# Patient Record
Sex: MALE | Race: WHITE | NOT HISPANIC OR LATINO | Employment: OTHER | URBAN - METROPOLITAN AREA
[De-identification: names, ages, dates, MRNs, and addresses within clinical notes are randomized per-mention and may not be internally consistent; named-entity substitution may affect disease eponyms.]

---

## 2017-01-27 ENCOUNTER — GENERIC CONVERSION - ENCOUNTER (OUTPATIENT)
Dept: OTHER | Facility: OTHER | Age: 57
End: 2017-01-27

## 2017-02-01 ENCOUNTER — GENERIC CONVERSION - ENCOUNTER (OUTPATIENT)
Dept: OTHER | Facility: OTHER | Age: 57
End: 2017-02-01

## 2017-03-03 ENCOUNTER — APPOINTMENT (OUTPATIENT)
Dept: LAB | Facility: CLINIC | Age: 57
End: 2017-03-03
Payer: MEDICARE

## 2017-03-03 DIAGNOSIS — B20 HUMAN IMMUNODEFICIENCY VIRUS (HIV) DISEASE (HCC): ICD-10-CM

## 2017-03-03 DIAGNOSIS — E78.1 PURE HYPERGLYCERIDEMIA: ICD-10-CM

## 2017-03-03 LAB
ALBUMIN SERPL BCP-MCNC: 4.4 G/DL (ref 3.5–5)
ALP SERPL-CCNC: 106 U/L (ref 46–116)
ALT SERPL W P-5'-P-CCNC: 34 U/L (ref 12–78)
ANION GAP SERPL CALCULATED.3IONS-SCNC: 11 MMOL/L (ref 4–13)
AST SERPL W P-5'-P-CCNC: 20 U/L (ref 5–45)
BASOPHILS # BLD AUTO: 0.04 THOUSANDS/ΜL (ref 0–0.1)
BASOPHILS NFR BLD AUTO: 1 % (ref 0–1)
BILIRUB SERPL-MCNC: 0.4 MG/DL (ref 0.2–1)
BUN SERPL-MCNC: 18 MG/DL (ref 5–25)
CALCIUM SERPL-MCNC: 9.6 MG/DL (ref 8.3–10.1)
CHLORIDE SERPL-SCNC: 101 MMOL/L (ref 100–108)
CHOLEST SERPL-MCNC: 185 MG/DL (ref 50–200)
CO2 SERPL-SCNC: 27 MMOL/L (ref 21–32)
CREAT SERPL-MCNC: 1.11 MG/DL (ref 0.6–1.3)
EOSINOPHIL # BLD AUTO: 0.12 THOUSAND/ΜL (ref 0–0.61)
EOSINOPHIL NFR BLD AUTO: 2 % (ref 0–6)
ERYTHROCYTE [DISTWIDTH] IN BLOOD BY AUTOMATED COUNT: 12.8 % (ref 11.6–15.1)
GFR SERPL CREATININE-BSD FRML MDRD: >60 ML/MIN/1.73SQ M
GLUCOSE SERPL-MCNC: 90 MG/DL (ref 65–140)
HCT VFR BLD AUTO: 45 % (ref 36.5–49.3)
HDLC SERPL-MCNC: 41 MG/DL (ref 40–60)
HGB BLD-MCNC: 15.8 G/DL (ref 12–17)
LDLC SERPL CALC-MCNC: 95 MG/DL (ref 0–100)
LYMPHOCYTES # BLD AUTO: 1.83 THOUSANDS/ΜL (ref 0.6–4.47)
LYMPHOCYTES NFR BLD AUTO: 27 % (ref 14–44)
MCH RBC QN AUTO: 30 PG (ref 26.8–34.3)
MCHC RBC AUTO-ENTMCNC: 35.1 G/DL (ref 31.4–37.4)
MCV RBC AUTO: 86 FL (ref 82–98)
MONOCYTES # BLD AUTO: 0.66 THOUSAND/ΜL (ref 0.17–1.22)
MONOCYTES NFR BLD AUTO: 10 % (ref 4–12)
NEUTROPHILS # BLD AUTO: 4.26 THOUSANDS/ΜL (ref 1.85–7.62)
NEUTS SEG NFR BLD AUTO: 60 % (ref 43–75)
PLATELET # BLD AUTO: 215 THOUSANDS/UL (ref 149–390)
PMV BLD AUTO: 11 FL (ref 8.9–12.7)
POTASSIUM SERPL-SCNC: 3.7 MMOL/L (ref 3.5–5.3)
PROT SERPL-MCNC: 8.8 G/DL (ref 6.4–8.2)
RBC # BLD AUTO: 5.26 MILLION/UL (ref 3.88–5.62)
SODIUM SERPL-SCNC: 139 MMOL/L (ref 136–145)
TRIGL SERPL-MCNC: 245 MG/DL
WBC # BLD AUTO: 6.91 THOUSAND/UL (ref 4.31–10.16)

## 2017-03-03 PROCEDURE — 36415 COLL VENOUS BLD VENIPUNCTURE: CPT

## 2017-03-03 PROCEDURE — 85025 COMPLETE CBC W/AUTO DIFF WBC: CPT

## 2017-03-03 PROCEDURE — 86706 HEP B SURFACE ANTIBODY: CPT

## 2017-03-03 PROCEDURE — 80061 LIPID PANEL: CPT

## 2017-03-03 PROCEDURE — 86361 T CELL ABSOLUTE COUNT: CPT

## 2017-03-03 PROCEDURE — 87536 HIV-1 QUANT&REVRSE TRNSCRPJ: CPT

## 2017-03-03 PROCEDURE — 80053 COMPREHEN METABOLIC PANEL: CPT

## 2017-03-03 PROCEDURE — 86803 HEPATITIS C AB TEST: CPT

## 2017-03-04 LAB
BASOPHILS # BLD AUTO: 0 X10E3/UL (ref 0–0.2)
BASOPHILS NFR BLD AUTO: 0 %
CD3+CD4+ CELLS # BLD: 594 /UL (ref 359–1519)
CD3+CD4+ CELLS NFR BLD: 33 % (ref 30.8–58.5)
EOSINOPHIL # BLD AUTO: 0.1 X10E3/UL (ref 0–0.4)
EOSINOPHIL NFR BLD AUTO: 2 %
ERYTHROCYTE [DISTWIDTH] IN BLOOD BY AUTOMATED COUNT: 14 % (ref 12.3–15.4)
HBV SURFACE AB SER-ACNC: <3.1 MIU/ML
HCT VFR BLD AUTO: 45.3 % (ref 37.5–51)
HCV AB SER QL: NORMAL
HGB BLD-MCNC: 15.6 G/DL (ref 12.6–17.7)
IMM GRANULOCYTES # BLD: 0 X10E3/UL (ref 0–0.1)
IMM GRANULOCYTES NFR BLD: 0 %
LYMPHOCYTES # BLD AUTO: 1.8 X10E3/UL (ref 0.7–3.1)
LYMPHOCYTES NFR BLD AUTO: 27 %
MCH RBC QN AUTO: 30.2 PG (ref 26.6–33)
MCHC RBC AUTO-ENTMCNC: 34.4 G/DL (ref 31.5–35.7)
MCV RBC AUTO: 88 FL (ref 79–97)
MONOCYTES # BLD AUTO: 0.6 X10E3/UL (ref 0.1–0.9)
MONOCYTES NFR BLD AUTO: 8 %
NEUTROPHILS # BLD AUTO: 4.1 X10E3/UL (ref 1.4–7)
NEUTROPHILS NFR BLD AUTO: 63 %
PLATELET # BLD AUTO: 218 X10E3/UL (ref 150–379)
RBC # BLD AUTO: 5.17 X10E6/UL (ref 4.14–5.8)
WBC # BLD AUTO: 6.6 X10E3/UL (ref 3.4–10.8)

## 2017-03-07 LAB
HIV1 RNA # SERPL NAA+PROBE: <20 COPIES/ML
HIV1 RNA SERPL NAA+PROBE-LOG#: NORMAL LOG10COPY/ML

## 2017-03-08 ENCOUNTER — GENERIC CONVERSION - ENCOUNTER (OUTPATIENT)
Dept: OTHER | Facility: OTHER | Age: 57
End: 2017-03-08

## 2017-03-28 ENCOUNTER — ALLSCRIPTS OFFICE VISIT (OUTPATIENT)
Dept: OTHER | Facility: CLINIC | Age: 57
End: 2017-03-28

## 2017-04-25 ENCOUNTER — ALLSCRIPTS OFFICE VISIT (OUTPATIENT)
Dept: OTHER | Facility: CLINIC | Age: 57
End: 2017-04-25

## 2017-04-25 ENCOUNTER — GENERIC CONVERSION - ENCOUNTER (OUTPATIENT)
Dept: OTHER | Facility: OTHER | Age: 57
End: 2017-04-25

## 2017-04-25 DIAGNOSIS — R19.7 DIARRHEA: ICD-10-CM

## 2017-04-25 DIAGNOSIS — E78.1 PURE HYPERGLYCERIDEMIA: ICD-10-CM

## 2017-05-19 ENCOUNTER — GENERIC CONVERSION - ENCOUNTER (OUTPATIENT)
Dept: OTHER | Facility: OTHER | Age: 57
End: 2017-05-19

## 2017-07-11 ENCOUNTER — ALLSCRIPTS OFFICE VISIT (OUTPATIENT)
Dept: OTHER | Facility: CLINIC | Age: 57
End: 2017-07-11

## 2017-07-11 ENCOUNTER — GENERIC CONVERSION - ENCOUNTER (OUTPATIENT)
Dept: OTHER | Facility: OTHER | Age: 57
End: 2017-07-11

## 2017-08-01 DIAGNOSIS — Z11.3 ENCOUNTER FOR SCREENING FOR INFECTIONS WITH PREDOMINANTLY SEXUAL MODE OF TRANSMISSION: ICD-10-CM

## 2017-08-01 DIAGNOSIS — B20 HUMAN IMMUNODEFICIENCY VIRUS (HIV) DISEASE (HCC): ICD-10-CM

## 2017-09-01 DIAGNOSIS — Z79.899 OTHER LONG TERM (CURRENT) DRUG THERAPY: ICD-10-CM

## 2017-09-01 DIAGNOSIS — E78.1 PURE HYPERGLYCERIDEMIA: ICD-10-CM

## 2017-09-01 DIAGNOSIS — E11.9 TYPE 2 DIABETES MELLITUS WITHOUT COMPLICATIONS (HCC): ICD-10-CM

## 2017-09-01 DIAGNOSIS — F17.200 NICOTINE DEPENDENCE, UNCOMPLICATED: ICD-10-CM

## 2017-10-05 ENCOUNTER — TRANSCRIBE ORDERS (OUTPATIENT)
Dept: LAB | Facility: CLINIC | Age: 57
End: 2017-10-05

## 2017-10-05 ENCOUNTER — APPOINTMENT (OUTPATIENT)
Dept: LAB | Facility: CLINIC | Age: 57
End: 2017-10-05
Payer: MEDICARE

## 2017-10-05 DIAGNOSIS — B20 HUMAN IMMUNODEFICIENCY VIRUS (HIV) DISEASE (HCC): ICD-10-CM

## 2017-10-05 DIAGNOSIS — E78.1 PURE HYPERGLYCERIDEMIA: ICD-10-CM

## 2017-10-05 DIAGNOSIS — Z11.3 ENCOUNTER FOR SCREENING FOR INFECTIONS WITH PREDOMINANTLY SEXUAL MODE OF TRANSMISSION: ICD-10-CM

## 2017-10-05 DIAGNOSIS — Z79.899 OTHER LONG TERM (CURRENT) DRUG THERAPY: ICD-10-CM

## 2017-10-05 DIAGNOSIS — F17.200 NICOTINE DEPENDENCE, UNCOMPLICATED: ICD-10-CM

## 2017-10-05 DIAGNOSIS — E11.9 TYPE 2 DIABETES MELLITUS WITHOUT COMPLICATIONS (HCC): ICD-10-CM

## 2017-10-05 LAB
25(OH)D3 SERPL-MCNC: 29.7 NG/ML (ref 30–100)
ALBUMIN SERPL BCP-MCNC: 3.7 G/DL (ref 3.5–5)
ALP SERPL-CCNC: 100 U/L (ref 46–116)
ALT SERPL W P-5'-P-CCNC: 35 U/L (ref 12–78)
ANION GAP SERPL CALCULATED.3IONS-SCNC: 4 MMOL/L (ref 4–13)
AST SERPL W P-5'-P-CCNC: 20 U/L (ref 5–45)
BASOPHILS # BLD AUTO: 0.03 THOUSANDS/ΜL (ref 0–0.1)
BASOPHILS NFR BLD AUTO: 1 % (ref 0–1)
BILIRUB SERPL-MCNC: 0.2 MG/DL (ref 0.2–1)
BILIRUB UR QL STRIP: NEGATIVE
BUN SERPL-MCNC: 17 MG/DL (ref 5–25)
CALCIUM SERPL-MCNC: 8.7 MG/DL (ref 8.3–10.1)
CHLORIDE SERPL-SCNC: 101 MMOL/L (ref 100–108)
CHOLEST SERPL-MCNC: 144 MG/DL (ref 50–200)
CLARITY UR: CLEAR
CO2 SERPL-SCNC: 29 MMOL/L (ref 21–32)
COLOR UR: YELLOW
CREAT SERPL-MCNC: 1.03 MG/DL (ref 0.6–1.3)
EOSINOPHIL # BLD AUTO: 0.33 THOUSAND/ΜL (ref 0–0.61)
EOSINOPHIL NFR BLD AUTO: 7 % (ref 0–6)
ERYTHROCYTE [DISTWIDTH] IN BLOOD BY AUTOMATED COUNT: 12.8 % (ref 11.6–15.1)
EST. AVERAGE GLUCOSE BLD GHB EST-MCNC: 120 MG/DL
GFR SERPL CREATININE-BSD FRML MDRD: 81 ML/MIN/1.73SQ M
GLUCOSE P FAST SERPL-MCNC: 111 MG/DL (ref 65–99)
GLUCOSE UR STRIP-MCNC: NEGATIVE MG/DL
HBA1C MFR BLD: 5.8 % (ref 4.2–6.3)
HCT VFR BLD AUTO: 40.9 % (ref 36.5–49.3)
HDLC SERPL-MCNC: 37 MG/DL (ref 40–60)
HGB BLD-MCNC: 14.1 G/DL (ref 12–17)
HGB UR QL STRIP.AUTO: NEGATIVE
KETONES UR STRIP-MCNC: NEGATIVE MG/DL
LDLC SERPL CALC-MCNC: 83 MG/DL (ref 0–100)
LEUKOCYTE ESTERASE UR QL STRIP: NEGATIVE
LYMPHOCYTES # BLD AUTO: 1.57 THOUSANDS/ΜL (ref 0.6–4.47)
LYMPHOCYTES NFR BLD AUTO: 34 % (ref 14–44)
MCH RBC QN AUTO: 30.1 PG (ref 26.8–34.3)
MCHC RBC AUTO-ENTMCNC: 34.5 G/DL (ref 31.4–37.4)
MCV RBC AUTO: 87 FL (ref 82–98)
MONOCYTES # BLD AUTO: 0.43 THOUSAND/ΜL (ref 0.17–1.22)
MONOCYTES NFR BLD AUTO: 9 % (ref 4–12)
NEUTROPHILS # BLD AUTO: 2.32 THOUSANDS/ΜL (ref 1.85–7.62)
NEUTS SEG NFR BLD AUTO: 49 % (ref 43–75)
NITRITE UR QL STRIP: NEGATIVE
PH UR STRIP.AUTO: 6 [PH] (ref 4.5–8)
PLATELET # BLD AUTO: 192 THOUSANDS/UL (ref 149–390)
PMV BLD AUTO: 11.2 FL (ref 8.9–12.7)
POTASSIUM SERPL-SCNC: 3.7 MMOL/L (ref 3.5–5.3)
PROT SERPL-MCNC: 7.8 G/DL (ref 6.4–8.2)
PROT UR STRIP-MCNC: NEGATIVE MG/DL
RBC # BLD AUTO: 4.69 MILLION/UL (ref 3.88–5.62)
SODIUM SERPL-SCNC: 134 MMOL/L (ref 136–145)
SP GR UR STRIP.AUTO: 1.01 (ref 1–1.03)
TRIGL SERPL-MCNC: 118 MG/DL
UROBILINOGEN UR QL STRIP.AUTO: 0.2 E.U./DL
WBC # BLD AUTO: 4.68 THOUSAND/UL (ref 4.31–10.16)

## 2017-10-05 PROCEDURE — 86592 SYPHILIS TEST NON-TREP QUAL: CPT

## 2017-10-05 PROCEDURE — 80061 LIPID PANEL: CPT

## 2017-10-05 PROCEDURE — 87536 HIV-1 QUANT&REVRSE TRNSCRPJ: CPT

## 2017-10-05 PROCEDURE — 86361 T CELL ABSOLUTE COUNT: CPT

## 2017-10-05 PROCEDURE — 80053 COMPREHEN METABOLIC PANEL: CPT

## 2017-10-05 PROCEDURE — 36415 COLL VENOUS BLD VENIPUNCTURE: CPT

## 2017-10-05 PROCEDURE — 81003 URINALYSIS AUTO W/O SCOPE: CPT

## 2017-10-05 PROCEDURE — 82306 VITAMIN D 25 HYDROXY: CPT

## 2017-10-05 PROCEDURE — 85025 COMPLETE CBC W/AUTO DIFF WBC: CPT

## 2017-10-05 PROCEDURE — 86480 TB TEST CELL IMMUN MEASURE: CPT

## 2017-10-05 PROCEDURE — 83036 HEMOGLOBIN GLYCOSYLATED A1C: CPT

## 2017-10-06 LAB
BASOPHILS # BLD AUTO: 0.1 X10E3/UL (ref 0–0.2)
BASOPHILS NFR BLD AUTO: 1 %
CD3+CD4+ CELLS # BLD: 465 /UL (ref 359–1519)
CD3+CD4+ CELLS NFR BLD: 31 % (ref 30.8–58.5)
EOSINOPHIL # BLD AUTO: 0.3 X10E3/UL (ref 0–0.4)
EOSINOPHIL NFR BLD AUTO: 7 %
ERYTHROCYTE [DISTWIDTH] IN BLOOD BY AUTOMATED COUNT: 14.4 % (ref 12.3–15.4)
HCT VFR BLD AUTO: 37.3 % (ref 37.5–51)
HGB BLD-MCNC: 12.9 G/DL (ref 12.6–17.7)
IMM GRANULOCYTES # BLD: 0 X10E3/UL (ref 0–0.1)
IMM GRANULOCYTES NFR BLD: 0 %
LYMPHOCYTES # BLD AUTO: 1.5 X10E3/UL (ref 0.7–3.1)
LYMPHOCYTES NFR BLD AUTO: 32 %
MCH RBC QN AUTO: 30.6 PG (ref 26.6–33)
MCHC RBC AUTO-ENTMCNC: 34.6 G/DL (ref 31.5–35.7)
MCV RBC AUTO: 88 FL (ref 79–97)
MONOCYTES # BLD AUTO: 0.5 X10E3/UL (ref 0.1–0.9)
MONOCYTES NFR BLD AUTO: 10 %
NEUTROPHILS # BLD AUTO: 2.4 X10E3/UL (ref 1.4–7)
NEUTROPHILS NFR BLD AUTO: 50 %
PLATELET # BLD AUTO: 216 X10E3/UL (ref 150–379)
RBC # BLD AUTO: 4.22 X10E6/UL (ref 4.14–5.8)
RPR SER QL: NORMAL
WBC # BLD AUTO: 4.8 X10E3/UL (ref 3.4–10.8)

## 2017-10-07 LAB
ANNOTATION COMMENT IMP: NORMAL
GAMMA INTERFERON BACKGROUND BLD IA-ACNC: 0.02 IU/ML
M TB IFN-G BLD-IMP: NEGATIVE
M TB IFN-G CD4+ BCKGRND COR BLD-ACNC: 0.12 IU/ML
M TB IFN-G CD4+ T-CELLS BLD-ACNC: 0.14 IU/ML
MITOGEN IGNF BLD-ACNC: 7.89 IU/ML
QUANTIFERON-TB GOLD IN TUBE: NORMAL
SERVICE CMNT-IMP: NORMAL

## 2017-10-09 ENCOUNTER — GENERIC CONVERSION - ENCOUNTER (OUTPATIENT)
Dept: OTHER | Facility: OTHER | Age: 57
End: 2017-10-09

## 2017-10-09 LAB
HIV1 RNA # SERPL NAA+PROBE: <20 COPIES/ML
HIV1 RNA SERPL NAA+PROBE-LOG#: NORMAL LOG10COPY/ML

## 2017-10-16 ENCOUNTER — ALLSCRIPTS OFFICE VISIT (OUTPATIENT)
Dept: OTHER | Facility: CLINIC | Age: 57
End: 2017-10-16

## 2017-10-17 ENCOUNTER — GENERIC CONVERSION - ENCOUNTER (OUTPATIENT)
Dept: OTHER | Facility: OTHER | Age: 57
End: 2017-10-17

## 2017-10-24 ENCOUNTER — ALLSCRIPTS OFFICE VISIT (OUTPATIENT)
Dept: OTHER | Facility: CLINIC | Age: 57
End: 2017-10-24

## 2017-12-06 ENCOUNTER — GENERIC CONVERSION - ENCOUNTER (OUTPATIENT)
Dept: OTHER | Facility: OTHER | Age: 57
End: 2017-12-06

## 2018-01-09 NOTE — PROGRESS NOTES
Assessment    1  Anxiety (300 00) (F41 9)   2  HIV disease (042) (B20)   3  Hypertriglyceridemia (272 1) (E78 1)   4  Testicular pain (608 9) (N50 8)    Plan    1  Stop: Hepatitis A   2  Stop: Prevnar 13 Intramuscular Suspension    3  (1) LIPID PANEL, FASTING; Status:Active; Requested for:40Leg5349;     Discussion/Summary    Mr Derek Rincon is a 50 yo man with HIV and documented HIV viral resistance to multiple classes of ARTs, now VL<20 on salvage regimen DRV/r bid, DTG bid, TDF qd  CD4 is 564  Progressive improvement in CD4 count since 2014  1  Continue current ART  2  Kidney function normal eGFR, liver enzymes also normal off Tricor  3  Improved TG with dietary modification  Will follow  AST 16  ALT 26  Checking lipids with next blood draw  4  Urology seen by Dr Jenise Aguayo, appreciate evaluation including ultrasound for mild varicocele  Recommended scrotal support and NSAIDS  5  Erectile dysfunction will consider low dose ED meds  Pt reports benefit, not recorded in med list     6  Tobacco cessation encouraged, would benefit  Possible side effects of new medications were reviewed with the patient/guardian today  The treatment plan was reviewed with the patient/guardian  The patient/guardian understands and agrees with the treatment plan      Chief Complaint  Pt offers no c/o at this time  Patient is here today for follow up of chronic conditions described in HPI  History of Present Illness    Pain Assessment   the patient states they do not have pain  Abuse And Domestic Violence Screen    Yes, the patient is safe at home  The patient states no one is hurting them  Depression And Suicide Screen  No, the patient has not had thoughts of hurting themself  No, the patient has not felt depressed in the past 7 days  The patient is being seen for a routine clinic follow-up of HIV infection  The last clinic visit was 3 month(s) ago   Management changes made at the last visit include ordering HIV VL and CD4 count  The patient is currently asymptomatic  No exacerbating factors are noted  No relieving factors are noted  No associated symptoms are reported  Current treatment includes antiretroviral regimen  By report, there is good compliance with treatment, good tolerance of treatment and good symptom control  The initial diagnosis of HIV was year(s) ago  The last CD4 count was 564 and performed on 4/22/16  The CD4 trend has been stable  The last viral load was <20 and performed on 4/22/16  The viral load has been stable  Since diagnosis the disease has been stable  Recently, the disease has been stable  There are no known disease complications  Pertinent medical history:  mental health problems, but no hepatitis B, no hepatitis C, no syphilis, no tuberculosis, no pneumonia, no herpes simplex, no alcohol dependency, no drug dependency, no IV drug use, no unsafe sexual habits and no pets in the home  The patient is currently able to do activities of daily living without limitations  He was previously evaluated in this clinic 3 month(s) ago  Past evaluation has included CD4 count, plasma viral load, complete blood count, urinalysis, metabolic profile, liver function panel and hepatitis panel  Past treatment has included antiretroviral regimen of salvage ART, DTG bid and DRV/r bid  The patient is being seen for follow-up of anxiety  The patient reports doing well  He has no comorbid illnesses  He has had no significant interval events  The patient is currently asymptomatic  Associated symptoms: no grandiosity, no racing thoughts, no periods of euphoria, no hallucinations and no suicidal ideation  The patient is not currently on medication for this problem  Disease management:  the patient is doing well with his goals  The patient is being seen for a routine clinic follow-up of hypertriglyceridemia  The last clinic visit was 3 month(s) ago   Management changes made at the last visit include ordering lipid profile  (off Tricor, due for lipid profile, dietary changes and activity increase) Recent measurements for this patient were taken on 1/25/16  Recent measurements: LDL 86 mg/dL  No associated symptoms are reported  Current treatment includes weight loss program and dietary modification  By report, there is good compliance with treatment, good tolerance of treatment and good symptom control  (hypertriglyceridemia off Tricor, had elevated liver enzymes on Tricor  )      Review of Systems    Constitutional: No fever or chills, feels well, no tiredness, no recent weight gain or weight loss  Eyes: No complaints of eye pain, no red eyes, no discharge from eyes, no itchy eyes  ENT: no complaints of earache, no hearing loss, no nosebleeds, no nasal discharge, no sore throat, no hoarseness  Cardiovascular: No complaints of slow heart rate, no fast heart rate, no chest pain, no palpitations, no leg claudication, no lower extremity  Respiratory: No complaints of shortness of breath, no wheezing, no cough, no SOB on exertion, no orthopnea or PND  Gastrointestinal: No complaints of abdominal pain, no constipation, no nausea or vomiting, no diarrhea or bloody stools  Genitourinary: No complaints of dysuria, no incontinence, no hesitancy, no nocturia, no genital lesion, no testicular pain  Musculoskeletal: No complaints of arthralgia, no myalgias, no joint swelling or stiffness, no limb pain or swelling  Integumentary: No complaints of skin rash or skin lesions, no itching, no skin wound, no dry skin  Neurological: No compliants of headache, no confusion, no convulsions, no numbness or tingling, no dizziness or fainting, no limb weakness, no difficulty walking  Psychiatric: Is not suicidal, no sleep disturbances, no anxiety or depression, no change in personality, no emotional problems     Endocrine: No complaints of proptosis, no hot flashes, no muscle weakness, no erectile dysfunction, no deepening of the voice, no feelings of weakness  Hematologic/Lymphatic: No complaints of swollen glands, no swollen glands in the neck, does not bleed easily, no easy bruising  Active Problems     1  Anxiety (300 00) (F41 9)   2  Bilateral back pain, unspecified location   3  Depression screening (V79 0) (Z13 89)   4  Essential hypertension (401 9) (I10)   5  Heart murmur (785 2) (R01 1)   6  HIV disease (042) (B20)   7  Hypertriglyceridemia (272 1) (E78 1)   8  Left groin pain (789 09) (R10 30)   9  Prostate cancer screening (V76 44) (Z12 5)   10  Snoring (786 09) (R06 83)   11  Testicular pain (608 9) (N50 8)   12  Varicocele (456 4) (I86 1)    Diabetes mellitus (250 00) (E11 9)          Past Medical History    1  History of DDD (degenerative disc disease), lumbar (722 52) (M51 36)   2  History of Facet joint disease (724 8) (M47 899)   3  History of hiatal hernia (V12 79) (Z87 19)   4  History of human immunodeficiency virus infection (V12 09) (Z86 19)   5  History of hypertension (V12 59) (Z86 79)   6  History of shoulder surgery (V45 89) (Z98 89)   7  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)    The active problems and past medical history were reviewed and updated today  Surgical History    1  History of Hernia Repair   2  History of Shoulder Surgery    The surgical history was reviewed and updated today  Family History  Mother    1  No pertinent family history  Father    2  No pertinent family history    The family history was reviewed and updated today  Social History    · Current every day smoker (305 1) (F17 200)   · History of Unknown if ever smoked  The social history was reviewed and updated today  The social history was reviewed and is unchanged  Current Meds   1  AmLODIPine Besylate 2 5 MG Oral Tablet; take 1 tablet by mouth daily; Therapy: 45Lej6638 to (Evaluate:50Ifd8152)  Requested for: 04LDJ3315; Last   Rx:30Mar2016 Ordered   2  Norvir 100 MG Oral Tablet; take 1 tablet by mouth twice a day; Therapy: 05Gkq2924 to (Evaluate:69Gju6215)  Requested for: 48XDM5299; Last   Rx:30Mar2016 Ordered   3  PARoxetine HCl - 20 MG Oral Tablet; take 1 tablet by mouth daily; Therapy: 62Vzm1499 to (Evaluate:65Kus9215)  Requested for: 45OUS3857; Last   Rx:32Jnp8286 Ordered   4  Prezista 600 MG Oral Tablet; take 1 tablet by mouth twice a day; Therapy: 34Vzv1257 to (Evaluate:93Pkn7301)  Requested for: 97XUP4251; Last   Rx:30Mar2016 Ordered   5  Tivicay 50 MG Oral Tablet; take 1 tablet by mouth twice a day; Therapy: 04Wxr7508 to (Evaluate:49Ijm2943)  Requested for: 86SXX5354; Last   Rx:30Mar2016 Ordered   6  Viread 300 MG Oral Tablet; take 1 tablet by mouth daily; Therapy: 91Xjz4111 to (Eloina Kaufman)  Requested for: 44VGD7776; Last   RQ:81ZMY9667 Ordered    The medication list was reviewed and updated today  Allergies    1  No Known Drug Allergies    Vitals  Signs [Data Includes: Current Encounter]   Recorded: 17CTF5792 09:22AM   Temperature: 97 5 F  Heart Rate: 76  Systolic: 847  Diastolic: 82  Weight: 429 lb   BMI Calculated: 28 62  BSA Calculated: 2 18    Physical Exam    Constitutional   General appearance: No acute distress, well appearing and well nourished  Eyes   Conjunctiva and lids: No swelling, erythema or discharge  Pupils and irises: Equal, round and reactive to light  Ears, Nose, Mouth, and Throat   External inspection of ears and nose: Normal     Otoscopic examination: Tympanic membranes translucent with normal light reflex  Canals patent without erythema  Nasal mucosa, septum, and turbinates: Normal without edema or erythema  Oropharynx: Normal with no erythema, edema, exudate or lesions  Pulmonary   Respiratory effort: No increased work of breathing or signs of respiratory distress  Auscultation of lungs: Clear to auscultation  Cardiovascular   Palpation of heart: Normal PMI, no thrills      Auscultation of heart: Normal rate and rhythm, normal S1 and S2, without murmurs  Examination of extremities for edema and/or varicosities: Normal     Carotid pulses: Normal     Abdomen   Abdomen: Non-tender, no masses  Liver and spleen: No hepatomegaly or splenomegaly  Lymphatic   Palpation of lymph nodes in neck: No lymphadenopathy  Musculoskeletal   Gait and station: Normal     Digits and nails: Normal without clubbing or cyanosis  Inspection/palpation of joints, bones, and muscles: Normal     Muscle strength: Normal strength throughout  Skin   Skin and subcutaneous tissue: Normal without rashes or lesions  Neurologic   Cranial nerves: Cranial nerves 2-12 intact  Reflexes: 2+ and symmetric  Sensation: No sensory loss      Psychiatric   Orientation to person, place, and time: Normal     Mood and affect: Normal        Results/Data  Results   (1) CBC/PLT/DIFF 22Apr2016 06:54AM Swapnil Garcia     Test Name Result Flag Reference   WBC COUNT 5 38 Thousand/uL  4 31-10 16   RBC COUNT 5 01 Million/uL  3 88-5 62   HEMOGLOBIN 14 9 g/dL  12 0-17 0   HEMATOCRIT 42 9 %  36 5-49 3   MCV 86 fL  82-98   MCH 29 7 pg  26 8-34 3   MCHC 34 7 g/dL  31 4-37 4   RDW 13 1 %  11 6-15 1   MPV 11 1 fL  8 9-12 7   PLATELET COUNT 351 Thousands/uL  149-390   NEUTROPHILS RELATIVE PERCENT 45 %  43-75   LYMPHOCYTES RELATIVE PERCENT 36 %  14-44   MONOCYTES RELATIVE PERCENT 11 %  4-12   EOSINOPHILS RELATIVE PERCENT 7 % H 0-6   BASOPHILS RELATIVE PERCENT 1 %  0-1   NEUTROPHILS ABSOLUTE COUNT 2 44 Thousands/µL  1 85-7 62   LYMPHOCYTES ABSOLUTE COUNT 1 93 Thousands/µL  0 60-4 47   MONOCYTES ABSOLUTE COUNT 0 60 Thousand/µL  0 17-1 22   EOSINOPHILS ABSOLUTE COUNT 0 36 Thousand/µL  0 00-0 61   BASOPHILS ABSOLUTE COUNT 0 05 Thousands/µL  0 00-0 10     (1) COMPREHENSIVE METABOLIC PANEL 13RCA5527 92:97WJ Swapnil Garcia   National Kidney Disease Education Program recommendations are as follows:  GFR calculation is accurate only with a steady state creatinine  Chronic Kidney disease less than 60 ml/min/1 73 sq  meters  Kidney failure less than 15 ml/min/1 73 sq  meters  Test Name Result Flag Reference   GLUCOSE,RANDM 99 mg/dL     If the patient is fasting, the ADA then defines impaired fasting glucose as > 100 mg/dL and diabetes as > or equal to 123 mg/dL  SODIUM 139 mmol/L  136-145   POTASSIUM 4 0 mmol/L  3 5-5 3   CHLORIDE 102 mmol/L  100-108   CARBON DIOXIDE 26 mmol/L  21-32   ANION GAP (CALC) 11 mmol/L  4-13   BLOOD UREA NITROGEN 17 mg/dL  5-25   CREATININE 1 00 mg/dL  0 60-1 30   Standardized to IDMS reference method   CALCIUM 9 2 mg/dL  8 3-10 1   BILI, TOTAL 0 20 mg/dL  0 20-1 00   ALK PHOSPHATAS 112 U/L     ALT (SGPT) 26 U/L  12-78   AST(SGOT) 16 U/L  5-45   ALBUMIN 3 8 g/dL  3 5-5 0   TOTAL PROTEIN 8 2 g/dL  6 4-8 2   eGFR Non-African American      >60 0 ml/min/1 73sq m     (1) T LYMPH SUBSET (CD4) 34Bsk3845 06:54AM Tom Moctezuma   Performed at:  [address] [facility] [address]  [id number]  : Christofer Calero MD, Phone:  [phone]     Test Name Result Flag Reference   CD4 T CELL ABSOLUTE 564 /uL  359 - 1519   CD4 % HELPER T CELL 29 7 % L 30 8 - 58 5   WBC (CD4/8) 5 3 x10E3/uL  3 4 - 10 8   RBC 5 13 x10E6/uL  4 14 - 5 80   HGB (CD4/8) 15 4 g/dL  12 6 - 17 7   HCT (CD4/8) 44 8 %  37 5 - 51 0   MCV (CD4/8) 87 fL  79 - 97   MCH (CD4/8) 30 0 pg  26 6 - 33 0   MCHC (CD4/8) 34 4 g/dL  31 5 - 35 7   RDW (CD4/8) 14 3 %  12 3 - 15 4   PLT (CD4/8) 217 x10E3/uL  150 - 379   NEUTS (CD4/8) 46 %     LYMPHS (CD4/8) 37 %     MONOS (CD4/8) 10 %     EOS (CD4/8) 6 %     BASOS (CD4/8) 1 %     NEUTS,ABS  (CD4/8) 2 4 x10E3/uL  1 4 - 7 0   LYMP,ABS (CD4/8) 1 9 x10E3/uL  0 7 - 3 1   MONOS,ABS (CD4/8) 0 5 x10E3/uL  0 1 - 0 9   EOS, ABS (CD4/8) 0 3 x10E3/uL  0 0 - 0 4   BASO, ABS (CD4/8) 0 1 x10E3/uL  0 0 - 0 2   IIMM  GRANS,ABS (CD4/8) 0 0 x10E3/uL  0 0 - 0 1   IMM  GRANULOCYTES (CD4/8) 0 %       (1) HIV-1 RNA QUANTITATIVE 22Apr2016 06: 54AM Eber Saint   Performed at:  [address] [facility] [address]  [id number]  : Azar Moore MD, Phone:  [phone]     Test Name Result Flag Reference   HIV-1 RNA QUANT <20 copies/mL     HIV-1 RNA not detectedThe reportable range for this assay is 20 to 10,000,000copies HIV-1 RNA/mL     HIV-1 RNA VIRAL LOAD LOG COMMNT yev21nqay/mL     Unable to calculate result since non-numeric result obtained forcomponent test      Future Appointments    Date/Time Provider Specialty Site   10/04/2016 04:15 PM Codie Otoole MD [facility]   03/22/2017 02:15 PM Cyndi Chen MD Urology [address] [place]     Signatures   Electronically signed by : Suzette Rock MD PhD; May 12 2016  2:41PM EST                       (Author)

## 2018-01-10 NOTE — PROGRESS NOTES
History of Present Illness    Assessment:  Oral Health Questionnaire  Nutrition Diagnosis No Nutrition Diagnosis Now   Pt indicates no current oral issues  Had 3 teeth pulled in February 2016 at [facility]  That was the last encounter with the dentist  Pt is cased managed by Sapphire Burns  Active Problems    1  Anxiety (300 00) (F41 9)   2  Bilateral back pain, unspecified location   3  Depression screening (V79 0) (Z13 89)   4  Dermoid cyst of face (216 3) (D23 30)   5  Diabetes mellitus (250 00) (E11 9)   6  Essential hypertension (401 9) (I10)   7  Heart murmur (785 2) (R01 1)   8  HIV disease (042) (B20)   9  Hypertriglyceridemia (272 1) (E78 1)   10  Left groin pain (789 09) (R10 30)   11  Prostate cancer screening (V76 44) (Z12 5)   12  Snoring (786 09) (R06 83)   13  Testicular pain (608 9) (N50 819)   14  Varicocele (456 4) (I86 1)    Past Medical History    1  History of DDD (degenerative disc disease), lumbar (722 52) (M51 36)   2  History of Facet joint disease (724 8) (M12 88)   3  History of hiatal hernia (V12 79) (Z87 19)   4  History of human immunodeficiency virus infection (V12 09) (Z86 19)   5  History of hypertension (V12 59) (Z86 79)   6  History of shoulder surgery (V45 89) (Z98 890)   7  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)    Surgical History    1  History of Hernia Repair   2  History of Shoulder Surgery    Family History  Mother    1  No pertinent family history  Father    2  No pertinent family history    Social History    · Current every day smoker (305 1) (F17 200)   · History of Unknown if ever smoked    Current Meds   1  AmLODIPine Besylate 2 5 MG Oral Tablet; take 1 tablet by mouth daily; Therapy: 63Euc8261 to (Evaluate:26Mar2017)  Requested for: 51TJN1133; Last Rx:46Pqg0323   Ordered   2  Norvir 100 MG Oral Tablet; take 1 tablet by mouth twice a day;    Therapy: 34Lpe2675 to (Evaluate:26Mar2017)  Requested for: 83UQQ5376; Last Rx:45Xwn5554   Ordered   3  PARoxetine HCl - 20 MG Oral Tablet; take 1 tablet by mouth daily; Therapy: 76Klq6417 to (Evaluate:26Mar2017)  Requested for: 69LIN6275; Last Rx:63Usl2413   Ordered   4  Prezista 600 MG Oral Tablet; take 1 tablet by mouth twice a day; Therapy: 38Lqa8764 to (Evaluate:26Mar2017)  Requested for: 97FGA2930; Last Rx:52Pwr0829   Ordered   5  Tivicay 50 MG Oral Tablet; take 1 tablet by mouth twice a day; Therapy: 35Qwk3741 to (Evaluate:26Mar2017)  Requested for: 04CPZ3383; Last Rx:46Peq9682   Ordered   6  Viread 300 MG Oral Tablet; take 1 tablet by mouth daily; Therapy: 50Aor3328 to (Yamilet Du)  Requested for: 86QJY7745; Last HL:56PAY1116   Ordered    Allergies    1  No Known Drug Allergies    Future Appointments    Date/Time Provider Specialty Site   03/28/2017 05:15 PM Linda Tobin MD Internal Medicine [facility] AT [facility]   11/09/2016 09:30 AM CATHIE Sanders [facility]     Signatures   Electronically signed by :  OLI Gamboa; Oct 12 2016 12:52PM EST                       (Author)

## 2018-01-11 NOTE — PROGRESS NOTES
Assessment    1  Diabetes mellitus (250 00) (E11 9)    Discussion/Summary    Intervention Diet Prescription   Energy 2000 kcal   25kcal /81kg   Protein 85g   1 1g /81kg   Fluid 2000ml   25ml /81kg Drink more water or home made sugar free tea, less juice  Estimated Intake: 2400kcal 80g Protein   His current intake is meeting estimated nutrition needs  Goal #1 - Improve/Maintain  comprehend education  Goal initiated  Time Frame For Accomplishment: By next follow-up  Intervention Nutrition Education Provided  Person Educated: patient   Topics Discussed: Lab results   Barriers To Learning: none  Readiness to Learn: Receptive  Teaching Method: verbal   Evaluation Of Learning: verbalized/demonstrated understanding       History of Present Illness  Assessment: Clinical Data/Client History HIV - Yes  His socio-economic status includes  cooks  He lives in [facility] house  Living Environment - He has access to refrigerator, stove and microwave  Psycho-Social factors are  depression  His functional status is  ambulatory, able to food shop and prepares own meals  He eats breakfast at  [employer], eggs, sometimes sausage orbacon, coffee, juice  He eats lunch at  [employer], juice  He eats dinner at  [employer] with grilled chicken, large glass juice  He snacks Loves juice, admits he drinks a lot  His appetite is  good  Nutrition Diagnosis   Problem related to comprehend education  As evidenced by  knowledge deficit  Signs/Symptoms:  Weight Gain and Abnormal Lab Results  Current A1c: 6 0  Active Problems    1  Anxiety (300 00) (F41 9)   2  Bilateral back pain, unspecified location   3  Depression screening (V79 0) (Z13 89)   4  Diabetes mellitus (250 00) (E11 9)   5  Essential hypertension (401 9) (I10)   6  Heart murmur (785 2) (R01 1)   7  HIV disease (042) (B20)   8  Hypertriglyceridemia (272 1) (E78 1)   9  Left groin pain (789 09) (R10 30)   10   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)   11  Prostate cancer screening (V76 44) (Z12 5)   12  Snoring (786 09) (R06 83)   13  Testicular pain (608 9) (N50 8)   14  Varicocele (456 4) (I86 1)    Past Medical History    1  History of DDD (degenerative disc disease), lumbar (722 52) (M51 36)   2  History of Facet joint disease (724 8) (M47 899)   3  History of hiatal hernia (V12 79) (Z87 19)   4  History of human immunodeficiency virus infection (V12 09) (Z86 19)   5  History of hypertension (V12 59) (Z86 79)   6  History of shoulder surgery (V45 89) (Z98 89)    Surgical History    1  History of Hernia Repair   2  History of Shoulder Surgery    Family History  Mother    1  No pertinent family history  Father    2  No pertinent family history    Social History    · Current every day smoker (305 1) (F17 200)   · History of Unknown if ever smoked    Current Meds   1  AmLODIPine Besylate 2 5 MG Oral Tablet; take 1 tablet by mouth daily; Therapy: 11Nkz5098 to (Evaluate:04Uis1190)  Requested for: 75FDV4978; Last Rx:30Mar2016   Ordered   2  Norvir 100 MG Oral Tablet; take 1 tablet by mouth twice a day; Therapy: 79Wlg5788 to (Evaluate:61Lcj6815)  Requested for: 14SAO3422; Last Rx:30Mar2016   Ordered   3  PARoxetine HCl - 20 MG Oral Tablet; take 1 tablet by mouth daily; Therapy: 52Xhn1768 to (Evaluate:17Efo4928)  Requested for: 95PBX4299; Last Rx:30Mar2016   Ordered   4  Prezista 600 MG Oral Tablet; take 1 tablet by mouth twice a day; Therapy: 08Zsm0603 to (Evaluate:58Ave6280)  Requested for: 37BXN4137; Last Rx:30Mar2016   Ordered   5  Tivicay 50 MG Oral Tablet; take 1 tablet by mouth twice a day; Therapy: 87Yya5944 to (Evaluate:14Sep8539)  Requested for: 41RWS7808; Last Rx:30Mar2016   Ordered   6  Viread 300 MG Oral Tablet; take 1 tablet by mouth daily; Therapy: 46Ggk0857 to (Carles Chant)  Requested for: 13MWG4123; Last LM:23CEO8307   Ordered    Allergies    1   No Known Drug Allergies    Vitals  Vitals [Data Includes: All]   Recorded: 38JHC2476 24:26CR   Systolic: 990  Diastolic: 82  Temperature: 97 5 F  Heart Rate: 76  Weight: 211 lb   BMI Calculated: 28 62  BSA Calculated: 2 18  Recorded: 08ENL1301 94:00XD   Systolic: 249  Diastolic: 80  Heart Rate: 72  Respiration: 16  Height: 6 ft   Weight: 204 lb   BMI Calculated: 27 67  BSA Calculated: 2 15  Recorded: 53IUY1095 58:64VS   Systolic: 577  Diastolic: 70  Temperature: 98 F  Heart Rate: 96  Weight: 207 lb 6 oz  BMI Calculated: 27 55  BSA Calculated: 2 18  Recorded: 93RAG0137 79:93PF   Systolic: 308  Diastolic: 78  Temperature: 97 7 F  Heart Rate: 76  Weight: 210 lb 4 oz  BMI Calculated: 27 93  BSA Calculated: 2 19  Recorded: 79RSO6877 56:45CV   Systolic: 556  Diastolic: 90  Temperature: 98 F  Heart Rate: 88  Weight: 207 lb 4 oz  BMI Calculated: 27 53  BSA Calculated: 2 18  Recorded: 68HTV6061 68:83LU   Systolic: 666  Diastolic: 90  Temperature: 97 8 F  Heart Rate: 84  Weight: 206 lb 4 oz  BMI Calculated: 27 4  BSA Calculated: 2 17  Recorded: 80UCV7455 90:17LO   Systolic: 622  Diastolic: 94  Temperature: 98 1 F  Heart Rate: 80  Weight: 207 lb 4 oz  BMI Calculated: 27 53  BSA Calculated: 2 18  Recorded: 49TLL9870 38:10LN   Systolic: 804  Diastolic: 82  Temperature: 97 9 F  Heart Rate: 76  Weight: 212 lb 2 oz  BMI Calculated: 28 18  BSA Calculated: 2 2  Recorded: 02ZTE0603 40:44NM   Systolic: 235  Diastolic: 84  Temperature: 98 1 F  Heart Rate: 92  Height: 6 ft 0 75 in  Weight: 210 lb 8 oz  BMI Calculated: 27 97  BSA Calculated: 2 19    Future Appointments    Date/Time Provider Specialty Site   03/22/2017 02:15 PM Candis Hines MD Urology [address]     Signatures   Electronically signed by : EMILY Jerome,SSI,[facility]; May 11 2016 11:00AM EST                       (Author)

## 2018-01-11 NOTE — RESULT NOTES
Verified Results  (1) LDL,DIRECT 61JOM6424 07:57AM Candy Morrow   LDL Cholesterol:        Optimal          <100 mg/dl         Near Optimal     100-129 mg/dl        Above Optimal          Borderline High   130-159 mg/dl          High              160-189 mg/dl          Very High        >189 mg/dl     Test Name Result Flag Reference   LDL, DIRECT 86 mg/dl  0-100     (1) HEMOGLOBIN A1C 79JZR8042 07:57AM Candy Morrow   5 7-6 4% impaired fasting glucose  >=6 5% diagnosis of diabetes    Falsely low levels are seen in conditions linked to short RBC life span-  hemolytic anemia, and splenomegaly  Falsely elevated levels are seen in situations where there is an increased production of RBC- receipt of erythropoietin or blood transfusions  Adopted from ADA-Clinical Practice Recommendations     Test Name Result Flag Reference   HEMOGLOBIN A1C 6 0 % H 4 0-5 6   EST  AVG   GLUCOSE 126 mg/dl
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
## 2018-01-12 NOTE — RESULT NOTES
Verified Results  (1) HEP C ANTIBODY [id number] 07:57AM Miranda Holley     Test Name Result Flag Reference   HEPATITIS C ANTIBODY Non-reactive  Non-reactive

## 2018-01-12 NOTE — PROGRESS NOTES
Assessment    1  Diarrhea (787 91) (R19 7)   2  HIV disease (042) (B20)   3  Nicotine dependence (305 1) (F17 200)   4  Essential hypertension (401 9) (I10)   5  Hypertriglyceridemia (272 1) (E78 1)    Plan  Diabetes mellitus, Nicotine dependence    · (1) HEMOGLOBIN A1C; Status:Active; Requested for:01Sep2017;   Hypertriglyceridemia, Nicotine dependence    · (1) LIPID PANEL, FASTING; Status:Active; Requested for:01Sep2017;   Nicotine dependence, Polypharmacy    · (1) VITAMIN D 25-HYDROXY; Status:Active; Requested for:01Sep2017;     Discussion/Summary    Sally Alicea is a 27-year-old  male who presents to clinic today for three-month primary care follow-up  HIV: CD4 594 VL <20 ART Norvir, Prezista, Tivicay, and Viread  Admits to missing the occasional night time dose  Advised to set alarm on cell phone to help him remember  Transient diarrhea: Describes 2-3 watery stools, occurring occasionally after breakfast  States most days BM are regular but does notice diarrhea occurring during times of increased stress  Advised to keep a food/stool diary and bring to next appointment  Encouraged a high fiber diet to bulk up stool  HTN: /76  Well controlled on Amlodipine  WIll continue  Hypertriglyceridemia: Doing well with eating a low fat diet  Recheck lipids with next labs  Refer to dietician for additional education  Nicotine dependence: Quit smoking 2 days ago  Is doing so "cold turkey" and declines nicotine replacement therapy  Refer to [address] for additional support  PCP f/u in 3 months  Possible side effects of new medications were reviewed with the patient/guardian today  The treatment plan was reviewed with the patient/guardian  The patient/guardian understands and agrees with the treatment plan   The patient was counseled regarding risks and benefits of treatment options, importance of compliance with treatment  Education   general HIV education  adherence  prevention care  Chief Complaint  Pt offers no c/o at this time  Pt quit smoking 2 days ago  Patient is here today for follow up of chronic conditions described in HPI  History of Present Illness  Amadorhuber Dennis Bill Garcia is a 56year old male who presents to the clinic today for 3 month PCP f/u  Past medical history significant for HIV, HTN, hypertriglyceridemia, nicotine dependence, and depression/anxiety  Seen in ID clinic 4/25/17 and reported new onset of diarrhea  Stool studies were ordered  Amador Shown states his diarrhea has significantly improved and he did not feel the need to complete stool studies  Pain Assessment   the patient states they do not have pain  Abuse And Domestic Violence Screen    Yes, the patient is safe at home  The patient states no one is hurting them  Depression And Suicide Screen  No, the patient has not had thoughts of hurting themself  No, the patient has not felt depressed in the past 7 days  no fever no lethargy no depression no weight loss no shortness of breath no nausea no vomiting no diarrhea no headache Current treatment includes antiretroviral regimen  By report, there is fair compliance with treatment and good tolerance of treatment  CD4: 594  VL: <20  Time spent: 20 minutes  Strength: Strong desire to be well  Weakness: Admit to missing 2 doses due to falling asleep early  Plan of Action:   Suggested he take medication earlier in the evening or use a phone alarm to help him remember doses  SUBSTANCE ABUSE: not using ETOH  not using drugs  SMOKING: He is not a current smoker, uses cigs, 10 cigs/day packs per day, for 39 years and has thought about quitting  He is a former smoker  SEXUALLY ACTIVE: He is not sexually active  HOUSING: He has stable housing  There are 2 people living in the household (including children)  The household income is $1445  00/month  HEALTH MAINTENANCE: His last dental exam was scheduled 7/2017  His last eye exam was needs     The patient is being seen for follow-up of acute diarrhea  Symptoms:  diarrhea and Describes occasion watery bowel movements that occur 2-3 times a day  he reports the symptoms are improving  Associated symptoms: no headache, no weight loss, no general malaise and no rash  Current Treatment: he is currently not being treated for this problem  The patient is being seen for a routine clinic follow-up of hypertriglyceridemia  Recent measurements: total cholesterol 185 mg/dL, HDL 41 mg/dL, LDL 95 mg/dL and triglycerides 245 mg/dL  His Due for lipid panel in September  Associated symptoms:  no abdominal pain, no chest pressure and no dyspnea at rest    The patient presents for follow-up of essential hypertension  The patient states he has been doing well with his blood pressure control since the last visit  He has no comorbid illnesses  He has no significant interval events  Symptoms: denies impaired vision, denies dyspnea, denies chest pain and denies lower extremity edema  Associated symptoms include no headache  Home monitoring: The patient is not checking blood pressure at home  Medications: the patient is adherent with his medication regimen  He denies medication side effects  Medication(s): a calcium channel blocker  Disease Management: the patient is doing well with his blood pressure goals  Review of Systems    Constitutional: No fever or chills, feels well, no tiredness, no recent weight gain or weight loss  Eyes: No complaints of eye pain, no red eyes, no discharge from eyes, no itchy eyes  ENT: no complaints of earache, no hearing loss, no nosebleeds, no nasal discharge, no sore throat, no hoarseness  Cardiovascular: No complaints of slow heart rate, no fast heart rate, no chest pain, no palpitations, no leg claudication, no lower extremity  Respiratory: No complaints of shortness of breath, no wheezing, no cough, no SOB on exertion, no orthopnea or PND     Gastrointestinal: bloody stools, but as noted in HPI, no abdominal pain, no nausea, no vomiting and no constipation  Musculoskeletal: No complaints of arthralgia, no myalgias, no joint swelling or stiffness, no limb pain or swelling  Integumentary: No complaints of skin rash or skin lesions, no itching, no skin wound, no dry skin  Psychiatric: not suicidal, no anxiety, no personality change, no sleep disturbances, no depression and no emotional problems  Endocrine: No complaints of proptosis, no hot flashes, no muscle weakness, no erectile dysfunction, no deepening of the voice, no feelings of weakness  Hematologic/Lymphatic: No complaints of swollen glands, no swollen glands in the neck, does not bleed easily, no easy bruising  Active Problems    1  Anxiety (300 00) (F41 9)   2  Bilateral back pain, unspecified location   3  Depression screening (V79 0) (Z13 89)   4  Dermoid cyst of face (216 3) (D23 30)   5  Diabetes mellitus (250 00) (E11 9)   6  Diarrhea (787 91) (R19 7)   7  Encounter for screening colonoscopy (V76 51) (Z12 11)   8  Encounter for screening examination for sexually transmitted disease (V74 5) (Z11 3)   9  Essential hypertension (401 9) (I10)   10  Heart murmur (785 2) (R01 1)   11  Hiatal hernia (553 3) (K44 9)   12  HIV disease (042) (B20)   15  Hypertriglyceridemia (272 1) (E78 1)   14  Inguinal hernia (550 90) (K40 90)   15  Left groin pain (789 09) (R10 32)   16  Need for prophylactic vaccination and inoculation against influenza (V04 81) (Z23)   17  Nicotine dependence (305 1) (F17 200)   18  Prostate cancer screening (V76 44) (Z12 5)   19  Snoring (786 09) (R06 83)   20  Testicular pain (608 9) (N50 819)   21  Varicocele (456 4) (I86 1)    Past Medical History    1  History of DDD (degenerative disc disease), lumbar (722 52) (M51 36)   2  History of Facet joint disease (724 8) (M12 88)   3  History of human immunodeficiency virus infection (V12 09) (Z86 19)   4   History of hypertension (V12 59) (Z86 79) 5  History of shoulder surgery (V45 89) (Z98 890)   6  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)    The active problems and past medical history were reviewed and updated today  Surgical History    1  History of Hernia Repair   2  History of Shoulder Surgery    The surgical history was reviewed and updated today  Family History  Mother    1  No pertinent family history  Father    2  No pertinent family history    The family history was reviewed and updated today  Social History    · Current every day smoker (305 1) (F17 200)   · History of Unknown if ever smoked  The social history was reviewed and updated today  The social history was reviewed and is unchanged  Current Meds   1  AmLODIPine Besylate 2 5 MG Oral Tablet; take 1 tablet by mouth daily; Therapy: 69Wep9688 to (Evaluate:78Oqn8406)  Requested for: 28Mar2017; Last   Rx:28Mar2017 Ordered   2  Norvir 100 MG Oral Tablet; take 1 tablet by mouth twice a day; Therapy: 33Pnd3831 to (Evaluate:26Khw3376)  Requested for: 28Mar2017; Last   Rx:28Mar2017 Ordered   3  PARoxetine HCl - 20 MG Oral Tablet; take 1 tablet by mouth daily; Therapy: 93Izh0808 to (Evaluate:99Gau0043)  Requested for: 28Mar2017; Last   Rx:28Mar2017 Ordered   4  Prezista 600 MG Oral Tablet; take 1 tablet by mouth twice a day; Therapy: 57Wcw3787 to (Evaluate:24Kir9253)  Requested for: 49WUY7619; Last   Rx:28Mar2017 Ordered   5  Tivicay 50 MG Oral Tablet; take 1 tablet by mouth twice a day; Therapy: 79Mnx0280 to (Evaluate:95Yim8374)  Requested for: 28Mar2017; Last   Rx:28Mar2017 Ordered   6  Viread 300 MG Oral Tablet; take 1 tablet by mouth daily; Therapy: 96Ujl0027 to (Evaluate:57Nth7156)  Requested for: 28Mar2017; Last   Rx:28Mar2017 Ordered    The medication list was reviewed and updated today  Allergies    1   No Known Drug Allergies    Vitals  Signs   Recorded: 55HKU6612 12:58PM   Temperature: 97 7 F  Heart Rate: 76  Systolic: 894  Diastolic: 76  Height: 6 ft   Weight: 183 lb 6 oz  BMI Calculated: 24 87  BSA Calculated: 2 05    Physical Exam    Constitutional   General appearance: No acute distress, well appearing and well nourished  Eyes   Conjunctiva and lids: No swelling, erythema or discharge  Wears corrective lenses  Pupils and irises: Equal, round and reactive to light  Ears, Nose, Mouth, and Throat   External inspection of ears and nose: Normal     Otoscopic examination: Tympanic membranes translucent with normal light reflex  Canals patent without erythema  Nasal mucosa, septum, and turbinates: Normal without edema or erythema  Oropharynx: Normal with no erythema, edema, exudate or lesions  Pulmonary   Respiratory effort: No increased work of breathing or signs of respiratory distress  Auscultation of lungs: Clear to auscultation  Cardiovascular   Auscultation of heart: Normal rate and rhythm, normal S1 and S2, without murmurs  Examination of extremities for edema and/or varicosities: Normal     Abdomen   Abdomen: Non-tender, no masses  Liver and spleen: No hepatomegaly or splenomegaly  Lymphatic   Palpation of lymph nodes in neck: No lymphadenopathy  Musculoskeletal   Gait and station: Normal     Digits and nails: Normal without clubbing or cyanosis  Inspection/palpation of joints, bones, and muscles: Normal     Muscle strength: Normal strength throughout  Skin   Skin and subcutaneous tissue: Normal without rashes or lesions  Psychiatric   Orientation to person, place, and time: Normal     Mood and affect: Normal     Lips, Teeth and Gums: The lips were normal with no lesions  Examination of the teeth revealed dental caries  Examination of the gingiva showed no abnormalities  Attending Note  Collaborating Physician: I disagree with the Advanced Practitioner note        Future Appointments    Date/Time Provider Specialty Site   10/24/2017 04:15 PM [facility]   10/16/2017 02:00 PM CATHIE Ricardo Epidemiology/Public Health [facility]   Electronically signed by : Trey Leiva; Jul 11 2017  6:38PM EST                       (Author)    Electronically signed by : Tabby Cadena DO; Jul 12 2017 11:10AM EST                       (Author)

## 2018-01-13 VITALS
HEIGHT: 72 IN | SYSTOLIC BLOOD PRESSURE: 122 MMHG | BODY MASS INDEX: 24.84 KG/M2 | TEMPERATURE: 97.7 F | WEIGHT: 183.38 LBS | DIASTOLIC BLOOD PRESSURE: 76 MMHG | HEART RATE: 76 BPM

## 2018-01-13 NOTE — PROGRESS NOTES
Assessment    1  Atypical mole (216 9) (D22 9)   2  HIV disease (042) (B20)   3  Essential hypertension (401 9) (I10)   4  Anxiety (300 00) (F41 9)   5  Hypertriglyceridemia (272 1) (E78 1)   6  Nicotine dependence (305 1) (F17 200)    Plan  Anxiety    · PARoxetine HCl - 20 MG Oral Tablet; take 1 tablet by mouth daily  Atypical mole    · *1 - [facility]  *  Status: Hold For - Scheduling   Requested for: [id number]  Care Summary provided  : Yes  Atypical mole, Essential hypertension, HIV disease    · Follow-up visit in 4 Months Evaluation and Treatment  Follow-up  Status: Hold For -  Scheduling  Requested for: 16Oct2017  HIV disease    · Norvir 100 MG Oral Tablet; take 1 tablet by mouth twice a day   · Prezista 600 MG Oral Tablet; take 1 tablet by mouth twice a day   · Tivicay 50 MG Oral Tablet; take 1 tablet by mouth twice a day   · Viread 300 MG Oral Tablet; take 1 tablet by mouth daily   · (1) CBC/PLT/DIFF; Status:Active; Requested FVD:[id number];    · (1) COMPREHENSIVE METABOLIC PANEL; Status:Active; Requested for:15Jan2018;   HIV disease, Need for prophylactic vaccination and inoculation against influenza    · Flulaval Quadrivalent Intramuscular Suspension  Hypertriglyceridemia    · 3 - Rosie PATTERSON, [facility] Co-Management  *  Status: Hold For - Scheduling   Requested for: 14IWX5804  are Referring to a non- Preferred Provider : Established Patient  Care Summary provided  : Yes  Nicotine dependence    · Nicotine Polacrilex 4 MG Mouth/Throat Gum; CHEW 1 PIECE SLOWLY AND  INTERMITTENTLY FOR 30 MINUTES  REPEAT EVERY 1-2 HOURS; MAXIMUM OF 24  PIECES/DAY   · *1 - [facility]  *  Status: Hold For -  Scheduling  Requested for: 82GCX8493  Care Summary provided  : Yes    Seth Navarro is a a 56year old male here today for PCP follow up of chronic conditions  HIV: CD4 697 VL 40 ART Norvir, Prezista, Tivicay and Viread   Admits to missing a few doses  Encouraged using cell phone alarm at last visit  Verónica Matthewsradhika states this has improved his compliance but will occasionally fall asleep prior to taking medications  Educated Verónica Mcneal on the importance of adherence  Explained the importance of viral suppression and the possibility of resistance developing if medication was not taken correctly  Reminded of scheduled ID clinic appointment next week  Transient diarrhea: Resolved  Verónica Mcneal states he is no longer having diarrhea and has one regular BM daily without any issues  HTN: /76  Well controlled on amlodipine  Hypertriglyceridemia: Total cholesterol 144 LDL 83 HDL 37 triglycerides 118  Greatly improved with diet  Continue to work closely with dietician  Recheck in 6 months  Anxiety/PTSD/depression: Well controlled on paroxetine  Atypical mole: 5cm atypical nevus on middle of scalp  Referred to dermatology for possible biopsy  Size has increased sine last PCP visit  Nicotine Dependence: Unsuccessful with smoking cessation  Willing to try nicotine replacement therapy at this time  Ordered Nicorette gum  Continue to work with smoking cessation program      PCP f/u scheduled for 4 months  Possible side effects of new medications were reviewed with the patient/guardian today  The treatment plan was reviewed with the patient/guardian  The patient/guardian understands and agrees with the treatment plan   The patient was counseled regarding instructions for management, risks and benefits of treatment options, importance of compliance with treatment  Education   general HIV education  adherence  prevention care  Chief Complaint  Pt would like to discuss smoking cessation  History of Present Illness  Mr Arthur Calix is a pleasant, talkative 49year old  male who presents to the clinic today for follow up of chronic conditions  PMHx significant fro HIV, HTN, depression/PTSD/anxiety, and hypertriglyceridemia   Verónica Mcneal would like to discuss smoking cessation today  Pain Assessment   the patient states they do not have pain  Abuse And Domestic Violence Screen    Yes, the patient is safe at home  The patient states no one is hurting them  Depression And Suicide Screen  No, the patient has not had thoughts of hurting themself  Yes, the patient has felt depressed in the past 7 days  The patient is being seen for a routine clinic follow-up of HIV infection  no fever no lethargy no depression no weight loss no decreased appetite no cough no shortness of breath no thrush no nausea no vomiting no diarrhea no headache Current treatment includes antiretroviral regimen  By report, there is good compliance with treatment and good tolerance of treatment  CD4: 465  VL: <20  Time spent: 20 minutes  Strength: Strong desire to be well and be involved in children's lives  Weakness: ocassionally misses doses  Plan of Action: Reminded to set alarm on phone  SUBSTANCE ABUSE: not using ETOH  not using drugs  SMOKING: He is a current smoker, uses cigs, 1 ppd packs per day, for 40 years and has thought about quitting  SEXUALLY ACTIVE: He is not sexually active  HOUSING: He has stable housing  There are 1 people living in the household (including children)  The household income is $1445  00/month  HEALTH MAINTENANCE: His last dental exam was 5/2017  His last eye exam was 8/2016  Stephanie Euceda presents with complaints of bilateral scalp skin lesions  Associated symptoms include multiple skin lesions, but no chills, no fever, no joint pain, no numbness and no shortness of breath  The patient is being seen for follow-up of generalized anxiety disorder  The patient reports doing well  Comorbid Illnesses: depression and PTSD  He has had no significant interval events  Associated symptoms: no suicidal ideation     Medication(s): selective serotonin reuptake inhibitors (The patient reports that the medication is working well )  Medications:  the patient is adherent to his medication regimen, but he denies medication side effects  The patient is being seen for clinic follow-up for tobacco cessation assistance  The patient has high interest in quitting  He has tried to quit several times  Patient perceived smoking benefits include stress management  Patient recognizes that smoking cessation benefits include saving money and improved health  Current treatment includes tobacco cessation program    The patient is being seen for a routine clinic follow-up of hypertriglyceridemia  Recent measurements: total cholesterol 144 mg/dL, HDL 37 mg/dL, LDL 83 mg/dL and triglycerides 118 mg/dL  His total cholesterol is decreasing, LDL is decreasing and triglycerides are decreasing  The patient presents for follow-up of essential hypertension  Symptoms: denies impaired vision, denies dyspnea, denies chest pain and denies lower extremity edema  Associated symptoms include no headache  Home monitoring: The patient is not checking blood pressure at home  Medications: Medication(s): a calcium channel blocker  Review of Systems    Constitutional: No fever or chills, feels well, no tiredness, no recent weight gain or weight loss  Eyes: No complaints of eye pain, no red eyes, no discharge from eyes, no itchy eyes  ENT: no complaints of earache, no hearing loss, no nosebleeds, no nasal discharge, no sore throat, no hoarseness  Cardiovascular: No complaints of slow heart rate, no fast heart rate, no chest pain, no palpitations, no leg claudication, no lower extremity  Respiratory: No complaints of shortness of breath, no wheezing, no cough, no SOB on exertion, no orthopnea or PND  Gastrointestinal: No complaints of abdominal pain, no constipation, no nausea or vomiting, no diarrhea or bloody stools  Genitourinary: No complaints of dysuria, no incontinence, no hesitancy, no nocturia, no genital lesion, no testicular pain  Musculoskeletal: No complaints of arthralgia, no myalgias, no joint swelling or stiffness, no limb pain or swelling  Integumentary: No complaints of skin rash or skin lesions, no itching, no skin wound, no dry skin  Neurological: No compliants of headache, no confusion, no convulsions, no numbness or tingling, no dizziness or fainting, no limb weakness, no difficulty walking  Psychiatric: Is not suicidal, no sleep disturbances, no anxiety or depression, no change in personality, no emotional problems  Endocrine: No complaints of proptosis, no hot flashes, no muscle weakness, no erectile dysfunction, no deepening of the voice, no feelings of weakness  Hematologic/Lymphatic: No complaints of swollen glands, no swollen glands in the neck, does not bleed easily, no easy bruising  Active Problems    1  Anxiety (300 00) (F41 9)   2  Bilateral back pain, unspecified location   3  Depression screening (V79 0) (Z13 89)   4  Dermoid cyst of face (216 3) (D23 30)   5  Diabetes mellitus (250 00) (E11 9)   6  Diarrhea (787 91) (R19 7)   7  Encounter for screening colonoscopy (V76 51) (Z12 11)   8  Encounter for screening examination for sexually transmitted disease (V74 5) (Z11 3)   9  Essential hypertension (401 9) (I10)   10  Heart murmur (785 2) (R01 1)   11  Hiatal hernia (553 3) (K44 9)   12  HIV disease (042) (B20)   15  Hypertriglyceridemia (272 1) (E78 1)   14  Inguinal hernia (550 90) (K40 90)   15  Left groin pain (789 09) (R10 32)   16  Need for prophylactic vaccination and inoculation against influenza (V04 81) (Z23)   17  Nicotine dependence (305 1) (F17 200)   18  Polypharmacy (V58 69) (Z79 899)   19  Prostate cancer screening (V76 44) (Z12 5)   20  Snoring (786 09) (R06 83)   21  Testicular pain (608 9) (N50 819)   22  Varicocele (456 4) (I86 1)    Past Medical History    1  History of DDD (degenerative disc disease), lumbar (722 52) (M51 36)   2   History of Facet joint disease (724 8) (M12 88) 3  History of human immunodeficiency virus infection (V12 09) (Z86 19)   4  History of hypertension (V12 59) (Z86 79)   5  History of shoulder surgery (V45 89) (Z98 890)   6  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)    The active problems and past medical history were reviewed and updated today  Surgical History    1  History of Hernia Repair   2  History of Shoulder Surgery    The surgical history was reviewed and updated today  Family History  Mother    1  No pertinent family history  Father    2  No pertinent family history    The family history was reviewed and updated today  Social History    · Current every day smoker (305 1) (F17 200)   · History of Unknown if ever smoked  The social history was reviewed and updated today  The social history was reviewed and is unchanged  Current Meds   1  AmLODIPine Besylate 2 5 MG Oral Tablet; take 1 tablet by mouth daily; Therapy: 17Ict5644 to (Evaluate:54Txl6432)  Requested for: 45TQC1909; Last   Rx:47Heh5091 Ordered   2  Norvir 100 MG Oral Tablet; take 1 tablet by mouth twice a day; Therapy: 66Cwu0170 to (Evaluate:63Qef5642)  Requested for: 28Mar2017; Last   Rx:28Mar2017 Ordered   3  PARoxetine HCl - 20 MG Oral Tablet; take 1 tablet by mouth daily; Therapy: 81Wgm6053 to (Evaluate:09Thw5191)  Requested for: 28Mar2017; Last   Rx:28Mar2017 Ordered   4  Prezista 600 MG Oral Tablet; take 1 tablet by mouth twice a day; Therapy: 33Ter7589 to (Evaluate:87Akl3973)  Requested for: 71WBC4872; Last   Rx:28Mar2017 Ordered   5  Tivicay 50 MG Oral Tablet; take 1 tablet by mouth twice a day; Therapy: 96Kwu6922 to (Evaluate:56Cdu6289)  Requested for: 28Mar2017; Last   Rx:28Mar2017 Ordered   6  Viread 300 MG Oral Tablet; take 1 tablet by mouth daily; Therapy: 04Rhv2081 to (Evaluate:79Ske5784)  Requested for: 28Mar2017; Last   Rx:28Mar2017 Ordered    The medication list was reviewed and updated today        Allergies 1  No Known Drug Allergies    Vitals  Signs   Recorded: 89RMY0091 01:58PM   Temperature: 97 5 F  Heart Rate: 71  Systolic: 419  Diastolic: 76  Height: 6 ft   Weight: 188 lb   BMI Calculated: 25 5  BSA Calculated: 2 08  O2 Saturation: 97    Physical Exam    Constitutional   General appearance: No acute distress, well appearing and well nourished  Eyes   Conjunctiva and lids: No swelling, erythema or discharge  wears corrective lens  Pupils and irises: Equal, round and reactive to light  Ears, Nose, Mouth, and Throat   External inspection of ears and nose: Normal     Otoscopic examination: Tympanic membranes translucent with normal light reflex  Canals patent without erythema  Nasal mucosa, septum, and turbinates: Normal without edema or erythema  Oropharynx: Normal with no erythema, edema, exudate or lesions  Pulmonary   Respiratory effort: No increased work of breathing or signs of respiratory distress  Auscultation of lungs: Clear to auscultation  Cardiovascular   Auscultation of heart: Normal rate and rhythm, normal S1 and S2, without murmurs  Examination of extremities for edema and/or varicosities: Normal     Abdomen   Abdomen: Non-tender, no masses  Liver and spleen: No hepatomegaly or splenomegaly  Lymphatic   Palpation of lymph nodes in neck: No lymphadenopathy  Musculoskeletal   Gait and station: Normal     Digits and nails: Normal without clubbing or cyanosis  Inspection/palpation of joints, bones, and muscles: Normal     Muscle strength: Normal strength throughout  Skin   Examination of the skin for lesions: Abnormal   Brown macule(s) with irregular borders  Psychiatric   Orientation to person, place, and time: Normal     Mood and affect: Normal      Lips, Teeth and Gums: The lips were normal with no lesions  Examination of the teeth revealed dental caries and teeth discoloration  Examination of the gingiva showed no abnormalities        Attending Note  Agree with Advanced Practitioner Note Except: Will verify size of nevus        Future Appointments    Date/Time Provider Specialty Site   10/24/2017 04:15 PM Altaf Cabello MD Infectious Disease [facility]   02/12/2018 02:00 PM CATHIE Leija Epidemiology/Public Health [address] [place] [address]   Electronically signed by : Alice Cordoba; Oct 16 2017  4:19PM EST                       (Author)    Electronically signed by : Manish Rivera DO; Oct 17 2017 10:45AM EST                       (Author)

## 2018-01-14 VITALS
SYSTOLIC BLOOD PRESSURE: 122 MMHG | TEMPERATURE: 98 F | WEIGHT: 181.25 LBS | HEART RATE: 80 BPM | BODY MASS INDEX: 24.58 KG/M2 | OXYGEN SATURATION: 98 % | DIASTOLIC BLOOD PRESSURE: 68 MMHG

## 2018-01-14 NOTE — PROGRESS NOTES
Assessment    1  Encounter for screening colonoscopy (V76 51) (Z12 11)   2  HIV disease (042) (B20)   3  Essential hypertension (401 9) (I10)   4  Hypertriglyceridemia (272 1) (E78 1)   5  Anxiety (300 00) (F41 9)    Plan  Encounter for screening colonoscopy    · *1 - [facility] Physician Referral  Consult Only: the expectation is that  the referring provider will communicate back to the patient on treatment options  Evaluation and Treatment: the expectation is that the referred to provider will  communicate back to the patient on treatment options  Status: Hold For - Scheduling   Requested for: 54CNX1490  Care Summary provided  : Yes  HIV disease    · Viread 300 MG Oral Tablet; take 1 tablet by mouth daily    Discussion/Summary    Delroy Vasquez is a 49year old male here today for 3 month PCP f/u  He is doing well and offers no acute complaints  HIV: CD4 416 VL <20 ART Norvir, Prezista, Tivicay, and Vired  Is able to correctly name and identify medications  Adherent with ART and denies missing any doses  Continues to be a monogamous relationship with girlfriend  Partner is aware of status  Uses condoms during sexual activity  HTN: BP stable 110/70  Continue Amlodipine  May consider d/c at next PCP visit  Elevated lipids: Will have lipid panel drawn with next labs in February  Discussed the importance of eating a diet low in saturated fat  Depression/PTSD: General mood improved  Working steadily and putting in overtime during the holiday season  In a stable relationship and highly motivated to stay healthy  Renewed paroxetine today  Health Maintenance: Counseled for greater than 15 minutes in the importance of smoking cessation  Advised to quit  Has tapered down smoking consumption from 1 pack a day to 6-10 cigarettes a day  Goal is to continue to taper and quit completely during the new year  Will assess for progress on smoking cessation goal at follow-up primary care visit   Refer to GI today for colonoscopy  PCP f/u scheduled for 3 months  Appointment made on same day as ID clinic  Instructed to get labs completed one month prior to appointment  Possible side effects of new medications were reviewed with the patient/guardian today  The treatment plan was reviewed with the patient/guardian  The patient/guardian understands and agrees with the treatment plan   The patient was counseled regarding instructions for management, risk factor reductions, risks and benefits of treatment options, importance of compliance with treatment  Topics Covered: advised patient not to smoke while using NRT  Education   general HIV education  adherence  Chief Complaint  Pt offers no c/o at this time  Patient is here today for follow up of chronic conditions described in HPI  History of Present Illness  Melida Raphael is a 49year old male here today for 3 month PCP f/u  PMHx significant for HTN, depression, elevated lipids, PTSD, and HIV  Latonya Rock has lost a total of 29 lbs in the past year due to improved eating habits and increased physical activity  He is doing well and offers no acute complaints  Pain Assessment   the patient states they do not have pain  Abuse And Domestic Violence Screen    Yes, the patient is safe at home  The patient states no one is hurting them  Depression And Suicide Screen  No, the patient has not had thoughts of hurting themself  No, the patient has not felt depressed in the past 7 days  The patient is being seen for a routine clinic follow-up of HIV infection  no fever no lethargy no depression no night sweats   The patient presents with complaints of gradual onset of weight loss (Losing weight to improve health) no decreased appetite no cough no shortness of breath no mouth sores no thrush no nausea no vomiting no diarrhea no headache No associated symptoms are reported  VL: <20  Time spent: 20 minutes  Strength: strong desire to be well  Weakness: busy work schedule  Plan of Action: take medication prior to leaving for work  SUBSTANCE ABUSE: not using ETOH  not using drugs  SMOKING: He is a current smoker, uses cigs, 6-10 cigs/day packs per day, for 38 years and has thought about quitting  He has the following barriers: pt is cutting back on his own  HOUSING: He has stable housing  There are 2 people living in the household (including children)  The household income is $1445  00/month  HEALTH MAINTENANCE: His last dental exam was will schedule today  His last eye exam was 10/2016  The patient is being seen for follow-up of generalized anxiety disorder  The patient reports doing well  Comorbid Illnesses: depression and PTSD  He has had no significant interval events  Interval symptoms:  stable anxiety, stable sleep disruption and stable depression    stable restlessness  Associated symptoms: no suicidal ideation  Medication(s): selective serotonin reuptake inhibitors (The patient reports he is currently taking this medication, that the medication is working well and he is adherent with taking this medication )  Medications:  the patient is adherent to his medication regimen  Disease management:  the patient is doing well with his goals  The patient presents for follow-up of essential hypertension  The patient states he has been doing well with his blood pressure control since the last visit  He has no significant interval events  Symptoms: denies impaired vision, denies dyspnea, denies chest pain, denies intermittent leg claudication and denies lower extremity edema  Review of Systems    Constitutional: No fever or chills, feels well, no tiredness, no recent weight gain or weight loss  Eyes: No complaints of eye pain, no red eyes, no discharge from eyes, no itchy eyes  ENT: no complaints of earache, no hearing loss, no nosebleeds, no nasal discharge, no sore throat, no hoarseness     Cardiovascular: No complaints of slow heart rate, no fast heart rate, no chest pain, no palpitations, no leg claudication, no lower extremity  Respiratory: No complaints of shortness of breath, no wheezing, no cough, no SOB on exertion, no orthopnea or PND  Gastrointestinal: No complaints of abdominal pain, no constipation, no nausea or vomiting, no diarrhea or bloody stools  Genitourinary: No complaints of dysuria, no incontinence, no hesitancy, no nocturia, no genital lesion, no testicular pain  Musculoskeletal: No complaints of arthralgia, no myalgias, no joint swelling or stiffness, no limb pain or swelling  Integumentary: No complaints of skin rash or skin lesions, no itching, no skin wound, no dry skin  Neurological: No compliants of headache, no confusion, no convulsions, no numbness or tingling, no dizziness or fainting, no limb weakness, no difficulty walking  Psychiatric: Is not suicidal, no sleep disturbances, no anxiety or depression, no change in personality, no emotional problems  Endocrine: No complaints of proptosis, no hot flashes, no muscle weakness, no erectile dysfunction, no deepening of the voice, no feelings of weakness  Hematologic/Lymphatic: No complaints of swollen glands, no swollen glands in the neck, does not bleed easily, no easy bruising  Active Problems    1  Anxiety (300 00) (F41 9)   2  Bilateral back pain, unspecified location   3  Depression screening (V79 0) (Z13 89)   4  Dermoid cyst of face (216 3) (D23 30)   5  Diabetes mellitus (250 00) (E11 9)   6  Essential hypertension (401 9) (I10)   7  Heart murmur (785 2) (R01 1)   8  HIV disease (042) (B20)   9  Hypertriglyceridemia (272 1) (E78 1)   10  Left groin pain (789 09) (R10 30)   11  Prostate cancer screening (V76 44) (Z12 5)   12  Snoring (786 09) (R06 83)   13  Testicular pain (608 9) (N50 819)   14  Varicocele (456 4) (I86 1)    Past Medical History    1  History of DDD (degenerative disc disease), lumbar (722 52) (M51 36)   2  History of Facet joint disease (724 8) (M12 88)   3  History of hiatal hernia (V12 79) (Z87 19)   4  History of human immunodeficiency virus infection (V12 09) (Z86 19)   5  History of hypertension (V12 59) (Z86 79)   6  History of shoulder surgery (V45 89) (Z98 890)   7  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)    The active problems and past medical history were reviewed and updated today  Surgical History    1  History of Hernia Repair   2  History of Shoulder Surgery    The surgical history was reviewed and updated today  Family History  Mother    1  No pertinent family history  Father    2  No pertinent family history    The family history was reviewed and updated today  Social History    · Current every day smoker (305 1) (F17 200)   · History of Unknown if ever smoked  The social history was reviewed and updated today  The social history was reviewed and is unchanged  Current Meds   1  AmLODIPine Besylate 2 5 MG Oral Tablet; take 1 tablet by mouth daily; Therapy: 22Vnz9789 to (Evaluate:26Mar2017)  Requested for: 40XVV3285; Last   Rx:03Lvt8166 Ordered   2  Norvir 100 MG Oral Tablet; take 1 tablet by mouth twice a day; Therapy: 16Err8234 to (Evaluate:26Mar2017)  Requested for: 99NSI2225; Last   Rx:84Kmt2928 Ordered   3  PARoxetine HCl - 20 MG Oral Tablet; take 1 tablet by mouth daily; Therapy: 94Adu9900 to (Evaluate:26Mar2017)  Requested for: 48IZY0031; Last   Rx:00Pgy0031 Ordered   4  Prezista 600 MG Oral Tablet; take 1 tablet by mouth twice a day; Therapy: 29Goy8349 to (Evaluate:26Mar2017)  Requested for: 19QLA4917; Last   Rx:97Zeu4111 Ordered   5  Tivicay 50 MG Oral Tablet; take 1 tablet by mouth twice a day; Therapy: 16Caf0994 to (Evaluate:26Mar2017)  Requested for: 02OTW1162; Last   Rx:95Zmr2278 Ordered   6  Viread 300 MG Oral Tablet; take 1 tablet by mouth daily;    Therapy: 78Fjh4724 to (Rashid Acosta)  Requested for: 10SMZ7825; Last   KF:03JCA3679 Ordered    The medication list was reviewed and updated today  Allergies    1  No Known Drug Allergies    Vitals  Signs   Recorded: 63Wmx1342 01:55PM   Temperature: 98 2 F  Heart Rate: 80  Systolic: 786  Diastolic: 70  Weight: 392 lb 8 oz  BMI Calculated: 24 62  BSA Calculated: 2 04    Physical Exam    Constitutional   General appearance: No acute distress, well appearing and well nourished  Eyes   Conjunctiva and lids: No swelling, erythema or discharge  wears corrective lens  Pupils and irises: Equal, round and reactive to light  Ears, Nose, Mouth, and Throat   External inspection of ears and nose: Normal     Otoscopic examination: Tympanic membranes translucent with normal light reflex  Canals patent without erythema  Nasal mucosa, septum, and turbinates: Normal without edema or erythema  Oropharynx: Normal with no erythema, edema, exudate or lesions  Pulmonary   Respiratory effort: No increased work of breathing or signs of respiratory distress  Auscultation of lungs: Clear to auscultation  Cardiovascular   Auscultation of heart: Normal rate and rhythm, normal S1 and S2, without murmurs  Examination of extremities for edema and/or varicosities: Normal     Abdomen   Abdomen: Non-tender, no masses  Liver and spleen: No hepatomegaly or splenomegaly  Lymphatic   Palpation of lymph nodes in neck: No lymphadenopathy  Musculoskeletal   Gait and station: Normal     Digits and nails: Normal without clubbing or cyanosis  Inspection/palpation of joints, bones, and muscles: Normal     Muscle strength: Normal strength throughout  Skin   Skin and subcutaneous tissue: Normal without rashes or lesions  Psychiatric   Orientation to person, place, and time: Normal     Mood and affect: Normal     Lips, Teeth and Gums: The lips were normal with no lesions  Examination of the teeth revealed missing teeth  Examination of the gingiva showed no abnormalities  Attending Note  Collaborating Physician: I agree with the Advanced Practitioner note        Future Appointments    Date/Time Provider Specialty Site   03/28/2017 05:15 PM Alvina Coast, Rollin Cowden, MD Internal Medicine [facility]   03/28/2017 02:00 PM CATHIE Dewey Epidemiology/Public Health [facility]   Electronically signed by : Dinh Ku; Dec 13 2016  3:54PM EST                       (Author)    Electronically signed by : Presley Nelson DO; Dec 14 2016  5:44PM EST                       (Author)

## 2018-01-14 NOTE — PROGRESS NOTES
Assessment    1  HIV disease (042) (B20)   2  Nicotine dependence (305 1) (F17 200)   3  Diarrhea (787 91) (R19 7)   4  Hypertriglyceridemia (272 1) (E78 1)    Plan    1  (1) STOOL CULTURE; Source:Rectum; Status:Active; Requested for:25Apr2017;     2  (1) CHLAMYDIA/GC AMPLIFIED DNA, PCR; Source:Urine, Unspecified Source;   Status:Active; Requested for:01Sep2017;     3  (1) RPR; Status:Active; Requested for:01Sep2017;     4  (1) CBC/PLT/DIFF; Status:Active; Requested for:01Sep2017;    5  (1) COMPREHENSIVE METABOLIC PANEL; Status:Active; Requested for:01Sep2017;    6  (1) HIV-1 RNA QUANTITATIVE; [Do Not Release]; Status:Active; Requested   for:01Sep2017;    7  (1) QUANTIFERON - TB GOLD; Status:Active; Requested for:01Sep2017;    8  (1) T LYMPH SUBSET (CD4); Status:Active; Requested for:01Sep2017;    9  (1) URINALYSIS (will reflex a microscopy if leukocytes, occult blood, protein or nitrites are   not within normal limits); Status:Active; Requested for:01Sep2017;    10  Hepatitis A; inject 1  ml intramuscular; To Be Done: 05CAW7602   85  Hepatitis B; INJECT 2  ML Intramuscular; To Be Done: 44VDT5363   12  Pneumo (Pneumovax); INJECT 0 5  ML Intramuscular; To Be Done:    32ONH8646    13  Follow-up visit in 6 months Evaluation and Treatment  Follow-up  Status: Hold For -    Scheduling  Requested for: 25Apr2017   14  (1) OVA AND PARASITES EXAM; Status:Active; Requested for:25Apr2017;    15  (Q) CLOSTRIDIUM DIFFICILE TOXIN B, QL PCR; Status:Active; Requested    for:25Apr2017;    16  (Q) FECAL LEUKOCYTE STAIN; Status:Active; Requested for:25Apr2017;     Discussion/Summary    HIV-doing well on ART with an undetectable viral load and a CD4 count in the 500s  Continue ART, recheck labs in 5 months, follow-up in 6 months  Nicotine dependence-stressed abstinence with the patient   I offered him our smoking cessation program  The patient declines at this time and wants to continue to try to quit on his own     Hypertriglyceridemia-marketed improvement with diet control  We'll continue to monitor  Diarrhea-possible IBS  As this is a new finding, will check stool studies including fecal leukocytes, culture, C  difficile, O&P  Possible side effects of new medications were reviewed with the patient/guardian today  The treatment plan was reviewed with the patient/guardian  The patient/guardian understands and agrees with the treatment plan   The patient was counseled regarding diagnostic results, prognosis, risks and benefits of treatment options  Education   general HIV education  adherence  prevention care  Chief Complaint  Patient reports to clinic for routine f/u visit  History of Present Illness  Routine follow-up for HIV  Patient claims 100% adherence with Prezista/Norvir/Tivicay/Viread  He denies any notable side effects  Since he had his colonoscopy he has had diarrhea  He also had vomiting last week which is now resolved  He denies any abdominal pain or weight loss  He continues to smoke but has decreased the amounts  Pain Assessment   the patient states they have pain  The pain is located in the Left Hip  (on a scale of 0 to 10, the patient rates the pain at 4 )   Abuse And Domestic Violence Screen    Yes, the patient is safe at home  The patient states no one is hurting them  Depression And Suicide Screen  No, the patient has not had thoughts of hurting themself  Yes, the patient has felt depressed in the past 7 days  no fever no lethargy no depression no night sweats no weight loss no decreased appetite no cough no shortness of breath no skin lesions no mouth sores no nausea no vomiting diarrhea      Active Problems    1  Anxiety (300 00) (F41 9)   2  Bilateral back pain, unspecified location   3  Depression screening (V79 0) (Z13 89)   4  Dermoid cyst of face (216 3) (D23 30)   5  Diabetes mellitus (250 00) (E11 9)   6   Encounter for screening colonoscopy (V76 51) (Z12 11) 7  Encounter for screening examination for sexually transmitted disease (V74 5) (Z11 3)   8  Essential hypertension (401 9) (I10)   9  Heart murmur (785 2) (R01 1)   10  Hiatal hernia (553 3) (K44 9)   11  HIV disease (042) (B20)   12  Hypertriglyceridemia (272 1) (E78 1)   13  Inguinal hernia (550 90) (K40 90)   14  Left groin pain (789 09) (R10 30)   15  Need for prophylactic vaccination and inoculation against influenza (V04 81) (Z23)   16  Prostate cancer screening (V76 44) (Z12 5)   17  Snoring (786 09) (R06 83)   18  Testicular pain (608 9) (N50 819)   19  Varicocele (456 4) (I86 1)    Past Medical History    1  History of DDD (degenerative disc disease), lumbar (722 52) (M51 36)   2  History of Facet joint disease (724 8) (M12 88)   3  History of human immunodeficiency virus infection (V12 09) (Z86 19)   4  History of hypertension (V12 59) (Z86 79)   5  History of shoulder surgery (V45 89) (Z98 890)   6  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)    Surgical History    1  History of Hernia Repair   2  History of Shoulder Surgery    Family History  Mother    1  No pertinent family history  Father    2  No pertinent family history    Social History    · Current every day smoker (305 1) (F17 200)   · History of Unknown if ever smoked    Current Meds   1  AmLODIPine Besylate 2 5 MG Oral Tablet; take 1 tablet by mouth daily; Therapy: 46Oaj1348 to (Evaluate:56Gva7241)  Requested for: 28Mar2017; Last   Rx:28Mar2017 Ordered   2  Norvir 100 MG Oral Tablet; take 1 tablet by mouth twice a day; Therapy: 47Spb7470 to (Evaluate:83Vpd8076)  Requested for: 28Mar2017; Last   Rx:28Mar2017 Ordered   3  PARoxetine HCl - 20 MG Oral Tablet; take 1 tablet by mouth daily; Therapy: 14Lkr2073 to (Evaluate:21Kks5314)  Requested for: 28Mar2017; Last   Rx:28Mar2017 Ordered   4  Prezista 600 MG Oral Tablet; take 1 tablet by mouth twice a day;    Therapy: 94Jxp7293 to (Evaluate:40Nfh6570)  Requested for: 30KCT0794; Last   Rx:28Mar2017 Ordered   5  Tivicay 50 MG Oral Tablet; take 1 tablet by mouth twice a day; Therapy: 50Wlo8641 to (Evaluate:56Xum5816)  Requested for: 28Mar2017; Last   Rx:28Mar2017 Ordered   6  Viread 300 MG Oral Tablet; take 1 tablet by mouth daily; Therapy: 76Gxh8686 to (Evaluate:20Vzz9173)  Requested for: 28Mar2017; Last   Rx:28Mar2017 Ordered    Allergies    1  No Known Drug Allergies    Vitals  Signs   Recorded: 25Apr2017 04:23PM   Temperature: 98 F  Heart Rate: 80  Systolic: 975  Diastolic: 68  Weight: 547 lb 4 oz  BMI Calculated: 24 58  BSA Calculated: 2 04  O2 Saturation: 98  Pain Scale: 4    Physical Exam    Constitutional   General appearance: No acute distress, well appearing and well nourished  Ears, Nose, Mouth, and Throat   Oropharynx: Normal with no erythema, edema, exudate or lesions  Pulmonary   Respiratory effort: No increased work of breathing or signs of respiratory distress  Auscultation of lungs: Clear to auscultation  Cardiovascular   Auscultation of heart: Normal rate and rhythm, normal S1 and S2, without murmurs  Examination of extremities for edema and/or varicosities: Normal     Abdomen   Abdomen: Non-tender, no masses  Liver and spleen: No hepatomegaly or splenomegaly  Lymphatic   Palpation of lymph nodes in neck: No lymphadenopathy         Future Appointments    Date/Time Provider Specialty Site   06/26/2017 02:00 PM CATHIE Whitley [facility]     Signatures   Electronically signed by : Ed Valverde MD; Apr 25 2017  4:44PM EST                       (Author)

## 2018-01-15 VITALS
HEART RATE: 71 BPM | OXYGEN SATURATION: 97 % | HEIGHT: 72 IN | SYSTOLIC BLOOD PRESSURE: 128 MMHG | WEIGHT: 188 LBS | DIASTOLIC BLOOD PRESSURE: 76 MMHG | TEMPERATURE: 97.5 F | BODY MASS INDEX: 25.47 KG/M2

## 2018-01-15 DIAGNOSIS — B20 HUMAN IMMUNODEFICIENCY VIRUS (HIV) DISEASE (HCC): ICD-10-CM

## 2018-01-15 NOTE — PROGRESS NOTES
Assessment    1  HIV disease (042) (B20)    Discussion/Summary    Intervention Diet Prescription   Energy 2075 kcal   25kcal /83kg   Protein 83g   1 0g /83kg   Fluid 2075ml   25ml /83kg <2 0g Na  Estimated Intake: 2200kcal 85g Protein   His current intake is meeting estimated nutrition needs  Goal #1 - Improve/Maintain  weight  Goal initiated  Time Frame For Accomplishment: By next follow-up  Intervention Nutrition Education Provided  Person Educated: patient   Topics Discussed: Weight management   Barriers To Learning: none  Readiness to Learn: Receptive  Teaching Method: verbal   Evaluation Of Learning: needs reinforcement   Keira Franks continues at a weight of 83kg (BMI 24)  PT is doing well with prior weight loss  Indicates continuing with improved dietary habits including drinking water throughout the day, increased salad, fruit, vegetable intake  Will continue to discuss eating habits  No supplementation at this time  Recent dental cleaning in June of 2017, upcoming work for crown in July 2017  Will continue to follow as needed  History of Present Illness  Assessment: Clinical Data/Client History HIV - Yes  AIDS - No    His socio-economic status includes  cooks and eats out  [facility] in [facility]  Living Environment - He has access to refrigerator, stove and microwave  His functional status is  ambulatory, able to food shop and prepares own meals  His activity level is  normal    He eats breakfast at  Cereal or hash browns, eggs, wheat toast, oj, coffee, water  He eats lunch at  Maybe a salad, minimal lunch intake,water  He eats dinner at  Beef or chicken, potatoes, vegetables, water  His appetite is  good   He does not take supplements  He has problems with  Regular dental cleanings last performed June 2017/ Additional work in July for crown   Nutrition Diagnosis   Problem related to no nutrition diagnosis at this time  Active Problems    1  Anxiety (300 00) (F41 9)   2  Bilateral back pain, unspecified location   3  Depression screening (V79 0) (Z13 89)   4  Dermoid cyst of face (216 3) (D23 30)   5  Diabetes mellitus (250 00) (E11 9)   6  Diarrhea (787 91) (R19 7)   7  Encounter for screening colonoscopy (V76 51) (Z12 11)   8  Encounter for screening examination for sexually transmitted disease (V74 5) (Z11 3)   9  Essential hypertension (401 9) (I10)   10  Heart murmur (785 2) (R01 1)   11  Hiatal hernia (553 3) (K44 9)   12  HIV disease (042) (B20)   15  Hypertriglyceridemia (272 1) (E78 1)   14  Inguinal hernia (550 90) (K40 90)   15  Left groin pain (789 09) (R10 32)   16  Need for prophylactic vaccination and inoculation against influenza (V04 81) (Z23)   17  Nicotine dependence (305 1) (F17 200)   18  Polypharmacy (V58 69) (Z79 899)   19  Prostate cancer screening (V76 44) (Z12 5)   20  Snoring (786 09) (R06 83)   21  Testicular pain (608 9) (N50 819)   22  Varicocele (456 4) (I86 1)    Past Medical History    1  History of DDD (degenerative disc disease), lumbar (722 52) (M51 36)   2  History of Facet joint disease (724 8) (M12 88)   3  History of human immunodeficiency virus infection (V12 09) (Z86 19)   4  History of hypertension (V12 59) (Z86 79)   5  History of shoulder surgery (V45 89) (Z98 890)   6  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)    Surgical History    1  History of Hernia Repair   2  History of Shoulder Surgery    Family History  Mother    1  No pertinent family history  Father    2  No pertinent family history    Social History    · Current every day smoker (305 1) (F17 200)   · History of Unknown if ever smoked    Current Meds   1  AmLODIPine Besylate 2 5 MG Oral Tablet; take 1 tablet by mouth daily; Therapy: 00Ywv0607 to (Evaluate:96Qqw4001)  Requested for: 28Mar2017; Last Rx:28Mar2017   Ordered   2  Norvir 100 MG Oral Tablet; take 1 tablet by mouth twice a day;    Therapy: 61Pbm2690 to (Evaluate:80Yit5419) Requested for: 28Mar2017; Last Rx:28Mar2017   Ordered   3  PARoxetine HCl - 20 MG Oral Tablet; take 1 tablet by mouth daily; Therapy: 06Sej9982 to (Evaluate:42Yji4992)  Requested for: 28Mar2017; Last Rx:28Mar2017   Ordered   4  Prezista 600 MG Oral Tablet; take 1 tablet by mouth twice a day; Therapy: 86Ado5716 to (Evaluate:67Zln1492)  Requested for: 39JNA8829; Last Rx:28Mar2017   Ordered   5  Tivicay 50 MG Oral Tablet; take 1 tablet by mouth twice a day; Therapy: 46Ziq7072 to (Evaluate:79Ina8263)  Requested for: 28Mar2017; Last Rx:28Mar2017   Ordered   6  Viread 300 MG Oral Tablet; take 1 tablet by mouth daily; Therapy: 73Wki8669 to (Evaluate:02Ydj2144)  Requested for: 28Mar2017; Last Rx:28Mar2017   Ordered    Allergies    1   No Known Drug Allergies    Vitals  Vitals   Recorded: 48DVV8224 27:48KO   Systolic: 944  Diastolic: 76  Temperature: 97 7 F  Heart Rate: 76  Height: 6 ft   Weight: 183 lb 6 oz  BMI Calculated: 24 87  BSA Calculated: 2 05  Recorded: 20QWV8102 52:38GB   Systolic: 884  Diastolic: 68  Temperature: 98 F  Heart Rate: 80  Weight: 181 lb 4 oz  BMI Calculated: 24 58  BSA Calculated: 2 04  Pain Scale: 4  O2 Saturation: 98  Recorded: 32DCJ8738 11:31KE   Systolic: 993  Diastolic: 78  Temperature: 97 6 F  Heart Rate: 60  Height: 6 ft   Weight: 182 lb 8 oz  BMI Calculated: 24 75  BSA Calculated: 2 05  Recorded: 74CPA7849 20:65VD   Systolic: 747  Diastolic: 70  Temperature: 98 2 F  Heart Rate: 80  Weight: 181 lb 8 oz  BMI Calculated: 24 62  BSA Calculated: 2 04  Recorded: 82SPU9493 08:15TT   Systolic: 566  Diastolic: 78  Temperature: 97 5 F  Heart Rate: 76  Weight: 191 lb 2 oz  BMI Calculated: 25 92  BSA Calculated: 2 09  Recorded: 90LNP3459 47:27JZ   Systolic: 563  Diastolic: 78  Temperature: 97 6 F  Heart Rate: 72  Weight: 198 lb   BMI Calculated: 26 85  BSA Calculated: 2 12  Recorded: 67DOF0288 01:82KT   Systolic: 652  Diastolic: 82  Temperature: 97 5 F  Heart Rate: 76  Weight: 211 lb   BMI Calculated: 28 62  BSA Calculated: 2 18  Recorded: 50PTN1570 59:28SP   Systolic: 773  Diastolic: 80  Heart Rate: 72  Respiration: 16  Height: 6 ft   Weight: 204 lb   BMI Calculated: 27 67  BSA Calculated: 2 15  Recorded: 66ZSN8364 80:15MY   Systolic: 436  Diastolic: 70  Temperature: 98 F  Heart Rate: 96  Weight: 207 lb 6 oz  BMI Calculated: 27 55  BSA Calculated: 2 18  Recorded: 29HNS9200 53:28PR   Systolic: 382  Diastolic: 78  Temperature: 97 7 F  Heart Rate: 76  Weight: 210 lb 4 oz  BMI Calculated: 27 93  BSA Calculated: 2 19  Recorded: 93NHJ1178 55:84KJ   Systolic: 569  Diastolic: 90  Temperature: 98 F  Heart Rate: 88  Weight: 207 lb 4 oz  BMI Calculated: 27 53  BSA Calculated: 2 18  Recorded: 84UXV0130 14:61EJ   Systolic: 393  Diastolic: 90  Temperature: 97 8 F  Heart Rate: 84  Weight: 206 lb 4 oz  BMI Calculated: 27 4  BSA Calculated: 2 17  Recorded: 59PZM5632 47:27HK   Systolic: 491  Diastolic: 94  Temperature: 98 1 F  Heart Rate: 80  Weight: 207 lb 4 oz  BMI Calculated: 27 53  BSA Calculated: 2 18  Recorded: 02ATW8239 73:96EO   Systolic: 911  Diastolic: 82  Temperature: 97 9 F  Heart Rate: 76  Weight: 212 lb 2 oz  BMI Calculated: 28 18  BSA Calculated: 2 2  Recorded: 12QIW8630 57:66KC   Systolic: 497  Diastolic: 84  Temperature: 98 1 F  Heart Rate: 92  Height: 6 ft 0 75 in  Weight: 210 lb 8 oz  BMI Calculated: 27 97  BSA Calculated: 2 19    Future Appointments    Date/Time Provider Specialty Site   10/24/2017 04:15 PM Alber Rossi MD Infectious Disease [facility] AT [facility]   10/16/2017 02:00 PM CATHIE Gandhi Epidemiology/Public Health [facility]     Signatures   Electronically signed by :  OLI Terrell; Jul 12 2017  4:18PM EST                       (Author)

## 2018-01-15 NOTE — PROGRESS NOTES
Assessment    1  HIV disease (042) (B20)   2  Diarrhea (787 91) (R19 7)   3  Nicotine dependence (305 1) (F17 200)    Plan    1  (1) CHLAMYDIA/GC AMPLIFIED DNA, PCR; Source:Urine, Unspecified Source;   Status:Active; Requested AZA:37XHU4272;     2  (1) CBC/PLT/DIFF; Status:Active; Requested CRJ:86MSI8650;    3  (1) COMPREHENSIVE METABOLIC PANEL; Status:Active; Requested JJX:09FQN6542;    4  (1) HIV-1 RNA QUANTITATIVE; [Do Not Release]; Status:Active; Requested   for:01Mar2018;    5  (1) T LYMPH SUBSET (CD4); Status:Active; Requested for:01Mar2018;    6  (1) URINALYSIS (will reflex a microscopy if leukocytes, occult blood, protein or nitrites are   not within normal limits); Status:Active; Requested LQW:60LXA5378;    7  Hepatitis A; inject 1  ml intramuscular; To Be Done: 26KNY9909   8  Hepatitis B; INJECT 2  ML Intramuscular; To Be Done: 57GIA2596   4  Menactra Intramuscular Injectable; INJECT 0 5  ML Intramuscular; To Be   Done: 55AEO5438    30  Follow-up visit in 6 months Evaluation and Treatment  Follow-up  Status: Hold For -    Scheduling  Requested for: 30AUJ3860    Discussion/Summary    HIV-doing well on ART with an undetectable viral load and CD4 count in the mid 400s  Continue ART, recheck labs in 5 months, follow-up in 6 months  Stressed adherence  Diarrhea-seems to have resolved with change in the patient's diet  The patient did not get stool studies before resolution  No additional workup needed at this time  Nicotine dependence-the patient is in the process of quitting smoking  Stressed the importance of tobacco cessation  Possible side effects of new medications were reviewed with the patient/guardian today  The treatment plan was reviewed with the patient/guardian   The patient/guardian understands and agrees with the treatment plan   The patient was counseled regarding diagnostic results, instructions for management, prognosis, risks and benefits of treatment options, importance of compliance with treatment  Education   general HIV education  adherence  prevention care  Chief Complaint  Pt here for routine f/u  History of Present Illness  Routine follow-up for HIV  Patient claims 100% adherence with Prezista/Norvir/Viread/Tivicay  He denies any notable side effects  His diarrhea has resolved  He is in the process of quitting smoking  He is starting an exercise program      Pain Assessment   the patient states they do not have pain  Abuse And Domestic Violence Screen    Yes, the patient is safe at home  The patient states no one is hurting them  Depression And Suicide Screen  No, the patient has not had thoughts of hurting themself  Yes, the patient has felt depressed in the past 7 days  The patient is being seen for a routine clinic follow-up of HIV infection  The patient is currently asymptomatic  Active Problems    1  Anxiety (300 00) (F41 9)   2  Atypical mole (216 9) (D22 9)   3  Bilateral back pain, unspecified location   4  Contact with and (suspected) exposure to infections with a predominantly sexual mode   of transmission (V01 6) (Z20 2)   5  Depression screening (V79 0) (Z13 89)   6  Dermoid cyst of face (216 3) (D23 30)   7  Diabetes mellitus (250 00) (E11 9)   8  Diarrhea (787 91) (R19 7)   9  Encounter for screening colonoscopy (V76 51) (Z12 11)   10  Encounter for screening examination for sexually transmitted disease (V74 5) (Z11 3)   11  Essential hypertension (401 9) (I10)   12  Heart murmur (785 2) (R01 1)   13  Hiatal hernia (553 3) (K44 9)   14  HIV disease (042) (B20)   15  Hypertriglyceridemia (272 1) (E78 1)   16  Inguinal hernia (550 90) (K40 90)   17  Left groin pain (789 09) (R10 32)   18  Need for prophylactic vaccination and inoculation against influenza (V04 81) (Z23)   19  Nicotine dependence (305 1) (F17 200)   20  Polypharmacy (V58 69) (Z79 899)   21  Prostate cancer screening (V76 44) (Z12 5)   22  Snoring (786 09) (R06 83)   23  Testicular pain (608 9) (N50 819)   24  Varicocele (456 4) (I86 1)    Past Medical History    1  History of DDD (degenerative disc disease), lumbar (722 52) (M51 36)   2  History of Facet joint disease (724 8) (M12 88)   3  History of human immunodeficiency virus infection (V12 09) (Z86 19)   4  History of hypertension (V12 59) (Z86 79)   5  History of shoulder surgery (V45 89) (Z98 890)   6  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)    Surgical History    1  History of Hernia Repair   2  History of Shoulder Surgery    Family History  Mother    1  No pertinent family history  Father    2  No pertinent family history    Social History    · Current every day smoker (305 1) (F17 200)   · History of Unknown if ever smoked    Current Meds   1  AmLODIPine Besylate 2 5 MG Oral Tablet; take 1 tablet by mouth daily; Therapy: 94Bjc7706 to (Evaluate:66Xiu0784)  Requested for: 22IUN9829; Last   Rx:12Cww9290 Ordered   2  Nicotine Polacrilex 4 MG Mouth/Throat Gum; CHEW 1 PIECE SLOWLY AND   INTERMITTENTLY FOR 30 MINUTES  REPEAT EVERY 1-2 HOURS; MAXIMUM OF 24   PIECES/DAY; Therapy: 02GZE2590 to (Evaluate:14Jan2018)  Requested for: 87RWH4807; Last   Rx:29Nic0455 Ordered   3  Norvir 100 MG Oral Tablet; take 1 tablet by mouth twice a day; Therapy: 42Pxr0608 to (Evaluate:14Apr2018)  Requested for: 60UHE8555; Last   Rx:18Apx9089 Ordered   4  PARoxetine HCl - 20 MG Oral Tablet; take 1 tablet by mouth daily; Therapy: 97Aut6671 to (Evaluate:14Apr2018)  Requested for: 88MRS0534; Last   Rx:43Kmk0391 Ordered   5  Prezista 600 MG Oral Tablet; take 1 tablet by mouth twice a day; Therapy: 61Yrx3427 to (Evaluate:14Apr2018)  Requested for: 80ALZ8362; Last   Rx:16Oct2017 Ordered   6  Tivicay 50 MG Oral Tablet; take 1 tablet by mouth twice a day; Therapy: 75Caw8202 to (Evaluate:14Apr2018)  Requested for: 65ZCN5634; Last   Rx:65Jus3460 Ordered   7   Viread 300 MG Oral Tablet; take 1 tablet by mouth daily; Therapy: 32Olc7835 to (Evaluate:94Zye0341)  Requested for: 56ISV1217; Last   Rx:84Ufw6535 Ordered    Allergies    1  No Known Drug Allergies    Vitals  Signs   Recorded: 78SYQ9861 03:59PM   Temperature: 98 F  Heart Rate: 77  Systolic: 362  Diastolic: 82  Height: 6 ft   Weight: 193 lb 8 oz  BMI Calculated: 26 24  BSA Calculated: 2 1  O2 Saturation: 97    Physical Exam    Constitutional   General appearance: No acute distress, well appearing and well nourished  Ears, Nose, Mouth, and Throat   Oropharynx: Normal with no erythema, edema, exudate or lesions  Pulmonary   Respiratory effort: No increased work of breathing or signs of respiratory distress  Auscultation of lungs: Clear to auscultation  Cardiovascular   Auscultation of heart: Normal rate and rhythm, normal S1 and S2, without murmurs  Examination of extremities for edema and/or varicosities: Normal     Abdomen   Abdomen: Non-tender, no masses  Liver and spleen: No hepatomegaly or splenomegaly  Lymphatic   Palpation of lymph nodes in neck: No lymphadenopathy         Future Appointments    Date/Time Provider Specialty Site   02/12/2018 02:00 PM CATHIE Quiroz Epidemiology/Public Health [facility] AT [facility]     Signatures   Electronically signed by : Sara Rodriguez MD; Oct 24 2017  4:41PM EST                       (Author)

## 2018-01-15 NOTE — PROGRESS NOTES
History of Present Illness    Assessment:  Wt loss/ elevated lipid levels  Nutrition Diagnosis Continue Previous Nutrition Diagnosis   Intervention Nutrition Education   Monitor And Evaluation Goal #1 - Wt loss, Goal #1 is improved , Goal #2 - Reduced lipid levels, Goal #2 is extended  Pt currently at 181# (29#) wt loss within 1 year time  Pt is within 3# of IBW  Pt indicates working towards wt loss as he did not feel well when he was around 210#  He indicates eating increased amount of vegetables  Asked pt of additional questions or needs at this time r/t wt loss  Pt declined  No new lab values r/t triglyceride levels  Will continue to follow to assess change in previous lab values and interventions as needed  Active Problems    1  Anxiety (300 00) (F41 9)   2  Bilateral back pain, unspecified location   3  Depression screening (V79 0) (Z13 89)   4  Dermoid cyst of face (216 3) (D23 30)   5  Diabetes mellitus (250 00) (E11 9)   6  Encounter for screening colonoscopy (V76 51) (Z12 11)   7  Essential hypertension (401 9) (I10)   8  Heart murmur (785 2) (R01 1)   9  HIV disease (042) (B20)   10  Hypertriglyceridemia (272 1) (E78 1)   11  Left groin pain (789 09) (R10 30)   12  Need for prophylactic vaccination and inoculation against influenza (V04 81) (Z23)   13  Prostate cancer screening (V76 44) (Z12 5)   14  Snoring (786 09) (R06 83)   15  Testicular pain (608 9) (N50 819)   16  Varicocele (456 4) (I86 1)    Past Medical History    1  History of DDD (degenerative disc disease), lumbar (722 52) (M51 36)   2  History of Facet joint disease (724 8) (M12 88)   3  History of hiatal hernia (V12 79) (Z87 19)   4  History of human immunodeficiency virus infection (V12 09) (Z86 19)   5  History of hypertension (V12 59) (Z86 79)   6  History of shoulder surgery (V45 89) (Z98 890)   7  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)    Surgical History    1   History of Hernia Repair   2  History of Shoulder Surgery    Family History  Mother    1  No pertinent family history  Father    2  No pertinent family history    Social History    · Current every day smoker (305 1) (F17 200)   · History of Unknown if ever smoked    Current Meds   1  AmLODIPine Besylate 2 5 MG Oral Tablet; take 1 tablet by mouth daily; Therapy: 62Ndw0125 to (Evaluate:26Mar2017)  Requested for: 88KVM0556; Last Rx:19Vza0759   Ordered   2  Norvir 100 MG Oral Tablet; take 1 tablet by mouth twice a day; Therapy: 44Rnw6147 to (Evaluate:26Mar2017)  Requested for: 34TAO1059; Last Rx:33Dqj1776   Ordered   3  PARoxetine HCl - 20 MG Oral Tablet; take 1 tablet by mouth daily; Therapy: 24Jys3113 to (Evaluate:26Mar2017)  Requested for: 10OZO0994; Last Rx:58Bub4793   Ordered   4  Prezista 600 MG Oral Tablet; take 1 tablet by mouth twice a day; Therapy: 99Rhs8376 to (Evaluate:26Mar2017)  Requested for: 02YEY2089; Last Rx:52Uxu1549   Ordered   5  Tivicay 50 MG Oral Tablet; take 1 tablet by mouth twice a day; Therapy: 36Sos9863 to (Evaluate:26Mar2017)  Requested for: 50BTC9469; Last Rx:66Mpq2386   Ordered   6  Viread 300 MG Oral Tablet; take 1 tablet by mouth daily; Therapy: 47Omp1783 to (Evaluate:11Jun2017)  Requested for: 90Ocf6794; Last Rx:18Qta4441   Ordered    Allergies    1  No Known Drug Allergies    Future Appointments    Date/Time Provider Specialty Site   03/28/2017 05:15 PM Franchesca Tobin MD Internal Medicine [facility] AT [place]   03/28/2017 02:00 PM CATHIE Leon Epidemiology/Public Health [facility]     Signatures   Electronically signed by :  OLI Durbin; Dec 13 2016  4:07PM EST                       (Author)

## 2018-01-15 NOTE — PROGRESS NOTES
Assessment    1  Dermoid cyst of face (216 3) (D23 30)   2  HIV disease (042) (B20)   3  Hypertriglyceridemia (272 1) (E78 1)   4  Essential hypertension (401 9) (I10)    Plan  Anxiety    · *1 - [facility],[address] Physician Referral  Consult  Status: Hold  For - Scheduling  Requested for: 43TNG0628  Care Summary provided  : Yes  Dermoid cyst of face    · Dermatology Referral Other Physician Referral  Consult  Status: Hold For - Scheduling   Requested for: 00SIB4163  are Referring to a non- Preferred Provider : Established Patient  Care Summary provided  : Yes  HIV disease    · Follow-up visit in 3 months Evaluation and Treatment  Follow-up  Status: Hold For -  Scheduling  Requested for: 21Inx7720  Hypertriglyceridemia    · (1) LIPID PANEL, FASTING; Status:Active; Requested for:10Aug2016;     Discussion/Summary    Christiana Jessica is a 53-year-old  male here today for primary care 3 month follow-up  Acute complaint of approximately 1 cm dermoid cyst on the left cheek  Lesion is not painful and developed suddenly over the past month  Refer to dermatology for further evaluation and possible removal     HIV: CD4 564 VL<20  On salvage ART Viread, Norvir, Prezista, Tivicay  Stressed the importance of adherence  Currently engaged in a new relationship with a female partner  She is aware of HIV status  Condoms are used during sexual intercourse  Hypertension; Well-controlled  Blood pressure today 122/78  Continue amlodipine  Hypertriglyceridemia: Last evaluation of lipid levels October 2015  Has not had lab work completed as requested  Elevated triglycerides 356  Is currently using natural remedy of garlic and clover honey in the hopes of lowering triglyceride levels  Stressed the importance of eating a low fat diet and increasing activity level  Encouraged continued weight loss  Given lab slip to have lipid levels checked with next labs  Depression: Stable  Continue paroxetine   Occasionally has nightmares related to PTSD  Will refer to behavioral health for further management  Maintenance: Refused hepatitis A and Prevnar vaccine  Concerned that vaccine may contain live virus  Educated that infection could not occur with administration of vaccine due to the denatured state of virus  Highly encouraged Angeline Stringer to get recommended vaccines in order to protect himself from infection  Ulises Fairly states he will consider proceeding vaccine at next office visit  Counseled on smoking cessation  Encouraged to quit  Angeline Stringer is currently reducing the amount of his daily cigarette intake in an attempt to quit  Educated on the health risks that smoking poses in the improvement to his overall well-being if he quits  Will refer to GI for colonoscopy at next office visit  Primary care follow-up scheduled for 3 months  Possible side effects of new medications were reviewed with the patient/guardian today  The treatment plan was reviewed with the patient/guardian  The patient/guardian understands and agrees with the treatment plan   The patient was counseled regarding diagnostic results, instructions for management, impressions, risks and benefits of treatment options, importance of compliance with treatment  Education   general HIV education  adherence  prevention care  Chief Complaint  Pt would like to have growth on left side of his face looked at  History of Present Illness    Pain Assessment   the patient states they do not have pain  Abuse And Domestic Violence Screen    Yes, the patient is safe at home  The patient states no one is hurting them  Depression And Suicide Screen  No, the patient has not had thoughts of hurting themself  Yes, the patient has felt depressed in the past 7 days  CD4: 564  VL: <20  Time spent: 15 minutes  Strength: Good insight into disease process  Weakness: Occasional reluctant to follow medical care plan, looks for alternative therapies     Plan of Action: Education on the need of continued adherence to prevent viral resistance  SUBSTANCE ABUSE: not using ETOH  not using drugs  SMOKING: He is a current smoker, uses cigs, 4 cigs/day packs per day and has thought about quitting  He has the following barriers: pt is currently cutting down on his own  SEXUALLY ACTIVE: He is not sexually active  HOUSING: He has stable housing  There are 1 people living in the household (including children)  The household income is $1445  00/month  HEALTH MAINTENANCE: His last dental exam was needs  will self schedule  His last eye exam was 2/2016  The patient is being seen for a routine clinic follow-up of hypertriglyceridemia  Recent measurements for this patient were taken on 10/2015 and Have requested many times that patient have laboratory study repeated, patient refused  Recent measurements: total cholesterol 149 mg/dL, HDL 45 mg/dL, LDL 33 mg/dL and triglycerides 356 mg/dL  Associated symptoms:  no abdominal pain, no chest pain, no chest pressure, no chest discomfort, no dyspnea at rest, no dyspnea on exertion, no exertional calf cramps and no skin lesions  Current treatment includes aerobic exercise and dietary modification  (Using honey and garlic to lower triglycerides)   The patient presents for follow-up of essential hypertension  The patient states he has been stable with his blood pressure control since the last visit  He has no comorbid illnesses  He has no significant interval events  Symptoms: denies impaired vision, denies dyspnea, denies chest pain, denies intermittent leg claudication and denies lower extremity edema  Associated symptoms include no headache and no memory loss  Home monitoring: The patient is not checking blood pressure at home  Medications: the patient is adherent with his medication regimen   The patient is due for a lipid panel and an eye exam       Review of Systems    Constitutional: No fever or chills, feels well, no tiredness, no recent weight gain or weight loss  Eyes: No complaints of eye pain, no red eyes, no discharge from eyes, no itchy eyes  ENT: no complaints of earache, no hearing loss, no nosebleeds, no nasal discharge, no sore throat, no hoarseness  Cardiovascular: No complaints of slow heart rate, no fast heart rate, no chest pain, no palpitations, no leg claudication, no lower extremity  Respiratory: No complaints of shortness of breath, no wheezing, no cough, no SOB on exertion, no orthopnea or PND  Gastrointestinal: No complaints of abdominal pain, no constipation, no nausea or vomiting, no diarrhea or bloody stools  Genitourinary: No complaints of dysuria, no incontinence, no hesitancy, no nocturia, no genital lesion, no testicular pain  Musculoskeletal: No complaints of arthralgia, no myalgias, no joint swelling or stiffness, no limb pain or swelling  Integumentary: skin lesion and Growth on left side of the face, but no rashes, no itching, no dry skin and no skin wound  Neurological: No compliants of headache, no confusion, no convulsions, no numbness or tingling, no dizziness or fainting, no limb weakness, no difficulty walking  Psychiatric: Is not suicidal, no sleep disturbances, no anxiety or depression, no change in personality, no emotional problems  Endocrine: No complaints of proptosis, no hot flashes, no muscle weakness, no erectile dysfunction, no deepening of the voice, no feelings of weakness  Hematologic/Lymphatic: No complaints of swollen glands, no swollen glands in the neck, does not bleed easily, no easy bruising  Active Problems    1  Anxiety (300 00) (F41 9)   2  Bilateral back pain, unspecified location   3  Depression screening (V79 0) (Z13 89)   4  Diabetes mellitus (250 00) (E11 9)   5  Essential hypertension (401 9) (I10)   6  Heart murmur (785 2) (R01 1)   7  HIV disease (042) (B20)   8  Hypertriglyceridemia (272 1) (E78 1)   9  Left groin pain (789 09) (R10 30)   10  Prostate cancer screening (V76 44) (Z12 5)   11  Snoring (786 09) (R06 83)   12  Testicular pain (608 9) (N50 8)   13  Varicocele (456 4) (I86 1)    Past Medical History    1  History of DDD (degenerative disc disease), lumbar (722 52) (M51 36)   2  History of Facet joint disease (724 8) (M47 899)   3  History of hiatal hernia (V12 79) (Z87 19)   4  History of human immunodeficiency virus infection (V12 09) (Z86 19)   5  History of hypertension (V12 59) (Z86 79)   6  History of shoulder surgery (V45 89) (Z98 89)   7  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)    The active problems and past medical history were reviewed and updated today  Surgical History    1  History of Hernia Repair   2  History of Shoulder Surgery    The surgical history was reviewed and updated today  Family History  Mother    1  No pertinent family history  Father    2  No pertinent family history    The family history was reviewed and updated today  Social History    · Current every day smoker (305 1) (F17 200)   · History of Unknown if ever smoked  The social history was reviewed and updated today  The social history was reviewed and is unchanged  Current Meds   1  AmLODIPine Besylate 2 5 MG Oral Tablet; take 1 tablet by mouth daily; Therapy: 84Bpy5794 to (Evaluate:25Uha7713)  Requested for: 01LBA1213; Last   Rx:30Mar2016 Ordered   2  Norvir 100 MG Oral Tablet; take 1 tablet by mouth twice a day; Therapy: 77Tyv2255 to (Evaluate:23Hqq7361)  Requested for: 46IDX3600; Last   Rx:30Mar2016 Ordered   3  PARoxetine HCl - 20 MG Oral Tablet; take 1 tablet by mouth daily; Therapy: 28Tmr0709 to (Evaluate:09Yzh8115)  Requested for: 55SQF5008; Last   Rx:30Mar2016 Ordered   4  Prezista 600 MG Oral Tablet; take 1 tablet by mouth twice a day; Therapy: 04Bxc4871 to (Evaluate:34Ect7232)  Requested for: 83JUZ2090; Last   Rx:30Mar2016 Ordered   5   Tivicay 50 MG Oral Tablet; take 1 tablet by mouth twice a day; Therapy: 38Jbc4032 to (Evaluate:23Ulr9888)  Requested for: 64ZAZ3365; Last   Rx:30Mar2016 Ordered   6  Viread 300 MG Oral Tablet; take 1 tablet by mouth daily; Therapy: 23Vlk9028 to (Levyyobany Larsen)  Requested for: 32MAA6374; Last   LD:19XXV8488 Ordered    The medication list was reviewed and updated today  Allergies    1  No Known Drug Allergies    Vitals  Signs   Recorded: 81OSU0023 43:42LH   Systolic: 016  Diastolic: 78  Heart Rate: 72  Temperature: 97 6 F  Weight: 198 lb   BMI Calculated: 26 85  BSA Calculated: 2 12    Physical Exam    Constitutional   General appearance: No acute distress, well appearing and well nourished  Eyes Wears corrective lenses  Pupils and irises: Equal, round and reactive to light  Ears, Nose, Mouth, and Throat   External inspection of ears and nose: Normal     Otoscopic examination: Tympanic membranes translucent with normal light reflex  Canals patent without erythema  Nasal mucosa, septum, and turbinates: Normal without edema or erythema  Oropharynx: Normal with no erythema, edema, exudate or lesions  Pulmonary   Respiratory effort: No increased work of breathing or signs of respiratory distress  Auscultation of lungs: Clear to auscultation  Cardiovascular   Auscultation of heart: Normal rate and rhythm, normal S1 and S2, without murmurs  Examination of extremities for edema and/or varicosities: Normal     Carotid pulses: Normal     Abdomen   Abdomen: Non-tender, no masses  Liver and spleen: No hepatomegaly or splenomegaly  Lymphatic   Palpation of lymph nodes in neck: No lymphadenopathy  Musculoskeletal   Gait and station: Normal     Digits and nails: Normal without clubbing or cyanosis  Inspection/palpation of joints, bones, and muscles: Normal     Muscle strength: Normal strength throughout  Skin 1 cm cystic lesion on the left cheek  Neurologic   Cranial nerves: Cranial nerves 2-12 intact      Reflexes: 2+ and symmetric  Sensation: No sensory loss  Psychiatric   Orientation to person, place, and time: Normal     Mood and affect: Normal     Lips, Teeth and Gums: The lips were normal with no lesions  Examination of the teeth revealed normal dentition  Examination of the gingiva showed no abnormalities  Attending Note  Collaborating Physician: I agree with the Advanced Practitioner note  Future Appointments    Date/Time Provider Specialty Site   10/04/2016 04:15 PM [facility] [place]   11/09/2016 09:30 AM CATHIE Bustillos Epidemiology/Public Health [facility] [address] [facility] [place]   03/22/2017 02:15 PM Ken Girard MD Urology [facility] Stephanie Mooney   Electronically signed by : Rachna Stafford;  Aug 10 2016  3:16PM EST                       (Author)    Electronically signed by : Teresa Luu DO; Aug 12 2016  7:54AM EST                       (Author)

## 2018-01-15 NOTE — PROGRESS NOTES
Patient Health Assessment    Date:            05/19/2017  Blood Pressure:  118/73  Pulse:           65  Age:             56  Weight:          180 lbs  Height/Length:   6' 2"  Body Mass Index: 23 1  Provider:        [email or web address]  Clinic:          [facility]        Medical Alert: Artificial Joints    Tobacco User    High Blood Pressure    ASC  Medications: Pen  mg    Pen  mg    Amlodipine Besylate    Epzicom    Fenofibrate    Norvir    Reyataz  Allergies:  Since Last Visit: Medical Alert: No Change    Medications: No Change    Allergies:        No Change  Pain Scale Type: Numeric Pain ScalePain Level: 0  Description: 56 yrs old pt presented for a periodic exam  Took 4 bitewings,  completed adult prophy using hand  and cavitron  Polished teeth using  polishing paste/cup  Noticed heavy plaque and dark calculus subgingivally and  fractured distal margin of tooth #19   The left bitewing showed recurrent caries   for 19   n v restore #19  a z/dr Sergio Oliver    ----- Signed on Friday, May 19, 2017 at 1:44:28 PM  -----  ----- Provider: [email or web address] - Resident Three, Dentist -- Clinic: [facility]  -----

## 2018-01-16 NOTE — PROGRESS NOTES
History of Present Illness    Assessment:    Dietary intake  Nutrition Diagnosis Continue Previous Nutrition Diagnosis   Intervention Nutrition Education   Monitor And Evaluation Goal #1 - Reduce triglyceride levels, Goal #1 is extended  Michelle Cuello continues with sustained wt loss as previously noted r/t to dietary intake changes  Pt darrius reports no issues  Triglyceride level from 3/3/17 at 275mg/dL which is a significant improvement from prior lab levels  Will continue education as needed  Active Problems    1  Anxiety (300 00) (F41 9)   2  Bilateral back pain, unspecified location   3  Depression screening (V79 0) (Z13 89)   4  Dermoid cyst of face (216 3) (D23 30)   5  Diabetes mellitus (250 00) (E11 9)   6  Diarrhea (787 91) (R19 7)   7  Encounter for screening colonoscopy (V76 51) (Z12 11)   8  Encounter for screening examination for sexually transmitted disease (V74 5) (Z11 3)   9  Essential hypertension (401 9) (I10)   10  Heart murmur (785 2) (R01 1)   11  Hiatal hernia (553 3) (K44 9)   12  HIV disease (042) (B20)   15  Hypertriglyceridemia (272 1) (E78 1)   14  Inguinal hernia (550 90) (K40 90)   15  Left groin pain (789 09) (R10 30)   16  Need for prophylactic vaccination and inoculation against influenza (V04 81) (Z23)   17  Nicotine dependence (305 1) (F17 200)   18  Prostate cancer screening (V76 44) (Z12 5)   19  Snoring (786 09) (R06 83)   20  Testicular pain (608 9) (N50 819)   21  Varicocele (456 4) (I86 1)    Past Medical History    1  History of DDD (degenerative disc disease), lumbar (722 52) (M51 36)   2  History of Facet joint disease (724 8) (M12 88)   3  History of human immunodeficiency virus infection (V12 09) (Z86 19)   4  History of hypertension (V12 59) (Z86 79)   5  History of shoulder surgery (V45 89) (Z98 890)   6  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)    Surgical History    1  History of Hernia Repair   2   History of Shoulder Surgery    Family History  Mother    1  No pertinent family history  Father    2  No pertinent family history    Social History    · Current every day smoker (305 1) (F17 200)   · History of Unknown if ever smoked    Current Meds   1  AmLODIPine Besylate 2 5 MG Oral Tablet; take 1 tablet by mouth daily; Therapy: 64Kcw7844 to (Evaluate:58Ftw6861)  Requested for: 28Mar2017; Last Rx:28Mar2017   Ordered   2  Norvir 100 MG Oral Tablet; take 1 tablet by mouth twice a day; Therapy: 95Mod1274 to (Evaluate:25Cty3708)  Requested for: 28Mar2017; Last Rx:28Mar2017   Ordered   3  PARoxetine HCl - 20 MG Oral Tablet; take 1 tablet by mouth daily; Therapy: 60Hui4844 to (Evaluate:14Lsv3119)  Requested for: 28Mar2017; Last Rx:28Mar2017   Ordered   4  Prezista 600 MG Oral Tablet; take 1 tablet by mouth twice a day; Therapy: 44Yde8803 to (Evaluate:72Stn8063)  Requested for: 98MKE7236; Last Rx:28Mar2017   Ordered   5  Tivicay 50 MG Oral Tablet; take 1 tablet by mouth twice a day; Therapy: 83Ztk2803 to (Evaluate:24Mur4168)  Requested for: 28Mar2017; Last Rx:28Mar2017   Ordered   6  Viread 300 MG Oral Tablet; take 1 tablet by mouth daily; Therapy: 56Cez7383 to (Evaluate:71Kvp5664)  Requested for: 28Mar2017; Last Rx:28Mar2017   Ordered    Allergies    1  No Known Drug Allergies    Future Appointments    Date/Time Provider Specialty Site   10/24/2017 04:15 PM Seamus Alaniz MD Infectious Disease [facility]   06/26/2017 02:00 PM CATHIE Snyder Epidemiology/Public Health [facility]     Signatures   Electronically signed by :  OLI Cardenas; Apr 26 2017 10:45AM EST                       (Author)

## 2018-01-16 NOTE — PROGRESS NOTES
Assessment    1  HIV disease (042) (B20)    Plan    1  Follow-up visit in 6 months Evaluation and Treatment  Follow-up  Status: Hold For -   Scheduling  Requested for: 24LNH2200   2  (1) CBC/PLT/DIFF; Status:Active; Requested for:01Feb2017;    3  (1) COMPREHENSIVE METABOLIC PANEL; Status:Active; Requested for:01Feb2017;    4  (1) HEP B SURFACE ANTIBODY; Status:Active; Requested for:01Feb2017;    5  (1) HEP C ANTIBODY; Status:Active; Requested for:01Feb2017;    6  (1) HIV-1 RNA QUANTITATIVE; [Do Not Release]; Status:Active; Requested   for:01Feb2017;    7  (1) T LYMPH SUBSET (CD4); Status:Active; Requested for:01Feb2017;    8  Hepatitis A; inject 1  ml intramuscular; To Be Done: 03OZV2404   8  Prevnar 13 Intramuscular Suspension; INJECT 0 5  ML Intramuscular; To Be   Done: 12FYQ8881    10  (1) LIPID PANEL, FASTING; Status:Active; Requested for:01Feb2017;    11  (1) LIPID PANEL, FASTING; Status:Canceled;    12  (1) LIPID PANEL, FASTING; Status:Canceled; Discussion/Summary    HIV-doing well with his current salvage regimen with an undetectable viral load and CD4 count of greater than 400  Continue ART, recheck labs in 5 months and follow-up in 6 months  Tobacco abuse-patient continues to attempt abstaining but is struggling with this process  Encouraged smoking cessation  Education   general HIV education  adherence  prevention care  Chief Complaint  Pt here for routine f/u  History of Present Illness  Routine follow-up for HIV  Patient on salvage regimen with twice daily Prezista/Norvir, tenofovir, Tivicay  Patient occasionally misses the p m  dose but only a few times since his last visit  He denies any notable side effects from the medications  Pain Assessment   the patient states they do not have pain  Abuse And Domestic Violence Screen    Yes, the patient is safe at home  The patient states no one is hurting them  Depression And Suicide Screen   No, the patient has not had thoughts of hurting themself  No, the patient has not felt depressed in the past 7 days  no fever no night sweats no weight loss no cough no shortness of breath no thrush no nausea no vomiting no diarrhea      Active Problems    1  Anxiety (300 00) (F41 9)   2  Bilateral back pain, unspecified location   3  Depression screening (V79 0) (Z13 89)   4  Dermoid cyst of face (216 3) (D23 30)   5  Diabetes mellitus (250 00) (E11 9)   6  Essential hypertension (401 9) (I10)   7  Heart murmur (785 2) (R01 1)   8  HIV disease (042) (B20)   9  Hypertriglyceridemia (272 1) (E78 1)   10  Left groin pain (789 09) (R10 30)   11  Prostate cancer screening (V76 44) (Z12 5)   12  Snoring (786 09) (R06 83)   13  Testicular pain (608 9) (N50 8)   14  Varicocele (456 4) (I86 1)    Past Medical History    1  History of DDD (degenerative disc disease), lumbar (722 52) (M51 36)   2  History of Facet joint disease (724 8) (M12 88)   3  History of hiatal hernia (V12 79) (Z87 19)   4  History of human immunodeficiency virus infection (V12 09) (Z86 19)   5  History of hypertension (V12 59) (Z86 79)   6  History of shoulder surgery (V45 89) (Z98 89)   7  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)    Surgical History    1  History of Hernia Repair   2  History of Shoulder Surgery    Family History  Mother    1  No pertinent family history  Father    2  No pertinent family history    Social History    · Current every day smoker (305 1) (F17 200)   · History of Unknown if ever smoked    Current Meds   1  AmLODIPine Besylate 2 5 MG Oral Tablet; take 1 tablet by mouth daily; Therapy: 11Deg6979 to (Evaluate:26Mar2017)  Requested for: 11CHG8705; Last   Rx:62Qcr8184 Ordered   2  Norvir 100 MG Oral Tablet; take 1 tablet by mouth twice a day; Therapy: 62Hvv8686 to (Evaluate:26Mar2017)  Requested for: 94QLE1378; Last   Rx:47Exi5008 Ordered   3  PARoxetine HCl - 20 MG Oral Tablet; take 1 tablet by mouth daily;    Therapy: 58CVD8215 to (Evaluate:2017)  Requested for: 31YSC5170; Last   Rx:73Uvh0775 Ordered   4  Prezista 600 MG Oral Tablet; take 1 tablet by mouth twice a day; Therapy: 79Zyi9344 to (Evaluate:2017)  Requested for: 82NNL0076; Last   Rx:60Bgm1628 Ordered   5  Tivicay 50 MG Oral Tablet; take 1 tablet by mouth twice a day; Therapy: 48Aic5058 to (Evaluate:2017)  Requested for: 80PDG4400; Last   Rx:14Yus1551 Ordered   6  Viread 300 MG Oral Tablet; take 1 tablet by mouth daily; Therapy: 83Doq1813 to (Rachell Salter)  Requested for: 15EIC6028; Last   U03AHU6327 Ordered    Allergies    1  No Known Drug Allergies    Vitals  Signs   Recorded: 64UTC4315 41:97NX   Systolic: 956  Diastolic: 78  Heart Rate: 76  Temperature: 97 5 F  Weight: 191 lb 2 oz  BMI Calculated: 25 92  BSA Calculated: 2 09    Physical Exam    Constitutional   General appearance: No acute distress, well appearing and well nourished  Ears, Nose, Mouth, and Throat   Oropharynx: Normal with no erythema, edema, exudate or lesions  Pulmonary   Respiratory effort: No increased work of breathing or signs of respiratory distress  Auscultation of lungs: Clear to auscultation  Cardiovascular   Auscultation of heart: Normal rate and rhythm, normal S1 and S2, without murmurs  Examination of extremities for edema and/or varicosities: Normal     Abdomen   Abdomen: Non-tender, no masses  Liver and spleen: No hepatomegaly or splenomegaly  Lymphatic   Palpation of lymph nodes in neck: No lymphadenopathy         Future Appointments    Date/Time Provider Specialty Site   2016 09:30 AM CATHIE Grace Epidemiology/Public Health [facility]     Signatures   Electronically signed by : Bharti Castro MD; Sep 27 2016  6:19PM EST                       (Author)

## 2018-01-17 NOTE — PROGRESS NOTES
Assessment    1  Inguinal hernia (550 90) (K40 90)   2  Anxiety (300 00) (F41 9)   3  Essential hypertension (401 9) (I10)   4  HIV disease (042) (B20)   5  Hypertriglyceridemia (272 1) (E78 1)    Plan  Anxiety    · PARoxetine HCl - 20 MG Oral Tablet; take 1 tablet by mouth daily  Essential hypertension    · AmLODIPine Besylate 2 5 MG Oral Tablet; take 1 tablet by mouth daily  Hiatal hernia, Inguinal hernia    · *1 - [address] [facility] Physician Referral  Consult Only: the expectation is  that the referring provider will communicate back to the patient on treatment options  Evaluation and Treatment: the expectation is that the referred to provider will  communicate back to the patient on treatment options  Status: Hold For - Scheduling   Requested for: [id number]  Care Summary provided  : Yes  HIV disease    · Norvir 100 MG Oral Tablet; take 1 tablet by mouth twice a day   · Prezista 600 MG Oral Tablet; take 1 tablet by mouth twice a day   · Tivicay 50 MG Oral Tablet; take 1 tablet by mouth twice a day   · Viread 300 MG Oral Tablet; take 1 tablet by mouth daily   · Hepatitis A; inject 1  ml intramuscular; To Be Done: [id number]   · Hepatitis B; INJECT 2  ML Intramuscular; To Be Done: 42PAJ1049   · Pneumo (Pneumovax); INJECT 0 5  ML Intramuscular; To Be Done:  [id number]    Discussion/Summary    Elizabeth Ayala is a pleasant, talkative 53-year-old male who presents to clinic today for primary care follow-up  Complains of increased pain at site of right inguinal hernia  Will refer to surgery for further evaluation  Also requests consult for hiatal hernia to be evaluated  Educated on signs and symptoms of hernia strangulation and instructed to seek immediate medical attention if any of those occur  HIV: CD4 594 VL <20 Adherent with ART Norvir, Prezista, Tivicay, and Viread  Rescheduled ID clinic appointment because he has a job interview tonight  HTN: /78, well controlled on Amlodipine   Will continue  Hypertriglyceridemia: Triglycerides decreased to 245 from previous reading of 356  Kati Schultz has made many dietary changes and would like to continue to lower triglycerides with lifestyle management  Recheck lipids in 6 months  Refer to dietician for additional education  Depression/PTSD: Kati Schultz recently ended his long-term relationship  This has increased his symptoms of depression  He is coping by taking classes at a local [employer] to further his career  Refer to [address] for additional management  Continue paroxetine  Smoking cessation: slowly reducing cigarette intake on his own  Had successfully reduced consumption to 6 cigarettes a day, but with recent life changes is back to smoking half a pack per day  Offered nicotine replacement therapy but patient declines this intervention at this time  Educated on the risks of smoking including COPD and lung cancer  We'll continue to assess for readiness at f/u appointments  Reymundo Sampson PCP f/u scheduled for 3 months  Possible side effects of new medications were reviewed with the patient/guardian today  The treatment plan was reviewed with the patient/guardian  The patient/guardian understands and agrees with the treatment plan   The patient was counseled regarding instructions for management, risks and benefits of treatment options  Education   general HIV education  adherence  prevention care  Chief Complaint  Pt c/o hernia  History of Present Illness  Kati Schultz is a 24-year-old male who presents to the clinic today for primary care follow-up  Past medical history significant for HIV, HTN, depression/PTSD, and hypertriglyceridemia  Complains of worsening right inguinal hernia  Previous surgical repair but feels as if symptoms have returned and are now causing pain that interferes with ambulation  Kati Schultz recently had a colonoscopy completed by [facility]  Pathology revealed a high grade polyp which was successfully excised   Follow-up colonoscopy recommended in 1 year  Pain Assessment   the patient states they have pain  The pain is located in the hernia  The patient describes the pain as sharp, dull and aching  (on a scale of 0 to 10, the patient rates the pain at 3 )   Abuse And Domestic Violence Screen    Yes, the patient is safe at home  The patient states no one is hurting them  Depression And Suicide Screen  No, the patient has not had thoughts of hurting themself  Yes, the patient has felt depressed in the past 7 days  The patient is being seen for a routine clinic follow-up of HIV infection  no fever no lethargy   The patient presents with complaints of depression (at baseline) no night sweats no weight loss no decreased appetite no cough no shortness of breath no skin lesions no mouth sores no thrush no nausea no vomiting no diarrhea no headache no visual changes Current treatment includes antiretroviral regimen  By report, there is good compliance with treatment and good tolerance of treatment  CD4: 594  VL: <20  Strength: Strong desire to be well  Weakness: Current diagnosis of depression/PTSD  Plan of Action: Close primary care follow-up  SUBSTANCE ABUSE: not using ETOH  not using drugs  SMOKING: He is a current smoker, uses cigs, 1/2ppd packs per day, for 39 years years and has thought about quitting  He has the following barriers: "not ready yet"  SEXUALLY ACTIVE: He is not sexually active  HOUSING: He has stable housing  There are 2 people living in the household (including children)  The household income is $2174  00month  HEALTH MAINTENANCE: His last dental exam was scheduled 5/2017  His last eye exam was 3/2016  The patient is being seen for follow-up of anxiety  The patient reports doing well  Comorbid Illnesses: depression  Break up   Interval symptoms:  stable anxiety, stable sleep disruption and stable depression  Associated symptoms: no suicidal ideation     Medication(s): selective serotonin reuptake inhibitors  Medications:  the patient is adherent to his medication regimen, but he denies medication side effects  Disease management:  the patient is doing well with his goals  The patient is being seen for a routine clinic follow-up of hypertriglyceridemia  Recent measurements for this patient were taken on 3/3/17 and 245  His triglycerides are decreasing  Associated symptoms:  no abdominal pain  Current treatment includes dietary modification  By report, there is good compliance with treatment  The patient is being seen for an initial evaluation of inguinal hernia  Symptoms:  inguinal bulge and inguinal pain, but no abdominal pain  The patient is currently experiencing symptoms  Associated symptoms:  no nausea, no vomiting, no constipation and no fever  The patient presents for follow-up of essential hypertension  The patient states he has been doing well with his blood pressure control since the last visit  Symptoms: denies chest pain and denies lower extremity edema  Associated symptoms include no headache  Medications: Medication(s): a calcium channel blocker  Review of Systems    Constitutional: No fever or chills, feels well, no tiredness, no recent weight gain or weight loss  Eyes: No complaints of eye pain, no red eyes, no discharge from eyes, no itchy eyes  ENT: no complaints of earache, no hearing loss, no nosebleeds, no nasal discharge, no sore throat, no hoarseness  Cardiovascular: No complaints of slow heart rate, no fast heart rate, no chest pain, no palpitations, no leg claudication, no lower extremity  Respiratory: No complaints of shortness of breath, no wheezing, no cough, no SOB on exertion, no orthopnea or PND  Gastrointestinal: No complaints of abdominal pain, no constipation, no nausea or vomiting, no diarrhea or bloody stools     Genitourinary: No complaints of dysuria, no incontinence, no hesitancy, no nocturia, no genital lesion, no testicular pain  Musculoskeletal: No complaints of arthralgia, no myalgias, no joint swelling or stiffness, no limb pain or swelling  Integumentary: No complaints of skin rash or skin lesions, no itching, no skin wound, no dry skin  Neurological: No compliants of headache, no confusion, no convulsions, no numbness or tingling, no dizziness or fainting, no limb weakness, no difficulty walking  Psychiatric: Is not suicidal, no sleep disturbances, no anxiety or depression, no change in personality, no emotional problems  Endocrine: No complaints of proptosis, no hot flashes, no muscle weakness, no erectile dysfunction, no deepening of the voice, no feelings of weakness  Hematologic/Lymphatic: No complaints of swollen glands, no swollen glands in the neck, does not bleed easily, no easy bruising  Active Problems    1  Anxiety (300 00) (F41 9)   2  Bilateral back pain, unspecified location   3  Depression screening (V79 0) (Z13 89)   4  Dermoid cyst of face (216 3) (D23 30)   5  Diabetes mellitus (250 00) (E11 9)   6  Encounter for screening colonoscopy (V76 51) (Z12 11)   7  Encounter for screening examination for sexually transmitted disease (V74 5) (Z11 3)   8  Essential hypertension (401 9) (I10)   9  Heart murmur (785 2) (R01 1)   10  HIV disease (042) (B20)   11  Hypertriglyceridemia (272 1) (E78 1)   12  Left groin pain (789 09) (R10 30)   13  Need for prophylactic vaccination and inoculation against influenza (V04 81) (Z23)   14  Prostate cancer screening (V76 44) (Z12 5)   15  Snoring (786 09) (R06 83)   16  Testicular pain (608 9) (N50 819)   17  Varicocele (456 4) (I86 1)    Past Medical History    1  History of DDD (degenerative disc disease), lumbar (722 52) (M51 36)   2  History of Facet joint disease (724 8) (M12 88)   3  History of human immunodeficiency virus infection (V12 09) (Z86 19)   4  History of hypertension (V12 59) (Z86 79)   5   History of shoulder surgery (V45 89) (Z98 890) 6  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)    The active problems and past medical history were reviewed and updated today  Surgical History    1  History of Hernia Repair   2  History of Shoulder Surgery    The surgical history was reviewed and updated today  Family History  Mother    1  No pertinent family history  Father    2  No pertinent family history    The family history was reviewed and updated today  Social History    · Current every day smoker (305 1) (F17 200)   · History of Unknown if ever smoked  The social history was reviewed and updated today  The social history was reviewed and is unchanged  Current Meds   1  AmLODIPine Besylate 2 5 MG Oral Tablet; take 1 tablet by mouth daily; Therapy: 64Sai5726 to (Evaluate:26Mar2017)  Requested for: 55RLE5042; Last   Rx:59Vsr3263 Ordered   2  Norvir 100 MG Oral Tablet; take 1 tablet by mouth twice a day; Therapy: 31Moq6580 to (Evaluate:26Mar2017)  Requested for: 54KJU3781; Last   Rx:15Ljx2000 Ordered   3  PARoxetine HCl - 20 MG Oral Tablet; take 1 tablet by mouth daily; Therapy: 41Fld6121 to (Evaluate:26Mar2017)  Requested for: 79IEX6893; Last   Rx:93Jhj8699 Ordered   4  Prezista 600 MG Oral Tablet; take 1 tablet by mouth twice a day; Therapy: 04Klk6288 to (Evaluate:26Mar2017)  Requested for: 58CON8158; Last   Rx:24Wwr7645 Ordered   5  Tivicay 50 MG Oral Tablet; take 1 tablet by mouth twice a day; Therapy: 61Pbn1769 to (Evaluate:26Mar2017)  Requested for: 32RAO6386; Last   Rx:20Apu2432 Ordered   6  Viread 300 MG Oral Tablet; take 1 tablet by mouth daily; Therapy: 35Sqe8352 to (Evaluate:11Jun2017)  Requested for: 53Qiu3370; Last   Rx:01Zjw2826 Ordered    The medication list was reviewed and updated today  Allergies    1   No Known Drug Allergies    Vitals  Signs   Recorded: 58ZMU7303 01:53PM   Temperature: 97 6 F  Heart Rate: 60  Systolic: 433  Diastolic: 78  Height: 6 ft   Weight: 182 lb 8 oz  BMI Calculated: 24 75  BSA Calculated: 2 05    Physical Exam    Constitutional   General appearance: No acute distress, well appearing and well nourished  Eyes   Conjunctiva and lids: No swelling, erythema or discharge  Wears corrective lenses  Pupils and irises: Equal, round and reactive to light  Ears, Nose, Mouth, and Throat   External inspection of ears and nose: Normal     Otoscopic examination: Tympanic membranes translucent with normal light reflex  Canals patent without erythema  Nasal mucosa, septum, and turbinates: Normal without edema or erythema  Oropharynx: Normal with no erythema, edema, exudate or lesions  Pulmonary   Respiratory effort: No increased work of breathing or signs of respiratory distress  Auscultation of lungs: Clear to auscultation  Cardiovascular   Auscultation of heart: Normal rate and rhythm, normal S1 and S2, without murmurs  Examination of extremities for edema and/or varicosities: Normal     Abdomen   Abdomen: Non-tender, no masses  Liver and spleen: No hepatomegaly or splenomegaly  Lymphatic   Palpation of lymph nodes in neck: No lymphadenopathy  Musculoskeletal   Gait and station: Normal     Skin   Skin and subcutaneous tissue: Normal without rashes or lesions  Psychiatric   Orientation to person, place, and time: Normal     Mood and affect: Normal     Lips, Teeth and Gums: The lips were normal with no lesions  Examination of the teeth revealed poor dentition  Attending Note  Collaborating Physician: I agree with the Advanced Practitioner note        Future Appointments    Date/Time Provider Specialty Site   04/25/2017 04:15 PM Emily Rodriguez MD Infectious Disease ASC AT [facility]   06/26/2017 02:00 PM CATHIE Barrera Epidemiology/Public Health [address] [facility]   Electronically signed by : Lewis Walker; Mar 28 2017  6:38PM EST                       (Author)    Electronically signed by Aislinn Monterroso DO; Mar 29 2017  5:11PM EST                       (Author)

## 2018-01-17 NOTE — PROGRESS NOTES
History of Present Illness  [facility]:   He is being seen for an initial consultation  The patient is being seen regarding meeting new [facility] and reviewing [facility] services   Support/Coping: daughter, hobbies which include biking, fixing old cars, racing cars,flying planes, as well as 2 cats and 2 dogs  Physical Exam    Objective: Orientation: oriented to person, oriented to place and oriented to time  Appearance: well developed, well nourished and well groomed  Observed mood and affect: euthymic, but appropriate  Harm to self or others: none reported or observed  Substance abuse: none reported or observed  Assessment    1  Inguinal hernia (550 90) (K40 90)   2  Anxiety (300 00) (F41 9)   3  Essential hypertension (401 9) (I10)   4  HIV disease (042) (B20)   5  Hypertriglyceridemia (272 1) (E78 1)    Plan    1  PARoxetine HCl - 20 MG Oral Tablet; take 1 tablet by mouth daily    2  AmLODIPine Besylate 2 5 MG Oral Tablet; take 1 tablet by mouth daily    3  *1 - [facility] [place] [address] Physician Referral  Consult Only: the expectation is   that the referring provider will communicate back to the patient on treatment options  Evaluation and Treatment: the expectation is that the referred to provider will   communicate back to the patient on treatment options  Status: Hold For - Scheduling    Requested for: 90PMV2885  Care Summary provided  : Yes    4  Norvir 100 MG Oral Tablet; take 1 tablet by mouth twice a day   5  Prezista 600 MG Oral Tablet; take 1 tablet by mouth twice a day   6  Tivicay 50 MG Oral Tablet; take 1 tablet by mouth twice a day   7  Viread 300 MG Oral Tablet; take 1 tablet by mouth daily   8  Hepatitis A; inject 1  ml intramuscular; To Be Done: 75HCI8263   9  Hepatitis B; INJECT 2  ML Intramuscular; To Be Done: 78DGO5767   10  Pneumo (Pneumovax); INJECT 0 5  ML Intramuscular;  To Be Done:    62OQM6438    Discussion/Summary  [facility]: Today, patient presents with no major issues or concerns  Patient will likely benefit from continued maintenance of wellbeing  The stage of change is maintenance  Behavioral recommendations: 1  F/U with [facility] at future visits  Discussion Summary [facility]:   [facility] introduced self and services to pt  Pt stated that he is hesitant about [facility]'s and his ability to connect because "there are going to be differences between us, he can tell right now " [facility] continued attempt to build therapeutic rapport with pt and practicing active listening  Pt opened up and talked extensively about his hobbies which included biking, fixing cars, flying planes and racing  Pt also talked about his cats and dogs including showing pictures to [facility]  Pt is a  with over 22 years of service and 3 tours in [place]  PT stated that his [facility] background has helped him deal with life as he "says it as it is" and gets things done  PT referred to his children therefore [facility] followed up by inquiring about his supports  PT stated that he lives with his daughter and that 1 of his sons are also in the area  PT ended by stating that the [facility] smile and curiosity about what he was talking about made his day and that he will be more willing to talk with [facility] in the future should a MH need arise as a result of this conversation today  [facility] thanked pt and returned the compliment as it was a pleasure talking with pt also        Future Appointments    Date/Time Provider Specialty Site   04/25/2017 04:15 PM Koko Stack MD Infectious Disease ASC AT [facility]   06/26/2017 02:00 PM CATHIE Fisher Epidemiology/Public Health [address] [facility]   Electronically signed by : Isabela Loyd; Mar 31 2017 10:36AM EST                       (Author)

## 2018-01-17 NOTE — PROGRESS NOTES
History of Present Illness  [facility]:   He is being seen in follow-up  The patient is being seen regarding checking in on current MH and assessing adequacy of coping skills   Physical Exam    Objective: Orientation: oriented to person, oriented to place and oriented to time  Appearance: well developed, appears healthy, well groomed and no decreased eye contact  Observed mood and affect: euthymic, but appropriate  Harm to self or others: none reported or observed  Substance abuse: none reported or observed  Assessment    1  HIV disease (042) (B20)   2  Nicotine dependence (305 1) (F17 200)   3  Diarrhea (787 91) (R19 7)   4  Hypertriglyceridemia (272 1) (E78 1)    Plan    1  (1) STOOL CULTURE; Source:Rectum; Status:Active; Requested for:25Apr2017;     2  (1) CHLAMYDIA/GC AMPLIFIED DNA, PCR; Source:Urine, Unspecified Source;   Status:Active; Requested for:01Sep2017;     3  (1) RPR; Status:Active; Requested for:01Sep2017;     4  (1) CBC/PLT/DIFF; Status:Active; Requested for:01Sep2017;    5  (1) COMPREHENSIVE METABOLIC PANEL; Status:Active; Requested for:01Sep2017;    6  (1) HIV-1 RNA QUANTITATIVE; [Do Not Release]; Status:Active; Requested   for:01Sep2017;    7  (1) QUANTIFERON - TB GOLD; Status:Active; Requested for:01Sep2017;    8  (1) T LYMPH SUBSET (CD4); Status:Active; Requested for:01Sep2017;    9  (1) URINALYSIS (will reflex a microscopy if leukocytes, occult blood, protein or nitrites are   not within normal limits); Status:Active; Requested for:01Sep2017;    10  Hepatitis B; INJECT 2  ML Intramuscular; To Be Done: 87YXZ0706   30  Hepatitis A   12  Pneumo (Pneumovax)    13  (1) OVA AND PARASITES EXAM; Status:Active; Requested for:25Apr2017;    14  (Q) CLOSTRIDIUM DIFFICILE TOXIN B, QL PCR; Status:Active; Requested    for:25Apr2017;    15  (Q) FECAL LEUKOCYTE STAIN; Status:Active; Requested for:25Apr2017;    16   Follow-up visit in 6 months Evaluation and Treatment  Follow-up Status: Complete     Done: 12Ijl9452    Discussion/Summary  [facility]: Today, patient presents with emotional disturbance concerning current happenings in [address] and other war/political news  Patient will likely benefit from limiting his news consumption during this time  Behavioral recommendations: 1  Watch movies - non war related  Limit amount of news watched on regular basis  2  Use VA counseling resource as needed   Discussion Summary [facility]:   [facility] checked in with PT as follow up after seeing him for the first time a month ago  PT stated that he is not doing very well because of the ongoing news about [place] and the potential for war  PT is ex- and states that he did not realize that when he originally signed up he had signed up for life  [facility] asked for clarification on this comment  PT responded that 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 and that he can never be discharged of that  [facility] explored how PT is coping and suggested that he drastically reduce the amount of news he watches and watch movies instead  PT commented that there are several war movies on now that he sometimes feel he cannot avoid them  [facility] explored PT's connection with the [place]  PT is engaged with VA services and even showed [facility] his benefits card  [facility] encouraged PT to call the hotline whenever he needs to talk things over as the card says and to call 911 if he ever feels like harming himself  PT agreed to plan        Future Appointments    Date/Time Provider Specialty Site   10/24/2017 04:15 PM Carlos Shane MD Infectious Disease ASC AT [facility]   06/26/2017 02:00 PM CATHIE De Leon Epidemiology/Public Health [address] [facility]   Electronically signed by : Mirna Frausto 87; May  2 2017  2:00PM EST                       (Author)

## 2018-01-22 VITALS
BODY MASS INDEX: 24.72 KG/M2 | DIASTOLIC BLOOD PRESSURE: 78 MMHG | TEMPERATURE: 97.6 F | HEART RATE: 60 BPM | SYSTOLIC BLOOD PRESSURE: 124 MMHG | WEIGHT: 182.5 LBS | HEIGHT: 72 IN

## 2018-01-22 VITALS
BODY MASS INDEX: 26.21 KG/M2 | OXYGEN SATURATION: 97 % | DIASTOLIC BLOOD PRESSURE: 82 MMHG | HEART RATE: 77 BPM | SYSTOLIC BLOOD PRESSURE: 156 MMHG | TEMPERATURE: 98 F | WEIGHT: 193.5 LBS | HEIGHT: 72 IN

## 2018-01-23 NOTE — PROGRESS NOTES
Patient Health Assessment    Date:            12/06/2017  Blood Pressure:  147/83  Pulse:           75  Age:             56  Weight:          180 lbs  Height/Length:   6' 2"  Body Mass Index: 23 1  Provider:        LUIS FERNANDO  Clinic:          [facility]  Medical Alert: Artificial Joints    Tobacco User    High Blood Pressure    ASC  Medications: Pen  mg    Pen  mg    Amlodipine Besylate    Epzicom    Fenofibrate    Norvir    Reyataz  Allergies:  Since Last Visit: Medical Alert: No Change    Medications: No Change    Allergies:        No Change  Pain Scale Type: Numeric Pain ScalePain Level: 0  Description:    Pt presents for repair of minor break in 19   14 and 19 are broken down  from grinding  Periodontal condition is out of control  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

## 2018-01-30 ENCOUNTER — APPOINTMENT (OUTPATIENT)
Dept: LAB | Facility: CLINIC | Age: 58
End: 2018-01-30
Payer: MEDICARE

## 2018-01-30 DIAGNOSIS — B20 HUMAN IMMUNODEFICIENCY VIRUS (HIV) DISEASE (HCC): ICD-10-CM

## 2018-01-30 LAB
ALBUMIN SERPL BCP-MCNC: 3.7 G/DL (ref 3.5–5)
ALP SERPL-CCNC: 94 U/L (ref 46–116)
ALT SERPL W P-5'-P-CCNC: 32 U/L (ref 12–78)
ANION GAP SERPL CALCULATED.3IONS-SCNC: 8 MMOL/L (ref 4–13)
AST SERPL W P-5'-P-CCNC: 19 U/L (ref 5–45)
BASOPHILS # BLD AUTO: 0.04 THOUSANDS/ΜL (ref 0–0.1)
BASOPHILS NFR BLD AUTO: 1 % (ref 0–1)
BILIRUB SERPL-MCNC: 0.3 MG/DL (ref 0.2–1)
BUN SERPL-MCNC: 14 MG/DL (ref 5–25)
CALCIUM SERPL-MCNC: 9.4 MG/DL (ref 8.3–10.1)
CHLORIDE SERPL-SCNC: 103 MMOL/L (ref 100–108)
CO2 SERPL-SCNC: 29 MMOL/L (ref 21–32)
CREAT SERPL-MCNC: 1.09 MG/DL (ref 0.6–1.3)
EOSINOPHIL # BLD AUTO: 0.21 THOUSAND/ΜL (ref 0–0.61)
EOSINOPHIL NFR BLD AUTO: 4 % (ref 0–6)
ERYTHROCYTE [DISTWIDTH] IN BLOOD BY AUTOMATED COUNT: 12.5 % (ref 11.6–15.1)
GFR SERPL CREATININE-BSD FRML MDRD: 76 ML/MIN/1.73SQ M
GLUCOSE SERPL-MCNC: 136 MG/DL (ref 65–140)
HCT VFR BLD AUTO: 41.8 % (ref 36.5–49.3)
HGB BLD-MCNC: 14.4 G/DL (ref 12–17)
LYMPHOCYTES # BLD AUTO: 1.74 THOUSANDS/ΜL (ref 0.6–4.47)
LYMPHOCYTES NFR BLD AUTO: 34 % (ref 14–44)
MCH RBC QN AUTO: 29.6 PG (ref 26.8–34.3)
MCHC RBC AUTO-ENTMCNC: 34.4 G/DL (ref 31.4–37.4)
MCV RBC AUTO: 86 FL (ref 82–98)
MONOCYTES # BLD AUTO: 0.44 THOUSAND/ΜL (ref 0.17–1.22)
MONOCYTES NFR BLD AUTO: 9 % (ref 4–12)
NEUTROPHILS # BLD AUTO: 2.75 THOUSANDS/ΜL (ref 1.85–7.62)
NEUTS SEG NFR BLD AUTO: 52 % (ref 43–75)
PLATELET # BLD AUTO: 184 THOUSANDS/UL (ref 149–390)
PMV BLD AUTO: 10.7 FL (ref 8.9–12.7)
POTASSIUM SERPL-SCNC: 3.7 MMOL/L (ref 3.5–5.3)
PROT SERPL-MCNC: 7.9 G/DL (ref 6.4–8.2)
RBC # BLD AUTO: 4.87 MILLION/UL (ref 3.88–5.62)
SODIUM SERPL-SCNC: 140 MMOL/L (ref 136–145)
WBC # BLD AUTO: 5.18 THOUSAND/UL (ref 4.31–10.16)

## 2018-01-30 PROCEDURE — 80053 COMPREHEN METABOLIC PANEL: CPT

## 2018-01-30 PROCEDURE — 36415 COLL VENOUS BLD VENIPUNCTURE: CPT

## 2018-01-30 PROCEDURE — 85025 COMPLETE CBC W/AUTO DIFF WBC: CPT

## 2018-02-08 RX ORDER — AMLODIPINE BESYLATE 2.5 MG/1
1 TABLET ORAL DAILY
COMMUNITY
Start: 2015-09-17 | End: 2018-04-11 | Stop reason: SDUPTHER

## 2018-02-08 RX ORDER — PAROXETINE HYDROCHLORIDE 20 MG/1
1 TABLET, FILM COATED ORAL DAILY
COMMUNITY
Start: 2015-09-17 | End: 2018-08-15 | Stop reason: SDUPTHER

## 2018-02-08 RX ORDER — RITONAVIR 100 MG/1
1 TABLET ORAL 2 TIMES DAILY
COMMUNITY
Start: 2015-09-17 | End: 2018-08-15 | Stop reason: SDUPTHER

## 2018-02-08 RX ORDER — TENOFOVIR DISOPROXIL FUMARATE 300 MG/1
1 TABLET, FILM COATED ORAL DAILY
COMMUNITY
Start: 2015-09-17 | End: 2018-08-15 | Stop reason: SDUPTHER

## 2018-02-12 ENCOUNTER — OFFICE VISIT (OUTPATIENT)
Dept: SURGERY | Facility: CLINIC | Age: 58
End: 2018-02-12
Payer: MEDICARE

## 2018-02-12 VITALS
HEIGHT: 74 IN | WEIGHT: 190.6 LBS | OXYGEN SATURATION: 98 % | SYSTOLIC BLOOD PRESSURE: 120 MMHG | BODY MASS INDEX: 24.46 KG/M2 | HEART RATE: 70 BPM | TEMPERATURE: 97.7 F | DIASTOLIC BLOOD PRESSURE: 80 MMHG

## 2018-02-12 DIAGNOSIS — F17.210 CIGARETTE NICOTINE DEPENDENCE WITHOUT COMPLICATION: ICD-10-CM

## 2018-02-12 DIAGNOSIS — B20 HIV DISEASE (HCC): ICD-10-CM

## 2018-02-12 DIAGNOSIS — I10 ESSENTIAL HYPERTENSION: Primary | ICD-10-CM

## 2018-02-12 DIAGNOSIS — D22.9 ATYPICAL MOLE: ICD-10-CM

## 2018-02-12 DIAGNOSIS — F41.9 ANXIETY: ICD-10-CM

## 2018-02-12 DIAGNOSIS — E78.1 HYPERTRIGLYCERIDEMIA: ICD-10-CM

## 2018-02-12 PROBLEM — K40.90 INGUINAL HERNIA: Status: ACTIVE | Noted: 2017-03-28

## 2018-02-12 PROBLEM — F17.200 NICOTINE DEPENDENCE: Status: ACTIVE | Noted: 2017-04-25

## 2018-02-12 PROCEDURE — 99214 OFFICE O/P EST MOD 30 MIN: CPT | Performed by: NURSE PRACTITIONER

## 2018-02-12 NOTE — PROGRESS NOTES
Assessment/Plan:    Subjective:      Patient ID: Felisha Obando is a 56 y o  male  Problem List Items Addressed This Visit     Anxiety     Stable  Continue current medications  Atypical mole     Removed by dermatology  Biopsy negative for malignancy  Essential hypertension - Primary     Blood pressure:   BP Readings from Last 3 Encounters:   18 120/80   10/24/17 156/82   10/16/17 128/76       Continue current antihypertensive  Educated on the following lifestyle modifications to lower BP and decrease cardiovascular risk factors  limit alcohol intake, reduce salt in diet, maintain a healthy weight, engage in 30 minutes of cardiovascular exercise daily, and not smoke  Relevant Medications    amLODIPine (NORVASC) 2 5 mg tablet    [facility] ([facility] )       Cd4:  465  Viral load: <20   ART: Norvir, Tivicay, Prezista, and Viread  Admits to missing a few doses of medications  Suggested setting alarm on cell phone to help remember medications  Denies side effects  Stressed the importance of adherence  Continue 6 month follow up with ID clinic  Reviewed most recent labs, including Cd4 and viral load  Discussed the risks and benefits of treatment options, instructions for management, importance of treatment adherence, and reduction of risk factor  Educated on possible  medication side effects  Counseled on routes of HIV transmission, including the risk of  infection  Emphasized that viral suppression is the best method to prevent HIV transmission  At this time pt denies the need for HIV testing of anyone in their life  Total encounter time was 45 minutes  Greater then 20 minutes were spent on counseling and patient education  Pt voices understanding and agreement with treatment plan               Relevant Medications    ritonavir (NORVIR) 100 mg tablet    darunavir (PREZISTA) 600 mg tablet    dolutegravir (TIVICAY) 50 MG TABS    tenofovir (VIREAD) 300 mg tablet Hypertriglyceridemia         Lipid levels improved with diet  Continue to eat a low fat/low cholesterol diet  Follow up with dietician for additional education  Check lipid levels annually  Nicotine dependence     Using gum on occasion to reduce cigarette use  Counseled for greater than 15 minutes on the importance of smoking cessation  Advised to quit  Educated on the increased risk of heart and lung disease associated with smoking  Referred to Jayro Briseno [address] [place] [address] for enrollment in smoking cessation program             Relevant Medications    nicotine polacrilex (NICORETTE) 4 mg gum            Pt  Presents here today for PCP f/u of chronic conditions  C/O slight right foot pain  Does not wish additional work up at this time  Will let provider know when he would like additional work up  The following portions of the patient's history were reviewed and updated as appropriate: allergies, current medications, past family history, past medical history, past social history, past surgical history and problem list     Review of Systems   Constitutional: Negative for activity change, appetite change, chills, diaphoresis, fatigue, fever and unexpected weight change  HENT: Negative for congestion, dental problem, ear pain, hearing loss, mouth sores, rhinorrhea and sore throat  Eyes: Negative for pain, redness and visual disturbance  Respiratory: Negative for shortness of breath and wheezing  Cardiovascular: Negative for chest pain and leg swelling  Gastrointestinal: Negative for abdominal pain, constipation, diarrhea, nausea and vomiting  Endocrine: Negative for polydipsia, polyphagia and polyuria  Genitourinary: Negative for difficulty urinating and dysuria  Musculoskeletal: Negative for back pain, joint swelling and myalgias  Skin: Negative for rash  Neurological: Negative for syncope and headaches  Psychiatric/Behavioral: Negative for behavioral problems and suicidal ideas  Objective:    Vitals:    02/12/18 1338   BP: 120/80   Pulse: 70   Temp: 97 7 °F (36 5 °C)   SpO2: 98%        Physical Exam   Constitutional: He is oriented to person, place, and time  He appears well-developed and well-nourished  No distress  HENT:   Head: Normocephalic  Right Ear: External ear normal    Left Ear: External ear normal    Nose: Nose normal    Mouth/Throat: Oropharynx is clear and moist  No oropharyngeal exudate  Eyes: Conjunctivae are normal  Pupils are equal, round, and reactive to light  Right eye exhibits no discharge  Left eye exhibits no discharge  Neck: Normal range of motion  No thyromegaly present  Cardiovascular: Normal rate, regular rhythm, normal heart sounds and intact distal pulses  No murmur heard  Pulmonary/Chest: Effort normal and breath sounds normal  He has no wheezes  Abdominal: Soft  Bowel sounds are normal  He exhibits no mass  There is no tenderness  Musculoskeletal: Normal range of motion  He exhibits no edema or tenderness  Lymphadenopathy:     He has no cervical adenopathy  Neurological: He is alert and oriented to person, place, and time  Skin: Skin is warm and dry  No rash noted  Psychiatric: He has a normal mood and affect   His behavior is normal

## 2018-02-12 NOTE — PROGRESS NOTES
Assessment/Plan:      Diagnoses and all orders for this visit:    Essential hypertension    Atypical mole    Cigarette nicotine dependence without complication    Anxiety    HIV disease (HCC)    Hypertriglyceridemia    Other orders  -     amLODIPine (NORVASC) 2 5 mg tablet; Take 1 tablet by mouth daily  -     nicotine polacrilex (NICORETTE) 4 mg gum; Chew  -     ritonavir (NORVIR) 100 mg tablet; Take 1 tablet by mouth 2 (two) times a day  -     PARoxetine (PAXIL) 20 mg tablet; Take 1 tablet by mouth daily  -     darunavir (PREZISTA) 600 mg tablet; Take 1 tablet by mouth 2 (two) times a day  -     dolutegravir (TIVICAY) 50 MG TABS; Take 1 tablet by mouth 2 (two) times a day  -     tenofovir (VIREAD) 300 mg tablet; Take 1 tablet by mouth daily          [address] continue efforts to stop smoking    Discussion: Today patient presents with no mental health concerns  PT is doing well and has a new dog added to his existing 2  Son stays with him 1/week  Ex-wife contacted him and tried to get back in his life but he enforced his boundary as he realizes that that relationship is over and he is not in a place to re-enter one right now  [facility] praised  Subjective:     Patient ID: Se Shrestha is a 56 y o  male      HPI: The patient is being seen at the [facility] today for a routine behavioral health follow up     Objective:    Orientation    Person: Yes   Place: Yes   Time: Yes     Appearance     Well Developed: Yes   Healthy: Yes   Acutely Ill: No  Chronically Ill: No  Uncomfortable: No  Normal Body Odor: Yes   Smells of Feces: No  Disheveled: No  Well Nourished: Yes   Weight WNL of ideal: Yes   Grooming Unkempt: No  Poor Eye Contact: No  Hirsute: No  Looks Tired: No  Acutely Exhausted: No  Well Hydrated: Yes   Poorly Hydrated: No    Mood and Affect    Appropriate: Yes   Euthymic: Yes   Irritable: No  Angry: No  Anxious: No  Depresses: No  Blunted: No  Labile: No  Restricted: No    Harm to Self or Others: denied     Substance Abuse: none reported or observed

## 2018-02-12 NOTE — ASSESSMENT & PLAN NOTE
Blood pressure:   BP Readings from Last 3 Encounters:   02/12/18 120/80   10/24/17 156/82   10/16/17 128/76       Continue current antihypertensive  Educated on the following lifestyle modifications to lower BP and decrease cardiovascular risk factors  limit alcohol intake, reduce salt in diet, maintain a healthy weight, engage in 30 minutes of cardiovascular exercise daily, and not smoke

## 2018-02-12 NOTE — PATIENT INSTRUCTIONS
Problem List Items Addressed This Visit     Anxiety     Stable  Continue current medications  Atypical mole     Removed by dermatology  Biopsy negative for malignancy  Essential hypertension - Primary     Blood pressure:   BP Readings from Last 3 Encounters:   18 120/80   10/24/17 156/82   10/16/17 128/76       Continue current antihypertensive  Educated on the following lifestyle modifications to lower BP and decrease cardiovascular risk factors  limit alcohol intake, reduce salt in diet, maintain a healthy weight, engage in 30 minutes of cardiovascular exercise daily, and not smoke  Relevant Medications    amLODIPine (NORVASC) 2 5 mg tablet    [facility] disease ([facility] )       Cd4:  465  Viral load: <20   ART: Norvir, Tivicay, Prezista, and Viread  Admits to missing a few doses of medications  Suggested setting alarm on cell phone to help remember medications  Denies side effects  Stressed the importance of adherence  Continue 6 month follow up with ID clinic  Reviewed most recent labs, including Cd4 and viral load  Discussed the risks and benefits of treatment options, instructions for management, importance of treatment adherence, and reduction of risk factor  Educated on possible  medication side effects  Counseled on routes of HIV transmission, including the risk of  infection  Emphasized that viral suppression is the best method to prevent HIV transmission  At this time pt denies the need for HIV testing of anyone in their life  Total encounter time was 45 minutes  Greater then 20 minutes were spent on counseling and patient education  Pt voices understanding and agreement with treatment plan  Relevant Medications    ritonavir (NORVIR) 100 mg tablet    darunavir (PREZISTA) 600 mg tablet    dolutegravir (TIVICAY) 50 MG TABS    tenofovir (VIREAD) 300 mg tablet    Hypertriglyceridemia         Lipid levels improved with diet      Continue to eat a low fat/low cholesterol diet  Follow up with dietician for additional education  Check lipid levels annually  Nicotine dependence     Using gum on occasion to reduce cigarette use  Counseled for greater than 15 minutes on the importance of smoking cessation  Advised to quit  Educated on the increased risk of heart and lung disease associated with smoking  Referred to Jayro Briseno [address] [place] [address] for enrollment in smoking cessation program             Relevant Medications    nicotine polacrilex (NICORETTE) 4 mg gum        Cigarette Smoking and Your Health   AMBULATORY CARE:   Risks to your health if you smoke:  Nicotine and other chemicals found in tobacco damage every cell in your body  Even if you are a light smoker, you have an increased risk for cancer, heart disease, and lung disease  If you are pregnant or have diabetes, smoking increases your risk for complications  Benefits to your health if you stop smoking:   · You decrease respiratory symptoms such as coughing, wheezing, and shortness of breath  · You reduce your risk for cancers of the lung, mouth, throat, kidney, bladder, pancreas, stomach, and cervix  If you already have cancer, you increase the benefits of chemotherapy  You also reduce your risk for cancer returning or a second cancer from developing  · You reduce your risk for heart disease, blood clots, heart attack, and stroke  · You reduce your risk for lung infections, and diseases such as pneumonia, asthma, chronic bronchitis, and emphysema  · Your circulation improves  More oxygen can be delivered to your body  If you have diabetes, you lower your risk for complications, such as kidney, artery, and eye diseases  You also lower your risk for nerve damage  Nerve damage can lead to amputations, poor vision, and blindness  · You improve your body's ability to heal and to fight infections    Benefits to the health of others if you stop smoking:  Tobacco is harmful to nonsmokers who breathe in your secondhand smoke  The following are ways the health of others around you may improve when you stop smoking:  · You lower the risks for lung cancer and heart disease in nonsmoking adults  · If you are pregnant, you lower the risk for miscarriage, early delivery, low birth weight, and stillbirth  You also lower your baby's risk for SIDS, obesity, developmental delay, and neurobehavioral problems, such as ADHD  · If you have children, you lower their risk for ear infections, colds, pneumonia, bronchitis, and asthma  For more information and support to stop smoking:   · [employer]  Phone: 3- 166 - 454-5204  Web Address: www [employer]  Follow up with your healthcare provider as directed:  Write down your questions so you remember to ask them during your visits  © 2017 [address] Information is for End User's use only and may not be sold, redistributed or otherwise used for commercial purposes  All illustrations and images included in CareNotes® are the copyrighted property of [employer] , [employer]  or [employer]  The above information is an  only  It is not intended as medical advice for individual conditions or treatments  Talk to your doctor, nurse or pharmacist before following any medical regimen to see if it is safe and effective for you

## 2018-02-12 NOTE — ASSESSMENT & PLAN NOTE
Cd4:  465  Viral load: <20   ART: Norvir, Tivicay, Prezista, and Viread  Admits to missing a few doses of medications  Suggested setting alarm on cell phone to help remember medications  Denies side effects  Stressed the importance of adherence  Continue 6 month follow up with ID clinic  Reviewed most recent labs, including Cd4 and viral load  Discussed the risks and benefits of treatment options, instructions for management, importance of treatment adherence, and reduction of risk factor  Educated on possible  medication side effects  Counseled on routes of HIV transmission, including the risk of  infection  Emphasized that viral suppression is the best method to prevent HIV transmission  At this time pt denies the need for HIV testing of anyone in their life  Total encounter time was 45 minutes  Greater then 20 minutes were spent on counseling and patient education  Pt voices understanding and agreement with treatment plan

## 2018-02-12 NOTE — PROGRESS NOTES
Assessment/Plan:      Diagnoses and all orders for this visit:    Essential hypertension    Atypical mole    Cigarette nicotine dependence without complication    Anxiety    HIV disease (HCC)    Hypertriglyceridemia    Other orders  -     amLODIPine (NORVASC) 2 5 mg tablet; Take 1 tablet by mouth daily  -     nicotine polacrilex (NICORETTE) 4 mg gum; Chew  -     ritonavir (NORVIR) 100 mg tablet; Take 1 tablet by mouth 2 (two) times a day  -     PARoxetine (PAXIL) 20 mg tablet; Take 1 tablet by mouth daily  -     darunavir (PREZISTA) 600 mg tablet; Take 1 tablet by mouth 2 (two) times a day  -     dolutegravir (TIVICAY) 50 MG TABS; Take 1 tablet by mouth 2 (two) times a day  -     tenofovir (VIREAD) 300 mg tablet; Take 1 tablet by mouth daily          Subjective:     Patient ID: Felicity Rudd is a 56 y o  male  Reason for Visit: Nicotine Dependence    HPI    Smoking Cessation:         Current Status: trying to quit    Treatment Plan: nicotine gum     Additional Treatments: tobacco cessation program    Patient Discussion: patient    Discussion   PT still smokes  PEDRO SCHRADER [facility] encouraged complete cessation  PT uses gum to reduce urges  Smoking occurs while driving out of habit  Has reduced use from 12 cigs or up to a pack to about 8-10 per day      Review of Systems      Objective:     Physical Exam

## 2018-02-12 NOTE — ASSESSMENT & PLAN NOTE
Using gum on occasion to reduce cigarette use  Counseled for greater than 15 minutes on the importance of smoking cessation  Advised to quit  Educated on the increased risk of heart and lung disease associated with smoking    Referred to [facility] for enrollment in smoking cessation program

## 2018-02-12 NOTE — ASSESSMENT & PLAN NOTE
Lipid levels improved with diet  Continue to eat a low fat/low cholesterol diet  Follow up with dietician for additional education  Check lipid levels annually

## 2018-03-01 DIAGNOSIS — Z20.2 CONTACT WITH AND (SUSPECTED) EXPOSURE TO INFECTIONS WITH A PREDOMINANTLY SEXUAL MODE OF TRANSMISSION: ICD-10-CM

## 2018-03-01 DIAGNOSIS — Z11.3 ENCOUNTER FOR SCREENING FOR INFECTIONS WITH PREDOMINANTLY SEXUAL MODE OF TRANSMISSION: ICD-10-CM

## 2018-03-01 DIAGNOSIS — B20 HUMAN IMMUNODEFICIENCY VIRUS (HIV) DISEASE (HCC): ICD-10-CM

## 2018-04-05 ENCOUNTER — TRANSCRIBE ORDERS (OUTPATIENT)
Dept: LAB | Facility: CLINIC | Age: 58
End: 2018-04-05

## 2018-04-05 ENCOUNTER — APPOINTMENT (OUTPATIENT)
Dept: LAB | Facility: CLINIC | Age: 58
End: 2018-04-05
Payer: MEDICARE

## 2018-04-05 DIAGNOSIS — Z11.3 ENCOUNTER FOR SCREENING FOR INFECTIONS WITH PREDOMINANTLY SEXUAL MODE OF TRANSMISSION: ICD-10-CM

## 2018-04-05 DIAGNOSIS — Z20.2 CONTACT WITH AND (SUSPECTED) EXPOSURE TO INFECTIONS WITH A PREDOMINANTLY SEXUAL MODE OF TRANSMISSION: ICD-10-CM

## 2018-04-05 DIAGNOSIS — B20 HUMAN IMMUNODEFICIENCY VIRUS (HIV) DISEASE (HCC): ICD-10-CM

## 2018-04-05 LAB
ALBUMIN SERPL BCP-MCNC: 4.3 G/DL (ref 3.5–5)
ALP SERPL-CCNC: 101 U/L (ref 46–116)
ALT SERPL W P-5'-P-CCNC: 39 U/L (ref 12–78)
ANION GAP SERPL CALCULATED.3IONS-SCNC: 9 MMOL/L (ref 4–13)
AST SERPL W P-5'-P-CCNC: 23 U/L (ref 5–45)
BASOPHILS # BLD AUTO: 0.03 THOUSANDS/ΜL (ref 0–0.1)
BASOPHILS NFR BLD AUTO: 1 % (ref 0–1)
BILIRUB SERPL-MCNC: 0.4 MG/DL (ref 0.2–1)
BILIRUB UR QL STRIP: NEGATIVE
BUN SERPL-MCNC: 14 MG/DL (ref 5–25)
CALCIUM SERPL-MCNC: 9.3 MG/DL (ref 8.3–10.1)
CHLORIDE SERPL-SCNC: 99 MMOL/L (ref 100–108)
CLARITY UR: CLEAR
CO2 SERPL-SCNC: 31 MMOL/L (ref 21–32)
COLOR UR: YELLOW
CREAT SERPL-MCNC: 1.13 MG/DL (ref 0.6–1.3)
EOSINOPHIL # BLD AUTO: 0.21 THOUSAND/ΜL (ref 0–0.61)
EOSINOPHIL NFR BLD AUTO: 4 % (ref 0–6)
ERYTHROCYTE [DISTWIDTH] IN BLOOD BY AUTOMATED COUNT: 13 % (ref 11.6–15.1)
GFR SERPL CREATININE-BSD FRML MDRD: 72 ML/MIN/1.73SQ M
GLUCOSE SERPL-MCNC: 87 MG/DL (ref 65–140)
GLUCOSE UR STRIP-MCNC: ABNORMAL MG/DL
HCT VFR BLD AUTO: 43.6 % (ref 36.5–49.3)
HGB BLD-MCNC: 15.2 G/DL (ref 12–17)
HGB UR QL STRIP.AUTO: NEGATIVE
KETONES UR STRIP-MCNC: NEGATIVE MG/DL
LEUKOCYTE ESTERASE UR QL STRIP: NEGATIVE
LYMPHOCYTES # BLD AUTO: 1.96 THOUSANDS/ΜL (ref 0.6–4.47)
LYMPHOCYTES NFR BLD AUTO: 35 % (ref 14–44)
MCH RBC QN AUTO: 29.6 PG (ref 26.8–34.3)
MCHC RBC AUTO-ENTMCNC: 34.9 G/DL (ref 31.4–37.4)
MCV RBC AUTO: 85 FL (ref 82–98)
MONOCYTES # BLD AUTO: 0.58 THOUSAND/ΜL (ref 0.17–1.22)
MONOCYTES NFR BLD AUTO: 11 % (ref 4–12)
NEUTROPHILS # BLD AUTO: 2.77 THOUSANDS/ΜL (ref 1.85–7.62)
NEUTS SEG NFR BLD AUTO: 49 % (ref 43–75)
NITRITE UR QL STRIP: NEGATIVE
PH UR STRIP.AUTO: 6 [PH] (ref 4.5–8)
PLATELET # BLD AUTO: 215 THOUSANDS/UL (ref 149–390)
PMV BLD AUTO: 10.7 FL (ref 8.9–12.7)
POTASSIUM SERPL-SCNC: 4 MMOL/L (ref 3.5–5.3)
PROT SERPL-MCNC: 8.6 G/DL (ref 6.4–8.2)
PROT UR STRIP-MCNC: NEGATIVE MG/DL
RBC # BLD AUTO: 5.14 MILLION/UL (ref 3.88–5.62)
SODIUM SERPL-SCNC: 139 MMOL/L (ref 136–145)
SP GR UR STRIP.AUTO: 1.02 (ref 1–1.03)
UROBILINOGEN UR QL STRIP.AUTO: 0.2 E.U./DL
WBC # BLD AUTO: 5.55 THOUSAND/UL (ref 4.31–10.16)

## 2018-04-05 PROCEDURE — 80053 COMPREHEN METABOLIC PANEL: CPT

## 2018-04-05 PROCEDURE — 85025 COMPLETE CBC W/AUTO DIFF WBC: CPT

## 2018-04-05 PROCEDURE — 87591 N.GONORRHOEAE DNA AMP PROB: CPT

## 2018-04-05 PROCEDURE — 81003 URINALYSIS AUTO W/O SCOPE: CPT

## 2018-04-05 PROCEDURE — 87536 HIV-1 QUANT&REVRSE TRNSCRPJ: CPT

## 2018-04-05 PROCEDURE — 87491 CHLMYD TRACH DNA AMP PROBE: CPT

## 2018-04-05 PROCEDURE — 36415 COLL VENOUS BLD VENIPUNCTURE: CPT

## 2018-04-05 PROCEDURE — 86361 T CELL ABSOLUTE COUNT: CPT

## 2018-04-06 LAB
BASOPHILS # BLD AUTO: 0 X10E3/UL (ref 0–0.2)
BASOPHILS NFR BLD AUTO: 1 %
CD3+CD4+ CELLS # BLD: 794 /UL (ref 359–1519)
CD3+CD4+ CELLS NFR BLD: 36.1 % (ref 30.8–58.5)
EOSINOPHIL # BLD AUTO: 0.2 X10E3/UL (ref 0–0.4)
EOSINOPHIL NFR BLD AUTO: 4 %
ERYTHROCYTE [DISTWIDTH] IN BLOOD BY AUTOMATED COUNT: 14.3 % (ref 12.3–15.4)
HCT VFR BLD AUTO: 54.7 % (ref 37.5–51)
HGB BLD-MCNC: 19.1 G/DL (ref 13–17.7)
IMM GRANULOCYTES # BLD: 0 X10E3/UL (ref 0–0.1)
IMM GRANULOCYTES NFR BLD: 0 %
LYMPHOCYTES # BLD AUTO: 2.2 X10E3/UL (ref 0.7–3.1)
LYMPHOCYTES NFR BLD AUTO: 40 %
MCH RBC QN AUTO: 30.5 PG (ref 26.6–33)
MCHC RBC AUTO-ENTMCNC: 34.9 G/DL (ref 31.5–35.7)
MCV RBC AUTO: 87 FL (ref 79–97)
MONOCYTES # BLD AUTO: 0.4 X10E3/UL (ref 0.1–0.9)
MONOCYTES NFR BLD AUTO: 7 %
NEUTROPHILS # BLD AUTO: 2.7 X10E3/UL (ref 1.4–7)
NEUTROPHILS NFR BLD AUTO: 48 %
PLATELET # BLD AUTO: 167 X10E3/UL (ref 150–379)
RBC # BLD AUTO: 6.27 X10E6/UL (ref 4.14–5.8)
WBC # BLD AUTO: 5.6 X10E3/UL (ref 3.4–10.8)

## 2018-04-07 LAB
CHLAMYDIA DNA CVX QL NAA+PROBE: NORMAL
N GONORRHOEA DNA GENITAL QL NAA+PROBE: NORMAL

## 2018-04-09 LAB
HIV1 RNA # SERPL NAA+PROBE: <20 COPIES/ML
HIV1 RNA SERPL NAA+PROBE-LOG#: NORMAL LOG10COPY/ML

## 2018-04-11 DIAGNOSIS — I10 ESSENTIAL HYPERTENSION: Primary | ICD-10-CM

## 2018-04-11 RX ORDER — AMLODIPINE BESYLATE 2.5 MG/1
TABLET ORAL DAILY
Qty: 30 TABLET | Refills: 3 | Status: SHIPPED | OUTPATIENT
Start: 2018-04-11 | End: 2018-09-19 | Stop reason: SDUPTHER

## 2018-04-24 ENCOUNTER — OFFICE VISIT (OUTPATIENT)
Dept: SURGERY | Facility: CLINIC | Age: 58
End: 2018-04-24
Payer: MEDICARE

## 2018-04-24 VITALS
WEIGHT: 195.4 LBS | SYSTOLIC BLOOD PRESSURE: 118 MMHG | TEMPERATURE: 97.6 F | OXYGEN SATURATION: 96 % | HEART RATE: 90 BPM | BODY MASS INDEX: 25.08 KG/M2 | HEIGHT: 74 IN | DIASTOLIC BLOOD PRESSURE: 74 MMHG

## 2018-04-24 VITALS
HEIGHT: 74 IN | TEMPERATURE: 97.6 F | OXYGEN SATURATION: 96 % | SYSTOLIC BLOOD PRESSURE: 118 MMHG | DIASTOLIC BLOOD PRESSURE: 74 MMHG | BODY MASS INDEX: 25.08 KG/M2 | HEART RATE: 90 BPM | WEIGHT: 195.4 LBS

## 2018-04-24 DIAGNOSIS — B20 HIV DISEASE (HCC): ICD-10-CM

## 2018-04-24 DIAGNOSIS — F41.9 ANXIETY: ICD-10-CM

## 2018-04-24 DIAGNOSIS — I10 ESSENTIAL HYPERTENSION: Primary | ICD-10-CM

## 2018-04-24 DIAGNOSIS — E78.1 HYPERTRIGLYCERIDEMIA: ICD-10-CM

## 2018-04-24 DIAGNOSIS — Z23 NEED FOR MENINGOCOCCUS VACCINE: ICD-10-CM

## 2018-04-24 DIAGNOSIS — Z13.6 ENCOUNTER FOR SCREENING FOR CARDIOVASCULAR DISORDERS: ICD-10-CM

## 2018-04-24 DIAGNOSIS — K40.90 INGUINAL HERNIA WITHOUT OBSTRUCTION OR GANGRENE, RECURRENCE NOT SPECIFIED, UNSPECIFIED LATERALITY: ICD-10-CM

## 2018-04-24 DIAGNOSIS — K44.9 HIATAL HERNIA: ICD-10-CM

## 2018-04-24 DIAGNOSIS — B20 HIV (HUMAN IMMUNODEFICIENCY VIRUS INFECTION) (HCC): Primary | ICD-10-CM

## 2018-04-24 DIAGNOSIS — Z23 NEED FOR HEPATITIS B VACCINATION: ICD-10-CM

## 2018-04-24 DIAGNOSIS — Z23 NEED FOR HEPATITIS A IMMUNIZATION: ICD-10-CM

## 2018-04-24 DIAGNOSIS — F17.210 CIGARETTE NICOTINE DEPENDENCE WITHOUT COMPLICATION: ICD-10-CM

## 2018-04-24 DIAGNOSIS — D75.1 POLYCYTHEMIA: ICD-10-CM

## 2018-04-24 PROBLEM — D22.9 ATYPICAL MOLE: Status: RESOLVED | Noted: 2017-10-16 | Resolved: 2018-04-24

## 2018-04-24 PROCEDURE — 90471 IMMUNIZATION ADMIN: CPT

## 2018-04-24 PROCEDURE — 99214 OFFICE O/P EST MOD 30 MIN: CPT | Performed by: INTERNAL MEDICINE

## 2018-04-24 PROCEDURE — 90632 HEPA VACCINE ADULT IM: CPT

## 2018-04-24 PROCEDURE — 99214 OFFICE O/P EST MOD 30 MIN: CPT | Performed by: NURSE PRACTITIONER

## 2018-04-24 NOTE — PATIENT INSTRUCTIONS
Inguinal Hernia   AMBULATORY CARE:   An inguinal hernia  happens when organs or abdominal tissue push through a weak spot in the abdominal wall  The abdominal wall is made of fat and muscle  It holds the intestines in place  The hernia may contain fluid, tissue from the abdomen, or part of an organ (such as an intestine)  Common signs and symptoms:  A hernia may happen over time or it may happen suddenly  Some movements can make symptoms worse  Examples include when you cough, sneeze, strain to have a bowel movement, lift, or stand for a long time  You may have any of the following:  · A soft lump or bulge in your groin, lower abdomen, or scrotum     · Pain or burning in your abdomen  Seek care immediately if:   · You have severe abdominal pain with nausea and vomiting  · Your abdomen is larger than usual      · Your hernia gets bigger or is purple or blue  · You see blood in your bowel movements  · You feel weak, dizzy, or faint  Contact your healthcare provider if:   · You have a fever  · You have questions or concerns about your condition or care  Treatment for an inguinal hernia  usually involves surgery  Surgery can be done to place the hernia back inside the abdominal wall  Before you have surgery, you may be given medicine or have a manual reduction  Manual reduction means your healthcare provider will use his hands to put firm, steady pressure on your hernia  He will continue until the hernia disappears inside the abdominal wall  You may  need the following:  · NSAIDs , such as ibuprofen, help decrease swelling, pain, and fever  NSAIDs can cause stomach bleeding or kidney problems in certain people  If you take blood thinner medicine, always ask your healthcare provider if NSAIDs are safe for you  Always read the medicine label and follow directions  · Take your medicine as directed    Contact your healthcare provider if you think your medicine is not helping or if you have side effects  Tell him or her if you are allergic to any medicine  Keep a list of the medicines, vitamins, and herbs you take  Include the amounts, and when and why you take them  Bring the list or the pill bottles to follow-up visits  Carry your medicine list with you in case of an emergency  Follow up with your healthcare provider as directed:  Write down your questions so you remember to ask them during your visits  Manage your symptoms and prevent another hernia:   · Do not lift anything heavy  Heavy lifting can make your hernia worse or cause another hernia  Ask your healthcare provider how much is safe for you to lift  · Drink liquids as directed  Liquids may prevent constipation and straining during a bowel movement  Ask how much liquid to drink each day and which liquids are best for you  · Eat foods high in fiber  Fiber may prevent constipation and straining during a bowel movement  Foods that contain fiber include fruits, vegetables, beans, lentils, and whole grains  · Maintain a healthy weight  If you are overweight, weight loss may prevent your hernia from getting worse  It may also prevent another hernia  Talk to your healthcare provider about exercise and how to lose weight safely if you are overweight  · Do not smoke  Nicotine and other chemicals in cigarettes and cigars can weaken the abdominal wall  This may increase your risk for another hernia  Ask your healthcare provider for information if you currently smoke and need help to quit  E-cigarettes or smokeless tobacco still contain nicotine  Talk to your healthcare provider before you use these products  © 2017 [address] [employer] Information is for End User's use only and may not be sold, redistributed or otherwise used for commercial purposes  All illustrations and images included in CareNotes® are the copyrighted property of [employer] , [employer]  or [employer]  The above information is an  only  It is not intended as medical advice for individual conditions or treatments  Talk to your doctor, nurse or pharmacist before following any medical regimen to see if it is safe and effective for you

## 2018-04-24 NOTE — ASSESSMENT & PLAN NOTE
Lipid panel to be checked with next labs  Continue to eat a low fat/low cholesterol diet and exercise regularly  Refer to dietician for additional education

## 2018-04-24 NOTE — PROGRESS NOTES
Progress Note - Infectious Disease   Marycarmen Conway 66 y o  male MRN: [id number]  Unit/Bed#:  Encounter: [id number]      Impression/Plan:  1  HIV-doing well on ART with an undetectable viral load and CD4 count in the 700s  Continue ART, recheck labs in 5 months, follow-up in 6 months  Stressed adherence      2   Polycythemia-possibly related to the patient's ongoing smoking  The H&H has increased substantially which could suggest a lab error or volume depletion  Will recheck the CBC with diff and continue to monitor for now  Stressed the importance tobacco cessation  Refer to hematology oncology if the H&H continues to stay high     3  Nicotine dependence-the patient is in the process of quitting smoking  Stressed the importance of tobacco cessation  Patient was provided medication, adherence and prevention education    Subjective:  Routine follow-up for HIV  Patient claims 100% adherence with Prezista/Norvir/Tivicay/tenofovir  Patient denies any notable side effects  Overall the feeling well  The patient denies any fever chills or sweats, denies any nausea vomiting or diarrhea, denies any cough or shortness of breath  ROS: A complete 12 point ROS is negative other than that noted in the HPI    Objective:  Vitals:  Vitals:    04/24/18 1620   BP: 118/74   Pulse: 90   Temp: 97 6 °F (36 4 °C)   SpO2: 96%   Weight: 88 6 kg (195 lb 6 4 oz)   Height: 6' 1 5" (1 867 m)       Physical Exam:   General Appearance:  Alert, interactive, appearing well,  nontoxic, no acute distress  Neck:   Supple without lymphadenopathy, no thyromegaly or masses   Throat: Oropharynx moist without lesions  Lungs:   Clear to auscultation bilaterally; no wheezes, rhonchi or rales; respirations unlabored   Heart:  RRR; no murmur, rub or gallop   Abdomen:   Soft, non-tender, non-distended, positive bowel sounds  Extremities: No clubbing, cyanosis or edema   Skin: No new rashes or lesions  No draining wounds noted  Lab Results   Component Value Date     04/05/2018    K 4 0 04/05/2018    CL 99 (L) 04/05/2018    CO2 31 04/05/2018    ANIONGAP 9 04/05/2018    BUN 14 04/05/2018    CREATININE 1 13 04/05/2018    GLUCOSE 87 04/05/2018    GLUF 111 (H) 10/05/2017    CALCIUM 9 3 04/05/2018    AST 23 04/05/2018    ALT 39 04/05/2018    ALKPHOS 101 04/05/2018    PROT 8 6 (H) 04/05/2018    BILITOT 0 40 04/05/2018    EGFR 72 04/05/2018     Lab Results   Component Value Date    WBC 5 55 04/05/2018    HGB 15 2 04/05/2018    HGB 19 1 (H) 04/05/2018    HCT 43 6 04/05/2018    HCT 54 7 (H) 04/05/2018    MCV 85 04/05/2018    MCV 87 04/05/2018     04/05/2018     04/05/2018     Lab Results   Component Value Date    HEPCAB Non-reactive 03/03/2017     Lab Results   Component Value Date    HEPCAB Non-reactive 03/03/2017     Lab Results   Component Value Date    RPR Non-Reactive 10/05/2017     CD4 T CELL ABSOLUTE   Date/Time Value Ref Range Status   10/26/2015 07:58  359 - 1,519 /uL Final     CD4 T Cell Abs   Date/Time Value Ref Range Status   04/05/2018 12:48  359 - 1519 /uL Final     HIV-1 RNA Viral Load Log   Date/Time Value Ref Range Status   04/05/2018 12:48 PM COMMENT [id number]/mL Final     Comment:     Unable to calculate result since non-numeric result obtained for  component test      No results found for: SCANRESULT    Labs, Imaging, & Other studies:   All pertinent labs and imaging studies were personally reviewed      Current Outpatient Prescriptions:     amLODIPine (NORVASC) 2 5 mg tablet, TAKE 1 TABLET BY MOUTH DAILY, Disp: 30 tablet, Rfl: 3    darunavir (PREZISTA) 600 mg tablet, Take 1 tablet by mouth 2 (two) times a day, Disp: , Rfl:     dolutegravir (TIVICAY) 50 MG TABS, Take 1 tablet by mouth 2 (two) times a day, Disp: , Rfl:     PARoxetine (PAXIL) 20 mg tablet, Take 1 tablet by mouth daily, Disp: , Rfl:     ritonavir (NORVIR) 100 mg tablet, Take 1 tablet by mouth 2 (two) times a day, Disp: , Rfl:     tenofovir (VIREAD) 300 mg tablet, Take 1 tablet by mouth daily, Disp: , Rfl:

## 2018-04-24 NOTE — ASSESSMENT & PLAN NOTE
Cd4:    CD4 T CELL ABSOLUTE   Date/Time Value Ref Range Status   10/26/2015 07:58  359 - 1,519 /uL Final     CD4 T Cell Abs   Date/Time Value Ref Range Status   2018 12:48  359 - 1519 /uL Final      Viral load: <20   ART: Verlin Backer, Viread  ID clinic scheduled today  Denies side effects  Stressed the importance of adherence  Continue follow up with ID clinic  Reviewed most recent labs, including Cd4 and viral load  Discussed the risks and benefits of treatment options, instructions for management, importance of treatment adherence, and reduction of risk factor  Educated on possible  medication side effects  Counseled on routes of HIV transmission, including the risk of  infection  Emphasized that viral suppression is the best method to prevent HIV transmission  At this time pt denies the need for HIV testing of anyone in their life  Total encounter time was 45 minutes  Greater then 20 minutes were spent on counseling and patient education  Pt voices understanding and agreement with treatment plan

## 2018-04-24 NOTE — ASSESSMENT & PLAN NOTE
Blood pressure:   BP Readings from Last 3 Encounters:   04/24/18 118/74   02/12/18 120/80   10/24/17 156/82       Continue amlodipine  Educated on the following lifestyle modifications to lower BP and decrease cardiovascular risk factors  limit alcohol intake, reduce salt in diet, maintain a healthy weight, engage in 30 minutes of cardiovascular exercise daily, and not smoke

## 2018-04-24 NOTE — ASSESSMENT & PLAN NOTE
Well controlled with paroxetine  Referred to [facility] for additional support  Understands events that trigger anxiety  Uses deep breathing to combat symptoms

## 2018-04-24 NOTE — ASSESSMENT & PLAN NOTE
Nicorette gum prescribed as a cessation aide  Not effective  Dayan Brothers states he is not ready to quit at this time  Counseled for greater than 15 minutes on the importance of smoking cessation  Advised to quit  Educated on the increased risk of heart and lung disease associated with smoking    Referred to [facility] for enrollment in smoking cessation program

## 2018-04-24 NOTE — PROGRESS NOTES
Assessment/Plan:    HIV disease ([facility] [address] )    Cd4:    CD4 T CELL ABSOLUTE   Date/Time Value Ref Range Status   10/26/2015 07:58  359 - 1,519 /uL Final     CD4 T Cell Abs   Date/Time Value Ref Range Status   2018 12:48  359 - 1519 /uL Final      Viral load: <20   ART: Herman Kulkarni, Viread  ID clinic scheduled today  Denies side effects  Stressed the importance of adherence  Continue follow up with ID clinic  Reviewed most recent labs, including Cd4 and viral load  Discussed the risks and benefits of treatment options, instructions for management, importance of treatment adherence, and reduction of risk factor  Educated on possible  medication side effects  Counseled on routes of HIV transmission, including the risk of  infection  Emphasized that viral suppression is the best method to prevent HIV transmission  At this time pt denies the need for HIV testing of anyone in their life  Total encounter time was 45 minutes  Greater then 20 minutes were spent on counseling and patient education  Pt voices understanding and agreement with treatment plan  Nicotine dependence  Nicorette gum prescribed as a cessation aide  Not effective  Vernerfeliciano Preston states he is not ready to quit at this time  Counseled for greater than 15 minutes on the importance of smoking cessation  Advised to quit  Educated on the increased risk of heart and lung disease associated with smoking  Referred to [facility] for enrollment in smoking cessation program        Hypertriglyceridemia  Lipid panel to be checked with next labs  Continue to eat a low fat/low cholesterol diet and exercise regularly  Refer to dietician for additional education  Hiatal hernia  Stable  Declines surgical referral  Reviewed education on when to seek emergent management r/t hernia strangulation  Provided with info sheet on AVS      Anxiety  Well controlled with paroxetine  Referred to [facility] for additional support  Understands events that trigger anxiety  Uses deep breathing to combat symptoms  Essential hypertension  Blood pressure:   BP Readings from Last 3 Encounters:   04/24/18 118/74   02/12/18 120/80   10/24/17 156/82       Continue amlodipine  Educated on the following lifestyle modifications to lower BP and decrease cardiovascular risk factors  limit alcohol intake, reduce salt in diet, maintain a healthy weight, engage in 30 minutes of cardiovascular exercise daily, and not smoke  Diagnoses and all orders for this visit:    Essential hypertension    HIV disease ([facility] [address] )    Cigarette nicotine dependence without complication    Hypertriglyceridemia    Hiatal hernia    Anxiety    Inguinal hernia without obstruction or gangrene, recurrence not specified, unspecified laterality          Subjective:      Patient ID: Janice Colmenares is a 66 y o  male  Danielle Hams Vikash Ramesh) Austyn Steveion presents to the clinic today for 4 month PCP f/u of chronic conditions  C/O cramp in the left leg that started this morning  Using stretching exercises to alleviate pain  Refuses any additional intervention  The following portions of the patient's history were reviewed and updated as appropriate: allergies, current medications, past family history, past medical history, past social history, past surgical history and problem list     Review of Systems   Constitutional: Negative for activity change, appetite change, chills, diaphoresis, fatigue, fever and unexpected weight change  HENT: Negative for congestion, dental problem, ear pain, hearing loss, mouth sores, rhinorrhea and sore throat  Eyes: Negative for pain, redness and visual disturbance  Respiratory: Negative for shortness of breath and wheezing  Cardiovascular: Negative for chest pain and leg swelling  Gastrointestinal: Negative for abdominal pain, constipation, diarrhea, nausea and vomiting  Endocrine: Negative for polydipsia, polyphagia and polyuria  Genitourinary: Negative for difficulty urinating and dysuria  Musculoskeletal: Negative for back pain, joint swelling and myalgias  Leg pain/cramp   Skin: Negative for rash  Neurological: Negative for syncope and headaches  Psychiatric/Behavioral: Negative for behavioral problems and suicidal ideas  Lab Results   Component Value Date     04/05/2018    K 4 0 04/05/2018    CL 99 (L) 04/05/2018    CO2 31 04/05/2018    ANIONGAP 9 04/05/2018    BUN 14 04/05/2018    CREATININE 1 13 04/05/2018    GLUCOSE 87 04/05/2018    GLUF 111 (H) 10/05/2017    CALCIUM 9 3 04/05/2018    AST 23 04/05/2018    ALT 39 04/05/2018    ALKPHOS 101 04/05/2018    PROT 8 6 (H) 04/05/2018    BILITOT 0 40 04/05/2018    EGFR 72 04/05/2018     Lab Results   Component Value Date    WBC 5 55 04/05/2018    HGB 15 2 04/05/2018    HGB 19 1 (H) 04/05/2018    HCT 43 6 04/05/2018    HCT 54 7 (H) 04/05/2018    MCV 85 04/05/2018    MCV 87 04/05/2018     04/05/2018     04/05/2018         Objective:      /74   Pulse 90   Temp 97 6 °F (36 4 °C)   Ht 6' 1 5" (1 867 m)   Wt 88 6 kg (195 lb 6 4 oz)   SpO2 96%   BMI 25 43 kg/m²          Physical Exam   Constitutional: He is oriented to person, place, and time  He appears well-developed and well-nourished  No distress  HENT:   Head: Normocephalic  Right Ear: External ear normal    Left Ear: External ear normal    Nose: Nose normal    Mouth/Throat: Oropharynx is clear and moist  No oropharyngeal exudate  Eyes: Conjunctivae are normal  Pupils are equal, round, and reactive to light  Right eye exhibits no discharge  Left eye exhibits no discharge  Neck: Normal range of motion  No thyromegaly present  Cardiovascular: Normal rate, regular rhythm, normal heart sounds and intact distal pulses  No murmur heard  Pulmonary/Chest: Effort normal and breath sounds normal  He has no wheezes  Abdominal: Soft  Bowel sounds are normal  He exhibits no mass   There is no tenderness  Musculoskeletal: Normal range of motion  He exhibits no edema or tenderness  Lymphadenopathy:     He has no cervical adenopathy  Neurological: He is alert and oriented to person, place, and time  Skin: Skin is warm and dry  No rash noted  Psychiatric: He has a normal mood and affect   His behavior is normal

## 2018-04-24 NOTE — ASSESSMENT & PLAN NOTE
Stable  Declines surgical referral  Reviewed education on when to seek emergent management r/t hernia strangulation   Provided with info sheet on AVS

## 2018-04-24 NOTE — PROGRESS NOTES
Assessment/Plan:     [employer]     Today patient present with   Chief Complaint   Patient presents with    Follow-up     Pt offers no c/o at this time  Patient would likely benefit from having people to talk to on a regular basis and maintaining overall well-being  Stage of change: Maintenance       Diagnoses and all orders for this visit:    Essential hypertension    HIV disease ([facility] [address] )    Cigarette nicotine dependence without complication    Hypertriglyceridemia    Hiatal hernia    Anxiety    Inguinal hernia without obstruction or gangrene, recurrence not specified, unspecified laterality          Discussion:     Patient mood is stable  and Patient denies acute behavioral health issues  Subjective:     Patient ID: Yolanda Turner is a 66 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   He reported no MH issues this day  Pt talked a lot in a short period of time (typical of him) and reported enjoying making [employer] [facility] lahannah  [employer]  [facility] completed PHQ-9 with pt; pt's scores revealed only minimal symptoms  HPI    History of Present Illness: The patient is seeing the [facility]  [facility] today for a routine behavioral health follow up      Review of Systems      Objective:     Physical Exam      [facility] [email or web address] [facility]    Orientation     Person: yes    Place: yes    Time: yes    Appearance    Well Developed: yes healthy    Uncomfortable: no    Normal Body Odor: yes    Smells of Feces: no    Smells of Urine: no    Disheveled: no    Well Nourished: yes     Grooming Unkempt: no    Poor Eye Contact: no    Hirsute: no    Looks Tired: no    Acutely Exhausted: no    Mood and Affect:     Appropriate: yes    Euthymicyes    Irritable: no    Angry: no    Anxious: no    Depressed:no    Blunted:no    Labile: no    Restricted: no    Harm to Self or Others: denied    Substance Abuse: none reported or observed

## 2018-08-15 DIAGNOSIS — F43.10 PTSD (POST-TRAUMATIC STRESS DISORDER): ICD-10-CM

## 2018-08-15 DIAGNOSIS — B20 HIV (HUMAN IMMUNODEFICIENCY VIRUS INFECTION) (HCC): Primary | ICD-10-CM

## 2018-08-16 RX ORDER — DOLUTEGRAVIR SODIUM 50 MG/1
TABLET, FILM COATED ORAL
Qty: 60 TABLET | Refills: 5 | Status: SHIPPED | OUTPATIENT
Start: 2018-08-16 | End: 2018-10-08 | Stop reason: SDUPTHER

## 2018-08-16 RX ORDER — PAROXETINE HYDROCHLORIDE 20 MG/1
TABLET, FILM COATED ORAL DAILY
Qty: 30 TABLET | Refills: 5 | Status: SHIPPED | OUTPATIENT
Start: 2018-08-16 | End: 2018-10-08 | Stop reason: SDUPTHER

## 2018-08-16 RX ORDER — DARUNAVIR 600 MG
TABLET ORAL
Qty: 60 TABLET | Refills: 5 | Status: SHIPPED | OUTPATIENT
Start: 2018-08-16 | End: 2018-10-08 | Stop reason: SDUPTHER

## 2018-08-16 RX ORDER — TENOFOVIR DISOPROXIL FUMARATE 300 MG
TABLET ORAL DAILY
Qty: 30 TABLET | Refills: 5 | Status: SHIPPED | OUTPATIENT
Start: 2018-08-16 | End: 2018-10-08 | Stop reason: SDUPTHER

## 2018-08-16 RX ORDER — RITONAVIR 100 MG/1
TABLET, FILM COATED ORAL
Qty: 60 TABLET | Refills: 5 | Status: SHIPPED | OUTPATIENT
Start: 2018-08-16 | End: 2018-10-08 | Stop reason: SDUPTHER

## 2018-08-24 ENCOUNTER — TRANSCRIBE ORDERS (OUTPATIENT)
Dept: LAB | Facility: CLINIC | Age: 58
End: 2018-08-24

## 2018-08-24 ENCOUNTER — APPOINTMENT (OUTPATIENT)
Dept: LAB | Facility: CLINIC | Age: 58
End: 2018-08-24
Payer: MEDICARE

## 2018-08-24 DIAGNOSIS — B20 HIV (HUMAN IMMUNODEFICIENCY VIRUS INFECTION) (HCC): ICD-10-CM

## 2018-08-24 DIAGNOSIS — Z13.6 ENCOUNTER FOR SCREENING FOR CARDIOVASCULAR DISORDERS: ICD-10-CM

## 2018-08-24 LAB
ALBUMIN SERPL BCP-MCNC: 3.9 G/DL (ref 3.5–5)
ALP SERPL-CCNC: 102 U/L (ref 46–116)
ALT SERPL W P-5'-P-CCNC: 36 U/L (ref 12–78)
ANION GAP SERPL CALCULATED.3IONS-SCNC: 8 MMOL/L (ref 4–13)
AST SERPL W P-5'-P-CCNC: 23 U/L (ref 5–45)
BASOPHILS # BLD AUTO: 0.04 THOUSANDS/ΜL (ref 0–0.1)
BASOPHILS NFR BLD AUTO: 1 % (ref 0–1)
BILIRUB SERPL-MCNC: 0.6 MG/DL (ref 0.2–1)
BUN SERPL-MCNC: 15 MG/DL (ref 5–25)
CALCIUM SERPL-MCNC: 9.3 MG/DL (ref 8.3–10.1)
CHLORIDE SERPL-SCNC: 102 MMOL/L (ref 100–108)
CHOLEST SERPL-MCNC: 182 MG/DL (ref 50–200)
CO2 SERPL-SCNC: 28 MMOL/L (ref 21–32)
CREAT SERPL-MCNC: 1.11 MG/DL (ref 0.6–1.3)
EOSINOPHIL # BLD AUTO: 0.22 THOUSAND/ΜL (ref 0–0.61)
EOSINOPHIL NFR BLD AUTO: 4 % (ref 0–6)
ERYTHROCYTE [DISTWIDTH] IN BLOOD BY AUTOMATED COUNT: 12.3 % (ref 11.6–15.1)
GFR SERPL CREATININE-BSD FRML MDRD: 74 ML/MIN/1.73SQ M
GLUCOSE P FAST SERPL-MCNC: 120 MG/DL (ref 65–99)
HCT VFR BLD AUTO: 46 % (ref 36.5–49.3)
HCV AB SER QL: NORMAL
HDLC SERPL-MCNC: 36 MG/DL (ref 40–60)
HGB BLD-MCNC: 15.6 G/DL (ref 12–17)
IMM GRANULOCYTES # BLD AUTO: 0.01 THOUSAND/UL (ref 0–0.2)
IMM GRANULOCYTES NFR BLD AUTO: 0 % (ref 0–2)
LDLC SERPL CALC-MCNC: 113 MG/DL (ref 0–100)
LYMPHOCYTES # BLD AUTO: 1.44 THOUSANDS/ΜL (ref 0.6–4.47)
LYMPHOCYTES NFR BLD AUTO: 29 % (ref 14–44)
MCH RBC QN AUTO: 29.7 PG (ref 26.8–34.3)
MCHC RBC AUTO-ENTMCNC: 33.9 G/DL (ref 31.4–37.4)
MCV RBC AUTO: 88 FL (ref 82–98)
MONOCYTES # BLD AUTO: 0.47 THOUSAND/ΜL (ref 0.17–1.22)
MONOCYTES NFR BLD AUTO: 9 % (ref 4–12)
NEUTROPHILS # BLD AUTO: 2.87 THOUSANDS/ΜL (ref 1.85–7.62)
NEUTS SEG NFR BLD AUTO: 57 % (ref 43–75)
NRBC BLD AUTO-RTO: 0 /100 WBCS
PLATELET # BLD AUTO: 219 THOUSANDS/UL (ref 149–390)
PMV BLD AUTO: 11 FL (ref 8.9–12.7)
POTASSIUM SERPL-SCNC: 4 MMOL/L (ref 3.5–5.3)
PROT SERPL-MCNC: 8.3 G/DL (ref 6.4–8.2)
RBC # BLD AUTO: 5.25 MILLION/UL (ref 3.88–5.62)
RPR SER QL: NORMAL
SODIUM SERPL-SCNC: 138 MMOL/L (ref 136–145)
TRIGL SERPL-MCNC: 164 MG/DL
WBC # BLD AUTO: 5.05 THOUSAND/UL (ref 4.31–10.16)

## 2018-08-24 PROCEDURE — 80053 COMPREHEN METABOLIC PANEL: CPT

## 2018-08-24 PROCEDURE — 86360 T CELL ABSOLUTE COUNT/RATIO: CPT

## 2018-08-24 PROCEDURE — 80061 LIPID PANEL: CPT

## 2018-08-24 PROCEDURE — 86480 TB TEST CELL IMMUN MEASURE: CPT

## 2018-08-24 PROCEDURE — 86803 HEPATITIS C AB TEST: CPT

## 2018-08-24 PROCEDURE — 86592 SYPHILIS TEST NON-TREP QUAL: CPT

## 2018-08-24 PROCEDURE — 87536 HIV-1 QUANT&REVRSE TRNSCRPJ: CPT

## 2018-08-24 PROCEDURE — 85025 COMPLETE CBC W/AUTO DIFF WBC: CPT

## 2018-08-24 PROCEDURE — 36415 COLL VENOUS BLD VENIPUNCTURE: CPT

## 2018-08-25 LAB
BASOPHILS # BLD AUTO: 0.1 X10E3/UL (ref 0–0.2)
BASOPHILS NFR BLD AUTO: 1 %
CD3+CD4+ CELLS # BLD: 544 /UL (ref 359–1519)
CD3+CD4+ CELLS NFR BLD: 34 % (ref 30.8–58.5)
CD3+CD4+ CELLS/CD3+CD8+ CLL BLD: 1.02 % (ref 0.92–3.72)
CD3+CD8+ CELLS # BLD: 534 /UL (ref 109–897)
CD3+CD8+ CELLS NFR BLD: 33.4 % (ref 12–35.5)
EOSINOPHIL # BLD AUTO: 0.2 X10E3/UL (ref 0–0.4)
EOSINOPHIL NFR BLD AUTO: 4 %
ERYTHROCYTE [DISTWIDTH] IN BLOOD BY AUTOMATED COUNT: 14.1 % (ref 12.3–15.4)
HCT VFR BLD AUTO: 43.8 % (ref 37.5–51)
HGB BLD-MCNC: 14.7 G/DL (ref 13–17.7)
IMM GRANULOCYTES # BLD: 0 X10E3/UL (ref 0–0.1)
IMM GRANULOCYTES NFR BLD: 0 %
LYMPHOCYTES # BLD AUTO: 1.6 X10E3/UL (ref 0.7–3.1)
LYMPHOCYTES NFR BLD AUTO: 30 %
MCH RBC QN AUTO: 30.1 PG (ref 26.6–33)
MCHC RBC AUTO-ENTMCNC: 33.6 G/DL (ref 31.5–35.7)
MCV RBC AUTO: 90 FL (ref 79–97)
MONOCYTES # BLD AUTO: 0.3 X10E3/UL (ref 0.1–0.9)
MONOCYTES NFR BLD AUTO: 6 %
NEUTROPHILS # BLD AUTO: 3 X10E3/UL (ref 1.4–7)
NEUTROPHILS NFR BLD AUTO: 59 %
PLATELET # BLD AUTO: 239 X10E3/UL (ref 150–379)
RBC # BLD AUTO: 4.89 X10E6/UL (ref 4.14–5.8)
WBC # BLD AUTO: 5.1 X10E3/UL (ref 3.4–10.8)

## 2018-08-26 LAB
ANNOTATION COMMENT IMP: NORMAL
GAMMA INTERFERON BACKGROUND BLD IA-ACNC: 0.02 IU/ML
M TB IFN-G BLD-IMP: NEGATIVE
M TB IFN-G CD4+ BCKGRND COR BLD-ACNC: 0 IU/ML
M TB IFN-G CD4+ T-CELLS BLD-ACNC: 0.02 IU/ML
MITOGEN IGNF BLD-ACNC: 7.79 IU/ML
QUANTIFERON-TB GOLD IN TUBE: NORMAL
SERVICE CMNT-IMP: NORMAL

## 2018-08-28 LAB
HIV1 RNA # SERPL NAA+PROBE: <20 COPIES/ML
HIV1 RNA SERPL NAA+PROBE-LOG#: NORMAL LOG10COPY/ML

## 2018-09-19 DIAGNOSIS — I10 ESSENTIAL HYPERTENSION: ICD-10-CM

## 2018-09-19 RX ORDER — AMLODIPINE BESYLATE 2.5 MG/1
TABLET ORAL DAILY
Qty: 30 TABLET | Refills: 5 | Status: SHIPPED | OUTPATIENT
Start: 2018-09-19 | End: 2018-10-08 | Stop reason: SDUPTHER

## 2018-09-25 ENCOUNTER — DOCUMENTATION (OUTPATIENT)
Dept: SURGERY | Facility: CLINIC | Age: 58
End: 2018-09-25

## 2018-09-25 NOTE — PROGRESS NOTES
Pt was at [facility] to obtain copies of medical records and requested conversation with Chaplain Dixon provided reflective and attentive listening as Pt shared his excitement of a trip he is planning to [place]  This is significant for him for multiple reasons including his reconnecting with old friends from the time he served in the [employer] [facility] and that they were stationed in [employer] together  Pt has very limited social support and this is meaningful to his life  Pt was able to make connections to this all and his spiritual beliefs   offered to hear how his plans progress and to hear more about his time in the [employer] [facility] [employer] and these friends he is reconnecting with  Pt was glad to hear that the  was interested in continued conversation      Chaplain Tacho

## 2018-10-01 ENCOUNTER — TELEPHONE (OUTPATIENT)
Dept: SURGERY | Facility: CLINIC | Age: 58
End: 2018-10-01

## 2018-10-01 ENCOUNTER — PATIENT OUTREACH (OUTPATIENT)
Dept: SURGERY | Facility: CLINIC | Age: 58
End: 2018-10-01

## 2018-10-01 NOTE — PROGRESS NOTES
Ct dropped off MAWD statement with [facility] staff, CM sent check request for premium payment to supervisor

## 2018-10-08 ENCOUNTER — OFFICE VISIT (OUTPATIENT)
Dept: SURGERY | Facility: CLINIC | Age: 58
End: 2018-10-08
Payer: MEDICARE

## 2018-10-08 ENCOUNTER — PATIENT OUTREACH (OUTPATIENT)
Dept: SURGERY | Facility: CLINIC | Age: 58
End: 2018-10-08

## 2018-10-08 VITALS
OXYGEN SATURATION: 97 % | WEIGHT: 185.6 LBS | HEIGHT: 74 IN | TEMPERATURE: 97.9 F | BODY MASS INDEX: 23.82 KG/M2 | DIASTOLIC BLOOD PRESSURE: 72 MMHG | HEART RATE: 91 BPM | SYSTOLIC BLOOD PRESSURE: 110 MMHG

## 2018-10-08 DIAGNOSIS — R73.01 IMPAIRED FASTING GLUCOSE: ICD-10-CM

## 2018-10-08 DIAGNOSIS — E78.1 HYPERTRIGLYCERIDEMIA: ICD-10-CM

## 2018-10-08 DIAGNOSIS — I10 ESSENTIAL HYPERTENSION: ICD-10-CM

## 2018-10-08 DIAGNOSIS — B20 HIV DISEASE (HCC): Primary | ICD-10-CM

## 2018-10-08 DIAGNOSIS — F43.10 PTSD (POST-TRAUMATIC STRESS DISORDER): ICD-10-CM

## 2018-10-08 DIAGNOSIS — F41.9 ANXIETY: ICD-10-CM

## 2018-10-08 DIAGNOSIS — D75.1 POLYCYTHEMIA: ICD-10-CM

## 2018-10-08 DIAGNOSIS — Z23 NEED FOR HEPATITIS B BOOSTER VACCINATION: ICD-10-CM

## 2018-10-08 DIAGNOSIS — Z23 NEED FOR INFLUENZA VACCINATION: ICD-10-CM

## 2018-10-08 DIAGNOSIS — F17.210 CIGARETTE NICOTINE DEPENDENCE WITHOUT COMPLICATION: ICD-10-CM

## 2018-10-08 DIAGNOSIS — B20 HIV (HUMAN IMMUNODEFICIENCY VIRUS INFECTION) (HCC): ICD-10-CM

## 2018-10-08 DIAGNOSIS — Z23 NEED FOR MENACTRA VACCINATION: ICD-10-CM

## 2018-10-08 PROCEDURE — 99214 OFFICE O/P EST MOD 30 MIN: CPT | Performed by: NURSE PRACTITIONER

## 2018-10-08 PROCEDURE — 90682 RIV4 VACC RECOMBINANT DNA IM: CPT

## 2018-10-08 PROCEDURE — G0008 ADMIN INFLUENZA VIRUS VAC: HCPCS

## 2018-10-08 RX ORDER — AMLODIPINE BESYLATE 2.5 MG/1
2.5 TABLET ORAL DAILY
Qty: 30 TABLET | Refills: 5 | Status: SHIPPED | OUTPATIENT
Start: 2018-10-08 | End: 2019-04-29 | Stop reason: SDUPTHER

## 2018-10-08 RX ORDER — RITONAVIR 100 MG/1
100 TABLET ORAL 2 TIMES DAILY
Qty: 60 TABLET | Refills: 5 | Status: SHIPPED | OUTPATIENT
Start: 2018-10-08 | End: 2019-04-29 | Stop reason: SDUPTHER

## 2018-10-08 RX ORDER — PAROXETINE HYDROCHLORIDE 20 MG/1
20 TABLET, FILM COATED ORAL DAILY
Qty: 30 TABLET | Refills: 5 | Status: SHIPPED | OUTPATIENT
Start: 2018-10-08 | End: 2019-04-29 | Stop reason: SDUPTHER

## 2018-10-08 RX ORDER — TENOFOVIR DISOPROXIL FUMARATE 300 MG/1
300 TABLET, FILM COATED ORAL DAILY
Qty: 30 TABLET | Refills: 5 | Status: SHIPPED | OUTPATIENT
Start: 2018-10-08 | End: 2019-04-29 | Stop reason: SDUPTHER

## 2018-10-08 NOTE — ASSESSMENT & PLAN NOTE
Starting to progress towards cessation goals  Starting to cutback daily intake  Declines nicotine replacement therapy  Goal is to be smoke free in two years  Counseled for greater than 15 minutes on the importance of smoking cessation  Advised to quit  Educated on the increased risk of heart and lung disease associated with smoking    Referred to [facility] for enrollment in smoking cessation program

## 2018-10-08 NOTE — PATIENT INSTRUCTIONS

## 2018-10-08 NOTE — ASSESSMENT & PLAN NOTE
Blood pressure: Stable  BP Readings from Last 3 Encounters:   10/08/18 110/72   04/24/18 118/74   04/24/18 118/74       Continue amlodipine  Educated on the following lifestyle modifications to lower BP and decrease cardiovascular risk factors  limit alcohol intake, reduce salt in diet, maintain a healthy weight, engage in 30 minutes of cardiovascular exercise daily, and not smoke

## 2018-10-08 NOTE — LETTER
HIV Prevention Counseling Risk Reduction Plan    Client Name: Felisha Obando   Client #:    Client Signature:     BI #:    CM Signature:     CHIS Score: ***   Date: 10/08/18 RAT Score: ***     Current Risk Behaviors           Number of sex partners in the past three months: 0        Has sex for drugs or money: No  Has sex while using drugs and alcohol: No  Drug or Alcohol Abuse: No        Has not disclosed HIV/STD D to partner(s): No  Victim of sexual abuse/assault: No       Previous Successes             Safer Goal Behavior(s)  Safer Goal Behavior(s): Disclose HIV/STD DX to your partner(s)       Barrier(s) to Safer Goal Behavior          Benefit(s) to Safer Goal Behavior  Benefit(s) to safer goal behavior: Prevent the spread of HIV, STD to others       Action Steps  Action Steps: Continue adhering to medical care/medication adherence       Referral          Comments (relating to goals, benefits, barriers and action steps identified above)  Comments: (relating to goals, benefits, barriers and action steps identified above):  No current goals, ct not sexually active

## 2018-10-08 NOTE — LETTER
Jayro Briseno 29 at [address]  Dental Assessment Referral    Date: 10/08/18    Client Name: Negar Richardson  Client Address: [place] [address]  Client Phone: [phone] (home)   Primary Dentist: Giovany Cota you have dental insurance coverage? If yes, note it comments: Yes    1  When was your last dental visit?: June 2  Do you have trouble eating?: No     3  Do your gums bleed at any time?: No  4  Is your mouth sore?: No  5  Do you have any toothaches?: No  6  Do you have any loose teeth?: No  7   Have you noticed any white coating on your tongue or in your mouth?: No            ASC : Darren Scherer  Date: 10/08/18

## 2018-10-08 NOTE — ASSESSMENT & PLAN NOTE
CD4 T CELL ABSOLUTE   Date/Time Value Ref Range Status   10/26/2015 07:58  359 - 1,519 /uL Final     CD4 ABS   Date/Time Value Ref Range Status   2018 06:38  359 - 1519 /uL Final     HIV-1 RNA by PCR, Qn   Date/Time Value Ref Range Status   2018 06:38 AM <20 copies/mL Final     Comment:     HIV-1 RNA detected  The reportable range for this assay is 20 to 10,000,000  copies HIV-1 RNA/mL  HIV-1 RNA Viral Load Log   Date/Time Value Ref Range Status   2018 06:38 AM COMMENT [id number]/mL Final     Comment:     Unable to calculate result since non-numeric result obtained for  component test          ART: Tivicay, Viread, Prezista  Claims 100% adherence, denies missed doses  Denies side effects  Stressed the importance of adherence  Continue follow up with ID clinic  Reviewed most recent labs, including Cd4 and viral load  Discussed the risks and benefits of treatment options, instructions for management, importance of treatment adherence, and reduction of risk factor  Educated on possible  medication side effects  Counseled on routes of HIV transmission, including the risk of  infection  Emphasized that viral suppression is the best method to prevent HIV transmission  At this time pt denies the need for HIV testing of anyone in their life  Total encounter time was 45 minutes  Greater then 20 minutes were spent on counseling and patient education  Pt voices understanding and agreement with treatment plan

## 2018-10-08 NOTE — PROGRESS NOTES
Assessment/Plan:    Essential hypertension  Blood pressure: Stable  BP Readings from Last 3 Encounters:   10/08/18 110/72   18 118/74   18 118/74       Continue amlodipine  Educated on the following lifestyle modifications to lower BP and decrease cardiovascular risk factors  limit alcohol intake, reduce salt in diet, maintain a healthy weight, engage in 30 minutes of cardiovascular exercise daily, and not smoke  HIV disease ([facility] [address] )  CD4 T CELL ABSOLUTE   Date/Time Value Ref Range Status   10/26/2015 07:58  359 - 1,519 /uL Final     CD4 ABS   Date/Time Value Ref Range Status   2018 06:38  359 - 1519 /uL Final     HIV-1 RNA by PCR, Qn   Date/Time Value Ref Range Status   2018 06:38 AM <20 copies/mL Final     Comment:     HIV-1 RNA detected  The reportable range for this assay is 20 to 10,000,000  copies HIV-1 RNA/mL  HIV-1 RNA Viral Load Log   Date/Time Value Ref Range Status   2018 06:38 AM COMMENT [id number]/mL Final     Comment:     Unable to calculate result since non-numeric result obtained for  component test          ART: Tivicay, Viread, Prezista  Claims 100% adherence, denies missed doses  Denies side effects  Stressed the importance of adherence  Continue follow up with ID clinic  Reviewed most recent labs, including Cd4 and viral load  Discussed the risks and benefits of treatment options, instructions for management, importance of treatment adherence, and reduction of risk factor  Educated on possible  medication side effects  Counseled on routes of HIV transmission, including the risk of  infection  Emphasized that viral suppression is the best method to prevent HIV transmission  At this time pt denies the need for HIV testing of anyone in their life  Total encounter time was 45 minutes  Greater then 20 minutes were spent on counseling and patient education  Pt voices understanding and agreement with treatment plan  Polycythemia  Lab Results   Component Value Date    WBC 5 05 08/24/2018    WBC 5 1 08/24/2018    HGB 15 6 08/24/2018    HGB 14 7 08/24/2018    HCT 46 0 08/24/2018    HCT 43 8 08/24/2018    MCV 88 08/24/2018    MCV 90 08/24/2018     08/24/2018     08/24/2018     Stable  Hypertriglyceridemia  Stable  Total cholesterol:182  Triglycerides:164  LDL:113  HDL: 36    Eat a low fat/low cholesterol diet  Follow up with dietician for additional education  Anxiety  Well controlled with paroxetine  Talking with pastoral care about life goals  Nicotine dependence  Starting to progress towards cessation goals  Starting to cutback daily intake  Declines nicotine replacement therapy  Goal is to be smoke free in two years  Counseled for greater than 15 minutes on the importance of smoking cessation  Advised to quit  Educated on the increased risk of heart and lung disease associated with smoking  Referred to [facility] for enrollment in smoking cessation program           Diagnoses and all orders for this visit:    HIV disease ([place] [address] )    Need for influenza vaccination  -     influenza vaccine, 1224-5567, quadrivalent, recombinant, PF, 0 5 mL, for patients 18 yr+ (FLUBLOK)    Need for hepatitis B booster vaccination  -     Hepatitis B Vaccine Adult IM  -     Hepatitis B Vaccine Adult IM    Need for Menactra vaccination  -     Meningococcal Conjugate Vaccine MCV4P IM    Polycythemia    Hypertriglyceridemia  -     HEMOGLOBIN A1C W/ EAG ESTIMATION; Future    Anxiety    Essential hypertension  -     HEMOGLOBIN A1C W/ EAG ESTIMATION; Future  -     amLODIPine (NORVASC) 2 5 mg tablet; Take 1 tablet (2 5 mg total) by mouth daily    Cigarette nicotine dependence without complication    HIV (human immunodeficiency virus infection) ([facility] )  -     tenofovir (VIREAD) 300 mg tablet;  Take 1 tablet (300 mg total) by mouth daily  -     dolutegravir (TIVICAY) 50 MG TABS; Take 1 tablet (50 mg total) by mouth 2 (two) times a day  -     darunavir (PREZISTA) 600 mg tablet; Take 1 tablet (600 mg total) by mouth 2 (two) times a day  -     ritonavir (NORVIR) 100 mg tablet; Take 1 tablet (100 mg total) by mouth 2 (two) times a day    PTSD (post-traumatic stress disorder)  -     PARoxetine (PAXIL) 20 mg tablet; Take 1 tablet (20 mg total) by mouth daily    Impaired fasting glucose   -     HEMOGLOBIN A1C W/ EAG ESTIMATION; Future          Subjective:      Patient ID: Yanelis Diaz is a 66 y o  male  [facility] Code presents to the clinic for 6 month  PCP f/u of chronic conditions  PMHx significant for HIV, HTN, hyperlipidemia, polycythemia, and anxiety  Rj Romo is doing well and denies any acute complaints  He has long term goals to move to [facility] in 2-3 years  The following portions of the patient's history were reviewed and updated as appropriate: allergies, current medications, past family history, past medical history, past social history, past surgical history and problem list     Review of Systems   Constitutional: Negative for activity change, appetite change, chills, diaphoresis, fatigue, fever and unexpected weight change  HENT: Negative for congestion, dental problem, ear pain, hearing loss, mouth sores, rhinorrhea and sore throat  Eyes: Negative for pain, redness and visual disturbance  Respiratory: Negative for shortness of breath and wheezing  Cardiovascular: Negative for chest pain and leg swelling  Gastrointestinal: Negative for abdominal pain, constipation, diarrhea, nausea and vomiting  Endocrine: Negative for polydipsia, polyphagia and polyuria  Genitourinary: Negative for difficulty urinating and dysuria  Musculoskeletal: Negative for back pain, joint swelling and myalgias  Skin: Negative for rash  Neurological: Negative for syncope and headaches  Psychiatric/Behavioral: Negative for behavioral problems and suicidal ideas           Objective:      /72   Pulse 91   Temp 97 9 °F (36 6 °C)   Ht 6' 1 5" (1 867 m)   Wt 84 2 kg (185 lb 9 6 oz)   SpO2 97%   BMI 24 15 kg/m²       Lab Results   Component Value Date     08/24/2018    K 4 0 08/24/2018     08/24/2018    CO2 28 08/24/2018    ANIONGAP 9 10/26/2015    BUN 15 08/24/2018    CREATININE 1 11 08/24/2018    GLUCOSE 133 10/26/2015    GLUF 120 (H) 08/24/2018    CALCIUM 9 3 08/24/2018    AST 23 08/24/2018    ALT 36 08/24/2018    ALKPHOS 102 08/24/2018    PROT 8 2 10/26/2015    BILITOT 0 23 10/26/2015    EGFR 74 08/24/2018     Lab Results   Component Value Date    WBC 5 05 08/24/2018    WBC 5 1 08/24/2018    HGB 15 6 08/24/2018    HGB 14 7 08/24/2018    HCT 46 0 08/24/2018    HCT 43 8 08/24/2018    MCV 88 08/24/2018    MCV 90 08/24/2018     08/24/2018     08/24/2018     Lab Results   Component Value Date    HEPCAB Non-reactive 08/24/2018     Lab Results   Component Value Date    HEPCAB Non-reactive 08/24/2018     Lab Results   Component Value Date    RPR Non-Reactive 08/24/2018            Physical Exam   Constitutional: He is oriented to person, place, and time  He appears well-developed and well-nourished  No distress  HENT:   Head: Normocephalic  Right Ear: External ear normal    Left Ear: External ear normal    Nose: Nose normal    Mouth/Throat: Oropharynx is clear and moist  No oropharyngeal exudate  Eyes: Pupils are equal, round, and reactive to light  Conjunctivae are normal  Right eye exhibits no discharge  Left eye exhibits no discharge  Neck: Normal range of motion  No thyromegaly present  Cardiovascular: Normal rate, regular rhythm, normal heart sounds and intact distal pulses  No murmur heard  Pulmonary/Chest: Effort normal and breath sounds normal  He has no wheezes  Abdominal: Soft  Bowel sounds are normal  He exhibits no mass  There is no tenderness  Musculoskeletal: Normal range of motion  He exhibits no edema or tenderness  Lymphadenopathy:     He has no cervical adenopathy  Neurological: He is alert and oriented to person, place, and time  Skin: Skin is warm and dry  No rash noted  Psychiatric: He has a normal mood and affect   His behavior is normal

## 2018-10-08 NOTE — LETTER
Jayro Briseno [address] at [address] [place] [address]  HIV and Non-HIV Medication Assessment    Date: 10/08/18  Client Name: Cheryl Pickett  Client Address: [place] [id number]  Client Phone: [phone] (home)   : ***    Is the client currently receiving medical care for HIV?: Yes  When was the last time the client had a CD4 (T-cell) count? (Choose one): Within the last 3 months  What was the CD4 (or T-cell) count at the client's last measurement?: 544  When was the last time the client had a viral load count? (Choose one): Within the last 3 months  What was the viral load count at the client's last measurement? (Choose one): Undetectable  Has client ever been prescribed HIV antiretroviral (ARV) drug therapy by a doctor?: Yes       Client is currently being prescribed the following medications:  Medications: HIV ARV Meds  ARV: How many pills is the client supposed to take a day?: 2  ARV: How many pills did the client miss taking yesterday?: 0  ARV: How many pills did the client miss taking last week?: 0  ARV: Were there any pills missed within the last month?: 0                                                    Other Medication Questions:  Are there other medications you are supposed to be taking and are not taking?: No           Client's understanding of mediation?: Thorough  Has the client ever stopped taking ARV without the doctor's permission?: No        Is medical provider aware of adherence problems?: No     Is medical provider aware of complementary therapies or other medical problems?: No    Barriers to Drug Adherence:                      Summary:  Summary: Drug Adherence Assessment (Choose one): Client has an adequate understading and support to maintain medication adherence  No interventions needed  Staff Summary/Recommendation(s): No recomendations at this time

## 2018-10-08 NOTE — ASSESSMENT & PLAN NOTE
Lab Results   Component Value Date    WBC 5 05 08/24/2018    WBC 5 1 08/24/2018    HGB 15 6 08/24/2018    HGB 14 7 08/24/2018    HCT 46 0 08/24/2018    HCT 43 8 08/24/2018    MCV 88 08/24/2018    MCV 90 08/24/2018     08/24/2018     08/24/2018     Stable

## 2018-10-08 NOTE — ASSESSMENT & PLAN NOTE
Stable  Total cholesterol:182  Triglycerides:164  LDL:113  HDL: 36    Eat a low fat/low cholesterol diet  Follow up with dietician for additional education

## 2018-10-08 NOTE — PROGRESS NOTES
10/08/18 1500   Clinical Encounter Type   Visited With Patient   Routine Visit Follow-up   Continue Visiting Yes   Presybeterian Encounters   Presybeterian Needs (None)   Patient Spiritual Encounters   Spiritual Assessment 4      Visit     visited with Pt during his PCP appointment   used reflective and attentive listening as Pt discussed his dogs, his reconnecting with an old girlfriend, and his planned trip to his home town in [address] to visit his [place] for [employer]   facilitated Pt reshaping his difficulty forgiving himself for past choices and mistakes and connected that with his statements on the beliefs of the [employer] culture and religions wherein Pt stated they don't look back but live in the moment  Pt said he feels he will learn a lot about himself as he learns about the [employer] culture both as he prepares for his trip there next year and during that visit  He said he hopes to visit shrines and temples, and an elephant refuge operated by [employer], where he wants to practice living simply like them and prayer and meditation with them  Pt had been given the freezer packet and the 5 Wishes form but he refused to talk about it saying he will call when and if he needs to      Chaplain Hermila

## 2018-10-08 NOTE — PROGRESS NOTES
Ct in [facility] for RST/Recert  CT states no missed doses, no issues w/ meds and no current sexual partners  Ct owns his home, works and receives [employer]  Ct has MAWD for insurance  Ct states no DV, Legal or D&A issues  Ct has a car for transportation  Ct not in [facility] tx  Ct states he plans on moving to [facility] by next recert, ct states he knows they have medical facilities that can treat his HIV there and he plans on looking into it once he gets there  Ct independent in ADL's  Ct states he has a support system

## 2018-10-22 ENCOUNTER — TELEPHONE (OUTPATIENT)
Dept: SURGERY | Facility: CLINIC | Age: 58
End: 2018-10-22

## 2018-10-22 DIAGNOSIS — B20 HIV (HUMAN IMMUNODEFICIENCY VIRUS INFECTION) (HCC): Primary | ICD-10-CM

## 2018-10-22 NOTE — TELEPHONE ENCOUNTER
Pt's  gave me a message that the Pt was requesting a call from me   placed call as per Pt request and left message

## 2018-10-23 ENCOUNTER — PATIENT OUTREACH (OUTPATIENT)
Dept: SURGERY | Facility: CLINIC | Age: 58
End: 2018-10-23

## 2018-10-24 ENCOUNTER — PATIENT OUTREACH (OUTPATIENT)
Dept: SURGERY | Facility: CLINIC | Age: 58
End: 2018-10-24

## 2018-10-24 ENCOUNTER — DOCUMENTATION (OUTPATIENT)
Dept: SURGERY | Facility: CLINIC | Age: 58
End: 2018-10-24

## 2018-10-24 NOTE — PROGRESS NOTES
Pt called  on the morning of 10/23 to arrange a visit in the afternoon  On Pt's arrival,  learned that Pt needed assistance completing his 5 Wishes form and to discuss the process   provided reflective listening and explanation of each of the questions and areas to be completed and recorded the Pt's choices on the form  Form was presented to [facility] clerical staff for scan into medical chart on completion   provided the safe space for Pt to discuss his process of choosing what he did and how he feels it will affect his adult children   also informed Pt of my leaving [facility] soon and processed that with Pt      Chaplain Yamel

## 2018-10-31 NOTE — PROGRESS NOTES
Assessment    Annual nutrition assessmetn    Clinical Data/Client History    HIV: YES  AIDS: NO    : Lesvia Thompson    Socio- Economic Status: Eats Out and Cooks    Living Situation: House    Food Prep/Access: Refrigerator, Stove and Microwave    Psycho Social Factors: Psychiatric    Functional Status: Ambulatory, Able to [employer] and Prepared Own Meals    Activity Level: Normal    Oral Problems: None reported     Last Dental Exam: June 2018    Procedures Performed: cleaning    Typical Food/Beverage Intake:    · Breakfast Vega and eggs  · [employer] or deli sandwich, apple  · Dinner Beef or chicken, pasta, banana  · Snacks chips or crackers  · Fluids whole milk, water, orange juice, grape juice    Appetite: Good    Current Body Weight: 185#    Nutrition Diagnosis    Problem: Altered nutrition related laboratory values    Related to: Lack of nutrition related education and ability to apply knowledge into daily intake    As Evidenced By: Elevated lipid panel, elevated fasting BG    Intervention Diet Prescription    Nutritional needs based on: 84 kg     · 2100 kcal  · 68g protein  · 2100mL fluid  · <1 8g Na    Current intake: adequate    Nutrition Recommendations: Increase whole food intake, vegetables, fruits, lean protein, water    Nutrition Education Intervention: Provided     Person Educated: Patient    Teaching Method: Verbal    Readiness to Change: Pre-Contemplation    Visit Summary    Elmira Huitron was seen for his annual nutrition assessment  # (BMI 24) Slightly elevated labs include fasting , Triglycerides 164, , HDL below range at 36  Will continue to follow r/t lab values  Currently reports no nutrition concerns, diet is low in fresh fruits, vegetables, lean proteins  Higher intake of processed foods, high saturated fat meats  Some cognitive issues associated with nutrition education/application  Reports last dental cleaning June 2018, no issues at this time    Activity level revolves around caring for 3 dogs and working on cars      Krystal Velez, RD,LDN,CHC

## 2018-11-02 ENCOUNTER — TRANSCRIBE ORDERS (OUTPATIENT)
Dept: LAB | Facility: CLINIC | Age: 58
End: 2018-11-02

## 2018-11-02 ENCOUNTER — APPOINTMENT (OUTPATIENT)
Dept: LAB | Facility: CLINIC | Age: 58
End: 2018-11-02
Payer: MEDICARE

## 2018-11-02 DIAGNOSIS — B20 HIV (HUMAN IMMUNODEFICIENCY VIRUS INFECTION) (HCC): ICD-10-CM

## 2018-11-02 LAB
ALBUMIN SERPL BCP-MCNC: 3.7 G/DL (ref 3.5–5)
ALP SERPL-CCNC: 108 U/L (ref 46–116)
ALT SERPL W P-5'-P-CCNC: 38 U/L (ref 12–78)
ANION GAP SERPL CALCULATED.3IONS-SCNC: 9 MMOL/L (ref 4–13)
AST SERPL W P-5'-P-CCNC: 21 U/L (ref 5–45)
BASOPHILS # BLD AUTO: 0.05 THOUSANDS/ΜL (ref 0–0.1)
BASOPHILS NFR BLD AUTO: 1 % (ref 0–1)
BILIRUB SERPL-MCNC: 0.4 MG/DL (ref 0.2–1)
BUN SERPL-MCNC: 13 MG/DL (ref 5–25)
CALCIUM SERPL-MCNC: 9.7 MG/DL (ref 8.3–10.1)
CHLORIDE SERPL-SCNC: 101 MMOL/L (ref 100–108)
CO2 SERPL-SCNC: 28 MMOL/L (ref 21–32)
CREAT SERPL-MCNC: 1.21 MG/DL (ref 0.6–1.3)
EOSINOPHIL # BLD AUTO: 0.2 THOUSAND/ΜL (ref 0–0.61)
EOSINOPHIL NFR BLD AUTO: 4 % (ref 0–6)
ERYTHROCYTE [DISTWIDTH] IN BLOOD BY AUTOMATED COUNT: 13.1 % (ref 11.6–15.1)
GFR SERPL CREATININE-BSD FRML MDRD: 67 ML/MIN/1.73SQ M
GLUCOSE P FAST SERPL-MCNC: 193 MG/DL (ref 65–99)
HCT VFR BLD AUTO: 46.1 % (ref 36.5–49.3)
HGB BLD-MCNC: 15.5 G/DL (ref 12–17)
IMM GRANULOCYTES # BLD AUTO: 0.02 THOUSAND/UL (ref 0–0.2)
IMM GRANULOCYTES NFR BLD AUTO: 0 % (ref 0–2)
LYMPHOCYTES # BLD AUTO: 1.2 THOUSANDS/ΜL (ref 0.6–4.47)
LYMPHOCYTES NFR BLD AUTO: 24 % (ref 14–44)
MCH RBC QN AUTO: 29.6 PG (ref 26.8–34.3)
MCHC RBC AUTO-ENTMCNC: 33.6 G/DL (ref 31.4–37.4)
MCV RBC AUTO: 88 FL (ref 82–98)
MONOCYTES # BLD AUTO: 0.39 THOUSAND/ΜL (ref 0.17–1.22)
MONOCYTES NFR BLD AUTO: 8 % (ref 4–12)
NEUTROPHILS # BLD AUTO: 3.25 THOUSANDS/ΜL (ref 1.85–7.62)
NEUTS SEG NFR BLD AUTO: 63 % (ref 43–75)
NRBC BLD AUTO-RTO: 0 /100 WBCS
PLATELET # BLD AUTO: 208 THOUSANDS/UL (ref 149–390)
PMV BLD AUTO: 10.7 FL (ref 8.9–12.7)
POTASSIUM SERPL-SCNC: 4 MMOL/L (ref 3.5–5.3)
PROT SERPL-MCNC: 8.4 G/DL (ref 6.4–8.2)
RBC # BLD AUTO: 5.23 MILLION/UL (ref 3.88–5.62)
SODIUM SERPL-SCNC: 138 MMOL/L (ref 136–145)
WBC # BLD AUTO: 5.11 THOUSAND/UL (ref 4.31–10.16)

## 2018-11-02 PROCEDURE — 80053 COMPREHEN METABOLIC PANEL: CPT

## 2018-11-02 PROCEDURE — 86360 T CELL ABSOLUTE COUNT/RATIO: CPT

## 2018-11-02 PROCEDURE — 87536 HIV-1 QUANT&REVRSE TRNSCRPJ: CPT

## 2018-11-02 PROCEDURE — 36415 COLL VENOUS BLD VENIPUNCTURE: CPT

## 2018-11-02 PROCEDURE — 85025 COMPLETE CBC W/AUTO DIFF WBC: CPT

## 2018-11-03 LAB
BASOPHILS # BLD AUTO: 0 X10E3/UL (ref 0–0.2)
BASOPHILS NFR BLD AUTO: 1 %
CD3+CD4+ CELLS # BLD: 473 /UL (ref 359–1519)
CD3+CD4+ CELLS NFR BLD: 33.8 % (ref 30.8–58.5)
CD3+CD4+ CELLS/CD3+CD8+ CLL BLD: 1.12 % (ref 0.92–3.72)
CD3+CD8+ CELLS # BLD: 423 /UL (ref 109–897)
CD3+CD8+ CELLS NFR BLD: 30.2 % (ref 12–35.5)
EOSINOPHIL # BLD AUTO: 0.2 X10E3/UL (ref 0–0.4)
EOSINOPHIL NFR BLD AUTO: 4 %
ERYTHROCYTE [DISTWIDTH] IN BLOOD BY AUTOMATED COUNT: 14.3 % (ref 12.3–15.4)
HCT VFR BLD AUTO: 41 % (ref 37.5–51)
HGB BLD-MCNC: 13.1 G/DL (ref 13–17.7)
IMM GRANULOCYTES # BLD: 0 X10E3/UL (ref 0–0.1)
IMM GRANULOCYTES NFR BLD: 0 %
LYMPHOCYTES # BLD AUTO: 1.4 X10E3/UL (ref 0.7–3.1)
LYMPHOCYTES NFR BLD AUTO: 26 %
MCH RBC QN AUTO: 28.4 PG (ref 26.6–33)
MCHC RBC AUTO-ENTMCNC: 32 G/DL (ref 31.5–35.7)
MCV RBC AUTO: 89 FL (ref 79–97)
MONOCYTES # BLD AUTO: 0.4 X10E3/UL (ref 0.1–0.9)
MONOCYTES NFR BLD AUTO: 6 %
NEUTROPHILS # BLD AUTO: 3.5 X10E3/UL (ref 1.4–7)
NEUTROPHILS NFR BLD AUTO: 63 %
PLATELET # BLD AUTO: 225 X10E3/UL (ref 150–379)
RBC # BLD AUTO: 4.61 X10E6/UL (ref 4.14–5.8)
WBC # BLD AUTO: 5.5 X10E3/UL (ref 3.4–10.8)

## 2018-11-06 LAB
HIV1 RNA # SERPL NAA+PROBE: <20 COPIES/ML
HIV1 RNA SERPL NAA+PROBE-LOG#: NORMAL LOG10COPY/ML

## 2018-11-13 ENCOUNTER — OFFICE VISIT (OUTPATIENT)
Dept: SURGERY | Facility: CLINIC | Age: 58
End: 2018-11-13
Payer: MEDICARE

## 2018-11-13 VITALS
HEART RATE: 79 BPM | WEIGHT: 187.4 LBS | TEMPERATURE: 97.7 F | HEIGHT: 74 IN | SYSTOLIC BLOOD PRESSURE: 122 MMHG | DIASTOLIC BLOOD PRESSURE: 76 MMHG | OXYGEN SATURATION: 97 % | BODY MASS INDEX: 24.05 KG/M2

## 2018-11-13 DIAGNOSIS — Z11.3 ENCOUNTER FOR SCREENING FOR INFECTIONS WITH A PREDOMINANTLY SEXUAL MODE OF TRANSMISSION: ICD-10-CM

## 2018-11-13 DIAGNOSIS — Z13.1 SCREENING FOR DIABETES MELLITUS: ICD-10-CM

## 2018-11-13 DIAGNOSIS — Z23 NEED FOR MENACTRA VACCINATION: ICD-10-CM

## 2018-11-13 DIAGNOSIS — Z20.2 CONTACT WITH AND (SUSPECTED) EXPOSURE TO INFECTIONS WITH A PREDOMINANTLY SEXUAL MODE OF TRANSMISSION: ICD-10-CM

## 2018-11-13 DIAGNOSIS — D75.1 POLYCYTHEMIA: ICD-10-CM

## 2018-11-13 DIAGNOSIS — B20 HIV (HUMAN IMMUNODEFICIENCY VIRUS INFECTION) (HCC): Primary | ICD-10-CM

## 2018-11-13 DIAGNOSIS — D72.89 OTHER SPECIFIED DISORDERS OF WHITE BLOOD CELLS: ICD-10-CM

## 2018-11-13 DIAGNOSIS — I10 HYPERTENSION, ESSENTIAL: ICD-10-CM

## 2018-11-13 DIAGNOSIS — Z23 NEED FOR HEPATITIS B VACCINATION: ICD-10-CM

## 2018-11-13 DIAGNOSIS — Z72.89 OTHER PROBLEMS RELATED TO LIFESTYLE: ICD-10-CM

## 2018-11-13 DIAGNOSIS — F17.210 CIGARETTE NICOTINE DEPENDENCE WITHOUT COMPLICATION: ICD-10-CM

## 2018-11-13 PROCEDURE — 99214 OFFICE O/P EST MOD 30 MIN: CPT | Performed by: INTERNAL MEDICINE

## 2018-11-13 NOTE — PROGRESS NOTES
Progress Note - Infectious Disease   Robert Lopez 66 y o  male MRN: [id number]  Unit/Bed#:  Encounter: [id number]      Impression/Plan:  1  HIV-doing well on ART with an undetectable viral load and CD4 count in the 400s  Continue ART, recheck labs in 5 months, follow-up in 6 months  Stressed adherence      2   Polycythemia-improved with decreased tobacco use  Will continue to monitor the CBC with diff     3  Nicotine dependence-the patient says he will be quitting smoking by the time he moves to [facility] next year  Stressed the importance of complete tobacco cessation      Patient was provided medication, adherence and prevention education    Subjective:  Routine follow-up for HIV  Patient claims 100% adherence with Prezista/Norvir/viread/Tivicay  Patient denies any notable side effects  Overall the feeling well  The patient denies any fever chills or sweats, denies any nausea vomiting or diarrhea, denies any cough or shortness of breath  He is thinking about moving to [facility] and will be visiting in the spring  ROS: A complete 12 point ROS is negative other than that noted in the HPI    Followup portions patient history reviewed and updated as: Allergies, current medications, past medical history, past social history, past surgical history, and the problem list    Objective:  Vitals:  Vitals:    11/13/18 1754   BP: 122/76   Pulse: 79   Temp: 97 7 °F (36 5 °C)   SpO2: 97%   Weight: 85 kg (187 lb 6 4 oz)   Height: 6' 1 5" (1 867 m)       Physical Exam:   General Appearance:  Alert, interactive, appearing well,  nontoxic, no acute distress  Neck:   Supple without lymphadenopathy, no thyromegaly or masses   Throat: Oropharynx moist without lesions  Lungs:   Clear to auscultation bilaterally; no wheezes, rhonchi or rales; respirations unlabored   Heart:  RRR; no murmur, rub or gallop   Abdomen:   Soft, non-tender, non-distended, positive bowel sounds       Extremities: No clubbing, cyanosis or edema Skin: No new rashes or lesions  No draining wounds noted  Labs, Imaging, & Other studies:   All pertinent labs and imaging studies were personally reviewed    Lab Results   Component Value Date     10/26/2015    K 4 0 11/02/2018     11/02/2018    CO2 28 11/02/2018    ANIONGAP 9 10/26/2015    BUN 13 11/02/2018    CREATININE 1 21 11/02/2018    GLUCOSE 133 10/26/2015    GLUF 193 (H) 11/02/2018    CALCIUM 9 7 11/02/2018    AST 21 11/02/2018    ALT 38 11/02/2018    ALKPHOS 108 11/02/2018    PROT 8 2 10/26/2015    BILITOT 0 23 10/26/2015    EGFR 67 11/02/2018     Lab Results   Component Value Date    WBC 5 11 11/02/2018    WBC 5 5 11/02/2018    HGB 15 5 11/02/2018    HGB 13 1 11/02/2018    HCT 46 1 11/02/2018    HCT 41 0 11/02/2018    MCV 88 11/02/2018    MCV 89 11/02/2018     11/02/2018     11/02/2018     Lab Results   Component Value Date    HEPCAB Non-reactive 08/24/2018     Lab Results   Component Value Date    HEPCAB Non-reactive 08/24/2018     Lab Results   Component Value Date    RPR Non-Reactive 08/24/2018     CD4 T CELL ABSOLUTE   Date/Time Value Ref Range Status   10/26/2015 07:58  359 - 1,519 /uL Final     CD4 ABS   Date/Time Value Ref Range Status   11/02/2018 06:30  359 - 1519 /uL Final     HIV-1 RNA by PCR, Qn   Date/Time Value Ref Range Status   11/02/2018 06:30 AM <20 copies/mL Final     Comment:     HIV-1 RNA not detected  The reportable range for this assay is 20 to 10,000,000  copies HIV-1 RNA/mL       HIV-1 RNA Viral Load Log   Date/Time Value Ref Range Status   11/02/2018 06:30 AM COMMENT [id number]/mL Final     Comment:     Unable to calculate result since non-numeric result obtained for  component test            Current Outpatient Prescriptions:     amLODIPine (NORVASC) 2 5 mg tablet, Take 1 tablet (2 5 mg total) by mouth daily, Disp: 30 tablet, Rfl: 5    darunavir (PREZISTA) 600 mg tablet, Take 1 tablet (600 mg total) by mouth 2 (two) times a day, Disp: 60 tablet, Rfl: 5    dolutegravir (TIVICAY) 50 MG TABS, Take 1 tablet (50 mg total) by mouth 2 (two) times a day, Disp: 60 tablet, Rfl: 5    PARoxetine (PAXIL) 20 mg tablet, Take 1 tablet (20 mg total) by mouth daily, Disp: 30 tablet, Rfl: 5    ritonavir (NORVIR) 100 mg tablet, Take 1 tablet (100 mg total) by mouth 2 (two) times a day, Disp: 60 tablet, Rfl: 5    tenofovir (VIREAD) 300 mg tablet, Take 1 tablet (300 mg total) by mouth daily, Disp: 30 tablet, Rfl: 5

## 2018-11-13 NOTE — PROGRESS NOTES
[address] checked in on pt  Pt seemed to e doing well  He discussed his penitentiary plan which is to move to [facility]  Pt stated that he will go and visit in April and move finally in about a year  Pt stated that he is about to enrol in a crash course to learn the language and he seemed to be looking forward to this whole new chapter of his life

## 2018-11-27 ENCOUNTER — PATIENT OUTREACH (OUTPATIENT)
Dept: SURGERY | Facility: CLINIC | Age: 58
End: 2018-11-27

## 2018-12-19 ENCOUNTER — PATIENT OUTREACH (OUTPATIENT)
Dept: SURGERY | Facility: CLINIC | Age: 58
End: 2018-12-19

## 2018-12-21 ENCOUNTER — PATIENT OUTREACH (OUTPATIENT)
Dept: SURGERY | Facility: CLINIC | Age: 58
End: 2018-12-21

## 2019-01-17 ENCOUNTER — PATIENT OUTREACH (OUTPATIENT)
Dept: SURGERY | Facility: CLINIC | Age: 59
End: 2019-01-17

## 2019-02-19 ENCOUNTER — PATIENT OUTREACH (OUTPATIENT)
Dept: SURGERY | Facility: CLINIC | Age: 59
End: 2019-02-19

## 2019-03-25 ENCOUNTER — PATIENT OUTREACH (OUTPATIENT)
Dept: SURGERY | Facility: CLINIC | Age: 59
End: 2019-03-25

## 2019-03-28 ENCOUNTER — PATIENT OUTREACH (OUTPATIENT)
Dept: SURGERY | Facility: CLINIC | Age: 59
End: 2019-03-28

## 2019-04-02 ENCOUNTER — TRANSCRIBE ORDERS (OUTPATIENT)
Dept: LAB | Facility: CLINIC | Age: 59
End: 2019-04-02

## 2019-04-02 ENCOUNTER — APPOINTMENT (OUTPATIENT)
Dept: LAB | Facility: CLINIC | Age: 59
End: 2019-04-02
Payer: MEDICARE

## 2019-04-02 DIAGNOSIS — Z11.3 ENCOUNTER FOR SCREENING FOR INFECTIONS WITH A PREDOMINANTLY SEXUAL MODE OF TRANSMISSION: ICD-10-CM

## 2019-04-02 DIAGNOSIS — D72.89 OTHER SPECIFIED DISORDERS OF WHITE BLOOD CELLS: ICD-10-CM

## 2019-04-02 DIAGNOSIS — I10 HYPERTENSION, ESSENTIAL: ICD-10-CM

## 2019-04-02 DIAGNOSIS — B20 HIV (HUMAN IMMUNODEFICIENCY VIRUS INFECTION) (HCC): ICD-10-CM

## 2019-04-02 DIAGNOSIS — Z20.2 CONTACT WITH AND (SUSPECTED) EXPOSURE TO INFECTIONS WITH A PREDOMINANTLY SEXUAL MODE OF TRANSMISSION: ICD-10-CM

## 2019-04-02 DIAGNOSIS — Z13.1 SCREENING FOR DIABETES MELLITUS: ICD-10-CM

## 2019-04-02 DIAGNOSIS — Z72.89 OTHER PROBLEMS RELATED TO LIFESTYLE: ICD-10-CM

## 2019-04-02 LAB
ALBUMIN SERPL BCP-MCNC: 3.8 G/DL (ref 3.5–5)
ALP SERPL-CCNC: 106 U/L (ref 46–116)
ALT SERPL W P-5'-P-CCNC: 32 U/L (ref 12–78)
ANION GAP SERPL CALCULATED.3IONS-SCNC: 7 MMOL/L (ref 4–13)
AST SERPL W P-5'-P-CCNC: 17 U/L (ref 5–45)
BASOPHILS # BLD AUTO: 0.04 THOUSANDS/ΜL (ref 0–0.1)
BASOPHILS NFR BLD AUTO: 1 % (ref 0–1)
BILIRUB SERPL-MCNC: 0.2 MG/DL (ref 0.2–1)
BILIRUB UR QL STRIP: NEGATIVE
BUN SERPL-MCNC: 16 MG/DL (ref 5–25)
CALCIUM SERPL-MCNC: 10.1 MG/DL (ref 8.3–10.1)
CHLORIDE SERPL-SCNC: 105 MMOL/L (ref 100–108)
CLARITY UR: CLEAR
CO2 SERPL-SCNC: 30 MMOL/L (ref 21–32)
COLOR UR: YELLOW
CREAT SERPL-MCNC: 0.94 MG/DL (ref 0.6–1.3)
EOSINOPHIL # BLD AUTO: 0.2 THOUSAND/ΜL (ref 0–0.61)
EOSINOPHIL NFR BLD AUTO: 5 % (ref 0–6)
ERYTHROCYTE [DISTWIDTH] IN BLOOD BY AUTOMATED COUNT: 12.6 % (ref 11.6–15.1)
EST. AVERAGE GLUCOSE BLD GHB EST-MCNC: 117 MG/DL
GFR SERPL CREATININE-BSD FRML MDRD: 90 ML/MIN/1.73SQ M
GLUCOSE P FAST SERPL-MCNC: 99 MG/DL (ref 65–99)
GLUCOSE UR STRIP-MCNC: NEGATIVE MG/DL
HBA1C MFR BLD: 5.7 % (ref 4.2–6.3)
HCT VFR BLD AUTO: 43.7 % (ref 36.5–49.3)
HGB BLD-MCNC: 14.7 G/DL (ref 12–17)
HGB UR QL STRIP.AUTO: NEGATIVE
IMM GRANULOCYTES # BLD AUTO: 0.01 THOUSAND/UL (ref 0–0.2)
IMM GRANULOCYTES NFR BLD AUTO: 0 % (ref 0–2)
KETONES UR STRIP-MCNC: NEGATIVE MG/DL
LEUKOCYTE ESTERASE UR QL STRIP: NEGATIVE
LYMPHOCYTES # BLD AUTO: 1.31 THOUSANDS/ΜL (ref 0.6–4.47)
LYMPHOCYTES NFR BLD AUTO: 32 % (ref 14–44)
MCH RBC QN AUTO: 30.4 PG (ref 26.8–34.3)
MCHC RBC AUTO-ENTMCNC: 33.6 G/DL (ref 31.4–37.4)
MCV RBC AUTO: 90 FL (ref 82–98)
MONOCYTES # BLD AUTO: 0.46 THOUSAND/ΜL (ref 0.17–1.22)
MONOCYTES NFR BLD AUTO: 11 % (ref 4–12)
NEUTROPHILS # BLD AUTO: 2.13 THOUSANDS/ΜL (ref 1.85–7.62)
NEUTS SEG NFR BLD AUTO: 51 % (ref 43–75)
NITRITE UR QL STRIP: NEGATIVE
NRBC BLD AUTO-RTO: 0 /100 WBCS
PH UR STRIP.AUTO: 5.5 [PH]
PLATELET # BLD AUTO: 231 THOUSANDS/UL (ref 149–390)
PMV BLD AUTO: 10.8 FL (ref 8.9–12.7)
POTASSIUM SERPL-SCNC: 4.3 MMOL/L (ref 3.5–5.3)
PROT SERPL-MCNC: 8.3 G/DL (ref 6.4–8.2)
PROT UR STRIP-MCNC: NEGATIVE MG/DL
RBC # BLD AUTO: 4.84 MILLION/UL (ref 3.88–5.62)
SODIUM SERPL-SCNC: 142 MMOL/L (ref 136–145)
SP GR UR STRIP.AUTO: 1.02 (ref 1–1.03)
UROBILINOGEN UR QL STRIP.AUTO: 0.2 E.U./DL
WBC # BLD AUTO: 4.15 THOUSAND/UL (ref 4.31–10.16)

## 2019-04-02 PROCEDURE — 83036 HEMOGLOBIN GLYCOSYLATED A1C: CPT

## 2019-04-02 PROCEDURE — 87536 HIV-1 QUANT&REVRSE TRNSCRPJ: CPT

## 2019-04-02 PROCEDURE — 85025 COMPLETE CBC W/AUTO DIFF WBC: CPT

## 2019-04-02 PROCEDURE — 81003 URINALYSIS AUTO W/O SCOPE: CPT

## 2019-04-02 PROCEDURE — 87591 N.GONORRHOEAE DNA AMP PROB: CPT

## 2019-04-02 PROCEDURE — 87491 CHLMYD TRACH DNA AMP PROBE: CPT

## 2019-04-02 PROCEDURE — 86360 T CELL ABSOLUTE COUNT/RATIO: CPT

## 2019-04-02 PROCEDURE — 36415 COLL VENOUS BLD VENIPUNCTURE: CPT

## 2019-04-02 PROCEDURE — 80053 COMPREHEN METABOLIC PANEL: CPT

## 2019-04-03 LAB
BASOPHILS # BLD AUTO: 0 X10E3/UL (ref 0–0.2)
BASOPHILS NFR BLD AUTO: 1 %
C TRACH DNA SPEC QL NAA+PROBE: NEGATIVE
CD3+CD4+ CELLS # BLD: 500 /UL (ref 359–1519)
CD3+CD4+ CELLS NFR BLD: 35.7 % (ref 30.8–58.5)
CD3+CD4+ CELLS/CD3+CD8+ CLL BLD: 1.15 % (ref 0.92–3.72)
CD3+CD8+ CELLS # BLD: 435 /UL (ref 109–897)
CD3+CD8+ CELLS NFR BLD: 31.1 % (ref 12–35.5)
EOSINOPHIL # BLD AUTO: 0.2 X10E3/UL (ref 0–0.4)
EOSINOPHIL NFR BLD AUTO: 4 %
ERYTHROCYTE [DISTWIDTH] IN BLOOD BY AUTOMATED COUNT: 13.7 % (ref 12.3–15.4)
HCT VFR BLD AUTO: 52.5 % (ref 37.5–51)
HGB BLD-MCNC: 18.5 G/DL (ref 13–17.7)
IMM GRANULOCYTES # BLD: 0 X10E3/UL (ref 0–0.1)
IMM GRANULOCYTES NFR BLD: 0 %
LYMPHOCYTES # BLD AUTO: 1.4 X10E3/UL (ref 0.7–3.1)
LYMPHOCYTES NFR BLD AUTO: 36 %
MCH RBC QN AUTO: 31.6 PG (ref 26.6–33)
MCHC RBC AUTO-ENTMCNC: 35.2 G/DL (ref 31.5–35.7)
MCV RBC AUTO: 90 FL (ref 79–97)
MONOCYTES # BLD AUTO: 0.3 X10E3/UL (ref 0.1–0.9)
MONOCYTES NFR BLD AUTO: 9 %
N GONORRHOEA DNA SPEC QL NAA+PROBE: NEGATIVE
NEUTROPHILS # BLD AUTO: 1.9 X10E3/UL (ref 1.4–7)
NEUTROPHILS NFR BLD AUTO: 50 %
PLATELET # BLD AUTO: 169 X10E3/UL (ref 150–379)
RBC # BLD AUTO: 5.85 X10E6/UL (ref 4.14–5.8)
WBC # BLD AUTO: 3.9 X10E3/UL (ref 3.4–10.8)

## 2019-04-04 ENCOUNTER — PATIENT OUTREACH (OUTPATIENT)
Dept: SURGERY | Facility: CLINIC | Age: 59
End: 2019-04-04

## 2019-04-04 LAB
HIV1 RNA # SERPL NAA+PROBE: <20 COPIES/ML
HIV1 RNA SERPL NAA+PROBE-LOG#: NORMAL LOG10COPY/ML

## 2019-04-11 ENCOUNTER — PATIENT OUTREACH (OUTPATIENT)
Dept: SURGERY | Facility: CLINIC | Age: 59
End: 2019-04-11

## 2019-04-22 ENCOUNTER — PATIENT OUTREACH (OUTPATIENT)
Dept: SURGERY | Facility: CLINIC | Age: 59
End: 2019-04-22

## 2019-04-29 ENCOUNTER — OFFICE VISIT (OUTPATIENT)
Dept: SURGERY | Facility: CLINIC | Age: 59
End: 2019-04-29
Payer: MEDICARE

## 2019-04-29 VITALS
SYSTOLIC BLOOD PRESSURE: 110 MMHG | BODY MASS INDEX: 22.92 KG/M2 | OXYGEN SATURATION: 98 % | DIASTOLIC BLOOD PRESSURE: 72 MMHG | TEMPERATURE: 97.6 F | WEIGHT: 178.6 LBS | HEIGHT: 74 IN | HEART RATE: 75 BPM

## 2019-04-29 DIAGNOSIS — Z13.6 ENCOUNTER FOR ABDOMINAL AORTIC ANEURYSM (AAA) SCREENING: ICD-10-CM

## 2019-04-29 DIAGNOSIS — R73.03 PREDIABETES: ICD-10-CM

## 2019-04-29 DIAGNOSIS — F17.210 CIGARETTE NICOTINE DEPENDENCE WITHOUT COMPLICATION: ICD-10-CM

## 2019-04-29 DIAGNOSIS — B20 HIV (HUMAN IMMUNODEFICIENCY VIRUS INFECTION) (HCC): Primary | ICD-10-CM

## 2019-04-29 DIAGNOSIS — I10 ESSENTIAL HYPERTENSION: ICD-10-CM

## 2019-04-29 DIAGNOSIS — F43.10 PTSD (POST-TRAUMATIC STRESS DISORDER): ICD-10-CM

## 2019-04-29 PROBLEM — D75.1 POLYCYTHEMIA: Status: RESOLVED | Noted: 2018-04-24 | Resolved: 2019-04-29

## 2019-04-29 PROCEDURE — 99214 OFFICE O/P EST MOD 30 MIN: CPT | Performed by: NURSE PRACTITIONER

## 2019-04-29 RX ORDER — RITONAVIR 100 MG/1
100 TABLET ORAL 2 TIMES DAILY
Qty: 60 TABLET | Refills: 5 | Status: SHIPPED | OUTPATIENT
Start: 2019-04-29 | End: 2019-11-26 | Stop reason: HOSPADM

## 2019-04-29 RX ORDER — TENOFOVIR DISOPROXIL FUMARATE 300 MG/1
300 TABLET, FILM COATED ORAL DAILY
Qty: 30 TABLET | Refills: 5 | Status: SHIPPED | OUTPATIENT
Start: 2019-04-29 | End: 2019-11-26 | Stop reason: SDUPTHER

## 2019-04-29 RX ORDER — AMLODIPINE BESYLATE 2.5 MG/1
2.5 TABLET ORAL DAILY
Qty: 30 TABLET | Refills: 5 | Status: SHIPPED | OUTPATIENT
Start: 2019-04-29 | End: 2020-04-03

## 2019-04-29 RX ORDER — PAROXETINE HYDROCHLORIDE 20 MG/1
20 TABLET, FILM COATED ORAL DAILY
Qty: 30 TABLET | Refills: 5 | Status: SHIPPED | OUTPATIENT
Start: 2019-04-29 | End: 2019-11-27 | Stop reason: SDUPTHER

## 2019-05-02 ENCOUNTER — HOSPITAL ENCOUNTER (OUTPATIENT)
Dept: ULTRASOUND IMAGING | Facility: HOSPITAL | Age: 59
Discharge: HOME | End: 2019-05-02
Payer: MEDICARE

## 2019-05-02 DIAGNOSIS — F17.210 CIGARETTE NICOTINE DEPENDENCE WITHOUT COMPLICATION: ICD-10-CM

## 2019-05-02 DIAGNOSIS — Z13.6 ENCOUNTER FOR ABDOMINAL AORTIC ANEURYSM (AAA) SCREENING: ICD-10-CM

## 2019-05-02 PROCEDURE — 76706 US ABDL AORTA SCREEN AAA: CPT

## 2019-05-24 ENCOUNTER — PATIENT OUTREACH (OUTPATIENT)
Dept: SURGERY | Facility: CLINIC | Age: 59
End: 2019-05-24

## 2019-05-28 ENCOUNTER — OFFICE VISIT (OUTPATIENT)
Dept: SURGERY | Facility: CLINIC | Age: 59
End: 2019-05-28
Payer: MEDICARE

## 2019-05-28 VITALS
SYSTOLIC BLOOD PRESSURE: 98 MMHG | TEMPERATURE: 97.9 F | OXYGEN SATURATION: 97 % | BODY MASS INDEX: 23.2 KG/M2 | HEIGHT: 74 IN | DIASTOLIC BLOOD PRESSURE: 62 MMHG | WEIGHT: 180.8 LBS | HEART RATE: 74 BPM

## 2019-05-28 DIAGNOSIS — Z13.220 ENCOUNTER FOR LIPID SCREENING FOR CARDIOVASCULAR DISEASE: ICD-10-CM

## 2019-05-28 DIAGNOSIS — Z23 NEED FOR TDAP VACCINATION: ICD-10-CM

## 2019-05-28 DIAGNOSIS — F17.210 CIGARETTE NICOTINE DEPENDENCE WITHOUT COMPLICATION: ICD-10-CM

## 2019-05-28 DIAGNOSIS — D75.1 POLYCYTHEMIA: ICD-10-CM

## 2019-05-28 DIAGNOSIS — Z23 NEED FOR HEPATITIS B VACCINATION: ICD-10-CM

## 2019-05-28 DIAGNOSIS — B20 HIV DISEASE (HCC): Primary | ICD-10-CM

## 2019-05-28 DIAGNOSIS — Z23 NEED FOR MENACTRA VACCINATION: ICD-10-CM

## 2019-05-28 DIAGNOSIS — Z13.6 ENCOUNTER FOR LIPID SCREENING FOR CARDIOVASCULAR DISEASE: ICD-10-CM

## 2019-05-28 DIAGNOSIS — D72.89 OTHER SPECIFIED DISORDERS OF WHITE BLOOD CELLS: ICD-10-CM

## 2019-05-28 DIAGNOSIS — Z11.3 ENCOUNTER FOR SCREENING FOR BACTERIAL SEXUALLY TRANSMITTED DISEASE: ICD-10-CM

## 2019-05-28 DIAGNOSIS — Z72.89 OTHER PROBLEMS RELATED TO LIFESTYLE: ICD-10-CM

## 2019-05-28 DIAGNOSIS — Z20.2 CONTACT WITH AND (SUSPECTED) EXPOSURE TO INFECTIONS WITH A PREDOMINANTLY SEXUAL MODE OF TRANSMISSION: ICD-10-CM

## 2019-05-28 PROCEDURE — 90746 HEPB VACCINE 3 DOSE ADULT IM: CPT | Performed by: INTERNAL MEDICINE

## 2019-05-28 PROCEDURE — 90471 IMMUNIZATION ADMIN: CPT | Performed by: INTERNAL MEDICINE

## 2019-05-28 PROCEDURE — 99214 OFFICE O/P EST MOD 30 MIN: CPT | Performed by: INTERNAL MEDICINE

## 2019-05-28 PROCEDURE — G0010 ADMIN HEPATITIS B VACCINE: HCPCS | Performed by: INTERNAL MEDICINE

## 2019-05-28 PROCEDURE — 90715 TDAP VACCINE 7 YRS/> IM: CPT | Performed by: INTERNAL MEDICINE

## 2019-06-11 ENCOUNTER — PATIENT OUTREACH (OUTPATIENT)
Dept: SURGERY | Facility: CLINIC | Age: 59
End: 2019-06-11

## 2019-06-17 ENCOUNTER — PATIENT OUTREACH (OUTPATIENT)
Dept: SURGERY | Facility: CLINIC | Age: 59
End: 2019-06-17

## 2019-06-19 ENCOUNTER — PATIENT OUTREACH (OUTPATIENT)
Dept: SURGERY | Facility: CLINIC | Age: 59
End: 2019-06-19

## 2019-07-26 ENCOUNTER — TELEPHONE (OUTPATIENT)
Dept: SURGERY | Facility: CLINIC | Age: 59
End: 2019-07-26

## 2019-07-26 NOTE — TELEPHONE ENCOUNTER
Ct called Cm to report no premium received from the welfare office for his MAWD, kin checked on Promise, ct still active MA, ct stated he can go donw to the welfare office on Monday to see whats going on

## 2019-07-29 ENCOUNTER — PATIENT OUTREACH (OUTPATIENT)
Dept: SURGERY | Facility: CLINIC | Age: 59
End: 2019-07-29

## 2019-07-29 NOTE — PROGRESS NOTES
CT dropped of [facility] premium  CT stated he received it on 7/27/19, post dated for 7/23/19  CM sent check request to supervisor

## 2019-08-09 ENCOUNTER — PATIENT OUTREACH (OUTPATIENT)
Dept: SURGERY | Facility: CLINIC | Age: 59
End: 2019-08-09

## 2019-08-09 NOTE — PROGRESS NOTES
Ct received letter from [address] [place] [address] office stating tthat they are closing his MA b/c they didn't receive requested docs  CT has  A new job and needs to provide proof to the [employer]   Ct stated he will come back on Tuesday or Wednesday with the necessary docs

## 2019-08-13 ENCOUNTER — PATIENT OUTREACH (OUTPATIENT)
Dept: SURGERY | Facility: CLINIC | Age: 59
End: 2019-08-13

## 2019-08-20 ENCOUNTER — PATIENT OUTREACH (OUTPATIENT)
Dept: SURGERY | Facility: CLINIC | Age: 59
End: 2019-08-20

## 2019-08-21 ENCOUNTER — PATIENT OUTREACH (OUTPATIENT)
Dept: SURGERY | Facility: CLINIC | Age: 59
End: 2019-08-21

## 2019-09-23 ENCOUNTER — PATIENT OUTREACH (OUTPATIENT)
Dept: SURGERY | Facility: CLINIC | Age: 59
End: 2019-09-23

## 2019-10-02 ENCOUNTER — TELEPHONE (OUTPATIENT)
Dept: SURGERY | Facility: CLINIC | Age: 59
End: 2019-10-02

## 2019-10-02 NOTE — PROGRESS NOTES
CT in [facility] b/c he received notificaiton that his MAWD is ending for not sending verifications   CM and ct called help line, they suggested ct complete another Application and submit to [facility]

## 2019-10-03 ENCOUNTER — PATIENT OUTREACH (OUTPATIENT)
Dept: SURGERY | Facility: CLINIC | Age: 59
End: 2019-10-03

## 2019-10-03 NOTE — PROGRESS NOTES
Ct in [facility] for RST/Recert  Ct not currently sexually active  Ct has had no missed doses of medication  Ct receives SSD income and is employed part time at [employer]  Ct's MAWD closed previously  CM and Ct called the Change center  They stated application is under review  CM helped ct complete SPBP application  Ct states no MH/DV or D&A concerns  Ct stated no legal issues  Ct has a vehicle of his own   Ct independent in ADL's

## 2019-10-21 ENCOUNTER — PATIENT OUTREACH (OUTPATIENT)
Dept: SURGERY | Facility: CLINIC | Age: 59
End: 2019-10-21

## 2019-10-21 NOTE — PROGRESS NOTES
CT called CM regarding mail received from the [address] [place] [address], ct hasnt opened it yet will open in office with CM   Ct scheduled to come in on 10/22

## 2019-10-22 ENCOUNTER — PATIENT OUTREACH (OUTPATIENT)
Dept: SURGERY | Facility: CLINIC | Age: 59
End: 2019-10-22

## 2019-10-22 NOTE — PROGRESS NOTES
Ct came in to drop of mail from [facility]  Ct received a MAWD payment with a overdue payment of 92 00  Ct also received a letter stating he was approved for MAWD and payment will be $88 00 a month beginning 11/2019

## 2019-10-28 ENCOUNTER — PATIENT OUTREACH (OUTPATIENT)
Dept: SURGERY | Facility: CLINIC | Age: 59
End: 2019-10-28

## 2019-10-29 ENCOUNTER — PATIENT OUTREACH (OUTPATIENT)
Dept: SURGERY | Facility: CLINIC | Age: 59
End: 2019-10-29

## 2019-11-06 ENCOUNTER — TRANSCRIBE ORDERS (OUTPATIENT)
Dept: LAB | Facility: CLINIC | Age: 59
End: 2019-11-06

## 2019-11-06 ENCOUNTER — APPOINTMENT (OUTPATIENT)
Dept: LAB | Facility: CLINIC | Age: 59
End: 2019-11-06
Payer: MEDICARE

## 2019-11-06 DIAGNOSIS — D72.89 OTHER SPECIFIED DISORDERS OF WHITE BLOOD CELLS: ICD-10-CM

## 2019-11-06 DIAGNOSIS — B20 HIV DISEASE (HCC): ICD-10-CM

## 2019-11-06 DIAGNOSIS — Z13.6 ENCOUNTER FOR LIPID SCREENING FOR CARDIOVASCULAR DISEASE: ICD-10-CM

## 2019-11-06 DIAGNOSIS — Z20.2 CONTACT WITH AND (SUSPECTED) EXPOSURE TO INFECTIONS WITH A PREDOMINANTLY SEXUAL MODE OF TRANSMISSION: ICD-10-CM

## 2019-11-06 DIAGNOSIS — Z72.89 OTHER PROBLEMS RELATED TO LIFESTYLE: ICD-10-CM

## 2019-11-06 DIAGNOSIS — Z11.3 ENCOUNTER FOR SCREENING FOR BACTERIAL SEXUALLY TRANSMITTED DISEASE: ICD-10-CM

## 2019-11-06 DIAGNOSIS — Z13.220 ENCOUNTER FOR LIPID SCREENING FOR CARDIOVASCULAR DISEASE: ICD-10-CM

## 2019-11-06 LAB
ALBUMIN SERPL BCP-MCNC: 3.7 G/DL (ref 3.5–5)
ALP SERPL-CCNC: 102 U/L (ref 46–116)
ALT SERPL W P-5'-P-CCNC: 35 U/L (ref 12–78)
ANION GAP SERPL CALCULATED.3IONS-SCNC: 6 MMOL/L (ref 4–13)
AST SERPL W P-5'-P-CCNC: 20 U/L (ref 5–45)
BASOPHILS # BLD AUTO: 0.06 THOUSANDS/ΜL (ref 0–0.1)
BASOPHILS NFR BLD AUTO: 1 % (ref 0–1)
BILIRUB SERPL-MCNC: 0.2 MG/DL (ref 0.2–1)
BUN SERPL-MCNC: 17 MG/DL (ref 5–25)
CALCIUM SERPL-MCNC: 9.3 MG/DL (ref 8.3–10.1)
CHLORIDE SERPL-SCNC: 102 MMOL/L (ref 100–108)
CHOLEST SERPL-MCNC: 156 MG/DL (ref 50–200)
CO2 SERPL-SCNC: 31 MMOL/L (ref 21–32)
CREAT SERPL-MCNC: 1.13 MG/DL (ref 0.6–1.3)
EOSINOPHIL # BLD AUTO: 0.27 THOUSAND/ΜL (ref 0–0.61)
EOSINOPHIL NFR BLD AUTO: 6 % (ref 0–6)
ERYTHROCYTE [DISTWIDTH] IN BLOOD BY AUTOMATED COUNT: 12.2 % (ref 11.6–15.1)
GFR SERPL CREATININE-BSD FRML MDRD: 72 ML/MIN/1.73SQ M
GLUCOSE P FAST SERPL-MCNC: 121 MG/DL (ref 65–99)
HCT VFR BLD AUTO: 45.3 % (ref 36.5–49.3)
HCV AB SER QL: NORMAL
HDLC SERPL-MCNC: 32 MG/DL
HGB BLD-MCNC: 15.5 G/DL (ref 12–17)
IMM GRANULOCYTES # BLD AUTO: 0.02 THOUSAND/UL (ref 0–0.2)
IMM GRANULOCYTES NFR BLD AUTO: 0 % (ref 0–2)
LDLC SERPL CALC-MCNC: 90 MG/DL (ref 0–100)
LYMPHOCYTES # BLD AUTO: 1.54 THOUSANDS/ΜL (ref 0.6–4.47)
LYMPHOCYTES NFR BLD AUTO: 32 % (ref 14–44)
MCH RBC QN AUTO: 30.6 PG (ref 26.8–34.3)
MCHC RBC AUTO-ENTMCNC: 34.2 G/DL (ref 31.4–37.4)
MCV RBC AUTO: 90 FL (ref 82–98)
MONOCYTES # BLD AUTO: 0.57 THOUSAND/ΜL (ref 0.17–1.22)
MONOCYTES NFR BLD AUTO: 12 % (ref 4–12)
NEUTROPHILS # BLD AUTO: 2.43 THOUSANDS/ΜL (ref 1.85–7.62)
NEUTS SEG NFR BLD AUTO: 49 % (ref 43–75)
NRBC BLD AUTO-RTO: 0 /100 WBCS
PLATELET # BLD AUTO: 202 THOUSANDS/UL (ref 149–390)
PMV BLD AUTO: 10.7 FL (ref 8.9–12.7)
POTASSIUM SERPL-SCNC: 4.3 MMOL/L (ref 3.5–5.3)
PROT SERPL-MCNC: 7.9 G/DL (ref 6.4–8.2)
RBC # BLD AUTO: 5.06 MILLION/UL (ref 3.88–5.62)
RPR SER QL: NORMAL
SODIUM SERPL-SCNC: 139 MMOL/L (ref 136–145)
TRIGL SERPL-MCNC: 171 MG/DL
WBC # BLD AUTO: 4.89 THOUSAND/UL (ref 4.31–10.16)

## 2019-11-06 PROCEDURE — 36415 COLL VENOUS BLD VENIPUNCTURE: CPT

## 2019-11-06 PROCEDURE — 86360 T CELL ABSOLUTE COUNT/RATIO: CPT

## 2019-11-06 PROCEDURE — 86803 HEPATITIS C AB TEST: CPT

## 2019-11-06 PROCEDURE — 80061 LIPID PANEL: CPT

## 2019-11-06 PROCEDURE — 87536 HIV-1 QUANT&REVRSE TRNSCRPJ: CPT

## 2019-11-06 PROCEDURE — 85025 COMPLETE CBC W/AUTO DIFF WBC: CPT

## 2019-11-06 PROCEDURE — 80053 COMPREHEN METABOLIC PANEL: CPT

## 2019-11-06 PROCEDURE — 86480 TB TEST CELL IMMUN MEASURE: CPT

## 2019-11-06 PROCEDURE — 86592 SYPHILIS TEST NON-TREP QUAL: CPT

## 2019-11-07 LAB
BASOPHILS # BLD AUTO: 0 X10E3/UL (ref 0–0.2)
BASOPHILS NFR BLD AUTO: 1 %
CD3+CD4+ CELLS # BLD: 488 /UL (ref 359–1519)
CD3+CD4+ CELLS NFR BLD: 30.5 % (ref 30.8–58.5)
CD3+CD4+ CELLS/CD3+CD8+ CLL BLD: 1 % (ref 0.92–3.72)
CD3+CD8+ CELLS # BLD: 486 /UL (ref 109–897)
CD3+CD8+ CELLS NFR BLD: 30.4 % (ref 12–35.5)
EOSINOPHIL # BLD AUTO: 0.3 X10E3/UL (ref 0–0.4)
EOSINOPHIL NFR BLD AUTO: 5 %
ERYTHROCYTE [DISTWIDTH] IN BLOOD BY AUTOMATED COUNT: 13.9 % (ref 12.3–15.4)
GAMMA INTERFERON BACKGROUND BLD IA-ACNC: 0.07 IU/ML
HCT VFR BLD AUTO: 44.1 % (ref 37.5–51)
HGB BLD-MCNC: 15.7 G/DL (ref 13–17.7)
IMM GRANULOCYTES # BLD: 0 X10E3/UL (ref 0–0.1)
IMM GRANULOCYTES NFR BLD: 0 %
LYMPHOCYTES # BLD AUTO: 1.6 X10E3/UL (ref 0.7–3.1)
LYMPHOCYTES NFR BLD AUTO: 33 %
M TB IFN-G BLD-IMP: NEGATIVE
M TB IFN-G CD4+ BCKGRND COR BLD-ACNC: 0.02 IU/ML
M TB IFN-G CD4+ BCKGRND COR BLD-ACNC: 0.03 IU/ML
MCH RBC QN AUTO: 30.8 PG (ref 26.6–33)
MCHC RBC AUTO-ENTMCNC: 35.6 G/DL (ref 31.5–35.7)
MCV RBC AUTO: 87 FL (ref 79–97)
MITOGEN IGNF BCKGRD COR BLD-ACNC: >10 IU/ML
MONOCYTES # BLD AUTO: 0.5 X10E3/UL (ref 0.1–0.9)
MONOCYTES NFR BLD AUTO: 11 %
NEUTROPHILS # BLD AUTO: 2.4 X10E3/UL (ref 1.4–7)
NEUTROPHILS NFR BLD AUTO: 50 %
PLATELET # BLD AUTO: 192 X10E3/UL (ref 150–450)
RBC # BLD AUTO: 5.09 X10E6/UL (ref 4.14–5.8)
WBC # BLD AUTO: 4.8 X10E3/UL (ref 3.4–10.8)

## 2019-11-08 LAB
HIV1 RNA # SERPL NAA+PROBE: 40 COPIES/ML
HIV1 RNA SERPL NAA+PROBE-LOG#: 1.6 LOG10COPY/ML

## 2019-11-25 ENCOUNTER — PATIENT OUTREACH (OUTPATIENT)
Dept: SURGERY | Facility: CLINIC | Age: 59
End: 2019-11-25

## 2019-11-26 ENCOUNTER — OFFICE VISIT (OUTPATIENT)
Dept: SURGERY | Facility: CLINIC | Age: 59
End: 2019-11-26
Payer: MEDICARE

## 2019-11-26 ENCOUNTER — PATIENT OUTREACH (OUTPATIENT)
Dept: SURGERY | Facility: CLINIC | Age: 59
End: 2019-11-26

## 2019-11-26 VITALS
DIASTOLIC BLOOD PRESSURE: 90 MMHG | SYSTOLIC BLOOD PRESSURE: 130 MMHG | BODY MASS INDEX: 24.31 KG/M2 | HEIGHT: 74 IN | HEART RATE: 93 BPM | OXYGEN SATURATION: 96 % | WEIGHT: 189.4 LBS | TEMPERATURE: 97.7 F

## 2019-11-26 VITALS
HEART RATE: 93 BPM | BODY MASS INDEX: 24.31 KG/M2 | SYSTOLIC BLOOD PRESSURE: 130 MMHG | OXYGEN SATURATION: 96 % | DIASTOLIC BLOOD PRESSURE: 90 MMHG | WEIGHT: 189.4 LBS | TEMPERATURE: 97.7 F | HEIGHT: 74 IN

## 2019-11-26 DIAGNOSIS — Z11.3 ENCOUNTER FOR SCREENING FOR BACTERIAL SEXUALLY TRANSMITTED DISEASE: ICD-10-CM

## 2019-11-26 DIAGNOSIS — Z23 NEED FOR HEPATITIS B VACCINATION: ICD-10-CM

## 2019-11-26 DIAGNOSIS — B20 HIV (HUMAN IMMUNODEFICIENCY VIRUS INFECTION) (HCC): ICD-10-CM

## 2019-11-26 DIAGNOSIS — Z23 NEED FOR INFLUENZA VACCINATION: ICD-10-CM

## 2019-11-26 DIAGNOSIS — B20 HIV DISEASE (HCC): ICD-10-CM

## 2019-11-26 DIAGNOSIS — Z13.1 SCREENING FOR DIABETES MELLITUS: ICD-10-CM

## 2019-11-26 DIAGNOSIS — Z12.11 SCREEN FOR COLON CANCER: ICD-10-CM

## 2019-11-26 DIAGNOSIS — Z20.2 CONTACT WITH AND (SUSPECTED) EXPOSURE TO INFECTIONS WITH A PREDOMINANTLY SEXUAL MODE OF TRANSMISSION: ICD-10-CM

## 2019-11-26 DIAGNOSIS — Z79.899 OTHER LONG TERM (CURRENT) DRUG THERAPY: ICD-10-CM

## 2019-11-26 DIAGNOSIS — Z00.00 MEDICARE ANNUAL WELLNESS VISIT, INITIAL: Primary | ICD-10-CM

## 2019-11-26 DIAGNOSIS — B20 HIV DISEASE (HCC): Primary | ICD-10-CM

## 2019-11-26 DIAGNOSIS — I25.10 ASCVD (ARTERIOSCLEROTIC CARDIOVASCULAR DISEASE): ICD-10-CM

## 2019-11-26 DIAGNOSIS — Z72.89 OTHER PROBLEMS RELATED TO LIFESTYLE: ICD-10-CM

## 2019-11-26 DIAGNOSIS — B20 HIV INFECTION, UNSPECIFIED SYMPTOM STATUS (HCC): Primary | ICD-10-CM

## 2019-11-26 DIAGNOSIS — F17.210 CIGARETTE NICOTINE DEPENDENCE WITHOUT COMPLICATION: ICD-10-CM

## 2019-11-26 DIAGNOSIS — I10 ESSENTIAL HYPERTENSION: ICD-10-CM

## 2019-11-26 DIAGNOSIS — D72.89 OTHER SPECIFIED DISORDERS OF WHITE BLOOD CELLS: ICD-10-CM

## 2019-11-26 DIAGNOSIS — D75.1 POLYCYTHEMIA: ICD-10-CM

## 2019-11-26 PROCEDURE — G0438 PPPS, INITIAL VISIT: HCPCS | Performed by: NURSE PRACTITIONER

## 2019-11-26 PROCEDURE — 99214 OFFICE O/P EST MOD 30 MIN: CPT | Performed by: INTERNAL MEDICINE

## 2019-11-26 PROCEDURE — G0008 ADMIN INFLUENZA VIRUS VAC: HCPCS | Performed by: NURSE PRACTITIONER

## 2019-11-26 PROCEDURE — G0010 ADMIN HEPATITIS B VACCINE: HCPCS | Performed by: NURSE PRACTITIONER

## 2019-11-26 PROCEDURE — 90682 RIV4 VACC RECOMBINANT DNA IM: CPT | Performed by: NURSE PRACTITIONER

## 2019-11-26 PROCEDURE — 90746 HEPB VACCINE 3 DOSE ADULT IM: CPT | Performed by: NURSE PRACTITIONER

## 2019-11-26 RX ORDER — ASPIRIN 81 MG/1
81 TABLET, CHEWABLE ORAL DAILY
Qty: 30 TABLET | Refills: 3 | Status: SHIPPED | OUTPATIENT
Start: 2019-11-26 | End: 2020-03-16

## 2019-11-26 RX ORDER — TENOFOVIR DISOPROXIL FUMARATE 300 MG/1
300 TABLET, FILM COATED ORAL DAILY
Qty: 30 TABLET | Refills: 5 | Status: SHIPPED | OUTPATIENT
Start: 2019-11-26 | End: 2020-05-01

## 2019-11-26 NOTE — PATIENT INSTRUCTIONS
Medicare Preventive Visit Patient Instructions  Thank you for completing your Welcome to Medicare Visit or Medicare Annual Wellness Visit today  Your next wellness visit will be due in one year (11/26/2020)  The screening/preventive services that you may require over the next 5-10 years are detailed below  Some tests may not apply to you based off risk factors and/or age  Screening tests ordered at today's visit but not completed yet may show as past due  Also, please note that scanned in results may not display below  Preventive Screenings:  Service Recommendations Previous Testing/Comments   Colorectal Cancer Screening  · Colonoscopy    · Fecal Occult Blood Test (FOBT)/Fecal Immunochemical Test (FIT)  · Fecal DNA/Cologuard Test  · Flexible Sigmoidoscopy Age: 54-65 years old   Colonoscopy: every 10 years (May be performed more frequently if at higher risk)  OR  FOBT/FIT: every 1 year  OR  Cologuard: every 3 years  OR  Sigmoidoscopy: every 5 years  Screening may be recommended earlier than age 48 if at higher risk for colorectal cancer  Also, an individualized decision between you and your healthcare provider will decide whether screening between the ages of 74-80 would be appropriate   Colonoscopy: 01/27/2017  FOBT/FIT: Not on file  Cologuard: Not on file  Sigmoidoscopy: Not on file    Screening Current     Prostate Cancer Screening Individualized decision between patient and health care provider in men between ages of 53-78   Medicare will cover every 12 months beginning on the day after your 50th birthday PSA: 0 4 ng/mL          Hepatitis C Screening Once for adults born between 1945 and 1965  More frequently in patients at high risk for Hepatitis C Hep C Antibody: 11/06/2019    Screening Current   Diabetes Screening 1-2 times per year if you're at risk for diabetes or have pre-diabetes Fasting glucose: 121 mg/dL   A1C: 5 7 %    Screening Current   Cholesterol Screening Once every 5 years if you don't have a lipid disorder  May order more often based on risk factors  Lipid panel: 11/06/2019    Screening Not Indicated  History Lipid Disorder      Other Preventive Screenings Covered by Medicare:  1  Abdominal Aortic Aneurysm (AAA) Screening: covered once if your at risk  You're considered to be at risk if you have a family history of AAA or a male between the age of 73-68 who smoking at least 100 cigarettes in your lifetime  2  Lung Cancer Screening: covers low dose CT scan once per year if you meet all of the following conditions: (1) Age 50-69; (2) No signs or symptoms of lung cancer; (3) Current smoker or have quit smoking within the last 15 years; (4) You have a tobacco smoking history of at least 30 pack years (packs per day x number of years you smoked); (5) You get a written order from a healthcare provider  3  Glaucoma Screening: covered annually if you're considered high risk: (1) You have diabetes OR (2) Family history of glaucoma OR (3)  aged 48 and older OR (3)  American aged 72 and older  3  Osteoporosis Screening: covered every 2 years if you meet one of the following conditions: (1) Have a vertebral abnormality; (2) On glucocorticoid therapy for more than 3 months; (3) Have primary hyperparathyroidism; (4) On osteoporosis medications and need to assess response to drug therapy  5  HIV Screening: covered annually if you're between the age of 12-76  Also covered annually if you are younger than 13 and older than 72 with risk factors for HIV infection  For pregnant patients, it is covered up to 3 times per pregnancy      Immunizations:  Immunization Recommendations   Influenza Vaccine Annual influenza vaccination during flu season is recommended for all persons aged >= 6 months who do not have contraindications   Pneumococcal Vaccine (Prevnar and Pneumovax)  * Prevnar = PCV13  * Pneumovax = PPSV23 Adults 25-60 years old: 1-3 doses may be recommended based on certain risk factors  Adults 72 years old: Prevnar (PCV13) vaccine recommended followed by Pneumovax (PPSV23) vaccine  If already received PPSV23 since turning 65, then PCV13 recommended at least one year after PPSV23 dose  Hepatitis B Vaccine 3 dose series if at intermediate or high risk (ex: diabetes, end stage renal disease, liver disease)   Tetanus (Td) Vaccine - COST NOT COVERED BY MEDICARE PART B Following completion of primary series, a booster dose should be given every 10 years to maintain immunity against tetanus  Td may also be given as tetanus wound prophylaxis  Tdap Vaccine - COST NOT COVERED BY MEDICARE PART B Recommended at least once for all adults  For pregnant patients, recommended with each pregnancy  Shingles Vaccine (Shingrix) - COST NOT COVERED BY MEDICARE PART B  2 shot series recommended in those aged 48 and above     Health Maintenance Due:      Topic Date Due    CRC Screening: Colonoscopy  01/27/2027    Hepatitis C Screening  Completed     Immunizations Due:      Topic Date Due    Meningococcal ACWY Vaccine (1 - Risk start before 7 months 4-dose series) 1962    Influenza Vaccine  07/01/2019     Advance Directives   What are advance directives? Advance directives are legal documents that state your wishes and plans for medical care  These plans are made ahead of time in case you lose your ability to make decisions for yourself  Advance directives can apply to any medical decision, such as the treatments you want, and if you want to donate organs  What are the types of advance directives? There are many types of advance directives, and each state has rules about how to use them  You may choose a combination of any of the following:  · Living will: This is a written record of the treatment you want  You can also choose which treatments you do not want, which to limit, and which to stop at a certain time  This includes surgery, medicine, IV fluid, and tube feedings     · Durable power of  for [facility]): This is a written record that states who you want to make healthcare choices for you when you are unable to make them for yourself  This person, called a proxy, is usually a family member or a friend  You may choose more than 1 proxy  · Do not resuscitate (DNR) order:  A DNR order is used in case your heart stops beating or you stop breathing  It is a request not to have certain forms of treatment, such as CPR  A DNR order may be included in other types of advance directives  · Medical directive: This covers the care that you want if you are in a coma, near death, or unable to make decisions for yourself  You can list the treatments you want for each condition  Treatment may include pain medicine, surgery, blood transfusions, dialysis, IV or tube feedings, and a ventilator (breathing machine)  · Values history: This document has questions about your views, beliefs, and how you feel and think about life  This information can help others choose the care that you would choose  Why are advance directives important? An advance directive helps you control your care  Although spoken wishes may be used, it is better to have your wishes written down  Spoken wishes can be misunderstood, or not followed  Treatments may be given even if you do not want them  An advance directive may make it easier for your family to make difficult choices about your care  Cigarette Smoking and Your Health   Risks to your health if you smoke:  Nicotine and other chemicals found in tobacco damage every cell in your body  Even if you are a light smoker, you have an increased risk for cancer, heart disease, and lung disease  If you are pregnant or have diabetes, smoking increases your risk for complications  Benefits to your health if you stop smoking:   · You decrease respiratory symptoms such as coughing, wheezing, and shortness of breath     · You reduce your risk for cancers of the lung, mouth, throat, kidney, bladder, pancreas, stomach, and cervix  If you already have cancer, you increase the benefits of chemotherapy  You also reduce your risk for cancer returning or a second cancer from developing  · You reduce your risk for heart disease, blood clots, heart attack, and stroke  · You reduce your risk for lung infections, and diseases such as pneumonia, asthma, chronic bronchitis, and emphysema  · Your circulation improves  More oxygen can be delivered to your body  If you have diabetes, you lower your risk for complications, such as kidney, artery, and eye diseases  You also lower your risk for nerve damage  Nerve damage can lead to amputations, poor vision, and blindness  · You improve your body's ability to heal and to fight infections  For more information and support to stop smoking:   · Smokefree  gov  Phone: 6- 979 - 035-8622  Web Address: www Mobile [employer]  [facility] [place] 2018 Information is for End User's use only and may not be sold, redistributed or otherwise used for commercial purposes   All illustrations and images included in CareNotes® are the copyrighted property of A D A M , Inc  or [address] [place] [address]

## 2019-11-26 NOTE — PROGRESS NOTES
11/26/19 [address] [address] Affiliation none   Spiritual Beliefs/Perceptions   Concept of God Accepting   God's Role in Disease Natural   Relationship with God Ambivalent   Unmet Spiritual Needs Searching for purpose   Support Systems Family members;Friends/neighbors   Psychosocial   Psychosocial (WDL) WDL   Length of Time/Family Visitation 31 min -1hr   Stress Factors   Patient Stress Factors Strained family relationships; Other (Comment)  (expressed grief)   Coping Responses   Patient Coping Accepting;Open/discussion   Plan of Care   Care Plan Initiated Yes   Provide Spiritual Support (Visits) 1 time   Assessment Completed by: Other (Comment)  (PCP visit )   The  initiated a relationship of care and support  Mr Brijesh Rojas advised he was not religous and he use to go to Spiritism with his mother  He advised he wanted to share his perspective with the   Mr Brijesh Rojas talked about his  service and his travels  Mr Brijesh Rojas seemed enthusiastic about his experiences and role in the [address] [place] [address]  He shared some very difficult encounters and advised the [facility] helped shaped him  Mr Brijesh Rojas has a good relationship with 2 of his 3 children and hopes that his son will come around one day and reconnect  Mr Brijesh Rojas desires to move to [facility] once he retires and travel between the [facility] and [place]  He expressed grief regarding the death of his father and wife and held back his emotions  Also,  he expressed sadness regarding his mother's current medical and mental state  Mr Brijesh Rojas was welcoming of the  presence and seemed to want to share his life stories  The  encouraged Mr Brijesh Rojas to tell his stories and offered words of comfort to him as he talked  about his father and wife  Mr Brijesh Rojas advised he would like to talk further  The  shared her card with him and encouraged him to call

## 2019-11-26 NOTE — PROGRESS NOTES
Assessment and Plan:     Problem List Items Addressed This Visit     None      Visit Diagnoses     Need for influenza vaccination        Need for hepatitis B vaccination              Tobacco Cessation Counseling: Tobacco cessation counseling was provided  The patient is sincerely urged to quit consumption of tobacco  He is not ready to quit tobacco  Medication options and side effects of medication discussed  Patient agreed to medication  Spoke to pt  About using nicotine patch  Will start therapy at follow up visit  Preventive health issues were discussed with patient, and age appropriate screening tests were ordered as noted in patient's After Visit Summary  Personalized health advice and appropriate referrals for health education or preventive services given if needed, as noted in patient's After Visit Summary  History of Present Illness:     Patient presents for Medicare Annual Wellness visit    Patient Care Team:  Mario Banks as PCP - CATHIE Bhatia MD Patience Real, SW as      Problem List:     Patient Active Problem List   Diagnosis    Anxiety    Essential hypertension    Heart murmur    Hiatal hernia    HIV disease ([facility] [address] )    Hypertriglyceridemia    Inguinal hernia    Nicotine dependence    Prediabetes      Past Medical and Surgical History:     Past Medical History:   Diagnosis Date    DDD (degenerative disc disease), lumbar     Facet joint disease     Human immunodeficiency virus (HIV) infection ([facility] [address] )     resolved 11/5/15    Hypertension     resolved 5/29/15    Other specified disorders of white blood cells     resolved 5/12/16     Past Surgical History:   Procedure Laterality Date    HERNIA REPAIR      SHOULDER SURGERY Left       Family History:     History reviewed  No pertinent family history     Social History:     Social History     Socioeconomic History    Marital status: Single     Spouse name: None    Number of children: None    Years of education: None    Highest education level: None   Occupational History    None   Social Needs    Financial resource strain: None    Food insecurity:     Worry: None     Inability: None    Transportation needs:     Medical: None     Non-medical: None   Tobacco Use    Smoking status: Current Every Day Smoker     Packs/day: 0 50     Years: 41 00     Pack years: 20 50    Smokeless tobacco: Never Used    Tobacco comment: 10 cigs/day   Substance and Sexual Activity    Alcohol use: Not Currently    Drug use: Never    Sexual activity: Not Currently     Partners: Female   Lifestyle    Physical activity:     Days per week: None     Minutes per session: None    Stress: None   Relationships    Social connections:     Talks on phone: None     Gets together: None     Attends Anabaptism service: None     Active member of club or organization: None     Attends meetings of clubs or organizations: None     Relationship status: None    Intimate partner violence:     Fear of current or ex partner: None     Emotionally abused: None     Physically abused: None     Forced sexual activity: None   Other Topics Concern    None   Social History Narrative    None       Medications and Allergies:     Current Outpatient Medications   Medication Sig Dispense Refill    amLODIPine (NORVASC) 2 5 mg tablet Take 1 tablet (2 5 mg total) by mouth daily 30 tablet 5    darunavir (PREZISTA) 600 mg tablet Take 1 tablet (600 mg total) by mouth 2 (two) times a day 60 tablet 5    dolutegravir (TIVICAY) 50 MG TABS Take 1 tablet (50 mg total) by mouth 2 (two) times a day 60 tablet 5    PARoxetine (PAXIL) 20 mg tablet Take 1 tablet (20 mg total) by mouth daily 30 tablet 5    ritonavir (NORVIR) 100 mg tablet Take 1 tablet (100 mg total) by mouth 2 (two) times a day 60 tablet 5    tenofovir (VIREAD) 300 mg tablet Take 1 tablet (300 mg total) by mouth daily 30 tablet 5     No current facility-administered medications for this visit  No Known Allergies   Immunizations:     Immunization History   Administered Date(s) Administered    Hep A, adult 04/25/2017, 04/24/2018    Hep B, adult 09/18/2008, 01/26/2009, 05/07/2009, 05/28/2019, 05/28/2019    Influenza Quadrivalent Preservative Free 3 years and older IM 11/05/2015    Influenza Quadrivalent, 6-35 Months IM 12/13/2016, 10/16/2017    Influenza TIV (IM) 12/15/2011, 10/04/2012, 10/10/2012, 10/30/2014    Influenza, recombinant, quadrivalent,injectable, preservative free 10/08/2018    Pneumococcal Conjugate 13-Valent 09/27/2016    Pneumococcal Polysaccharide PPV23 09/18/2008, 04/25/2017    Tdap 05/07/2009, 05/28/2019      Health Maintenance:         Topic Date Due    CRC Screening: Colonoscopy  01/27/2027    Hepatitis C Screening  Completed         Topic Date Due    Meningococcal ACWY Vaccine (1 - Risk start before 7 months 4-dose series) 1962    Influenza Vaccine  07/01/2019      Medicare Health Risk Assessment:     /90   Pulse 93   Temp 97 7 °F (36 5 °C)   Ht 6' 1 5" (1 867 m)   Wt 85 9 kg (189 lb 6 4 oz)   SpO2 96%   BMI 24 65 kg/m²      P[place] is here for his Initial Wellness visit  Health Risk Assessment:   Patient rates overall health as good  Patient feels that their physical health rating is same  Eyesight was rated as same  Hearing was rated as same  Patient feels that their emotional and mental health rating is same  Pain experienced in the last 7 days has been some  Patient's pain rating has been 6/10  Patient states that he has experienced no weight loss or gain in last 6 months  Depression Screening:   PHQ-2 Score: 0      Fall Risk Screening: In the past year, patient has experienced: no history of falling in past year      Home Safety:  Patient does not have trouble with stairs inside or outside of their home  Patient has working smoke alarms and has no working carbon monoxide detector   Home safety hazards include: none      Nutrition:   Current diet is Limited junk food  Medications:   Patient is not currently taking any over-the-counter supplements  Patient is able to manage medications  Activities of Daily Living (ADLs)/Instrumental Activities of Daily Living (IADLs):   Walk and transfer into and out of bed and chair?: Yes  Dress and groom yourself?: Yes    Bathe or shower yourself?: Yes    Feed yourself? Yes  Do your laundry/housekeeping?: Yes  Manage your money, pay your bills and track your expenses?: Yes  Make your own meals?: Yes    Do your own shopping?: Yes    Previous Hospitalizations:   Any hospitalizations or ED visits within the last 12 months?: No      Advance Care Planning:   Living will: Yes    Durable POA for healthcare: Yes    Advanced directive: Yes      PREVENTIVE SCREENINGS      Cardiovascular Screening:    General: Screening Not Indicated and History Lipid Disorder      Diabetes Screening:     General: Screening Current      Colorectal Cancer Screening:     General: Screening Current      Abdominal Aortic Aneurysm (AAA) Screening:    Risk factors include: tobacco use        Hepatitis C Screening:    General: Screening Current    Other Counseling Topics:   Car/seat belt/driving safety  CD4 T CELL ABSOLUTE   Date/Time Value Ref Range Status   10/26/2015 07:58  359 - 1,519 /uL Final     CD4 ABS   Date/Time Value Ref Range Status   11/06/2019 06:55  359 - 1519 /uL Final     HIV-1 RNA by PCR, Qn   Date/Time Value Ref Range Status   11/06/2019 06:55 AM 40 copies/mL Final     Comment:     The reportable range for this assay is 20 to 10,000,000  copies HIV-1 RNA/mL  HIV-1 RNA Viral Load Log   Date/Time Value Ref Range Status   11/06/2019 06:55 AM 1 602 [id number]/mL Final     P[place] is often missing at least one dose of medication daily  Discussed using a pill box and placing medication near normal routine areas to increase adherence  Scheduled for ID clinic tonight         Current Outpatient Medications:     amLODIPine (NORVASC) 2 5 mg tablet, Take 1 tablet (2 5 mg total) by mouth daily, Disp: 30 tablet, Rfl: 5    darunavir (PREZISTA) 600 mg tablet, Take 1 tablet (600 mg total) by mouth 2 (two) times a day, Disp: 60 tablet, Rfl: 5    dolutegravir (TIVICAY) 50 MG TABS, Take 1 tablet (50 mg total) by mouth 2 (two) times a day, Disp: 60 tablet, Rfl: 5    PARoxetine (PAXIL) 20 mg tablet, Take 1 tablet (20 mg total) by mouth daily, Disp: 30 tablet, Rfl: 5    ritonavir (NORVIR) 100 mg tablet, Take 1 tablet (100 mg total) by mouth 2 (two) times a day, Disp: 60 tablet, Rfl: 5    tenofovir (VIREAD) 300 mg tablet, Take 1 tablet (300 mg total) by mouth daily, Disp: 30 tablet, Rfl: 5    aspirin 81 mg chewable tablet, Chew 1 tablet (81 mg total) daily, Disp: 30 tablet, Rfl: 3        Denies side effects  Stressed the importance of adherence  Continue follow up with ID clinic  Reviewed most recent labs, including Cd4 and viral load  Discussed the risks and benefits of treatment options, instructions for management, importance of treatment adherence, and reduction of risk factor  Educated on possible  medication side effects  Counseled on routes of HIV transmission, including the risk of  infection  Emphasized that viral suppression is the best method to prevent HIV transmission  At this time pt denies the need for HIV testing of anyone in their life  Total encounter time was 45 minutes  Greater then 20 minutes were spent on counseling and patient education  Pt voices understanding and agreement with treatment plan        CATHIE Gaspar n/a

## 2019-11-26 NOTE — PROGRESS NOTES
11/26/19 1500   Clinical Encounter Type   Visited With Patient   Routine Visit Introduction   Continue Visiting Yes   Patient Spiritual Encounters   Spiritual Assessment 4   Coping 5   Grief Resolution 3   See progress note

## 2019-11-26 NOTE — PROGRESS NOTES
Progress Note - Infectious Disease   Francisco J Hargrove 64 y o  male MRN: [id number]  Unit/Bed#:  Encounter: [id number]      Impression/Plan:  1   HIV-doing well on ART with an here undetectable viral load and CD4 count in the 400s  Will attempt to simplify the regimen by dropping the 2nd dose of Tivicay  Will continue the Prezista Norvir with eventually changing to Prezcobix, continue Viread, and continue Tivicay but only once daily  Recheck labs in 4 weeks and follow up in 6 weeks  Stressed adherence      2   Polycythemia-improved with decreased tobacco use   Will continue to monitor the CBC with diff     3   Nicotine dependence-the patient says he will be quitting smoking by the time he moves to [facility] next year  He has moved to [facility] has been delayed as his daughter is now getting   Stressed the importance of complete tobacco cessation      Patient was provided medication, adherence and prevention education    Subjective:  Routine follow-up for HIV  Patient claims 100% adherence with Tivicay Viread Norvir Prezista    Patient denies any notable side effects  Overall the feeling well  The patient denies any fever chills or sweats, denies any nausea vomiting or diarrhea, denies any cough or shortness of breath  ROS: A complete 12 point ROS is negative other than that noted in the HPI    Followup portions patient history reviewed and updated as: Allergies, current medications, past medical history, past social history, past surgical history, and the problem list    Objective:  Vitals:  Vitals:    11/26/19 1611   BP: 130/90   Pulse: 93   Temp: 97 7 °F (36 5 °C)   SpO2: 96%   Weight: 85 9 kg (189 lb 6 4 oz)   Height: 6' 1 5" (1 867 m)       Physical Exam:   General Appearance:  Alert, interactive, appearing well,  nontoxic, no acute distress  Neck:   Supple without lymphadenopathy, no thyromegaly or masses   Throat: Oropharynx moist without lesions      Lungs:   Clear to auscultation bilaterally; no wheezes, rhonchi or rales; respirations unlabored   Heart:  RRR; no murmur, rub or gallop   Abdomen:   Soft, non-tender, non-distended, positive bowel sounds  Extremities: No clubbing, cyanosis or edema   Skin: No new rashes or lesions  No draining wounds noted  Labs, Imaging, & Other studies:   All pertinent labs and imaging studies were personally reviewed    Lab Results   Component Value Date     10/26/2015    K 4 3 11/06/2019     11/06/2019    CO2 31 11/06/2019    ANIONGAP 9 10/26/2015    BUN 17 11/06/2019    CREATININE 1 13 11/06/2019    GLUCOSE 133 10/26/2015    GLUF 121 (H) 11/06/2019    CALCIUM 9 3 11/06/2019    AST 20 11/06/2019    ALT 35 11/06/2019    ALKPHOS 102 11/06/2019    PROT 8 2 10/26/2015    BILITOT 0 23 10/26/2015    EGFR 72 11/06/2019     Lab Results   Component Value Date    WBC 4 89 11/06/2019    WBC 4 8 11/06/2019    HGB 15 5 11/06/2019    HGB 15 7 11/06/2019    HCT 45 3 11/06/2019    HCT 44 1 11/06/2019    MCV 90 11/06/2019    MCV 87 11/06/2019     11/06/2019     11/06/2019     Lab Results   Component Value Date    HEPCAB Non-reactive 11/06/2019     Lab Results   Component Value Date    HEPCAB Non-reactive 11/06/2019     Lab Results   Component Value Date    RPR Non-Reactive 11/06/2019     CD4 ABS   Date/Time Value Ref Range Status   11/06/2019 06:55  359 - 1519 /uL Final     HIV-1 RNA by PCR, Qn   Date/Time Value Ref Range Status   11/06/2019 06:55 AM 40 copies/mL Final     Comment:     The reportable range for this assay is 20 to 10,000,000  copies HIV-1 RNA/mL             Current Outpatient Medications:     amLODIPine (NORVASC) 2 5 mg tablet, Take 1 tablet (2 5 mg total) by mouth daily, Disp: 30 tablet, Rfl: 5    aspirin 81 mg chewable tablet, Chew 1 tablet (81 mg total) daily, Disp: 30 tablet, Rfl: 3    darunavir (PREZISTA) 600 mg tablet, Take 1 tablet (600 mg total) by mouth 2 (two) times a day, Disp: 60 tablet, Rfl: 5   dolutegravir (TIVICAY) 50 MG TABS, Take 1 tablet (50 mg total) by mouth 2 (two) times a day, Disp: 60 tablet, Rfl: 5    PARoxetine (PAXIL) 20 mg tablet, Take 1 tablet (20 mg total) by mouth daily, Disp: 30 tablet, Rfl: 5    ritonavir (NORVIR) 100 mg tablet, Take 1 tablet (100 mg total) by mouth 2 (two) times a day, Disp: 60 tablet, Rfl: 5    tenofovir (VIREAD) 300 mg tablet, Take 1 tablet (300 mg total) by mouth daily, Disp: 30 tablet, Rfl: 5

## 2019-11-27 DIAGNOSIS — F43.10 PTSD (POST-TRAUMATIC STRESS DISORDER): ICD-10-CM

## 2019-12-03 RX ORDER — PAROXETINE HYDROCHLORIDE 20 MG/1
TABLET, FILM COATED ORAL
Qty: 30 TABLET | Refills: 5 | Status: SHIPPED | OUTPATIENT
Start: 2019-12-03 | End: 2020-05-01

## 2019-12-17 ENCOUNTER — PATIENT OUTREACH (OUTPATIENT)
Dept: SURGERY | Facility: CLINIC | Age: 59
End: 2019-12-17

## 2019-12-20 ENCOUNTER — PATIENT OUTREACH (OUTPATIENT)
Dept: SURGERY | Facility: CLINIC | Age: 59
End: 2019-12-20

## 2019-12-24 ENCOUNTER — PATIENT OUTREACH (OUTPATIENT)
Dept: SURGERY | Facility: CLINIC | Age: 59
End: 2019-12-24

## 2019-12-31 ENCOUNTER — APPOINTMENT (OUTPATIENT)
Dept: LAB | Facility: CLINIC | Age: 59
End: 2019-12-31
Payer: MEDICARE

## 2019-12-31 ENCOUNTER — TRANSCRIBE ORDERS (OUTPATIENT)
Dept: LAB | Facility: CLINIC | Age: 59
End: 2019-12-31

## 2019-12-31 DIAGNOSIS — B20 HIV DISEASE (HCC): ICD-10-CM

## 2019-12-31 LAB
ALBUMIN SERPL BCP-MCNC: 4 G/DL (ref 3.5–5)
ALP SERPL-CCNC: 99 U/L (ref 46–116)
ALT SERPL W P-5'-P-CCNC: 31 U/L (ref 12–78)
ANION GAP SERPL CALCULATED.3IONS-SCNC: 10 MMOL/L (ref 4–13)
AST SERPL W P-5'-P-CCNC: 19 U/L (ref 5–45)
BASOPHILS # BLD AUTO: 0.06 THOUSANDS/ΜL (ref 0–0.1)
BASOPHILS NFR BLD AUTO: 1 % (ref 0–1)
BILIRUB SERPL-MCNC: 0.58 MG/DL (ref 0.2–1)
BUN SERPL-MCNC: 15 MG/DL (ref 5–25)
CALCIUM SERPL-MCNC: 9.2 MG/DL (ref 8.3–10.1)
CHLORIDE SERPL-SCNC: 101 MMOL/L (ref 100–108)
CO2 SERPL-SCNC: 29 MMOL/L (ref 21–32)
CREAT SERPL-MCNC: 1.19 MG/DL (ref 0.6–1.3)
EOSINOPHIL # BLD AUTO: 0.29 THOUSAND/ΜL (ref 0–0.61)
EOSINOPHIL NFR BLD AUTO: 5 % (ref 0–6)
ERYTHROCYTE [DISTWIDTH] IN BLOOD BY AUTOMATED COUNT: 12.1 % (ref 11.6–15.1)
GFR SERPL CREATININE-BSD FRML MDRD: 67 ML/MIN/1.73SQ M
GLUCOSE P FAST SERPL-MCNC: 106 MG/DL (ref 65–99)
HCT VFR BLD AUTO: 45.3 % (ref 36.5–49.3)
HGB BLD-MCNC: 15.7 G/DL (ref 12–17)
IMM GRANULOCYTES # BLD AUTO: 0.01 THOUSAND/UL (ref 0–0.2)
IMM GRANULOCYTES NFR BLD AUTO: 0 % (ref 0–2)
LYMPHOCYTES # BLD AUTO: 2.59 THOUSANDS/ΜL (ref 0.6–4.47)
LYMPHOCYTES NFR BLD AUTO: 45 % (ref 14–44)
MCH RBC QN AUTO: 30.5 PG (ref 26.8–34.3)
MCHC RBC AUTO-ENTMCNC: 34.7 G/DL (ref 31.4–37.4)
MCV RBC AUTO: 88 FL (ref 82–98)
MONOCYTES # BLD AUTO: 0.75 THOUSAND/ΜL (ref 0.17–1.22)
MONOCYTES NFR BLD AUTO: 13 % (ref 4–12)
NEUTROPHILS # BLD AUTO: 2.11 THOUSANDS/ΜL (ref 1.85–7.62)
NEUTS SEG NFR BLD AUTO: 36 % (ref 43–75)
NRBC BLD AUTO-RTO: 0 /100 WBCS
PLATELET # BLD AUTO: 220 THOUSANDS/UL (ref 149–390)
PMV BLD AUTO: 11.2 FL (ref 8.9–12.7)
POTASSIUM SERPL-SCNC: 3.8 MMOL/L (ref 3.5–5.3)
PROT SERPL-MCNC: 8.5 G/DL (ref 6.4–8.2)
RBC # BLD AUTO: 5.14 MILLION/UL (ref 3.88–5.62)
SODIUM SERPL-SCNC: 140 MMOL/L (ref 136–145)
WBC # BLD AUTO: 5.81 THOUSAND/UL (ref 4.31–10.16)

## 2019-12-31 PROCEDURE — 80053 COMPREHEN METABOLIC PANEL: CPT

## 2019-12-31 PROCEDURE — 85025 COMPLETE CBC W/AUTO DIFF WBC: CPT

## 2019-12-31 PROCEDURE — 86360 T CELL ABSOLUTE COUNT/RATIO: CPT

## 2019-12-31 PROCEDURE — 87536 HIV-1 QUANT&REVRSE TRNSCRPJ: CPT

## 2019-12-31 PROCEDURE — 36415 COLL VENOUS BLD VENIPUNCTURE: CPT

## 2020-01-01 LAB
BASOPHILS # BLD AUTO: 0.1 X10E3/UL (ref 0–0.2)
BASOPHILS NFR BLD AUTO: 1 %
CD3+CD4+ CELLS # BLD: 954 /UL (ref 359–1519)
CD3+CD4+ CELLS NFR BLD: 36.7 % (ref 30.8–58.5)
CD3+CD4+ CELLS/CD3+CD8+ CLL BLD: 1.22 % (ref 0.92–3.72)
CD3+CD8+ CELLS # BLD: 780 /UL (ref 109–897)
CD3+CD8+ CELLS NFR BLD: 30 % (ref 12–35.5)
EOSINOPHIL # BLD AUTO: 0.2 X10E3/UL (ref 0–0.4)
EOSINOPHIL NFR BLD AUTO: 4 %
ERYTHROCYTE [DISTWIDTH] IN BLOOD BY AUTOMATED COUNT: 13.9 % (ref 12.3–15.4)
HCT VFR BLD AUTO: 50.4 % (ref 37.5–51)
HGB BLD-MCNC: 17.7 G/DL (ref 13–17.7)
IMM GRANULOCYTES # BLD: 0 X10E3/UL (ref 0–0.1)
IMM GRANULOCYTES NFR BLD: 0 %
LYMPHOCYTES # BLD AUTO: 2.6 X10E3/UL (ref 0.7–3.1)
LYMPHOCYTES NFR BLD AUTO: 47 %
MCH RBC QN AUTO: 30.7 PG (ref 26.6–33)
MCHC RBC AUTO-ENTMCNC: 35.1 G/DL (ref 31.5–35.7)
MCV RBC AUTO: 87 FL (ref 79–97)
MONOCYTES # BLD AUTO: 0.6 X10E3/UL (ref 0.1–0.9)
MONOCYTES NFR BLD AUTO: 10 %
NEUTROPHILS # BLD AUTO: 2.2 X10E3/UL (ref 1.4–7)
NEUTROPHILS NFR BLD AUTO: 38 %
PLATELET # BLD AUTO: 195 X10E3/UL (ref 150–450)
RBC # BLD AUTO: 5.77 X10E6/UL (ref 4.14–5.8)
WBC # BLD AUTO: 5.7 X10E3/UL (ref 3.4–10.8)

## 2020-01-02 ENCOUNTER — PATIENT OUTREACH (OUTPATIENT)
Dept: SURGERY | Facility: CLINIC | Age: 60
End: 2020-01-02

## 2020-01-03 LAB
HIV1 RNA # SERPL NAA+PROBE: <20 COPIES/ML
HIV1 RNA SERPL NAA+PROBE-LOG#: NORMAL LOG10COPY/ML

## 2020-01-21 ENCOUNTER — PATIENT OUTREACH (OUTPATIENT)
Dept: SURGERY | Facility: CLINIC | Age: 60
End: 2020-01-21

## 2020-01-28 ENCOUNTER — OFFICE VISIT (OUTPATIENT)
Dept: SURGERY | Facility: CLINIC | Age: 60
End: 2020-01-28
Payer: MEDICARE

## 2020-01-28 ENCOUNTER — DOCUMENTATION (OUTPATIENT)
Dept: SURGERY | Facility: CLINIC | Age: 60
End: 2020-01-28

## 2020-01-28 VITALS
WEIGHT: 186.6 LBS | BODY MASS INDEX: 23.95 KG/M2 | HEART RATE: 92 BPM | DIASTOLIC BLOOD PRESSURE: 90 MMHG | OXYGEN SATURATION: 98 % | TEMPERATURE: 97.7 F | SYSTOLIC BLOOD PRESSURE: 160 MMHG | HEIGHT: 74 IN

## 2020-01-28 VITALS — DIASTOLIC BLOOD PRESSURE: 78 MMHG | SYSTOLIC BLOOD PRESSURE: 128 MMHG

## 2020-01-28 DIAGNOSIS — E78.1 HYPERTRIGLYCERIDEMIA: ICD-10-CM

## 2020-01-28 DIAGNOSIS — Z11.3 ENCOUNTER FOR SCREENING FOR BACTERIAL SEXUALLY TRANSMITTED DISEASE: ICD-10-CM

## 2020-01-28 DIAGNOSIS — I10 ESSENTIAL HYPERTENSION: ICD-10-CM

## 2020-01-28 DIAGNOSIS — Z72.89 OTHER PROBLEMS RELATED TO LIFESTYLE: ICD-10-CM

## 2020-01-28 DIAGNOSIS — B20 HIV DISEASE (HCC): Primary | ICD-10-CM

## 2020-01-28 DIAGNOSIS — F17.210 CIGARETTE NICOTINE DEPENDENCE WITHOUT COMPLICATION: ICD-10-CM

## 2020-01-28 DIAGNOSIS — Z79.899 OTHER LONG TERM (CURRENT) DRUG THERAPY: ICD-10-CM

## 2020-01-28 DIAGNOSIS — Z20.2 CONTACT WITH AND (SUSPECTED) EXPOSURE TO INFECTIONS WITH A PREDOMINANTLY SEXUAL MODE OF TRANSMISSION: ICD-10-CM

## 2020-01-28 DIAGNOSIS — D72.89 OTHER SPECIFIED DISORDERS OF WHITE BLOOD CELLS: ICD-10-CM

## 2020-01-28 DIAGNOSIS — Z13.1 SCREENING FOR DIABETES MELLITUS: ICD-10-CM

## 2020-01-28 PROCEDURE — 99214 OFFICE O/P EST MOD 30 MIN: CPT | Performed by: INTERNAL MEDICINE

## 2020-01-28 NOTE — PROGRESS NOTES
Progress Note - Infectious Disease   Juan M Ramsay 64 y o  male MRN: [id number]  Unit/Bed#:  Encounter: [id number]      Impression/Plan:  1   HIV-doing well on ART with an here undetectable viral load and CD4 count in the 900s  Patient doing well with only once daily Tivicay in addition to the Prezcobix and tenofovir  Recheck labs in 2 months and follow up in 3 months  Stressed adherence      2   Polycythemia-improved with decreased tobacco use   Will continue to monitor the CBC with diff     3   Nicotine dependence-Stressed the importance of complete tobacco cessation    4  Hypertension-asymptomatic  Suboptimal control  Discussed this issue with the primary and she will address  Patient was provided medication, adherence and prevention education    Subjective:  Routine follow-up for HIV  Patient claims 100% adherence with Prezcobix/Tivicay/tenofovir  The Tivicay dose was decreased to once daily after last visit    Patient denies any notable side effects  Overall the feeling well  The patient denies any fever chills or sweats, denies any nausea vomiting or diarrhea, denies any cough or shortness of breath  ROS: A complete 12 point ROS is negative other than that noted in the HPI    Followup portions patient history reviewed and updated as: Allergies, current medications, past medical history, past social history, past surgical history, and the problem list    Objective:  Vitals:  Vitals:    01/28/20 1659   BP: 160/90   Pulse: 92   Temp: 97 7 °F (36 5 °C)   SpO2: 98%   Weight: 84 6 kg (186 lb 9 6 oz)   Height: 6' 1 5" (1 867 m)       Physical Exam:   General Appearance:  Alert, interactive, appearing well,  nontoxic, no acute distress  Neck:   Supple without lymphadenopathy, no thyromegaly or masses   Throat: Oropharynx moist without lesions      Lungs:   Clear to auscultation bilaterally; no wheezes, rhonchi or rales; respirations unlabored   Heart:  RRR; no murmur, rub or gallop   Abdomen: Soft, non-tender, non-distended, positive bowel sounds  Extremities: No clubbing, cyanosis or edema   Skin: No new rashes or lesions  No draining wounds noted  Labs, Imaging, & Other studies:   All pertinent labs and imaging studies were personally reviewed    Lab Results   Component Value Date     10/26/2015    K 3 8 12/31/2019     12/31/2019    CO2 29 12/31/2019    ANIONGAP 9 10/26/2015    BUN 15 12/31/2019    CREATININE 1 19 12/31/2019    GLUCOSE 133 10/26/2015    GLUF 106 (H) 12/31/2019    CALCIUM 9 2 12/31/2019    AST 19 12/31/2019    ALT 31 12/31/2019    ALKPHOS 99 12/31/2019    PROT 8 2 10/26/2015    BILITOT 0 23 10/26/2015    EGFR 67 12/31/2019     Lab Results   Component Value Date    WBC 5 81 12/31/2019    WBC 5 7 12/31/2019    HGB 15 7 12/31/2019    HGB 17 7 12/31/2019    HCT 45 3 12/31/2019    HCT 50 4 12/31/2019    MCV 88 12/31/2019    MCV 87 12/31/2019     12/31/2019     12/31/2019     Lab Results   Component Value Date    HEPCAB Non-reactive 11/06/2019     Lab Results   Component Value Date    HEPCAB Non-reactive 11/06/2019     Lab Results   Component Value Date    RPR Non-Reactive 11/06/2019     CD4 ABS   Date/Time Value Ref Range Status   12/31/2019 06:10  359 - 1519 /uL Final     HIV-1 RNA by PCR, Qn   Date/Time Value Ref Range Status   12/31/2019 06:10 AM <20 copies/mL Final     Comment:     HIV-1 RNA not detected  The reportable range for this assay is 20 to 10,000,000  copies HIV-1 RNA/mL             Current Outpatient Medications:     amLODIPine (NORVASC) 2 5 mg tablet, Take 1 tablet (2 5 mg total) by mouth daily, Disp: 30 tablet, Rfl: 5    aspirin 81 mg chewable tablet, Chew 1 tablet (81 mg total) daily, Disp: 30 tablet, Rfl: 3    Darunavir-Cobicistat (PREZCOBIX) 800-150 MG TABS, Take 1 tablet by mouth daily, Disp: 30 tablet, Rfl: 3    dolutegravir (TIVICAY) 50 MG TABS, Take 1 tablet (50 mg total) by mouth daily, Disp: 30 tablet, Rfl: 5   PARoxetine (PAXIL) 20 mg tablet, TAKE 1 TABLET BY MOUTH DAILY, Disp: 30 tablet, Rfl: 5    tenofovir (VIREAD) 300 mg tablet, Take 1 tablet (300 mg total) by mouth daily, Disp: 30 tablet, Rfl: 5

## 2020-01-29 NOTE — PROGRESS NOTES
Assessment/Plan:      Diagnoses and all orders for this visit:    HIV disease ([facility] [address] )  -     CBC and differential; Future  -     T-helper cells CD4/CD8 %; Future  -     HIV-1 RNA, quantitative, PCR; Future  -     Comprehensive metabolic panel; Future  -     Chlamydia/GC amplified DNA by PCR; Future  -     UA (URINE) with reflex to Scope; Future  -     Hemoglobin A1C; Future    Hypertriglyceridemia  -     Hemoglobin A1C; Future    Essential hypertension  -     Hemoglobin A1C; Future    Screening for diabetes mellitus  -     Hemoglobin A1C; Future    Other long term (current) drug therapy  -     Hemoglobin A1C; Future    Contact with and (suspected) exposure to infections with a predominantly sexual mode of transmission  -     Chlamydia/GC amplified DNA by PCR; Future    Encounter for screening for bacterial sexually transmitted disease  -     Chlamydia/GC amplified DNA by PCR; Future    Other problems related to lifestyle  -     Chlamydia/GC amplified DNA by PCR; Future    Other specified disorders of white blood cells  -     Chlamydia/GC amplified DNA by PCR; Future    Cigarette nicotine dependence without complication          [address] continued maintenance of well being  Discussion: Today patient presents with no mental health concerns  [facility] inquired about what pt does to keep himself engaged/busy  Patient discussed some his hobbies incld building some model aircrafts (per his time in the [employer]), his [address] and so on  Pt provided curious/attentive listening  No issues were uncovered  [facility] was in with ARNAUD Arredondo's new [facility] who also engaged pt about his automobile hobby  Subjective:     Patient ID: Usha Trejo is a 64 y o  male      HPI: The patient is being seen at the [facility] today for a routine behavioral health follow up     Objective:    Orientation    Person: Yes   Place: Yes   Time: Yes     Appearance     Well Developed: Yes   Healthy: Yes   Uncomfortable: No  Normal Body Odor: Yes   Grooming Unkempt: No  Poor Eye Contact: No    Mood and Affect    Appropriate: Yes   Euthymic: Yes   Anxious: No  Depressed: No    Harm to Self or Others: denied    Substance Abuse: none reported or observed

## 2020-02-26 ENCOUNTER — PATIENT OUTREACH (OUTPATIENT)
Dept: SURGERY | Facility: CLINIC | Age: 60
End: 2020-02-26

## 2020-02-28 ENCOUNTER — TELEPHONE (OUTPATIENT)
Dept: SURGERY | Facility: CLINIC | Age: 60
End: 2020-02-28

## 2020-03-04 ENCOUNTER — PATIENT OUTREACH (OUTPATIENT)
Dept: SURGERY | Facility: CLINIC | Age: 60
End: 2020-03-04

## 2020-03-12 ENCOUNTER — PATIENT OUTREACH (OUTPATIENT)
Dept: SURGERY | Facility: CLINIC | Age: 60
End: 2020-03-12

## 2020-03-12 NOTE — PROGRESS NOTES
Letter below was mailed out to ct  Marlin Fleischer   Supervisor Case Management  ------------------------------------------------  3/11/20      I am sorry to inform you that this will be my last day at [facility] at [place] [address]  I wanted to let you know this for several reasons  Mostly I'd like to tell you that it has been my great privilege to be your  here at [place] [address] you have been a truly delightful patient and I have enjoyed getting to know you for the last 3 years  They are currently interviewing to fill my position,once filled you will be notified  Of your new   For now you can contact:    Sheila Egan,  [phone] or Marlin Fleischer Supervisor of Case Management, [phone] for case management issues  For medical information remember to call the [facility] staff at [phone]      Again, it was pleasure knowing you,      Sincerely,    Alesia Lowe

## 2020-03-16 DIAGNOSIS — I25.10 ASCVD (ARTERIOSCLEROTIC CARDIOVASCULAR DISEASE): ICD-10-CM

## 2020-03-16 DIAGNOSIS — B20 HIV INFECTION, UNSPECIFIED SYMPTOM STATUS (HCC): ICD-10-CM

## 2020-03-16 RX ORDER — ASPIRIN 81 MG/1
TABLET, CHEWABLE ORAL
Qty: 30 TABLET | Refills: 3 | Status: SHIPPED | OUTPATIENT
Start: 2020-03-16 | End: 2020-05-05 | Stop reason: SDUPTHER

## 2020-03-16 RX ORDER — DARUNAVIR ETHANOLATE AND COBICISTAT 800; 150 MG/1; MG/1
TABLET, FILM COATED ORAL
Qty: 30 TABLET | Refills: 3 | Status: SHIPPED | OUTPATIENT
Start: 2020-03-16 | End: 2020-05-05 | Stop reason: SDUPTHER

## 2020-03-19 ENCOUNTER — TELEPHONE (OUTPATIENT)
Dept: SURGERY | Facility: CLINIC | Age: 60
End: 2020-03-19

## 2020-03-19 ENCOUNTER — PATIENT OUTREACH (OUTPATIENT)
Dept: SURGERY | Facility: CLINIC | Age: 60
End: 2020-03-19

## 2020-03-20 ENCOUNTER — PATIENT OUTREACH (OUTPATIENT)
Dept: SURGERY | Facility: CLINIC | Age: 60
End: 2020-03-20

## 2020-04-03 DIAGNOSIS — I10 ESSENTIAL HYPERTENSION: ICD-10-CM

## 2020-04-03 RX ORDER — AMLODIPINE BESYLATE 2.5 MG/1
TABLET ORAL
Qty: 30 TABLET | Refills: 5 | Status: SHIPPED | OUTPATIENT
Start: 2020-04-03 | End: 2020-05-05 | Stop reason: SDUPTHER

## 2020-04-22 ENCOUNTER — PATIENT OUTREACH (OUTPATIENT)
Dept: SURGERY | Facility: CLINIC | Age: 60
End: 2020-04-22

## 2020-04-22 ENCOUNTER — APPOINTMENT (OUTPATIENT)
Dept: LAB | Facility: CLINIC | Age: 60
End: 2020-04-22
Payer: MEDICARE

## 2020-04-22 DIAGNOSIS — D72.89 OTHER SPECIFIED DISORDERS OF WHITE BLOOD CELLS: ICD-10-CM

## 2020-04-22 DIAGNOSIS — Z13.1 SCREENING FOR DIABETES MELLITUS: ICD-10-CM

## 2020-04-22 DIAGNOSIS — Z79.899 OTHER LONG TERM (CURRENT) DRUG THERAPY: ICD-10-CM

## 2020-04-22 DIAGNOSIS — E78.1 HYPERTRIGLYCERIDEMIA: ICD-10-CM

## 2020-04-22 DIAGNOSIS — Z11.3 ENCOUNTER FOR SCREENING FOR BACTERIAL SEXUALLY TRANSMITTED DISEASE: ICD-10-CM

## 2020-04-22 DIAGNOSIS — B20 HIV DISEASE (HCC): ICD-10-CM

## 2020-04-22 DIAGNOSIS — Z72.89 OTHER PROBLEMS RELATED TO LIFESTYLE: ICD-10-CM

## 2020-04-22 DIAGNOSIS — Z20.2 CONTACT WITH AND (SUSPECTED) EXPOSURE TO INFECTIONS WITH A PREDOMINANTLY SEXUAL MODE OF TRANSMISSION: ICD-10-CM

## 2020-04-22 DIAGNOSIS — I10 ESSENTIAL HYPERTENSION: ICD-10-CM

## 2020-04-22 LAB
ALBUMIN SERPL BCP-MCNC: 3.8 G/DL (ref 3.5–5)
ALP SERPL-CCNC: 91 U/L (ref 46–116)
ALT SERPL W P-5'-P-CCNC: 36 U/L (ref 12–78)
ANION GAP SERPL CALCULATED.3IONS-SCNC: 9 MMOL/L (ref 4–13)
AST SERPL W P-5'-P-CCNC: 18 U/L (ref 5–45)
BASOPHILS # BLD AUTO: 0.05 THOUSANDS/ΜL (ref 0–0.1)
BASOPHILS NFR BLD AUTO: 1 % (ref 0–1)
BILIRUB SERPL-MCNC: 0.48 MG/DL (ref 0.2–1)
BUN SERPL-MCNC: 16 MG/DL (ref 5–25)
CALCIUM SERPL-MCNC: 9.1 MG/DL (ref 8.3–10.1)
CHLORIDE SERPL-SCNC: 100 MMOL/L (ref 100–108)
CO2 SERPL-SCNC: 28 MMOL/L (ref 21–32)
CREAT SERPL-MCNC: 1.39 MG/DL (ref 0.6–1.3)
EOSINOPHIL # BLD AUTO: 0.23 THOUSAND/ΜL (ref 0–0.61)
EOSINOPHIL NFR BLD AUTO: 4 % (ref 0–6)
ERYTHROCYTE [DISTWIDTH] IN BLOOD BY AUTOMATED COUNT: 12.2 % (ref 11.6–15.1)
EST. AVERAGE GLUCOSE BLD GHB EST-MCNC: 117 MG/DL
GFR SERPL CREATININE-BSD FRML MDRD: 55 ML/MIN/1.73SQ M
GLUCOSE P FAST SERPL-MCNC: 152 MG/DL (ref 65–99)
HBA1C MFR BLD: 5.7 %
HCT VFR BLD AUTO: 44.3 % (ref 36.5–49.3)
HGB BLD-MCNC: 15.2 G/DL (ref 12–17)
IMM GRANULOCYTES # BLD AUTO: 0.01 THOUSAND/UL (ref 0–0.2)
IMM GRANULOCYTES NFR BLD AUTO: 0 % (ref 0–2)
LYMPHOCYTES # BLD AUTO: 1.49 THOUSANDS/ΜL (ref 0.6–4.47)
LYMPHOCYTES NFR BLD AUTO: 28 % (ref 14–44)
MCH RBC QN AUTO: 29.9 PG (ref 26.8–34.3)
MCHC RBC AUTO-ENTMCNC: 34.3 G/DL (ref 31.4–37.4)
MCV RBC AUTO: 87 FL (ref 82–98)
MONOCYTES # BLD AUTO: 0.52 THOUSAND/ΜL (ref 0.17–1.22)
MONOCYTES NFR BLD AUTO: 10 % (ref 4–12)
NEUTROPHILS # BLD AUTO: 3.02 THOUSANDS/ΜL (ref 1.85–7.62)
NEUTS SEG NFR BLD AUTO: 57 % (ref 43–75)
NRBC BLD AUTO-RTO: 0 /100 WBCS
PLATELET # BLD AUTO: 216 THOUSANDS/UL (ref 149–390)
PMV BLD AUTO: 10.7 FL (ref 8.9–12.7)
POTASSIUM SERPL-SCNC: 3.8 MMOL/L (ref 3.5–5.3)
PROT SERPL-MCNC: 8.1 G/DL (ref 6.4–8.2)
RBC # BLD AUTO: 5.08 MILLION/UL (ref 3.88–5.62)
SODIUM SERPL-SCNC: 137 MMOL/L (ref 136–145)
WBC # BLD AUTO: 5.32 THOUSAND/UL (ref 4.31–10.16)

## 2020-04-22 PROCEDURE — 86360 T CELL ABSOLUTE COUNT/RATIO: CPT

## 2020-04-22 PROCEDURE — 36415 COLL VENOUS BLD VENIPUNCTURE: CPT

## 2020-04-22 PROCEDURE — 83036 HEMOGLOBIN GLYCOSYLATED A1C: CPT

## 2020-04-22 PROCEDURE — 87536 HIV-1 QUANT&REVRSE TRNSCRPJ: CPT

## 2020-04-22 PROCEDURE — 85025 COMPLETE CBC W/AUTO DIFF WBC: CPT

## 2020-04-22 PROCEDURE — 80053 COMPREHEN METABOLIC PANEL: CPT

## 2020-04-23 ENCOUNTER — TRANSCRIBE ORDERS (OUTPATIENT)
Dept: LAB | Facility: CLINIC | Age: 60
End: 2020-04-23

## 2020-04-23 DIAGNOSIS — Z72.89 OTHER PROBLEMS RELATED TO LIFESTYLE: ICD-10-CM

## 2020-04-23 DIAGNOSIS — Z20.2 VENEREAL DISEASE CONTACT: ICD-10-CM

## 2020-04-23 DIAGNOSIS — B20 HUMAN IMMUNODEFICIENCY VIRUS (HIV) DISEASE (HCC): Primary | ICD-10-CM

## 2020-04-23 DIAGNOSIS — D72.89 DRUG-INDUCED NEUTROPHILIA: ICD-10-CM

## 2020-04-23 DIAGNOSIS — Z11.3 SCREENING EXAMINATION FOR VENEREAL DISEASE: ICD-10-CM

## 2020-04-23 DIAGNOSIS — T50.905A DRUG-INDUCED NEUTROPHILIA: ICD-10-CM

## 2020-04-23 LAB
BASOPHILS # BLD AUTO: 0 X10E3/UL (ref 0–0.2)
BASOPHILS NFR BLD AUTO: 1 %
CD3+CD4+ CELLS # BLD: 524 /UL (ref 359–1519)
CD3+CD4+ CELLS NFR BLD: 37.4 % (ref 30.8–58.5)
CD3+CD4+ CELLS/CD3+CD8+ CLL BLD: 1.13 % (ref 0.92–3.72)
CD3+CD8+ CELLS # BLD: 463 /UL (ref 109–897)
CD3+CD8+ CELLS NFR BLD: 33.1 % (ref 12–35.5)
EOSINOPHIL # BLD AUTO: 0.2 X10E3/UL (ref 0–0.4)
EOSINOPHIL NFR BLD AUTO: 4 %
ERYTHROCYTE [DISTWIDTH] IN BLOOD BY AUTOMATED COUNT: 12.8 % (ref 11.6–15.4)
HCT VFR BLD AUTO: 42.8 % (ref 37.5–51)
HGB BLD-MCNC: 14.5 G/DL (ref 13–17.7)
IMM GRANULOCYTES # BLD: 0 X10E3/UL (ref 0–0.1)
IMM GRANULOCYTES NFR BLD: 0 %
LYMPHOCYTES # BLD AUTO: 1.4 X10E3/UL (ref 0.7–3.1)
LYMPHOCYTES NFR BLD AUTO: 28 %
MCH RBC QN AUTO: 29.8 PG (ref 26.6–33)
MCHC RBC AUTO-ENTMCNC: 33.9 G/DL (ref 31.5–35.7)
MCV RBC AUTO: 88 FL (ref 79–97)
MONOCYTES # BLD AUTO: 0.5 X10E3/UL (ref 0.1–0.9)
MONOCYTES NFR BLD AUTO: 11 %
NEUTROPHILS # BLD AUTO: 2.8 X10E3/UL (ref 1.4–7)
NEUTROPHILS NFR BLD AUTO: 56 %
PLATELET # BLD AUTO: 248 X10E3/UL (ref 150–450)
RBC # BLD AUTO: 4.86 X10E6/UL (ref 4.14–5.8)
WBC # BLD AUTO: 5 X10E3/UL (ref 3.4–10.8)

## 2020-04-24 ENCOUNTER — PATIENT OUTREACH (OUTPATIENT)
Dept: SURGERY | Facility: CLINIC | Age: 60
End: 2020-04-24

## 2020-04-24 ENCOUNTER — APPOINTMENT (OUTPATIENT)
Dept: LAB | Facility: CLINIC | Age: 60
End: 2020-04-24
Payer: MEDICARE

## 2020-04-24 LAB
BILIRUB UR QL STRIP: NEGATIVE
CLARITY UR: CLEAR
COLOR UR: YELLOW
GLUCOSE UR STRIP-MCNC: NEGATIVE MG/DL
HGB UR QL STRIP.AUTO: NEGATIVE
KETONES UR STRIP-MCNC: NEGATIVE MG/DL
LEUKOCYTE ESTERASE UR QL STRIP: NEGATIVE
NITRITE UR QL STRIP: NEGATIVE
PH UR STRIP.AUTO: 6 [PH]
PROT UR STRIP-MCNC: NEGATIVE MG/DL
SP GR UR STRIP.AUTO: 1.02 (ref 1–1.03)
UROBILINOGEN UR QL STRIP.AUTO: 0.2 E.U./DL

## 2020-04-24 PROCEDURE — 81003 URINALYSIS AUTO W/O SCOPE: CPT | Performed by: INTERNAL MEDICINE

## 2020-04-27 LAB
HIV1 RNA # SERPL NAA+PROBE: <20 COPIES/ML
HIV1 RNA SERPL NAA+PROBE-LOG#: NORMAL LOG10COPY/ML

## 2020-04-28 ENCOUNTER — PATIENT OUTREACH (OUTPATIENT)
Dept: SURGERY | Facility: CLINIC | Age: 60
End: 2020-04-28

## 2020-05-01 ENCOUNTER — PATIENT OUTREACH (OUTPATIENT)
Dept: SURGERY | Facility: CLINIC | Age: 60
End: 2020-05-01

## 2020-05-01 DIAGNOSIS — B20 HIV INFECTION, UNSPECIFIED SYMPTOM STATUS (HCC): ICD-10-CM

## 2020-05-01 DIAGNOSIS — B20 HIV (HUMAN IMMUNODEFICIENCY VIRUS INFECTION) (HCC): ICD-10-CM

## 2020-05-01 DIAGNOSIS — F43.10 PTSD (POST-TRAUMATIC STRESS DISORDER): ICD-10-CM

## 2020-05-01 RX ORDER — PAROXETINE HYDROCHLORIDE 20 MG/1
TABLET, FILM COATED ORAL
Qty: 30 TABLET | Refills: 5 | Status: SHIPPED | OUTPATIENT
Start: 2020-05-01 | End: 2020-05-05 | Stop reason: SDUPTHER

## 2020-05-01 RX ORDER — TENOFOVIR DISOPROXIL FUMARATE 300 MG/1
TABLET, FILM COATED ORAL
Qty: 30 TABLET | Refills: 5 | Status: SHIPPED | OUTPATIENT
Start: 2020-05-01 | End: 2020-05-05 | Stop reason: SDUPTHER

## 2020-05-01 RX ORDER — DOLUTEGRAVIR SODIUM 50 MG/1
TABLET, FILM COATED ORAL
Qty: 30 TABLET | Refills: 5 | Status: SHIPPED | OUTPATIENT
Start: 2020-05-01 | End: 2020-05-05 | Stop reason: SDUPTHER

## 2020-05-05 ENCOUNTER — OFFICE VISIT (OUTPATIENT)
Dept: SURGERY | Facility: CLINIC | Age: 60
End: 2020-05-05
Payer: MEDICARE

## 2020-05-05 VITALS
HEIGHT: 74 IN | TEMPERATURE: 97.8 F | DIASTOLIC BLOOD PRESSURE: 70 MMHG | BODY MASS INDEX: 22.79 KG/M2 | SYSTOLIC BLOOD PRESSURE: 110 MMHG | WEIGHT: 177.6 LBS | OXYGEN SATURATION: 99 % | HEART RATE: 83 BPM

## 2020-05-05 VITALS
TEMPERATURE: 97.8 F | HEART RATE: 83 BPM | BODY MASS INDEX: 22.79 KG/M2 | HEIGHT: 74 IN | OXYGEN SATURATION: 99 % | WEIGHT: 177.6 LBS | DIASTOLIC BLOOD PRESSURE: 70 MMHG | SYSTOLIC BLOOD PRESSURE: 110 MMHG

## 2020-05-05 DIAGNOSIS — F43.10 PTSD (POST-TRAUMATIC STRESS DISORDER): ICD-10-CM

## 2020-05-05 DIAGNOSIS — R79.89 ELEVATED SERUM CREATININE: ICD-10-CM

## 2020-05-05 DIAGNOSIS — I25.10 ASCVD (ARTERIOSCLEROTIC CARDIOVASCULAR DISEASE): ICD-10-CM

## 2020-05-05 DIAGNOSIS — T65.292D TOXIC EFFECT OF TOBACCO, INTENTIONAL SELF-HARM, SUBSEQUENT ENCOUNTER: ICD-10-CM

## 2020-05-05 DIAGNOSIS — B20 HIV DISEASE (HCC): Primary | ICD-10-CM

## 2020-05-05 DIAGNOSIS — I10 ESSENTIAL HYPERTENSION: ICD-10-CM

## 2020-05-05 DIAGNOSIS — R73.03 PREDIABETES: ICD-10-CM

## 2020-05-05 DIAGNOSIS — D75.1 POLYCYTHEMIA: ICD-10-CM

## 2020-05-05 DIAGNOSIS — B20 HIV INFECTION, UNSPECIFIED SYMPTOM STATUS (HCC): ICD-10-CM

## 2020-05-05 DIAGNOSIS — B20 HIV (HUMAN IMMUNODEFICIENCY VIRUS INFECTION) (HCC): ICD-10-CM

## 2020-05-05 PROBLEM — T65.291A TOXIC EFFECT OF TOBACCO AND NICOTINE: Status: ACTIVE | Noted: 2017-04-25

## 2020-05-05 PROCEDURE — 99214 OFFICE O/P EST MOD 30 MIN: CPT | Performed by: NURSE PRACTITIONER

## 2020-05-05 PROCEDURE — 99215 OFFICE O/P EST HI 40 MIN: CPT | Performed by: INTERNAL MEDICINE

## 2020-05-05 RX ORDER — TENOFOVIR DISOPROXIL FUMARATE 300 MG/1
300 TABLET, FILM COATED ORAL DAILY
Qty: 30 TABLET | Refills: 5 | Status: SHIPPED | OUTPATIENT
Start: 2020-05-05 | End: 2021-05-04 | Stop reason: SDUPTHER

## 2020-05-05 RX ORDER — PAROXETINE HYDROCHLORIDE 20 MG/1
20 TABLET, FILM COATED ORAL DAILY
Qty: 30 TABLET | Refills: 5 | Status: SHIPPED | OUTPATIENT
Start: 2020-05-05 | End: 2021-05-04 | Stop reason: SDUPTHER

## 2020-05-05 RX ORDER — ASPIRIN 81 MG/1
81 TABLET, CHEWABLE ORAL DAILY
Qty: 30 TABLET | Refills: 3 | Status: SHIPPED | OUTPATIENT
Start: 2020-05-05 | End: 2020-12-15

## 2020-05-05 RX ORDER — AMLODIPINE BESYLATE 2.5 MG/1
2.5 TABLET ORAL DAILY
Qty: 30 TABLET | Refills: 5 | Status: SHIPPED | OUTPATIENT
Start: 2020-05-05 | End: 2021-05-04 | Stop reason: SDUPTHER

## 2020-05-05 RX ORDER — NICOTINE 21 MG/24HR
1 PATCH, TRANSDERMAL 24 HOURS TRANSDERMAL EVERY 24 HOURS
Qty: 28 PATCH | Refills: 0 | Status: SHIPPED | OUTPATIENT
Start: 2020-05-05 | End: 2021-05-04 | Stop reason: HOSPADM

## 2020-05-21 ENCOUNTER — DOCUMENTATION (OUTPATIENT)
Dept: SURGERY | Facility: CLINIC | Age: 60
End: 2020-05-21

## 2020-05-26 ENCOUNTER — PATIENT OUTREACH (OUTPATIENT)
Dept: SURGERY | Facility: CLINIC | Age: 60
End: 2020-05-26

## 2020-06-09 ENCOUNTER — PATIENT OUTREACH (OUTPATIENT)
Dept: SURGERY | Facility: CLINIC | Age: 60
End: 2020-06-09

## 2020-06-12 ENCOUNTER — APPOINTMENT (OUTPATIENT)
Dept: LAB | Facility: CLINIC | Age: 60
End: 2020-06-12
Payer: MEDICARE

## 2020-06-12 DIAGNOSIS — R79.89 ELEVATED SERUM CREATININE: ICD-10-CM

## 2020-06-12 LAB
ALBUMIN SERPL BCP-MCNC: 3.8 G/DL (ref 3.5–5)
ALP SERPL-CCNC: 97 U/L (ref 46–116)
ALT SERPL W P-5'-P-CCNC: 31 U/L (ref 12–78)
ANION GAP SERPL CALCULATED.3IONS-SCNC: 9 MMOL/L (ref 4–13)
AST SERPL W P-5'-P-CCNC: 21 U/L (ref 5–45)
BILIRUB SERPL-MCNC: 0.33 MG/DL (ref 0.2–1)
BUN SERPL-MCNC: 21 MG/DL (ref 5–25)
CALCIUM SERPL-MCNC: 9.5 MG/DL (ref 8.3–10.1)
CHLORIDE SERPL-SCNC: 102 MMOL/L (ref 100–108)
CO2 SERPL-SCNC: 28 MMOL/L (ref 21–32)
CREAT SERPL-MCNC: 1.15 MG/DL (ref 0.6–1.3)
GFR SERPL CREATININE-BSD FRML MDRD: 70 ML/MIN/1.73SQ M
GLUCOSE P FAST SERPL-MCNC: 113 MG/DL (ref 65–99)
POTASSIUM SERPL-SCNC: 4 MMOL/L (ref 3.5–5.3)
PROT SERPL-MCNC: 8.1 G/DL (ref 6.4–8.2)
SODIUM SERPL-SCNC: 139 MMOL/L (ref 136–145)

## 2020-06-12 PROCEDURE — 80053 COMPREHEN METABOLIC PANEL: CPT

## 2020-06-12 PROCEDURE — 36415 COLL VENOUS BLD VENIPUNCTURE: CPT

## 2020-06-18 ENCOUNTER — PATIENT OUTREACH (OUTPATIENT)
Dept: SURGERY | Facility: CLINIC | Age: 60
End: 2020-06-18

## 2020-07-23 ENCOUNTER — TELEPHONE (OUTPATIENT)
Dept: SURGERY | Facility: CLINIC | Age: 60
End: 2020-07-23

## 2020-07-23 ENCOUNTER — PATIENT OUTREACH (OUTPATIENT)
Dept: SURGERY | Facility: CLINIC | Age: 60
End: 2020-07-23

## 2020-08-20 ENCOUNTER — PATIENT OUTREACH (OUTPATIENT)
Dept: SURGERY | Facility: CLINIC | Age: 60
End: 2020-08-20

## 2020-08-25 ENCOUNTER — PATIENT OUTREACH (OUTPATIENT)
Dept: SURGERY | Facility: CLINIC | Age: 60
End: 2020-08-25

## 2020-09-21 ENCOUNTER — PATIENT OUTREACH (OUTPATIENT)
Dept: SURGERY | Facility: CLINIC | Age: 60
End: 2020-09-21

## 2020-09-21 NOTE — PROGRESS NOTES
Ct called to give cm a heads up that he will be dropping off his MAWD statement sometime this week so it could be paid

## 2020-09-23 ENCOUNTER — PATIENT OUTREACH (OUTPATIENT)
Dept: SURGERY | Facility: CLINIC | Age: 60
End: 2020-09-23

## 2020-09-23 NOTE — PROGRESS NOTES
Ct called cm said he'll be coming by some time tomorrow morning to drop off MAWD statement  Ct will call when he's on his way

## 2020-09-24 ENCOUNTER — PATIENT OUTREACH (OUTPATIENT)
Dept: SURGERY | Facility: CLINIC | Age: 60
End: 2020-09-24

## 2020-09-24 NOTE — PROGRESS NOTES
Ct dropped off paperwork, which included September MAWD statement and paperwork from the department of human services  Cm went over paperwork and made copies of supported documents that ct needed for human services (id, income, bank statement) Ct will come back tomorrow with birth certificate bc that's needed as well  Cm linked ct with Jose R Keller  Ct wanted to speak to her and he did  Jose R Keller was on a phone call so they will meet tomorrow for a more deeper, longer conversation  Cm completed RW fee scale application  Cm sent out req for check for MAWD

## 2020-10-06 ENCOUNTER — PATIENT OUTREACH (OUTPATIENT)
Dept: SURGERY | Facility: CLINIC | Age: 60
End: 2020-10-06

## 2020-10-21 ENCOUNTER — PATIENT OUTREACH (OUTPATIENT)
Dept: SURGERY | Facility: CLINIC | Age: 60
End: 2020-10-21

## 2020-10-22 ENCOUNTER — TELEPHONE (OUTPATIENT)
Dept: SURGERY | Facility: CLINIC | Age: 60
End: 2020-10-22

## 2020-10-22 ENCOUNTER — PATIENT OUTREACH (OUTPATIENT)
Dept: SURGERY | Facility: CLINIC | Age: 60
End: 2020-10-22

## 2020-10-23 ENCOUNTER — TRANSCRIBE ORDERS (OUTPATIENT)
Dept: LAB | Facility: CLINIC | Age: 60
End: 2020-10-23

## 2020-10-23 ENCOUNTER — LAB (OUTPATIENT)
Dept: LAB | Facility: CLINIC | Age: 60
End: 2020-10-23
Payer: MEDICARE

## 2020-10-23 ENCOUNTER — PATIENT OUTREACH (OUTPATIENT)
Dept: SURGERY | Facility: CLINIC | Age: 60
End: 2020-10-23

## 2020-10-23 DIAGNOSIS — B20 HIV DISEASE (HCC): ICD-10-CM

## 2020-10-23 LAB
ALBUMIN SERPL BCP-MCNC: 3.8 G/DL (ref 3.5–5)
ALP SERPL-CCNC: 95 U/L (ref 46–116)
ALT SERPL W P-5'-P-CCNC: 29 U/L (ref 12–78)
ANION GAP SERPL CALCULATED.3IONS-SCNC: 11 MMOL/L (ref 4–13)
AST SERPL W P-5'-P-CCNC: 18 U/L (ref 5–45)
BASOPHILS # BLD AUTO: 0.04 THOUSANDS/ΜL (ref 0–0.1)
BASOPHILS NFR BLD AUTO: 1 % (ref 0–1)
BILIRUB SERPL-MCNC: 0.69 MG/DL (ref 0.2–1)
BUN SERPL-MCNC: 14 MG/DL (ref 5–25)
CALCIUM SERPL-MCNC: 9.3 MG/DL (ref 8.3–10.1)
CHLORIDE SERPL-SCNC: 101 MMOL/L (ref 100–108)
CO2 SERPL-SCNC: 26 MMOL/L (ref 21–32)
CREAT SERPL-MCNC: 1.31 MG/DL (ref 0.6–1.3)
EOSINOPHIL # BLD AUTO: 0.32 THOUSAND/ΜL (ref 0–0.61)
EOSINOPHIL NFR BLD AUTO: 6 % (ref 0–6)
ERYTHROCYTE [DISTWIDTH] IN BLOOD BY AUTOMATED COUNT: 12.3 % (ref 11.6–15.1)
GFR SERPL CREATININE-BSD FRML MDRD: 60 ML/MIN/1.73SQ M
GLUCOSE P FAST SERPL-MCNC: 104 MG/DL (ref 65–99)
HCT VFR BLD AUTO: 46.6 % (ref 36.5–49.3)
HGB BLD-MCNC: 15.8 G/DL (ref 12–17)
IMM GRANULOCYTES # BLD AUTO: 0.01 THOUSAND/UL (ref 0–0.2)
IMM GRANULOCYTES NFR BLD AUTO: 0 % (ref 0–2)
LYMPHOCYTES # BLD AUTO: 1.95 THOUSANDS/ΜL (ref 0.6–4.47)
LYMPHOCYTES NFR BLD AUTO: 37 % (ref 14–44)
MCH RBC QN AUTO: 29.8 PG (ref 26.8–34.3)
MCHC RBC AUTO-ENTMCNC: 33.9 G/DL (ref 31.4–37.4)
MCV RBC AUTO: 88 FL (ref 82–98)
MONOCYTES # BLD AUTO: 0.5 THOUSAND/ΜL (ref 0.17–1.22)
MONOCYTES NFR BLD AUTO: 10 % (ref 4–12)
NEUTROPHILS # BLD AUTO: 2.44 THOUSANDS/ΜL (ref 1.85–7.62)
NEUTS SEG NFR BLD AUTO: 46 % (ref 43–75)
NRBC BLD AUTO-RTO: 0 /100 WBCS
PLATELET # BLD AUTO: 221 THOUSANDS/UL (ref 149–390)
PMV BLD AUTO: 10.7 FL (ref 8.9–12.7)
POTASSIUM SERPL-SCNC: 3.9 MMOL/L (ref 3.5–5.3)
PROT SERPL-MCNC: 8.2 G/DL (ref 6.4–8.2)
RBC # BLD AUTO: 5.31 MILLION/UL (ref 3.88–5.62)
SODIUM SERPL-SCNC: 138 MMOL/L (ref 136–145)
WBC # BLD AUTO: 5.26 THOUSAND/UL (ref 4.31–10.16)

## 2020-10-23 PROCEDURE — 86360 T CELL ABSOLUTE COUNT/RATIO: CPT

## 2020-10-23 PROCEDURE — 87536 HIV-1 QUANT&REVRSE TRNSCRPJ: CPT

## 2020-10-23 PROCEDURE — 80053 COMPREHEN METABOLIC PANEL: CPT

## 2020-10-23 PROCEDURE — 85025 COMPLETE CBC W/AUTO DIFF WBC: CPT

## 2020-10-23 PROCEDURE — 36415 COLL VENOUS BLD VENIPUNCTURE: CPT

## 2020-10-24 LAB
BASOPHILS # BLD AUTO: 0.1 X10E3/UL (ref 0–0.2)
BASOPHILS NFR BLD AUTO: 1 %
CD3+CD4+ CELLS # BLD: 716 /UL (ref 359–1519)
CD3+CD4+ CELLS NFR BLD: 37.7 % (ref 30.8–58.5)
CD3+CD4+ CELLS/CD3+CD8+ CLL BLD: 1.19 % (ref 0.92–3.72)
CD3+CD8+ CELLS # BLD: 602 /UL (ref 109–897)
CD3+CD8+ CELLS NFR BLD: 31.7 % (ref 12–35.5)
EOSINOPHIL # BLD AUTO: 0.3 X10E3/UL (ref 0–0.4)
EOSINOPHIL NFR BLD AUTO: 6 %
ERYTHROCYTE [DISTWIDTH] IN BLOOD BY AUTOMATED COUNT: 12.9 % (ref 11.6–15.4)
HCT VFR BLD AUTO: 45.3 % (ref 37.5–51)
HGB BLD-MCNC: 15.3 G/DL (ref 13–17.7)
IMM GRANULOCYTES # BLD: 0 X10E3/UL (ref 0–0.1)
IMM GRANULOCYTES NFR BLD: 0 %
LYMPHOCYTES # BLD AUTO: 1.9 X10E3/UL (ref 0.7–3.1)
LYMPHOCYTES NFR BLD AUTO: 35 %
MCH RBC QN AUTO: 30 PG (ref 26.6–33)
MCHC RBC AUTO-ENTMCNC: 33.8 G/DL (ref 31.5–35.7)
MCV RBC AUTO: 89 FL (ref 79–97)
MONOCYTES # BLD AUTO: 0.5 X10E3/UL (ref 0.1–0.9)
MONOCYTES NFR BLD AUTO: 10 %
NEUTROPHILS # BLD AUTO: 2.5 X10E3/UL (ref 1.4–7)
NEUTROPHILS NFR BLD AUTO: 48 %
PLATELET # BLD AUTO: 229 X10E3/UL (ref 150–450)
RBC # BLD AUTO: 5.1 X10E6/UL (ref 4.14–5.8)
WBC # BLD AUTO: 5.4 X10E3/UL (ref 3.4–10.8)

## 2020-10-26 ENCOUNTER — PATIENT OUTREACH (OUTPATIENT)
Dept: SURGERY | Facility: CLINIC | Age: 60
End: 2020-10-26

## 2020-10-27 LAB
HIV1 RNA # SERPL NAA+PROBE: <20 COPIES/ML
HIV1 RNA SERPL NAA+PROBE-LOG#: NORMAL LOG10COPY/ML

## 2020-10-29 ENCOUNTER — PATIENT OUTREACH (OUTPATIENT)
Dept: SURGERY | Facility: CLINIC | Age: 60
End: 2020-10-29

## 2020-10-30 ENCOUNTER — PATIENT OUTREACH (OUTPATIENT)
Dept: SURGERY | Facility: CLINIC | Age: 60
End: 2020-10-30

## 2020-10-30 DIAGNOSIS — U07.1 COVID-19: Primary | ICD-10-CM

## 2020-11-02 ENCOUNTER — TELEPHONE (OUTPATIENT)
Dept: SURGERY | Facility: CLINIC | Age: 60
End: 2020-11-02

## 2020-11-03 ENCOUNTER — OFFICE VISIT (OUTPATIENT)
Dept: SURGERY | Facility: CLINIC | Age: 60
End: 2020-11-03
Payer: MEDICARE

## 2020-11-03 VITALS
BODY MASS INDEX: 23.97 KG/M2 | OXYGEN SATURATION: 98 % | WEIGHT: 186.8 LBS | HEART RATE: 100 BPM | HEIGHT: 74 IN | SYSTOLIC BLOOD PRESSURE: 120 MMHG | TEMPERATURE: 99.1 F | DIASTOLIC BLOOD PRESSURE: 70 MMHG

## 2020-11-03 VITALS
BODY MASS INDEX: 23.92 KG/M2 | OXYGEN SATURATION: 98 % | SYSTOLIC BLOOD PRESSURE: 120 MMHG | HEIGHT: 74 IN | DIASTOLIC BLOOD PRESSURE: 70 MMHG | HEART RATE: 100 BPM | TEMPERATURE: 99.1 F | WEIGHT: 186.4 LBS

## 2020-11-03 DIAGNOSIS — D72.89 OTHER SPECIFIED DISORDERS OF WHITE BLOOD CELLS: ICD-10-CM

## 2020-11-03 DIAGNOSIS — R73.03 PREDIABETES: ICD-10-CM

## 2020-11-03 DIAGNOSIS — E78.1 HYPERTRIGLYCERIDEMIA: ICD-10-CM

## 2020-11-03 DIAGNOSIS — D75.1 POLYCYTHEMIA: ICD-10-CM

## 2020-11-03 DIAGNOSIS — Z79.899 OTHER LONG TERM (CURRENT) DRUG THERAPY: ICD-10-CM

## 2020-11-03 DIAGNOSIS — Z13.6 ENCOUNTER FOR LIPID SCREENING FOR CARDIOVASCULAR DISEASE: ICD-10-CM

## 2020-11-03 DIAGNOSIS — Z13.1 SCREENING FOR DIABETES MELLITUS: ICD-10-CM

## 2020-11-03 DIAGNOSIS — Z11.3 ENCOUNTER FOR SCREENING FOR BACTERIAL SEXUALLY TRANSMITTED DISEASE: ICD-10-CM

## 2020-11-03 DIAGNOSIS — Z23 NEED FOR INFLUENZA VACCINATION: ICD-10-CM

## 2020-11-03 DIAGNOSIS — T65.292D TOXIC EFFECT OF TOBACCO, INTENTIONAL SELF-HARM, SUBSEQUENT ENCOUNTER: ICD-10-CM

## 2020-11-03 DIAGNOSIS — B20 HIV DISEASE (HCC): Primary | ICD-10-CM

## 2020-11-03 DIAGNOSIS — Z20.2 CONTACT WITH AND (SUSPECTED) EXPOSURE TO INFECTIONS WITH A PREDOMINANTLY SEXUAL MODE OF TRANSMISSION: ICD-10-CM

## 2020-11-03 DIAGNOSIS — Z72.89 OTHER PROBLEMS RELATED TO LIFESTYLE: ICD-10-CM

## 2020-11-03 DIAGNOSIS — Z23 NEED FOR HEPATITIS B VACCINATION: ICD-10-CM

## 2020-11-03 DIAGNOSIS — I10 ESSENTIAL HYPERTENSION: ICD-10-CM

## 2020-11-03 DIAGNOSIS — Z13.220 ENCOUNTER FOR LIPID SCREENING FOR CARDIOVASCULAR DISEASE: ICD-10-CM

## 2020-11-03 PROCEDURE — G0008 ADMIN INFLUENZA VIRUS VAC: HCPCS

## 2020-11-03 PROCEDURE — 99214 OFFICE O/P EST MOD 30 MIN: CPT

## 2020-11-03 PROCEDURE — 90746 HEPB VACCINE 3 DOSE ADULT IM: CPT

## 2020-11-03 PROCEDURE — 90682 RIV4 VACC RECOMBINANT DNA IM: CPT

## 2020-11-03 PROCEDURE — 99215 OFFICE O/P EST HI 40 MIN: CPT | Performed by: INTERNAL MEDICINE

## 2020-11-03 PROCEDURE — G0010 ADMIN HEPATITIS B VACCINE: HCPCS

## 2020-11-03 PROCEDURE — 90471 IMMUNIZATION ADMIN: CPT

## 2020-11-04 ENCOUNTER — PATIENT OUTREACH (OUTPATIENT)
Dept: SURGERY | Facility: CLINIC | Age: 60
End: 2020-11-04

## 2020-11-05 ENCOUNTER — PATIENT OUTREACH (OUTPATIENT)
Dept: SURGERY | Facility: CLINIC | Age: 60
End: 2020-11-05

## 2020-11-06 ENCOUNTER — PATIENT OUTREACH (OUTPATIENT)
Dept: SURGERY | Facility: CLINIC | Age: 60
End: 2020-11-06

## 2020-11-12 ENCOUNTER — PATIENT OUTREACH (OUTPATIENT)
Dept: SURGERY | Facility: CLINIC | Age: 60
End: 2020-11-12

## 2020-11-16 ENCOUNTER — PATIENT OUTREACH (OUTPATIENT)
Dept: SURGERY | Facility: CLINIC | Age: 60
End: 2020-11-16

## 2020-11-30 ENCOUNTER — PATIENT OUTREACH (OUTPATIENT)
Dept: SURGERY | Facility: CLINIC | Age: 60
End: 2020-11-30

## 2020-12-02 ENCOUNTER — PATIENT OUTREACH (OUTPATIENT)
Dept: SURGERY | Facility: CLINIC | Age: 60
End: 2020-12-02

## 2020-12-03 ENCOUNTER — PATIENT OUTREACH (OUTPATIENT)
Dept: SURGERY | Facility: CLINIC | Age: 60
End: 2020-12-03

## 2020-12-10 ENCOUNTER — PATIENT OUTREACH (OUTPATIENT)
Dept: SURGERY | Facility: CLINIC | Age: 60
End: 2020-12-10

## 2020-12-14 DIAGNOSIS — B20 HIV INFECTION, UNSPECIFIED SYMPTOM STATUS (HCC): ICD-10-CM

## 2020-12-14 DIAGNOSIS — I25.10 ASCVD (ARTERIOSCLEROTIC CARDIOVASCULAR DISEASE): ICD-10-CM

## 2020-12-15 RX ORDER — ASPIRIN 81 MG/1
TABLET, CHEWABLE ORAL
Qty: 30 TABLET | Refills: 3 | Status: SHIPPED | OUTPATIENT
Start: 2020-12-15 | End: 2021-04-26

## 2020-12-15 RX ORDER — DARUNAVIR ETHANOLATE AND COBICISTAT 800; 150 MG/1; MG/1
TABLET, FILM COATED ORAL
Qty: 30 TABLET | Refills: 3 | Status: SHIPPED | OUTPATIENT
Start: 2020-12-15 | End: 2021-04-26

## 2020-12-22 ENCOUNTER — PATIENT OUTREACH (OUTPATIENT)
Dept: SURGERY | Facility: CLINIC | Age: 60
End: 2020-12-22

## 2020-12-28 ENCOUNTER — PATIENT OUTREACH (OUTPATIENT)
Dept: SURGERY | Facility: CLINIC | Age: 60
End: 2020-12-28

## 2020-12-30 ENCOUNTER — PATIENT OUTREACH (OUTPATIENT)
Dept: SURGERY | Facility: CLINIC | Age: 60
End: 2020-12-30

## 2021-01-18 ENCOUNTER — PATIENT OUTREACH (OUTPATIENT)
Dept: SURGERY | Facility: CLINIC | Age: 61
End: 2021-01-18

## 2021-02-11 ENCOUNTER — PATIENT OUTREACH (OUTPATIENT)
Dept: SURGERY | Facility: CLINIC | Age: 61
End: 2021-02-11

## 2021-03-12 ENCOUNTER — PATIENT OUTREACH (OUTPATIENT)
Dept: SURGERY | Facility: CLINIC | Age: 61
End: 2021-03-12

## 2021-03-12 NOTE — PROGRESS NOTES
Ct came in today with mail from [facility]  Ct is no longer eligible for MAWD and now just has MA with no monthly premium payment  Ct brought in check for 88$ that was issued for [employer]  Cm and ct called [facility] to pick a plan since the mail said ct had to  Per worker, Mrs Jenelle Mota ct still have everything the same with [employer]  Cm gave ct blood work papers from [facility] received a copy of SS income  (see media)    Cm spoke to Dara Sanchez and explain ct's Elizabeth Loomis is no longer needed  Per Dara Sanchez give checkt o Suzette Montes to deposit and give back to funding agency  Dara Sanchez will let Jose May and Suzette Montes know

## 2021-03-30 ENCOUNTER — PATIENT OUTREACH (OUTPATIENT)
Dept: SURGERY | Facility: CLINIC | Age: 61
End: 2021-03-30

## 2021-03-30 NOTE — PROGRESS NOTES
Cm called ct about dental apt tmrw  Per ct they called to remind him but didn't give a time and he tried calling back but no answer       Cm told him its at 1 and wished him a happy birthday since its tmrw!!

## 2021-03-31 ENCOUNTER — TELEPHONE (OUTPATIENT)
Dept: SURGERY | Facility: CLINIC | Age: 61
End: 2021-03-31

## 2021-03-31 NOTE — TELEPHONE ENCOUNTER
Assessment    Annual Nutrition    Clinical Data/Client History    HIV: Yes  AIDS: No    : Juvenal Earl     Socio- Economic Status: Grocery Store    Living Situation: House and child sometimes stays with him    Food Prep/Access: Refrigerator, Stove and Microwave    Psycho Social Factors: Not Assessed    Functional Status: Ambulatory, Able to [employer] and Prepared Own Meals    Activity Level: #-4 times/week-walks, exercises    Ambulation Difficulty with None    Oral Problems: None    Last Dental Exam: 2 years ago     Procedures Performed: Had caps put on     Typical Food/Beverage Intake:    · Breakfast [place], egg, & cheese sandwich from a diner, coffee  · Lunch Usually skips  · [place], beef & other meats, potatoes-heavy on proteins  Vegetables 2-3x/wk  · Snacks Double stuffed oreos with milk a few x/month, baby carrots on occasion, salty snacks  · Fluids Milk, water, coffee    Appetite: Excellent    Supplements: No     Food Intolerance: None    Usual Body Weight: 186-190#    Current Body Weight: 11/03/20 186# 6 4oz    Nutrition Diagnosis    Problem: Undesirable food choices    Related to: Pt's dietary preferences and unsupported beliefs/attitudes about food, nutrition, and nutrition-related topics    As Evidenced By: Patient Interview, Dietary Recall, Verbalizes inaccurate or incomplete information and Denial of need for food and nutrition related changes    Intervention Diet Prescription    Nutritional needs based on: Actual Body Wt    · 8927-7134 kcal [address]  Jeor & 1 5 activity factor  · 68-85 g protein 0 8-1 0 g/kg  · 1488-3624 mL fluid 1 mL/kcal  · 2300 mg Na American Heart Association    Current intake: adequate    Snack/Supplement Recommendations: Reduce processed and salty snacks  Increase fruits, vegetable, dairy products  Nutrition Recommendations: Reduce intake of processed foods and foods high in fat and sodium  Increase fruits, vegetables  Goals:  Incorporate vegetables into a meal 3-4x/week  Nutrition Education Intervention: Provided     Person Educated: Patient    Topics Discussed: MyPlate, reducing sodium, increasing fruits and vegetables, healthy snacks, reducing processed foods    Teaching Method: Verbal    Readiness to Change: Pre-Contemplation:   (Not yet acknowledging that there is a problem behavior that needs to change)    Visit Summary    Pt interested in interview and was very talkative  States his health is very important to him, was in the [employer] and feels he is in tune with his body and what it needs  Pt feels consuming high amounts of proteins is important  When reviewed diet recall though, found pt is consuming  Foods high in sodium and fat as well as processed, diet low in fiber, dairy, fruits, vegetables  Reviewed dietary recommendations, pt willing to try eating more vegetables but feels proteins are more necessary and does not feel the processed foods and high sodium intakes are a problem because his BP and BMI are fine      Niya Campos, RD,LDN,CHC

## 2021-04-15 ENCOUNTER — TRANSCRIBE ORDERS (OUTPATIENT)
Dept: LAB | Facility: CLINIC | Age: 61
End: 2021-04-15

## 2021-04-15 ENCOUNTER — APPOINTMENT (OUTPATIENT)
Dept: LAB | Facility: CLINIC | Age: 61
End: 2021-04-15
Payer: MEDICARE

## 2021-04-15 DIAGNOSIS — Z72.89 OTHER PROBLEMS RELATED TO LIFESTYLE: ICD-10-CM

## 2021-04-15 DIAGNOSIS — Z11.3 ENCOUNTER FOR SCREENING FOR BACTERIAL SEXUALLY TRANSMITTED DISEASE: ICD-10-CM

## 2021-04-15 DIAGNOSIS — Z13.6 ENCOUNTER FOR LIPID SCREENING FOR CARDIOVASCULAR DISEASE: ICD-10-CM

## 2021-04-15 DIAGNOSIS — D72.89 OTHER SPECIFIED DISORDERS OF WHITE BLOOD CELLS: ICD-10-CM

## 2021-04-15 DIAGNOSIS — R73.03 PREDIABETES: ICD-10-CM

## 2021-04-15 DIAGNOSIS — U07.1 COVID-19: ICD-10-CM

## 2021-04-15 DIAGNOSIS — I10 ESSENTIAL HYPERTENSION: ICD-10-CM

## 2021-04-15 DIAGNOSIS — Z20.2 CONTACT WITH AND (SUSPECTED) EXPOSURE TO INFECTIONS WITH A PREDOMINANTLY SEXUAL MODE OF TRANSMISSION: ICD-10-CM

## 2021-04-15 DIAGNOSIS — Z13.220 ENCOUNTER FOR LIPID SCREENING FOR CARDIOVASCULAR DISEASE: ICD-10-CM

## 2021-04-15 DIAGNOSIS — Z13.1 SCREENING FOR DIABETES MELLITUS: ICD-10-CM

## 2021-04-15 DIAGNOSIS — B20 HIV DISEASE (HCC): ICD-10-CM

## 2021-04-15 DIAGNOSIS — Z79.899 OTHER LONG TERM (CURRENT) DRUG THERAPY: ICD-10-CM

## 2021-04-15 DIAGNOSIS — E78.1 HYPERTRIGLYCERIDEMIA: ICD-10-CM

## 2021-04-15 LAB
ALBUMIN SERPL BCP-MCNC: 3.7 G/DL (ref 3.5–5)
ALP SERPL-CCNC: 99 U/L (ref 46–116)
ALT SERPL W P-5'-P-CCNC: 39 U/L (ref 12–78)
ANION GAP SERPL CALCULATED.3IONS-SCNC: 13 MMOL/L (ref 4–13)
AST SERPL W P-5'-P-CCNC: 23 U/L (ref 5–45)
BASOPHILS # BLD AUTO: 0.05 THOUSANDS/ΜL (ref 0–0.1)
BASOPHILS NFR BLD AUTO: 1 % (ref 0–1)
BILIRUB SERPL-MCNC: 0.53 MG/DL (ref 0.2–1)
BILIRUB UR QL STRIP: NEGATIVE
BUN SERPL-MCNC: 16 MG/DL (ref 5–25)
CALCIUM SERPL-MCNC: 7.3 MG/DL (ref 8.3–10.1)
CHLORIDE SERPL-SCNC: 102 MMOL/L (ref 100–108)
CHOLEST SERPL-MCNC: 159 MG/DL (ref 50–200)
CLARITY UR: CLEAR
CO2 SERPL-SCNC: 24 MMOL/L (ref 21–32)
COLOR UR: YELLOW
CREAT SERPL-MCNC: 1.31 MG/DL (ref 0.6–1.3)
EOSINOPHIL # BLD AUTO: 0.26 THOUSAND/ΜL (ref 0–0.61)
EOSINOPHIL NFR BLD AUTO: 5 % (ref 0–6)
ERYTHROCYTE [DISTWIDTH] IN BLOOD BY AUTOMATED COUNT: 12.6 % (ref 11.6–15.1)
EST. AVERAGE GLUCOSE BLD GHB EST-MCNC: 120 MG/DL
GFR SERPL CREATININE-BSD FRML MDRD: 59 ML/MIN/1.73SQ M
GLUCOSE P FAST SERPL-MCNC: 167 MG/DL (ref 65–99)
GLUCOSE UR STRIP-MCNC: NEGATIVE MG/DL
HBA1C MFR BLD: 5.8 %
HCT VFR BLD AUTO: 45.4 % (ref 36.5–49.3)
HCV AB SER QL: NORMAL
HDLC SERPL-MCNC: 35 MG/DL
HGB BLD-MCNC: 15.5 G/DL (ref 12–17)
HGB UR QL STRIP.AUTO: NEGATIVE
IMM GRANULOCYTES # BLD AUTO: 0.01 THOUSAND/UL (ref 0–0.2)
IMM GRANULOCYTES NFR BLD AUTO: 0 % (ref 0–2)
KETONES UR STRIP-MCNC: NEGATIVE MG/DL
LDLC SERPL CALC-MCNC: 100 MG/DL (ref 0–100)
LEUKOCYTE ESTERASE UR QL STRIP: NEGATIVE
LYMPHOCYTES # BLD AUTO: 2 THOUSANDS/ΜL (ref 0.6–4.47)
LYMPHOCYTES NFR BLD AUTO: 37 % (ref 14–44)
MCH RBC QN AUTO: 29.5 PG (ref 26.8–34.3)
MCHC RBC AUTO-ENTMCNC: 34.1 G/DL (ref 31.4–37.4)
MCV RBC AUTO: 86 FL (ref 82–98)
MONOCYTES # BLD AUTO: 0.61 THOUSAND/ΜL (ref 0.17–1.22)
MONOCYTES NFR BLD AUTO: 11 % (ref 4–12)
NEUTROPHILS # BLD AUTO: 2.51 THOUSANDS/ΜL (ref 1.85–7.62)
NEUTS SEG NFR BLD AUTO: 46 % (ref 43–75)
NITRITE UR QL STRIP: NEGATIVE
NRBC BLD AUTO-RTO: 0 /100 WBCS
PH UR STRIP.AUTO: 6.5 [PH]
PLATELET # BLD AUTO: 207 THOUSANDS/UL (ref 149–390)
PMV BLD AUTO: 10.4 FL (ref 8.9–12.7)
POTASSIUM SERPL-SCNC: 3.7 MMOL/L (ref 3.5–5.3)
PROT SERPL-MCNC: 8.1 G/DL (ref 6.4–8.2)
PROT UR STRIP-MCNC: NEGATIVE MG/DL
RBC # BLD AUTO: 5.26 MILLION/UL (ref 3.88–5.62)
RPR SER QL: NORMAL
SARS-COV-2 IGG+IGM SERPL QL IA: NORMAL
SODIUM SERPL-SCNC: 139 MMOL/L (ref 136–145)
SP GR UR STRIP.AUTO: 1.02 (ref 1–1.03)
TRIGL SERPL-MCNC: 121 MG/DL
UROBILINOGEN UR QL STRIP.AUTO: 0.2 E.U./DL
WBC # BLD AUTO: 5.44 THOUSAND/UL (ref 4.31–10.16)

## 2021-04-15 PROCEDURE — 85025 COMPLETE CBC W/AUTO DIFF WBC: CPT

## 2021-04-15 PROCEDURE — 36415 COLL VENOUS BLD VENIPUNCTURE: CPT

## 2021-04-15 PROCEDURE — 86769 SARS-COV-2 COVID-19 ANTIBODY: CPT

## 2021-04-15 PROCEDURE — 80061 LIPID PANEL: CPT

## 2021-04-15 PROCEDURE — 80053 COMPREHEN METABOLIC PANEL: CPT

## 2021-04-15 PROCEDURE — 83036 HEMOGLOBIN GLYCOSYLATED A1C: CPT

## 2021-04-15 PROCEDURE — 81003 URINALYSIS AUTO W/O SCOPE: CPT

## 2021-04-15 PROCEDURE — 86360 T CELL ABSOLUTE COUNT/RATIO: CPT

## 2021-04-15 PROCEDURE — 87491 CHLMYD TRACH DNA AMP PROBE: CPT

## 2021-04-15 PROCEDURE — 86480 TB TEST CELL IMMUN MEASURE: CPT

## 2021-04-15 PROCEDURE — 86803 HEPATITIS C AB TEST: CPT

## 2021-04-15 PROCEDURE — 87536 HIV-1 QUANT&REVRSE TRNSCRPJ: CPT

## 2021-04-15 PROCEDURE — 87591 N.GONORRHOEAE DNA AMP PROB: CPT

## 2021-04-15 PROCEDURE — 86592 SYPHILIS TEST NON-TREP QUAL: CPT

## 2021-04-16 LAB
BASOPHILS # BLD AUTO: 0.1 X10E3/UL (ref 0–0.2)
BASOPHILS NFR BLD AUTO: 1 %
C TRACH DNA SPEC QL NAA+PROBE: NEGATIVE
CD3+CD4+ CELLS # BLD: 730 /UL (ref 359–1519)
CD3+CD4+ CELLS NFR BLD: 38.4 % (ref 30.8–58.5)
CD3+CD4+ CELLS/CD3+CD8+ CLL BLD: 1.26 % (ref 0.92–3.72)
CD3+CD8+ CELLS # BLD: 580 /UL (ref 109–897)
CD3+CD8+ CELLS NFR BLD: 30.5 % (ref 12–35.5)
EOSINOPHIL # BLD AUTO: 0.3 X10E3/UL (ref 0–0.4)
EOSINOPHIL NFR BLD AUTO: 5 %
ERYTHROCYTE [DISTWIDTH] IN BLOOD BY AUTOMATED COUNT: 13 % (ref 11.6–15.4)
GAMMA INTERFERON BACKGROUND BLD IA-ACNC: 0.02 IU/ML
HCT VFR BLD AUTO: 46.7 % (ref 37.5–51)
HGB BLD-MCNC: 15.8 G/DL (ref 13–17.7)
HIV1 RNA # SERPL NAA+PROBE: <20 COPIES/ML
HIV1 RNA SERPL NAA+PROBE-LOG#: NORMAL LOG10COPY/ML
IMM GRANULOCYTES # BLD: 0 X10E3/UL (ref 0–0.1)
IMM GRANULOCYTES NFR BLD: 0 %
LYMPHOCYTES # BLD AUTO: 1.9 X10E3/UL (ref 0.7–3.1)
LYMPHOCYTES NFR BLD AUTO: 36 %
M TB IFN-G BLD-IMP: NEGATIVE
M TB IFN-G CD4+ BCKGRND COR BLD-ACNC: 0.04 IU/ML
M TB IFN-G CD4+ BCKGRND COR BLD-ACNC: 0.05 IU/ML
MCH RBC QN AUTO: 29.4 PG (ref 26.6–33)
MCHC RBC AUTO-ENTMCNC: 33.8 G/DL (ref 31.5–35.7)
MCV RBC AUTO: 87 FL (ref 79–97)
MITOGEN IGNF BCKGRD COR BLD-ACNC: >10 IU/ML
MONOCYTES # BLD AUTO: 0.7 X10E3/UL (ref 0.1–0.9)
MONOCYTES NFR BLD AUTO: 13 %
N GONORRHOEA DNA SPEC QL NAA+PROBE: NEGATIVE
NEUTROPHILS # BLD AUTO: 2.5 X10E3/UL (ref 1.4–7)
NEUTROPHILS NFR BLD AUTO: 45 %
PLATELET # BLD AUTO: 222 X10E3/UL (ref 150–450)
RBC # BLD AUTO: 5.38 X10E6/UL (ref 4.14–5.8)
WBC # BLD AUTO: 5.4 X10E3/UL (ref 3.4–10.8)

## 2021-04-26 DIAGNOSIS — B20 HIV INFECTION, UNSPECIFIED SYMPTOM STATUS (HCC): ICD-10-CM

## 2021-04-26 DIAGNOSIS — I25.10 ASCVD (ARTERIOSCLEROTIC CARDIOVASCULAR DISEASE): ICD-10-CM

## 2021-04-26 RX ORDER — DARUNAVIR ETHANOLATE AND COBICISTAT 800; 150 MG/1; MG/1
TABLET, FILM COATED ORAL
Qty: 30 TABLET | Refills: 3 | Status: SHIPPED | OUTPATIENT
Start: 2021-04-26 | End: 2021-05-04 | Stop reason: SDUPTHER

## 2021-04-26 RX ORDER — ASPIRIN 81 MG/1
TABLET, CHEWABLE ORAL
Qty: 30 TABLET | Refills: 3 | Status: SHIPPED | OUTPATIENT
Start: 2021-04-26 | End: 2021-05-04 | Stop reason: SDUPTHER

## 2021-05-04 ENCOUNTER — PATIENT OUTREACH (OUTPATIENT)
Dept: SURGERY | Facility: CLINIC | Age: 61
End: 2021-05-04

## 2021-05-04 ENCOUNTER — OFFICE VISIT (OUTPATIENT)
Dept: SURGERY | Facility: CLINIC | Age: 61
End: 2021-05-04
Payer: MEDICARE

## 2021-05-04 VITALS
DIASTOLIC BLOOD PRESSURE: 83 MMHG | WEIGHT: 202 LBS | TEMPERATURE: 98.7 F | BODY MASS INDEX: 25.93 KG/M2 | HEART RATE: 97 BPM | SYSTOLIC BLOOD PRESSURE: 128 MMHG | HEIGHT: 74 IN | OXYGEN SATURATION: 95 %

## 2021-05-04 VITALS
DIASTOLIC BLOOD PRESSURE: 83 MMHG | HEIGHT: 74 IN | TEMPERATURE: 98.7 F | WEIGHT: 202.4 LBS | BODY MASS INDEX: 25.98 KG/M2 | OXYGEN SATURATION: 95 % | SYSTOLIC BLOOD PRESSURE: 128 MMHG | HEART RATE: 97 BPM

## 2021-05-04 DIAGNOSIS — Z00.00 MEDICARE ANNUAL WELLNESS VISIT, SUBSEQUENT: Primary | ICD-10-CM

## 2021-05-04 DIAGNOSIS — F43.10 PTSD (POST-TRAUMATIC STRESS DISORDER): ICD-10-CM

## 2021-05-04 DIAGNOSIS — B20 HIV DISEASE (HCC): ICD-10-CM

## 2021-05-04 DIAGNOSIS — N52.9 ERECTILE DYSFUNCTION, UNSPECIFIED ERECTILE DYSFUNCTION TYPE: ICD-10-CM

## 2021-05-04 DIAGNOSIS — R73.03 PREDIABETES: ICD-10-CM

## 2021-05-04 DIAGNOSIS — M79.604 PAIN IN BOTH LOWER EXTREMITIES: ICD-10-CM

## 2021-05-04 DIAGNOSIS — T65.292D TOXIC EFFECT OF TOBACCO, INTENTIONAL SELF-HARM, SUBSEQUENT ENCOUNTER: ICD-10-CM

## 2021-05-04 DIAGNOSIS — M79.605 PAIN IN BOTH LOWER EXTREMITIES: ICD-10-CM

## 2021-05-04 DIAGNOSIS — R63.5 WEIGHT GAIN: ICD-10-CM

## 2021-05-04 DIAGNOSIS — I25.10 ASCVD (ARTERIOSCLEROTIC CARDIOVASCULAR DISEASE): ICD-10-CM

## 2021-05-04 DIAGNOSIS — I86.1 VARICOCELE: ICD-10-CM

## 2021-05-04 DIAGNOSIS — B20 HIV DISEASE (HCC): Primary | ICD-10-CM

## 2021-05-04 DIAGNOSIS — I10 ESSENTIAL HYPERTENSION: ICD-10-CM

## 2021-05-04 DIAGNOSIS — B20 HIV INFECTION, UNSPECIFIED SYMPTOM STATUS (HCC): ICD-10-CM

## 2021-05-04 DIAGNOSIS — R79.89 ELEVATED SERUM CREATININE: ICD-10-CM

## 2021-05-04 DIAGNOSIS — B20 HIV (HUMAN IMMUNODEFICIENCY VIRUS INFECTION) (HCC): ICD-10-CM

## 2021-05-04 DIAGNOSIS — I10 ESSENTIAL (PRIMARY) HYPERTENSION: ICD-10-CM

## 2021-05-04 PROBLEM — N52.8 OTHER MALE ERECTILE DYSFUNCTION: Status: ACTIVE | Noted: 2021-05-04

## 2021-05-04 PROCEDURE — G0439 PPPS, SUBSEQ VISIT: HCPCS | Performed by: NURSE PRACTITIONER

## 2021-05-04 PROCEDURE — 99215 OFFICE O/P EST HI 40 MIN: CPT | Performed by: INTERNAL MEDICINE

## 2021-05-04 RX ORDER — DARUNAVIR ETHANOLATE AND COBICISTAT 800; 150 MG/1; MG/1
1 TABLET, FILM COATED ORAL DAILY
Qty: 30 TABLET | Refills: 3 | Status: SHIPPED | OUTPATIENT
Start: 2021-05-04 | End: 2021-11-09 | Stop reason: SDUPTHER

## 2021-05-04 RX ORDER — DOLUTEGRAVIR SODIUM 50 MG/1
50 TABLET, FILM COATED ORAL DAILY
Qty: 30 TABLET | Refills: 5 | Status: SHIPPED | OUTPATIENT
Start: 2021-05-04 | End: 2021-11-09 | Stop reason: SDUPTHER

## 2021-05-04 RX ORDER — AMOXICILLIN 500 MG/1
TABLET, FILM COATED ORAL
COMMUNITY
Start: 2021-04-05 | End: 2022-06-24

## 2021-05-04 RX ORDER — ASPIRIN 81 MG/1
81 TABLET, CHEWABLE ORAL DAILY
Qty: 30 TABLET | Refills: 3 | Status: SHIPPED | OUTPATIENT
Start: 2021-05-04 | End: 2021-11-09 | Stop reason: SDUPTHER

## 2021-05-04 RX ORDER — AMLODIPINE BESYLATE 2.5 MG/1
2.5 TABLET ORAL DAILY
Qty: 30 TABLET | Refills: 5 | Status: SHIPPED | OUTPATIENT
Start: 2021-05-04 | End: 2021-11-09 | Stop reason: SDUPTHER

## 2021-05-04 RX ORDER — TENOFOVIR DISOPROXIL FUMARATE 300 MG/1
300 TABLET, FILM COATED ORAL DAILY
Qty: 30 TABLET | Refills: 5 | Status: SHIPPED | OUTPATIENT
Start: 2021-05-04 | End: 2021-11-09 | Stop reason: SDUPTHER

## 2021-05-04 RX ORDER — PAROXETINE HYDROCHLORIDE 20 MG/1
20 TABLET, FILM COATED ORAL DAILY
Qty: 30 TABLET | Refills: 5 | Status: SHIPPED | OUTPATIENT
Start: 2021-05-04 | End: 2021-11-09 | Stop reason: SDUPTHER

## 2021-05-04 NOTE — PROGRESS NOTES
Assessment and Plan:     Problem List Items Addressed This Visit        Cardiovascular and Mediastinum    Essential hypertension     Blood pressure: Stable  BP Readings from Last 3 Encounters:   21 128/83   20 120/70   20 120/70       Continue amlodipine  Educated on the following lifestyle modifications to lower BP and decrease cardiovascular risk factors  limit alcohol intake, reduce salt in diet, maintain a healthy weight, engage in 30 minutes of cardiovascular exercise daily, and not smoke  Relevant Medications    amLODIPine (NORVASC) 2 5 mg tablet       Other    HIV disease ([facility] [address] )     CD4 T CELL ABSOLUTE   Date/Time Value Ref Range Status   10/26/2015 07:58  359 - 1,519 /uL Final     CD4 ABS   Date/Time Value Ref Range Status   04/15/2021 08:02  359 - 1519 /uL Final     HIV-1 RNA by PCR, Qn   Date/Time Value Ref Range Status   04/15/2021 08:02 AM <20 copies/mL Final     Comment:     HIV-1 RNA not detected  The reportable range for this assay is 20 to 10,000,000  copies HIV-1 RNA/mL  HIV-1 RNA Viral Load Log   Date/Time Value Ref Range Status   04/15/2021 08:02 AM COMMENT [id number]/mL Final     Comment:     Unable to calculate result since non-numeric result obtained for  component test          ART: Tivicay, Precobix, and Viread        Denies side effects  Stressed the importance of adherence  Continue follow up with ID clinic  Reviewed most recent labs, including Cd4 and viral load  Discussed the risks and benefits of treatment options, instructions for management, importance of treatment adherence, and reduction of risk factor  Educated on possible  medication side effects  Counseled on routes of HIV transmission, including the risk of  infection  Emphasized that viral suppression is the best method to prevent HIV transmission  At this time pt denies the need for HIV testing of anyone in their life  Total encounter time was 45 minutes  Greater then 20 minutes were spent on counseling and patient education  Pt voices understanding and agreement with treatment plan  Relevant Medications    dolutegravir (Tivicay) 50 MG TABS    Darunavir-Cobicistat (Prezcobix) 800-150 MG TABS    tenofovir (VIREAD) 300 mg tablet    Toxic effect of tobacco and nicotine     Declines NRT  Does not want to quit smoking at this time  Counseled for greater than 15 minutes on the importance of smoking cessation  Advised to quit  Educated on the increased risk of heart and lung disease associated with smoking  Referred to Jayro Briseno [address] [place] [address] for enrollment in smoking cessation program             Prediabetes     Lab Results   Component Value Date/Time    HGBA1C 5 8 (H) 04/15/2021 08:02 AM    HGBA1C 5 9 08/12/2015 07:06 AM        Check HgbA1C q 6 months  Educated to follow diabetic diet, maintain a healthy weight, and exercise regularly  Referred to dietician for additional education                             Other Visit Diagnoses     Medicare annual wellness visit, subsequent    -  Primary    ASCVD (arteriosclerotic cardiovascular disease)        Relevant Medications    aspirin 81 mg chewable tablet    HIV infection, unspecified symptom status (HCC)        Relevant Medications    dolutegravir (Tivicay) 50 MG TABS    Darunavir-Cobicistat (Prezcobix) 800-150 MG TABS    tenofovir (VIREAD) 300 mg tablet    PTSD (post-traumatic stress disorder)        Relevant Medications    PARoxetine (PAXIL) 20 mg tablet    HIV (human immunodeficiency virus infection) ([facility] [address] )        Relevant Medications    dolutegravir (Tivicay) 50 MG TABS    Darunavir-Cobicistat (Prezcobix) 800-150 MG TABS    tenofovir (VIREAD) 300 mg tablet    Pain in both lower extremities        Relevant Orders    Ambulatory referral to Physical Therapy    Varicocele        Relevant Orders    Ambulatory referral to Urology    Erectile dysfunction, unspecified erectile dysfunction type        Relevant Orders    TSH, 3rd generation with Free T4 reflex    Testosterone, free, total    PSA Total (Reflex To Free)    Essential (primary) hypertension         Relevant Medications    amLODIPine (NORVASC) 2 5 mg tablet    Other Relevant Orders    TSH, 3rd generation with Free T4 reflex        BMI Counseling: Body mass index is 26 29 kg/m²  The BMI is above normal  Nutrition recommendations include encouraging healthy choices of fruits and vegetables and consuming healthier snacks  Exercise recommendations include exercising 3-5 times per week  Tobacco Cessation Counseling: Tobacco cessation counseling was provided  The patient is sincerely urged to quit consumption of tobacco  He is not ready to quit tobacco  Medication options and side effects of medication discussed  Patient refused medication  Preventive health issues were discussed with patient, and age appropriate screening tests were ordered as noted in patient's After Visit Summary  Personalized health advice and appropriate referrals for health education or preventive services given if needed, as noted in patient's After Visit Summary       History of Present Illness:     Patient presents for Medicare Annual Wellness visit    Patient Care Team:  Rachna Stafford as PCP - CATHIE Barraza MD Glenis Burn Colon as  ()     Problem List:     Patient Active Problem List   Diagnosis    Anxiety    Essential hypertension    Heart murmur    Hiatal hernia    HIV disease ([facility] [address] )    Hypertriglyceridemia    Inguinal hernia    Toxic effect of tobacco and nicotine    Prediabetes    Other male erectile dysfunction      Past Medical and Surgical History:     Past Medical History:   Diagnosis Date    DDD (degenerative disc disease), lumbar     Facet joint disease     Human immunodeficiency virus (HIV) infection ([facility] [address] )     resolved 11/5/15    Hypertension     resolved 5/29/15    Other specified disorders of white blood cells resolved 5/12/16     Past Surgical History:   Procedure Laterality Date    HERNIA REPAIR      SHOULDER SURGERY Left       Family History:     History reviewed  No pertinent family history     Social History:        Social History     Socioeconomic History    Marital status: Single     Spouse name: None    Number of children: None    Years of education: None    Highest education level: None   Occupational History    None   Social Needs    Financial resource strain: None    Food insecurity     Worry: None     Inability: None    Transportation needs     Medical: None     Non-medical: None   Tobacco Use    Smoking status: Current Every Day Smoker     Packs/day: 0 50     Years: 43 00     Pack years: 21 50    Smokeless tobacco: Never Used    Tobacco comment: 10 cigs/day   Substance and Sexual Activity    Alcohol use: Not Currently    Drug use: Never    Sexual activity: Not Currently     Partners: Female   Lifestyle    Physical activity     Days per week: None     Minutes per session: None    Stress: None   Relationships    Social connections     Talks on phone: None     Gets together: None     Attends Anabaptism service: None     Active member of club or organization: None     Attends meetings of clubs or organizations: None     Relationship status: None    Intimate partner violence     Fear of current or ex partner: None     Emotionally abused: None     Physically abused: None     Forced sexual activity: None   Other Topics Concern    None   Social History Narrative    None      Medications and Allergies:     Current Outpatient Medications   Medication Sig Dispense Refill    amLODIPine (NORVASC) 2 5 mg tablet Take 1 tablet (2 5 mg total) by mouth daily 30 tablet 5    amoxicillin (AMOXIL) 500 MG tablet TAKE 4 TABLETS BY MOUTH 1 HOUR PRIOR TO PROCEDURE      aspirin 81 mg chewable tablet Chew 1 tablet (81 mg total) daily 30 tablet 3    Darunavir-Cobicistat (Prezcobix) 800-150 MG TABS Take 1 tablet by mouth daily 30 tablet 3    dolutegravir (Tivicay) 50 MG TABS Take 1 tablet (50 mg total) by mouth daily 30 tablet 5    PARoxetine (PAXIL) 20 mg tablet Take 1 tablet (20 mg total) by mouth daily 30 tablet 5    tenofovir (VIREAD) 300 mg tablet Take 1 tablet (300 mg total) by mouth daily 30 tablet 5     No current facility-administered medications for this visit        No Known Allergies   Immunizations:     Immunization History   Administered Date(s) Administered    Hep A, adult 04/25/2017, 04/24/2018    Hep B, adult 09/18/2008, 01/26/2009, 05/07/2009, 05/28/2019, 05/28/2019, 11/26/2019, 11/26/2019, 11/03/2020, 11/03/2020    Influenza Quadrivalent Preservative Free 3 years and older IM 11/05/2015    Influenza Quadrivalent, 6-35 Months IM 12/13/2016, 10/16/2017    Influenza, recombinant, quadrivalent,injectable, preservative free 10/08/2018, 11/26/2019, 11/03/2020    Influenza, seasonal, injectable 12/15/2011, 10/04/2012, 10/10/2012, 10/30/2014    Pneumococcal Conjugate 13-Valent 09/27/2016    Pneumococcal Polysaccharide PPV23 09/18/2008, 04/25/2017    Tdap 05/07/2009, 05/28/2019      Health Maintenance:         Topic Date Due    Lung Cancer Screening  Never done    Colorectal Cancer Screening  01/27/2027    Hepatitis C Screening  Completed         Topic Date Due    Meningococcal ACWY Vaccine (1 - Risk start before 7 months 4-dose series) Never done    COVID-19 Vaccine (1) Never done      Medicare Health Risk Assessment:     /83   Pulse 97   Temp 98 7 °F (37 1 °C)   Ht 6' 1 5" (1 867 m)   Wt 91 6 kg (202 lb)   SpO2 95%   BMI 26 29 kg/m²        Lab Results   Component Value Date     10/26/2015    K 3 7 04/15/2021     04/15/2021    CO2 24 04/15/2021    ANIONGAP 9 10/26/2015    BUN 16 04/15/2021    CREATININE 1 31 (H) 04/15/2021    GLUCOSE 133 10/26/2015    GLUF 167 (H) 04/15/2021    CALCIUM 7 3 (L) 04/15/2021    AST 23 04/15/2021    ALT 39 04/15/2021    ALKPHOS 99 04/15/2021 PROT 8 2 10/26/2015    BILITOT 0 23 10/26/2015    EGFR 59 04/15/2021     Lab Results   Component Value Date    WBC 5 44 04/15/2021    WBC 5 4 04/15/2021    HGB 15 5 04/15/2021    HGB 15 8 04/15/2021    HCT 45 4 04/15/2021    HCT 46 7 04/15/2021    MCV 86 04/15/2021    MCV 87 04/15/2021     04/15/2021     04/15/2021     Lab Results   Component Value Date    HEPCAB Non-reactive 04/15/2021     Lab Results   Component Value Date    HEPCAB Non-reactive 04/15/2021     Lab Results   Component Value Date    RPR Non-Reactive 04/15/2021         Theresa Cameron is here for his Subsequent Wellness visit  Last Medicare Wellness visit information reviewed, patient interviewed and updates made to the record today  Health Risk Assessment:   Patient rates overall health as good  Patient feels that their physical health rating is slightly worse  Patient is satisfied with their life  Eyesight was rated as same  Hearing was rated as same  Patient feels that their emotional and mental health rating is same  Patients states they are never, rarely angry  Patient states they are sometimes unusually tired/fatigued  Pain experienced in the last 7 days has been some  Patient's pain rating has been 5/10  Patient states that he has experienced weight loss or gain in last 6 months  Fall Risk Screening: In the past year, patient has experienced: history of falling in past year    Number of falls: 2 or more  Injured during fall?: No    Feels unsteady when standing or walking?: Yes    Worried about falling?: Yes      Home Safety:  Patient does not have trouble with stairs inside or outside of their home  Patient has working smoke alarms and has working carbon monoxide detector  Home safety hazards include: none  Nutrition:   Current diet is No Added Salt and Regular  Medications:   Patient is not currently taking any over-the-counter supplements  Patient is able to manage medications       Activities of Daily Living (ADLs)/Instrumental Activities of Daily Living (IADLs):   Walk and transfer into and out of bed and chair?: Yes  Dress and groom yourself?: Yes    Bathe or shower yourself?: Yes    Feed yourself? Yes  Do your laundry/housekeeping?: Yes  Manage your money, pay your bills and track your expenses?: Yes  Make your own meals?: Yes    Do your own shopping?: Yes    Previous Hospitalizations:   Any hospitalizations or ED visits within the last 12 months?: No      Advance Care Planning:   Living will: Yes    Advanced directive: Yes    End of Life Decisions reviewed with patient: Yes    Provider agrees with end of life decisions: Yes      PREVENTIVE SCREENINGS      Cardiovascular Screening:    General: Screening Not Indicated and History Lipid Disorder      Diabetes Screening:     General: Screening Current      Colorectal Cancer Screening:     General: Screening Current      Prostate Cancer Screening:    General: Screening Not Indicated      Osteoporosis Screening:    General: Screening Not Indicated      Abdominal Aortic Aneurysm (AAA) Screening:    Risk factors include: tobacco use        General: Screening Not Indicated and Screening Current      Lung Cancer Screening:     General: Screening Not Indicated      Hepatitis C Screening:    General: Screening Current    Screening, Brief Intervention, and Referral to Treatment (SBIRT)    Screening  Typical number of drinks in a day: 0    Single Item Drug Screening:  How often have you used an illegal drug (including marijuana) or a prescription medication for non-medical reasons in the past year? never    Single Item Drug Screen Score: 0  Interpretation: Negative screen for possible drug use disorder    Other Counseling Topics:   Sunscreen and regular weightbearing exercise         CATHIE Johnson

## 2021-05-04 NOTE — PATIENT INSTRUCTIONS
Medicare Preventive Visit Patient Instructions  Thank you for completing your Welcome to Medicare Visit or Medicare Annual Wellness Visit today  Your next wellness visit will be due in one year (5/5/2022)  The screening/preventive services that you may require over the next 5-10 years are detailed below  Some tests may not apply to you based off risk factors and/or age  Screening tests ordered at today's visit but not completed yet may show as past due  Also, please note that scanned in results may not display below  Preventive Screenings:  Service Recommendations Previous Testing/Comments   Colorectal Cancer Screening  · Colonoscopy    · Fecal Occult Blood Test (FOBT)/Fecal Immunochemical Test (FIT)  · Fecal DNA/Cologuard Test  · Flexible Sigmoidoscopy Age: 54-65 years old   Colonoscopy: every 10 years (May be performed more frequently if at higher risk)  OR  FOBT/FIT: every 1 year  OR  Cologuard: every 3 years  OR  Sigmoidoscopy: every 5 years  Screening may be recommended earlier than age 48 if at higher risk for colorectal cancer  Also, an individualized decision between you and your healthcare provider will decide whether screening between the ages of 74-80 would be appropriate   Colonoscopy: 01/27/2017  FOBT/FIT: Not on file  Cologuard: Not on file  Sigmoidoscopy: Not on file    Screening Current     Prostate Cancer Screening Individualized decision between patient and health care provider in men between ages of 53-78   Medicare will cover every 12 months beginning on the day after your 50th birthday PSA: No results in last 5 years           Hepatitis C Screening Once for adults born between 1945 and 1965  More frequently in patients at high risk for Hepatitis C Hep C Antibody: 04/15/2021    Screening Current   Diabetes Screening 1-2 times per year if you're at risk for diabetes or have pre-diabetes Fasting glucose: 167 mg/dL   A1C: 5 8 %    Screening Current   Cholesterol Screening Once every 5 years if you don't have a lipid disorder  May order more often based on risk factors  Lipid panel: 04/15/2021    Screening Not Indicated  History Lipid Disorder      Other Preventive Screenings Covered by Medicare:  1  Abdominal Aortic Aneurysm (AAA) Screening: covered once if your at risk  You're considered to be at risk if you have a family history of AAA or a male between the age of 73-68 who smoking at least 100 cigarettes in your lifetime  2  Lung Cancer Screening: covers low dose CT scan once per year if you meet all of the following conditions: (1) Age 50-69; (2) No signs or symptoms of lung cancer; (3) Current smoker or have quit smoking within the last 15 years; (4) You have a tobacco smoking history of at least 30 pack years (packs per day x number of years you smoked); (5) You get a written order from a healthcare provider  3  Glaucoma Screening: covered annually if you're considered high risk: (1) You have diabetes OR (2) Family history of glaucoma OR (3)  aged 48 and older OR (3)  American aged 72 and older  3  Osteoporosis Screening: covered every 2 years if you meet one of the following conditions: (1) Have a vertebral abnormality; (2) On glucocorticoid therapy for more than 3 months; (3) Have primary hyperparathyroidism; (4) On osteoporosis medications and need to assess response to drug therapy  5  HIV Screening: covered annually if you're between the age of 12-76  Also covered annually if you are younger than 13 and older than 72 with risk factors for HIV infection  For pregnant patients, it is covered up to 3 times per pregnancy      Immunizations:  Immunization Recommendations   Influenza Vaccine Annual influenza vaccination during flu season is recommended for all persons aged >= 6 months who do not have contraindications   Pneumococcal Vaccine (Prevnar and Pneumovax)  * Prevnar = PCV13  * Pneumovax = PPSV23 Adults 25-60 years old: 1-3 doses may be recommended based on certain risk factors  Adults 72 years old: Prevnar (PCV13) vaccine recommended followed by Pneumovax (PPSV23) vaccine  If already received PPSV23 since turning 65, then PCV13 recommended at least one year after PPSV23 dose  Hepatitis B Vaccine 3 dose series if at intermediate or high risk (ex: diabetes, end stage renal disease, liver disease)   Tetanus (Td) Vaccine - COST NOT COVERED BY MEDICARE PART B Following completion of primary series, a booster dose should be given every 10 years to maintain immunity against tetanus  Td may also be given as tetanus wound prophylaxis  Tdap Vaccine - COST NOT COVERED BY MEDICARE PART B Recommended at least once for all adults  For pregnant patients, recommended with each pregnancy  Shingles Vaccine (Shingrix) - COST NOT COVERED BY MEDICARE PART B  2 shot series recommended in those aged 48 and above     Health Maintenance Due:      Topic Date Due    Colorectal Cancer Screening  01/27/2027    Hepatitis C Screening  Completed     Immunizations Due:      Topic Date Due    Meningococcal ACWY Vaccine (1 - Risk start before 7 months 4-dose series) Never done    COVID-19 Vaccine (1) Never done     Advance Directives   What are advance directives? Advance directives are legal documents that state your wishes and plans for medical care  These plans are made ahead of time in case you lose your ability to make decisions for yourself  Advance directives can apply to any medical decision, such as the treatments you want, and if you want to donate organs  What are the types of advance directives? There are many types of advance directives, and each state has rules about how to use them  You may choose a combination of any of the following:  · Living will: This is a written record of the treatment you want  You can also choose which treatments you do not want, which to limit, and which to stop at a certain time  This includes surgery, medicine, IV fluid, and tube feedings     · Durable power of  for healthcare [facility]): This is a written record that states who you want to make healthcare choices for you when you are unable to make them for yourself  This person, called a proxy, is usually a family member or a friend  You may choose more than 1 proxy  · Do not resuscitate (DNR) order:  A DNR order is used in case your heart stops beating or you stop breathing  It is a request not to have certain forms of treatment, such as CPR  A DNR order may be included in other types of advance directives  · Medical directive: This covers the care that you want if you are in a coma, near death, or unable to make decisions for yourself  You can list the treatments you want for each condition  Treatment may include pain medicine, surgery, blood transfusions, dialysis, IV or tube feedings, and a ventilator (breathing machine)  · Values history: This document has questions about your views, beliefs, and how you feel and think about life  This information can help others choose the care that you would choose  Why are advance directives important? An advance directive helps you control your care  Although spoken wishes may be used, it is better to have your wishes written down  Spoken wishes can be misunderstood, or not followed  Treatments may be given even if you do not want them  An advance directive may make it easier for your family to make difficult choices about your care  Cigarette Smoking and Your Health   Risks to your health if you smoke:  Nicotine and other chemicals found in tobacco damage every cell in your body  Even if you are a light smoker, you have an increased risk for cancer, heart disease, and lung disease  If you are pregnant or have diabetes, smoking increases your risk for complications  Benefits to your health if you stop smoking:   · You decrease respiratory symptoms such as coughing, wheezing, and shortness of breath     · You reduce your risk for cancers of the lung, mouth, throat, kidney, bladder, pancreas, stomach, and cervix  If you already have cancer, you increase the benefits of chemotherapy  You also reduce your risk for cancer returning or a second cancer from developing  · You reduce your risk for heart disease, blood clots, heart attack, and stroke  · You reduce your risk for lung infections, and diseases such as pneumonia, asthma, chronic bronchitis, and emphysema  · Your circulation improves  More oxygen can be delivered to your body  If you have diabetes, you lower your risk for complications, such as kidney, artery, and eye diseases  You also lower your risk for nerve damage  Nerve damage can lead to amputations, poor vision, and blindness  · You improve your body's ability to heal and to fight infections  For more information and support to stop smoking:   · [employer]  Phone: 0- [phone]  Web Address: [facility]  Weight Management   Why it is important to manage your weight:  Being overweight increases your risk of health conditions such as heart disease, high blood pressure, type 2 diabetes, and certain types of cancer  It can also increase your risk for osteoarthritis, sleep apnea, and other respiratory problems  Aim for a slow, steady weight loss  Even a small amount of weight loss can lower your risk of health problems  How to lose weight safely:  A safe and healthy way to lose weight is to eat fewer calories and get regular exercise  You can lose up about 1 pound a week by decreasing the number of calories you eat by 500 calories each day  Healthy meal plan for weight management:  A healthy meal plan includes a variety of foods, contains fewer calories, and helps you stay healthy  A healthy meal plan includes the following:  · Eat whole-grain foods more often  A healthy meal plan should contain fiber  Fiber is the part of grains, fruits, and vegetables that is not broken down by your body   Whole-grain foods are healthy and provide extra fiber in your diet  Some examples of whole-grain foods are whole-wheat breads and pastas, oatmeal, brown rice, and bulgur  · Eat a variety of vegetables every day  Include dark, leafy greens such as spinach, kale, mendel greens, and mustard greens  Eat yellow and orange vegetables such as carrots, sweet potatoes, and winter squash  · Eat a variety of fruits every day  Choose fresh or canned fruit (canned in its own juice or light syrup) instead of juice  Fruit juice has very little or no fiber  · Eat low-fat dairy foods  Drink fat-free (skim) milk or 1% milk  Eat fat-free yogurt and low-fat cottage cheese  Try low-fat cheeses such as mozzarella and other reduced-fat cheeses  · Choose meat and other protein foods that are low in fat  Choose beans or other legumes such as split peas or lentils  Choose fish, skinless poultry (chicken or turkey), or lean cuts of red meat (beef or pork)  Before you cook meat or poultry, cut off any visible fat  · Use less fat and oil  Try baking foods instead of frying them  Add less fat, such as margarine, sour cream, regular salad dressing and mayonnaise to foods  Eat fewer high-fat foods  Some examples of high-fat foods include french fries, doughnuts, ice cream, and cakes  · Eat fewer sweets  Limit foods and drinks that are high in sugar  This includes candy, cookies, regular soda, and sweetened drinks  Exercise:  Exercise at least 30 minutes per day on most days of the week  Some examples of exercise include walking, biking, dancing, and swimming  You can also fit in more physical activity by taking the stairs instead of the elevator or parking farther away from stores  Ask your healthcare provider about the best exercise plan for you  © Copyright [employer] 2018 Information is for End User's use only and may not be sold, redistributed or otherwise used for commercial purposes   All illustrations and images included in CareNotes® are the copyrighted property of A D A M , Inc  or [address] [place] [address]

## 2021-05-04 NOTE — ASSESSMENT & PLAN NOTE
Blood pressure: Stable  BP Readings from Last 3 Encounters:   05/04/21 128/83   11/03/20 120/70   11/03/20 120/70       Continue amlodipine  Educated on the following lifestyle modifications to lower BP and decrease cardiovascular risk factors  limit alcohol intake, reduce salt in diet, maintain a healthy weight, engage in 30 minutes of cardiovascular exercise daily, and not smoke

## 2021-05-04 NOTE — ASSESSMENT & PLAN NOTE
CD4 T CELL ABSOLUTE   Date/Time Value Ref Range Status   10/26/2015 07:58  359 - 1,519 /uL Final     CD4 ABS   Date/Time Value Ref Range Status   04/15/2021 08:02  359 - 1519 /uL Final     HIV-1 RNA by PCR, Qn   Date/Time Value Ref Range Status   04/15/2021 08:02 AM <20 copies/mL Final     Comment:     HIV-1 RNA not detected  The reportable range for this assay is 20 to 10,000,000  copies HIV-1 RNA/mL  HIV-1 RNA Viral Load Log   Date/Time Value Ref Range Status   04/15/2021 08:02 AM COMMENT [id number]/mL Final     Comment:     Unable to calculate result since non-numeric result obtained for  component test          ART: Tivicay, Precobix, and Viread        Denies side effects  Stressed the importance of adherence  Continue follow up with ID clinic  Reviewed most recent labs, including Cd4 and viral load  Discussed the risks and benefits of treatment options, instructions for management, importance of treatment adherence, and reduction of risk factor  Educated on possible  medication side effects  Counseled on routes of HIV transmission, including the risk of  infection  Emphasized that viral suppression is the best method to prevent HIV transmission  At this time pt denies the need for HIV testing of anyone in their life  Total encounter time was 45 minutes  Greater then 20 minutes were spent on counseling and patient education  Pt voices understanding and agreement with treatment plan

## 2021-05-04 NOTE — PROGRESS NOTES
Ct came in today for recert  There are no e-signatures due to covid 19  Per ct is not sexually active  Per ct no partners within the last 3 months  Per ct goes to [facility] for dental  Ct has active [employer] Per ct is getting a bridge, deep cleaning and filling soon  Per ct he's CD4 count is 730  Per ct is undetectable  Per ct is seen every 6 months  Per ct takes 7 pills a day- currently on Tivicay  Per ct is now thinking about [place]  No longer [place]  Javier was unable to print promise portal sheet  Was having trouble with printer

## 2021-05-04 NOTE — PROGRESS NOTES
Assessment/Plan:      Diagnoses and all orders for this visit:    Medicare annual wellness visit, subsequent    Essential hypertension  -     amLODIPine (NORVASC) 2 5 mg tablet; Take 1 tablet (2 5 mg total) by mouth daily    ASCVD (arteriosclerotic cardiovascular disease)  -     aspirin 81 mg chewable tablet; Chew 1 tablet (81 mg total) daily    HIV infection, unspecified symptom status (HCC)  -     dolutegravir (Tivicay) 50 MG TABS; Take 1 tablet (50 mg total) by mouth daily  -     Darunavir-Cobicistat (Prezcobix) 800-150 MG TABS; Take 1 tablet by mouth daily    PTSD (post-traumatic stress disorder)  -     PARoxetine (PAXIL) 20 mg tablet; Take 1 tablet (20 mg total) by mouth daily    [facility] virus [facility]  -     tenofovir (VIREAD) 300 mg tablet; Take 1 tablet (300 mg total) by mouth daily    HIV disease ([facility] )    Prediabetes    Toxic effect of tobacco, intentional self-harm, subsequent encounter    Pain in both lower extremities  -     Ambulatory referral to Physical Therapy; Future    Varicocele  -     Ambulatory referral to Urology; Future    Erectile dysfunction, unspecified erectile dysfunction type  -     TSH, 3rd generation with Free T4 reflex; Future  -     Testosterone, free, total; Future  -     PSA Total (Reflex To Free); Future    Essential (primary) hypertension   -     TSH, 3rd generation with Free T4 reflex; Future    Other orders  -     amoxicillin (AMOXIL) 500 MG tablet; TAKE 4 TABLETS BY MOUTH 1 HOUR PR[facility]  continued maintenance of well-being  Discussion: Today patient presents with no mental health concerns  Patient shared he had been doing well mentally, he was as engaging as he had always been and even cracked a joke with [facility]  Pt reported he is now considering moving to [place] rather than [facility] which he was previously considering  PHQ-9 screener completed, score = 1      Smoking cessation: Not interested in quitting at this time    Subjective:     Patient ID: Yanelis Diaz is a 63 y o  male              HPI: The patient is being seen at the [facility] today for a routine behavioral health follow up     Objective:    Orientation    Person: Yes   Place: Yes   Time: Yes     Appearance     Well Developed: Yes   Healthy: Yes   Uncomfortable: No  Normal Body Odor: Yes   Grooming Unkempt: No  Poor Eye Contact: No    Mood and Affect    Appropriate: Yes   Euthymic: Yes   Anxious: No  Depressed: No  Blunted: No  Labile: No  Restricted: No    Harm to Self or Others: denied    Substance Abuse: none reported or observed

## 2021-05-04 NOTE — PROGRESS NOTES
Progress Note - Infectious Disease   Rachael Galloway 63 y o  male MRN: [id number]  Unit/Bed#:  Encounter: [id number]      Impression/Plan:  1   HIV-doing well on ART with an here undetectable viral load and CD4 count in the 700s    Patient doing well with only once daily Tivicay in addition to the Prezcobix and tenofovir   Recheck labs in 5 months and follow up in 6 months   Stressed adherence      2   Polycythemia-improved with decreased tobacco use   Will continue to monitor the CBC with diff  H&H has decreased      3   Nicotine dependence-Stressed the importance of complete tobacco cessation   He is starting on nicotine replacement therapy today     4  Hypertension-asymptomatic  Improved control   Discussed this issue with the primary     5  Elevated serum creatinine-  Creatinine is now stabilized  Will continue to monitor the BMP  6  Weight gain- patient has gained approximately 20 lb since his last visit  Stressed the importance diet exercise  Patient was provided medication, adherence and prevention education    Subjective:  Routine follow-up for HIV  Patient claims 100% adherence with  Tivicay, Prezcobix, Viread    Patient denies any notable side effects  Overall the feeling well  The patient denies any fever chills or sweats, denies any nausea vomiting or diarrhea, denies any cough or shortness of breath  ROS:  A complete review of systems is negative other than that noted above in the subjective    Followup portions patient history reviewed and updated as: Allergies, current medications, past medical history, past social history, past surgical history, and the problem list    Objective:  Vitals:  Vitals:    05/04/21 1614   BP: 128/83   Pulse: 97   Temp: 98 7 °F (37 1 °C)   SpO2: 95%   Weight: 91 8 kg (202 lb 6 4 oz)   Height: 6' 1 5" (1 867 m)       Physical Exam:   General Appearance:  Alert, interactive, appearing well,  nontoxic, no acute distress     Neck:   Supple without lymphadenopathy, no thyromegaly or masses   Throat: Oropharynx moist without lesions  Lungs:   Clear to auscultation bilaterally; no wheezes, rhonchi or rales; respirations unlabored   Heart:  RRR; no murmur, rub or gallop   Abdomen:   Soft, non-tender, non-distended, positive bowel sounds  Extremities: No clubbing, cyanosis or edema   Skin: No new rashes or lesions  No draining wounds noted  Labs, Imaging, & Other studies:   All pertinent labs and imaging studies were personally reviewed    Lab Results   Component Value Date     10/26/2015    K 3 7 04/15/2021     04/15/2021    CO2 24 04/15/2021    ANIONGAP 9 10/26/2015    BUN 16 04/15/2021    CREATININE 1 31 (H) 04/15/2021    GLUCOSE 133 10/26/2015    GLUF 167 (H) 04/15/2021    CALCIUM 7 3 (L) 04/15/2021    AST 23 04/15/2021    ALT 39 04/15/2021    ALKPHOS 99 04/15/2021    PROT 8 2 10/26/2015    BILITOT 0 23 10/26/2015    EGFR 59 04/15/2021     Lab Results   Component Value Date    WBC 5 44 04/15/2021    WBC 5 4 04/15/2021    HGB 15 5 04/15/2021    HGB 15 8 04/15/2021    HCT 45 4 04/15/2021    HCT 46 7 04/15/2021    MCV 86 04/15/2021    MCV 87 04/15/2021     04/15/2021     04/15/2021     Lab Results   Component Value Date    HEPCAB Non-reactive 04/15/2021     Lab Results   Component Value Date    HEPCAB Non-reactive 04/15/2021     Lab Results   Component Value Date    RPR Non-Reactive 04/15/2021     CD4 ABS   Date/Time Value Ref Range Status   04/15/2021 08:02  359 - 1519 /uL Final     HIV-1 RNA by PCR, Qn   Date/Time Value Ref Range Status   04/15/2021 08:02 AM <20 copies/mL Final     Comment:     HIV-1 RNA not detected  The reportable range for this assay is 20 to 10,000,000  copies HIV-1 RNA/mL             Current Outpatient Medications:     amLODIPine (NORVASC) 2 5 mg tablet, Take 1 tablet (2 5 mg total) by mouth daily, Disp: 30 tablet, Rfl: 5    amoxicillin (AMOXIL) 500 MG tablet, TAKE 4 TABLETS BY MOUTH 1 HOUR PRIOR TO PROCEDURE, Disp: , Rfl:     aspirin 81 mg chewable tablet, Chew 1 tablet (81 mg total) daily, Disp: 30 tablet, Rfl: 3    Darunavir-Cobicistat (Prezcobix) 800-150 MG TABS, Take 1 tablet by mouth daily, Disp: 30 tablet, Rfl: 3    dolutegravir (Tivicay) 50 MG TABS, Take 1 tablet (50 mg total) by mouth daily, Disp: 30 tablet, Rfl: 5    PARoxetine (PAXIL) 20 mg tablet, Take 1 tablet (20 mg total) by mouth daily, Disp: 30 tablet, Rfl: 5    tenofovir (VIREAD) 300 mg tablet, Take 1 tablet (300 mg total) by mouth daily, Disp: 30 tablet, Rfl: 5

## 2021-05-04 NOTE — ASSESSMENT & PLAN NOTE
Lab Results   Component Value Date/Time    HGBA1C 5 8 (H) 04/15/2021 08:02 AM    HGBA1C 5 9 08/12/2015 07:06 AM        Check HgbA1C q 6 months  Educated to follow diabetic diet, maintain a healthy weight, and exercise regularly  Referred to dietician for additional education

## 2021-05-04 NOTE — ASSESSMENT & PLAN NOTE
Declines NRT  Does not want to quit smoking at this time  Counseled for greater than 15 minutes on the importance of smoking cessation  Advised to quit  Educated on the increased risk of heart and lung disease associated with smoking    Referred to [facility] for enrollment in smoking cessation program

## 2021-05-19 ENCOUNTER — PATIENT OUTREACH (OUTPATIENT)
Dept: SURGERY | Facility: CLINIC | Age: 61
End: 2021-05-19

## 2021-05-27 ENCOUNTER — OFFICE VISIT (OUTPATIENT)
Dept: UROLOGY | Facility: CLINIC | Age: 61
End: 2021-05-27
Payer: MEDICARE

## 2021-05-27 VITALS
WEIGHT: 195 LBS | BODY MASS INDEX: 25.03 KG/M2 | SYSTOLIC BLOOD PRESSURE: 128 MMHG | DIASTOLIC BLOOD PRESSURE: 82 MMHG | HEIGHT: 74 IN

## 2021-05-27 DIAGNOSIS — I86.1 VARICOCELE: ICD-10-CM

## 2021-05-27 DIAGNOSIS — Z12.5 SCREENING FOR PROSTATE CANCER: Primary | ICD-10-CM

## 2021-05-27 LAB — POST-VOID RESIDUAL VOLUME, ML POC: 20 ML

## 2021-05-27 PROCEDURE — 51798 US URINE CAPACITY MEASURE: CPT | Performed by: PHYSICIAN ASSISTANT

## 2021-05-27 PROCEDURE — 99203 OFFICE O/P NEW LOW 30 MIN: CPT | Performed by: PHYSICIAN ASSISTANT

## 2021-05-27 NOTE — PROGRESS NOTES
1  Screening for prostate cancer  PSA, Total Screen   2  Varicocele  Ambulatory referral to Urology    POCT Measure PVR     Assessment and plan:       1  Left varicocele, mild  - confirmed on US 2016  - patient minimally symptomatic    2  LUTS  - demonstrating excellent bladder emptying  - nonbothersome urinary symptoms     3  Prostate cancer screening  - normal REECE  - obtain a PSA for routine surveillance  - continue annual routine prostate cancer screening with PCP    F/u with Urology JANICE Marinelli PA-C      Chief Complaint     Left varicocele     History of Present Illness     Luana Valdes is a 63 y o  Male presenting today as a new patient for varicocele  Patient had a scrotal ultrasound in 2016 confirming a small left varicocele without any further urologic abnormalities  Denies any scrotal pain  Happy with LUTS  Denies dysuria, gross hematuria, suprapubic pressure, flank pain, fevers, or chills  Patient had recent STD testing with chlamydia, hepatitis, syphilis, which were all negative  Patient does have a diagnosis of HIV and follows closely with Infectious Disease  Patient has a history of previous left inguinal hernia repeair  Patient does have some chronic kidney disease with a recent creatinine of 1 31 and a EGFR of 59 ( 04/15/2021)  Last PSA 0 4 (2016)  Patient had just voided prior to his appointment in therefore urine specimen was unable to be obtained  PVR 20mL    Laboratory     Lab Results   Component Value Date    CREATININE 1 31 (H) 04/15/2021       Lab Results   Component Value Date    PSA 0 4 02/16/2016     Review of Systems     Review of Systems   Constitutional: Negative for activity change, appetite change, chills, diaphoresis, fatigue, fever and unexpected weight change  Respiratory: Negative for chest tightness and shortness of breath  Cardiovascular: Negative for chest pain, palpitations and leg swelling     Gastrointestinal: Negative for abdominal distention, abdominal pain, constipation, diarrhea, nausea and vomiting  Genitourinary: Negative for decreased urine volume, difficulty urinating, dysuria, enuresis, flank pain, frequency, genital sores, hematuria and urgency  Musculoskeletal: Positive for arthralgias, gait problem and myalgias  Negative for back pain  Skin: Negative for color change, pallor, rash and wound  Psychiatric/Behavioral: Negative for behavioral problems  The patient is not nervous/anxious  Allergies     No Known Allergies    Physical Exam     Physical Exam  Constitutional:       General: He is not in acute distress  Appearance: Normal appearance  He is normal weight  He is not ill-appearing, toxic-appearing or diaphoretic  HENT:      Head: Normocephalic and atraumatic  Mouth/Throat:      Mouth: Mucous membranes are moist    Eyes:      General:         Right eye: No discharge  Left eye: No discharge  Conjunctiva/sclera: Conjunctivae normal    Pulmonary:      Effort: Pulmonary effort is normal  No respiratory distress  Genitourinary:     Comments: Good rectal tone  Prostate 30g, smooth, no nodules  Musculoskeletal:      Comments: Slowed gait   Skin:     General: Skin is warm and dry  Coloration: Skin is not jaundiced or pale  Neurological:      General: No focal deficit present  Mental Status: He is alert and oriented to person, place, and time  Psychiatric:         Mood and Affect: Mood normal          Behavior: Behavior normal          Thought Content:  Thought content normal          Judgment: Judgment normal            Vital Signs     Vitals:    05/27/21 0845   BP: 128/82   Weight: 88 5 kg (195 lb)   Height: 6' 1 5" (1 867 m)         Current Medications       Current Outpatient Medications:     amLODIPine (NORVASC) 2 5 mg tablet, Take 1 tablet (2 5 mg total) by mouth daily, Disp: 30 tablet, Rfl: 5    amoxicillin (AMOXIL) 500 MG tablet, TAKE 4 TABLETS BY MOUTH 1 HOUR PRIOR TO PROCEDURE, Disp: , Rfl:     aspirin 81 mg chewable tablet, Chew 1 tablet (81 mg total) daily, Disp: 30 tablet, Rfl: 3    Darunavir-Cobicistat (Prezcobix) 800-150 MG TABS, Take 1 tablet by mouth daily, Disp: 30 tablet, Rfl: 3    dolutegravir (Tivicay) 50 MG TABS, Take 1 tablet (50 mg total) by mouth daily, Disp: 30 tablet, Rfl: 5    PARoxetine (PAXIL) 20 mg tablet, Take 1 tablet (20 mg total) by mouth daily, Disp: 30 tablet, Rfl: 5    tenofovir (VIREAD) 300 mg tablet, Take 1 tablet (300 mg total) by mouth daily, Disp: 30 tablet, Rfl: 5      Active Problems     Patient Active Problem List   Diagnosis    Anxiety    Essential hypertension    Heart murmur    Hiatal hernia    HIV disease ([facility] )    Hypertriglyceridemia    Inguinal hernia    Toxic effect of tobacco and nicotine    Prediabetes    Other male erectile dysfunction         Past Medical History     Past Medical History:   Diagnosis Date    DDD (degenerative disc disease), lumbar     Facet joint disease     Human immunodeficiency virus (HIV) infection ([facility] )     resolved 11/5/15    Hypertension     resolved 5/29/15    Other specified disorders of white blood cells     resolved 5/12/16         Surgical History     Past Surgical History:   Procedure Laterality Date    HERNIA REPAIR      SHOULDER SURGERY Left          Family History     No family history on file        Social History     Social History       Radiology

## 2021-06-24 ENCOUNTER — PATIENT OUTREACH (OUTPATIENT)
Dept: SURGERY | Facility: CLINIC | Age: 61
End: 2021-06-24

## 2021-06-24 NOTE — PROGRESS NOTES
Bill Sanon wanted javier to reach out to ct about volunteering for a workshop for [address]  Javier called ct  Per ct is the process of grieving  Per ct his mother just passed away  Javier sent his condolences  Ct informed Bill Sanon Cm sent out new physician letter

## 2021-06-28 ENCOUNTER — PATIENT OUTREACH (OUTPATIENT)
Dept: SURGERY | Facility: CLINIC | Age: 61
End: 2021-06-28

## 2021-07-02 ENCOUNTER — PATIENT OUTREACH (OUTPATIENT)
Dept: SURGERY | Facility: CLINIC | Age: 61
End: 2021-07-02

## 2021-07-20 ENCOUNTER — PATIENT OUTREACH (OUTPATIENT)
Dept: SURGERY | Facility: CLINIC | Age: 61
End: 2021-07-20

## 2021-07-20 NOTE — PROGRESS NOTES
Lynn called cm regarding insurance  Per Andrei Neumanner was confused why ct's insurance was the same bc ct provided a different ID number  Cm told Lynn that's true and that's what was told from worker  That member id number is effective August 1st but ct will continue to have [employer] and Medicare Part A

## 2021-08-09 ENCOUNTER — PATIENT OUTREACH (OUTPATIENT)
Dept: SURGERY | Facility: CLINIC | Age: 61
End: 2021-08-09

## 2021-09-23 ENCOUNTER — PATIENT OUTREACH (OUTPATIENT)
Dept: SURGERY | Facility: CLINIC | Age: 61
End: 2021-09-23

## 2021-10-15 ENCOUNTER — APPOINTMENT (OUTPATIENT)
Dept: LAB | Facility: CLINIC | Age: 61
End: 2021-10-15
Payer: MEDICARE

## 2021-10-15 DIAGNOSIS — B20 HIV DISEASE (HCC): ICD-10-CM

## 2021-10-15 LAB
ALBUMIN SERPL BCP-MCNC: 3.9 G/DL (ref 3.5–5)
ALP SERPL-CCNC: 98 U/L (ref 46–116)
ALT SERPL W P-5'-P-CCNC: 45 U/L (ref 12–78)
ANION GAP SERPL CALCULATED.3IONS-SCNC: 11 MMOL/L (ref 4–13)
AST SERPL W P-5'-P-CCNC: 27 U/L (ref 5–45)
BASOPHILS # BLD AUTO: 0.06 THOUSANDS/ΜL (ref 0–0.1)
BASOPHILS NFR BLD AUTO: 1 % (ref 0–1)
BILIRUB SERPL-MCNC: 0.25 MG/DL (ref 0.2–1)
BUN SERPL-MCNC: 19 MG/DL (ref 5–25)
CALCIUM SERPL-MCNC: 9.4 MG/DL (ref 8.3–10.1)
CHLORIDE SERPL-SCNC: 104 MMOL/L (ref 100–108)
CO2 SERPL-SCNC: 25 MMOL/L (ref 21–32)
CREAT SERPL-MCNC: 1.17 MG/DL (ref 0.6–1.3)
EOSINOPHIL # BLD AUTO: 0.24 THOUSAND/ΜL (ref 0–0.61)
EOSINOPHIL NFR BLD AUTO: 4 % (ref 0–6)
ERYTHROCYTE [DISTWIDTH] IN BLOOD BY AUTOMATED COUNT: 12.9 % (ref 11.6–15.1)
GFR SERPL CREATININE-BSD FRML MDRD: 68 ML/MIN/1.73SQ M
GLUCOSE P FAST SERPL-MCNC: 98 MG/DL (ref 65–99)
HCT VFR BLD AUTO: 47.5 % (ref 36.5–49.3)
HGB BLD-MCNC: 15.8 G/DL (ref 12–17)
IMM GRANULOCYTES # BLD AUTO: 0.02 THOUSAND/UL (ref 0–0.2)
IMM GRANULOCYTES NFR BLD AUTO: 0 % (ref 0–2)
LYMPHOCYTES # BLD AUTO: 2.06 THOUSANDS/ΜL (ref 0.6–4.47)
LYMPHOCYTES NFR BLD AUTO: 33 % (ref 14–44)
MCH RBC QN AUTO: 29.5 PG (ref 26.8–34.3)
MCHC RBC AUTO-ENTMCNC: 33.3 G/DL (ref 31.4–37.4)
MCV RBC AUTO: 89 FL (ref 82–98)
MONOCYTES # BLD AUTO: 0.7 THOUSAND/ΜL (ref 0.17–1.22)
MONOCYTES NFR BLD AUTO: 11 % (ref 4–12)
NEUTROPHILS # BLD AUTO: 3.12 THOUSANDS/ΜL (ref 1.85–7.62)
NEUTS SEG NFR BLD AUTO: 51 % (ref 43–75)
NRBC BLD AUTO-RTO: 0 /100 WBCS
PLATELET # BLD AUTO: 228 THOUSANDS/UL (ref 149–390)
PMV BLD AUTO: 10.8 FL (ref 8.9–12.7)
POTASSIUM SERPL-SCNC: 4.3 MMOL/L (ref 3.5–5.3)
PROT SERPL-MCNC: 8.3 G/DL (ref 6.4–8.2)
RBC # BLD AUTO: 5.35 MILLION/UL (ref 3.88–5.62)
SODIUM SERPL-SCNC: 140 MMOL/L (ref 136–145)
WBC # BLD AUTO: 6.2 THOUSAND/UL (ref 4.31–10.16)

## 2021-10-15 PROCEDURE — 85025 COMPLETE CBC W/AUTO DIFF WBC: CPT

## 2021-10-15 PROCEDURE — 86360 T CELL ABSOLUTE COUNT/RATIO: CPT

## 2021-10-15 PROCEDURE — 80053 COMPREHEN METABOLIC PANEL: CPT

## 2021-10-15 PROCEDURE — 87536 HIV-1 QUANT&REVRSE TRNSCRPJ: CPT

## 2021-10-15 PROCEDURE — 36415 COLL VENOUS BLD VENIPUNCTURE: CPT

## 2021-10-16 LAB
BASOPHILS # BLD AUTO: 0.1 X10E3/UL (ref 0–0.2)
BASOPHILS NFR BLD AUTO: 1 %
CD3+CD4+ CELLS # BLD: 708 /UL (ref 359–1519)
CD3+CD4+ CELLS NFR BLD: 35.4 % (ref 30.8–58.5)
CD3+CD4+ CELLS/CD3+CD8+ CLL BLD: 1.11 % (ref 0.92–3.72)
CD3+CD8+ CELLS # BLD: 636 /UL (ref 109–897)
CD3+CD8+ CELLS NFR BLD: 31.8 % (ref 12–35.5)
EOSINOPHIL # BLD AUTO: 0.2 X10E3/UL (ref 0–0.4)
EOSINOPHIL NFR BLD AUTO: 4 %
ERYTHROCYTE [DISTWIDTH] IN BLOOD BY AUTOMATED COUNT: 13.6 % (ref 11.6–15.4)
HCT VFR BLD AUTO: 47.3 % (ref 37.5–51)
HGB BLD-MCNC: 15.9 G/DL (ref 13–17.7)
IMM GRANULOCYTES # BLD: 0 X10E3/UL (ref 0–0.1)
IMM GRANULOCYTES NFR BLD: 0 %
LYMPHOCYTES # BLD AUTO: 2 X10E3/UL (ref 0.7–3.1)
LYMPHOCYTES NFR BLD AUTO: 33 %
MCH RBC QN AUTO: 29.7 PG (ref 26.6–33)
MCHC RBC AUTO-ENTMCNC: 33.6 G/DL (ref 31.5–35.7)
MCV RBC AUTO: 88 FL (ref 79–97)
MONOCYTES # BLD AUTO: 0.7 X10E3/UL (ref 0.1–0.9)
MONOCYTES NFR BLD AUTO: 12 %
NEUTROPHILS # BLD AUTO: 3.1 X10E3/UL (ref 1.4–7)
NEUTROPHILS NFR BLD AUTO: 50 %
PLATELET # BLD AUTO: 237 X10E3/UL (ref 150–450)
RBC # BLD AUTO: 5.35 X10E6/UL (ref 4.14–5.8)
WBC # BLD AUTO: 6.1 X10E3/UL (ref 3.4–10.8)

## 2021-10-18 LAB
HIV1 RNA # SERPL NAA+PROBE: <20 COPIES/ML
HIV1 RNA SERPL NAA+PROBE-LOG#: NORMAL LOG10COPY/ML

## 2021-10-21 ENCOUNTER — PATIENT OUTREACH (OUTPATIENT)
Dept: SURGERY | Facility: CLINIC | Age: 61
End: 2021-10-21

## 2021-10-27 ENCOUNTER — PATIENT OUTREACH (OUTPATIENT)
Dept: SURGERY | Facility: CLINIC | Age: 61
End: 2021-10-27

## 2021-11-04 ENCOUNTER — PATIENT OUTREACH (OUTPATIENT)
Dept: SURGERY | Facility: CLINIC | Age: 61
End: 2021-11-04

## 2021-11-09 ENCOUNTER — OFFICE VISIT (OUTPATIENT)
Dept: SURGERY | Facility: CLINIC | Age: 61
End: 2021-11-09

## 2021-11-09 ENCOUNTER — OFFICE VISIT (OUTPATIENT)
Dept: SURGERY | Facility: CLINIC | Age: 61
End: 2021-11-09
Payer: MEDICARE

## 2021-11-09 VITALS
BODY MASS INDEX: 26.98 KG/M2 | SYSTOLIC BLOOD PRESSURE: 127 MMHG | TEMPERATURE: 98.8 F | OXYGEN SATURATION: 94 % | DIASTOLIC BLOOD PRESSURE: 91 MMHG | WEIGHT: 203.6 LBS | HEIGHT: 73 IN | HEART RATE: 84 BPM

## 2021-11-09 VITALS
HEIGHT: 73 IN | OXYGEN SATURATION: 94 % | BODY MASS INDEX: 26.98 KG/M2 | WEIGHT: 203.6 LBS | SYSTOLIC BLOOD PRESSURE: 127 MMHG | DIASTOLIC BLOOD PRESSURE: 91 MMHG | HEART RATE: 84 BPM | TEMPERATURE: 98.8 F

## 2021-11-09 DIAGNOSIS — M54.50 CHRONIC BILATERAL LOW BACK PAIN, UNSPECIFIED WHETHER SCIATICA PRESENT: Primary | ICD-10-CM

## 2021-11-09 DIAGNOSIS — Z79.899 OTHER LONG TERM (CURRENT) DRUG THERAPY: ICD-10-CM

## 2021-11-09 DIAGNOSIS — Z23 NEED FOR INFLUENZA VACCINATION: ICD-10-CM

## 2021-11-09 DIAGNOSIS — I25.10 ASCVD (ARTERIOSCLEROTIC CARDIOVASCULAR DISEASE): ICD-10-CM

## 2021-11-09 DIAGNOSIS — Z13.1 SCREENING FOR DIABETES MELLITUS: ICD-10-CM

## 2021-11-09 DIAGNOSIS — B20 HIV DISEASE (HCC): Primary | ICD-10-CM

## 2021-11-09 DIAGNOSIS — R63.5 WEIGHT GAIN: ICD-10-CM

## 2021-11-09 DIAGNOSIS — G89.29 CHRONIC BILATERAL LOW BACK PAIN, UNSPECIFIED WHETHER SCIATICA PRESENT: Primary | ICD-10-CM

## 2021-11-09 DIAGNOSIS — B20 HIV (HUMAN IMMUNODEFICIENCY VIRUS INFECTION) (HCC): ICD-10-CM

## 2021-11-09 DIAGNOSIS — Z13.6 ENCOUNTER FOR LIPID SCREENING FOR CARDIOVASCULAR DISEASE: ICD-10-CM

## 2021-11-09 DIAGNOSIS — D72.89 OTHER SPECIFIED DISORDERS OF WHITE BLOOD CELLS: ICD-10-CM

## 2021-11-09 DIAGNOSIS — R79.89 ELEVATED SERUM CREATININE: ICD-10-CM

## 2021-11-09 DIAGNOSIS — Z13.220 ENCOUNTER FOR LIPID SCREENING FOR CARDIOVASCULAR DISEASE: ICD-10-CM

## 2021-11-09 DIAGNOSIS — B20 HIV INFECTION, UNSPECIFIED SYMPTOM STATUS (HCC): ICD-10-CM

## 2021-11-09 DIAGNOSIS — I10 ESSENTIAL HYPERTENSION: ICD-10-CM

## 2021-11-09 DIAGNOSIS — R73.03 PREDIABETES: ICD-10-CM

## 2021-11-09 DIAGNOSIS — F43.10 PTSD (POST-TRAUMATIC STRESS DISORDER): ICD-10-CM

## 2021-11-09 DIAGNOSIS — T65.292A TOXIC EFFECT OF TOBACCO, INTENTIONAL SELF-HARM, INITIAL ENCOUNTER (HCC): ICD-10-CM

## 2021-11-09 DIAGNOSIS — T65.292D TOXIC EFFECT OF TOBACCO, INTENTIONAL SELF-HARM, SUBSEQUENT ENCOUNTER: ICD-10-CM

## 2021-11-09 DIAGNOSIS — D75.1 POLYCYTHEMIA: ICD-10-CM

## 2021-11-09 DIAGNOSIS — B20 HIV DISEASE (HCC): ICD-10-CM

## 2021-11-09 DIAGNOSIS — E78.1 HYPERTRIGLYCERIDEMIA: ICD-10-CM

## 2021-11-09 DIAGNOSIS — Z20.2 CONTACT WITH AND (SUSPECTED) EXPOSURE TO INFECTIONS WITH A PREDOMINANTLY SEXUAL MODE OF TRANSMISSION: ICD-10-CM

## 2021-11-09 DIAGNOSIS — Z11.3 ENCOUNTER FOR SCREENING FOR BACTERIAL SEXUALLY TRANSMITTED DISEASE: ICD-10-CM

## 2021-11-09 DIAGNOSIS — Z72.89 OTHER PROBLEMS RELATED TO LIFESTYLE: ICD-10-CM

## 2021-11-09 PROCEDURE — 99214 OFFICE O/P EST MOD 30 MIN: CPT | Performed by: NURSE PRACTITIONER

## 2021-11-09 PROCEDURE — 99215 OFFICE O/P EST HI 40 MIN: CPT | Performed by: INTERNAL MEDICINE

## 2021-11-09 PROCEDURE — 90682 RIV4 VACC RECOMBINANT DNA IM: CPT

## 2021-11-09 PROCEDURE — G0008 ADMIN INFLUENZA VIRUS VAC: HCPCS

## 2021-11-09 RX ORDER — TENOFOVIR DISOPROXIL FUMARATE 300 MG/1
300 TABLET, FILM COATED ORAL DAILY
Qty: 30 TABLET | Refills: 5 | Status: SHIPPED | OUTPATIENT
Start: 2021-11-09 | End: 2022-05-31

## 2021-11-09 RX ORDER — DARUNAVIR ETHANOLATE AND COBICISTAT 800; 150 MG/1; MG/1
1 TABLET, FILM COATED ORAL DAILY
Qty: 30 TABLET | Refills: 3 | Status: SHIPPED | OUTPATIENT
Start: 2021-11-09 | End: 2022-05-31

## 2021-11-09 RX ORDER — ASPIRIN 81 MG/1
81 TABLET, CHEWABLE ORAL DAILY
Qty: 30 TABLET | Refills: 3 | Status: SHIPPED | OUTPATIENT
Start: 2021-11-09 | End: 2022-05-31

## 2021-11-09 RX ORDER — DOLUTEGRAVIR SODIUM 50 MG/1
50 TABLET, FILM COATED ORAL DAILY
Qty: 30 TABLET | Refills: 5 | Status: SHIPPED | OUTPATIENT
Start: 2021-11-09 | End: 2022-05-31

## 2021-11-09 RX ORDER — AMLODIPINE BESYLATE 2.5 MG/1
2.5 TABLET ORAL DAILY
Qty: 30 TABLET | Refills: 5 | Status: SHIPPED | OUTPATIENT
Start: 2021-11-09 | End: 2022-05-31

## 2021-11-09 RX ORDER — PAROXETINE HYDROCHLORIDE 20 MG/1
20 TABLET, FILM COATED ORAL DAILY
Qty: 30 TABLET | Refills: 5 | Status: SHIPPED | OUTPATIENT
Start: 2021-11-09 | End: 2022-05-31

## 2021-11-11 ENCOUNTER — TELEPHONE (OUTPATIENT)
Dept: PHYSICAL THERAPY | Facility: OTHER | Age: 61
End: 2021-11-11

## 2021-11-12 ENCOUNTER — NURSE TRIAGE (OUTPATIENT)
Dept: PHYSICAL THERAPY | Facility: OTHER | Age: 61
End: 2021-11-12

## 2021-11-12 DIAGNOSIS — M54.50 ACUTE EXACERBATION OF CHRONIC LOW BACK PAIN: Primary | ICD-10-CM

## 2021-11-12 DIAGNOSIS — G89.29 ACUTE EXACERBATION OF CHRONIC LOW BACK PAIN: Primary | ICD-10-CM

## 2021-11-16 ENCOUNTER — PATIENT OUTREACH (OUTPATIENT)
Dept: SURGERY | Facility: CLINIC | Age: 61
End: 2021-11-16

## 2021-11-17 ENCOUNTER — PATIENT OUTREACH (OUTPATIENT)
Dept: SURGERY | Facility: CLINIC | Age: 61
End: 2021-11-17

## 2021-11-18 ENCOUNTER — EVALUATION (OUTPATIENT)
Dept: PHYSICAL THERAPY | Facility: REHABILITATION | Age: 61
End: 2021-11-18
Payer: MEDICARE

## 2021-11-18 DIAGNOSIS — G89.29 ACUTE EXACERBATION OF CHRONIC LOW BACK PAIN: Primary | ICD-10-CM

## 2021-11-18 DIAGNOSIS — M54.50 ACUTE EXACERBATION OF CHRONIC LOW BACK PAIN: Primary | ICD-10-CM

## 2021-11-18 PROCEDURE — 97110 THERAPEUTIC EXERCISES: CPT | Performed by: PHYSICAL THERAPIST

## 2021-11-18 PROCEDURE — 97161 PT EVAL LOW COMPLEX 20 MIN: CPT | Performed by: PHYSICAL THERAPIST

## 2021-11-23 ENCOUNTER — OFFICE VISIT (OUTPATIENT)
Dept: PHYSICAL THERAPY | Facility: REHABILITATION | Age: 61
End: 2021-11-23
Payer: MEDICARE

## 2021-11-23 DIAGNOSIS — M54.50 ACUTE EXACERBATION OF CHRONIC LOW BACK PAIN: Primary | ICD-10-CM

## 2021-11-23 DIAGNOSIS — G89.29 ACUTE EXACERBATION OF CHRONIC LOW BACK PAIN: Primary | ICD-10-CM

## 2021-11-23 PROCEDURE — 97110 THERAPEUTIC EXERCISES: CPT | Performed by: PHYSICAL THERAPIST

## 2021-11-23 PROCEDURE — 97112 NEUROMUSCULAR REEDUCATION: CPT | Performed by: PHYSICAL THERAPIST

## 2021-11-24 ENCOUNTER — OFFICE VISIT (OUTPATIENT)
Dept: PHYSICAL THERAPY | Facility: REHABILITATION | Age: 61
End: 2021-11-24
Payer: MEDICARE

## 2021-11-24 DIAGNOSIS — M54.50 ACUTE EXACERBATION OF CHRONIC LOW BACK PAIN: Primary | ICD-10-CM

## 2021-11-24 DIAGNOSIS — G89.29 ACUTE EXACERBATION OF CHRONIC LOW BACK PAIN: Primary | ICD-10-CM

## 2021-11-24 PROCEDURE — 97110 THERAPEUTIC EXERCISES: CPT | Performed by: PHYSICAL THERAPIST

## 2021-11-24 PROCEDURE — 97112 NEUROMUSCULAR REEDUCATION: CPT | Performed by: PHYSICAL THERAPIST

## 2021-11-30 ENCOUNTER — APPOINTMENT (OUTPATIENT)
Dept: PHYSICAL THERAPY | Facility: REHABILITATION | Age: 61
End: 2021-11-30
Payer: MEDICARE

## 2021-12-02 ENCOUNTER — OFFICE VISIT (OUTPATIENT)
Dept: PHYSICAL THERAPY | Facility: REHABILITATION | Age: 61
End: 2021-12-02
Payer: MEDICARE

## 2021-12-02 DIAGNOSIS — M54.50 ACUTE EXACERBATION OF CHRONIC LOW BACK PAIN: Primary | ICD-10-CM

## 2021-12-02 DIAGNOSIS — G89.29 ACUTE EXACERBATION OF CHRONIC LOW BACK PAIN: Primary | ICD-10-CM

## 2021-12-02 PROCEDURE — 97110 THERAPEUTIC EXERCISES: CPT | Performed by: PHYSICAL THERAPIST

## 2021-12-02 PROCEDURE — 97140 MANUAL THERAPY 1/> REGIONS: CPT | Performed by: PHYSICAL THERAPIST

## 2021-12-02 PROCEDURE — 97112 NEUROMUSCULAR REEDUCATION: CPT | Performed by: PHYSICAL THERAPIST

## 2021-12-07 ENCOUNTER — OFFICE VISIT (OUTPATIENT)
Dept: PHYSICAL THERAPY | Facility: REHABILITATION | Age: 61
End: 2021-12-07
Payer: MEDICARE

## 2021-12-07 DIAGNOSIS — G89.29 ACUTE EXACERBATION OF CHRONIC LOW BACK PAIN: Primary | ICD-10-CM

## 2021-12-07 DIAGNOSIS — M54.50 ACUTE EXACERBATION OF CHRONIC LOW BACK PAIN: Primary | ICD-10-CM

## 2021-12-07 PROCEDURE — 97110 THERAPEUTIC EXERCISES: CPT | Performed by: PHYSICAL THERAPIST

## 2021-12-07 PROCEDURE — 97140 MANUAL THERAPY 1/> REGIONS: CPT | Performed by: PHYSICAL THERAPIST

## 2021-12-07 PROCEDURE — 97112 NEUROMUSCULAR REEDUCATION: CPT | Performed by: PHYSICAL THERAPIST

## 2021-12-09 ENCOUNTER — OFFICE VISIT (OUTPATIENT)
Dept: PHYSICAL THERAPY | Facility: REHABILITATION | Age: 61
End: 2021-12-09
Payer: MEDICARE

## 2021-12-09 DIAGNOSIS — M54.50 ACUTE EXACERBATION OF CHRONIC LOW BACK PAIN: Primary | ICD-10-CM

## 2021-12-09 DIAGNOSIS — G89.29 ACUTE EXACERBATION OF CHRONIC LOW BACK PAIN: Primary | ICD-10-CM

## 2021-12-09 PROCEDURE — 97112 NEUROMUSCULAR REEDUCATION: CPT | Performed by: PHYSICAL THERAPIST

## 2021-12-09 PROCEDURE — 97110 THERAPEUTIC EXERCISES: CPT | Performed by: PHYSICAL THERAPIST

## 2021-12-09 PROCEDURE — 97140 MANUAL THERAPY 1/> REGIONS: CPT | Performed by: PHYSICAL THERAPIST

## 2021-12-14 ENCOUNTER — OFFICE VISIT (OUTPATIENT)
Dept: PHYSICAL THERAPY | Facility: REHABILITATION | Age: 61
End: 2021-12-14
Payer: MEDICARE

## 2021-12-14 DIAGNOSIS — M54.50 ACUTE EXACERBATION OF CHRONIC LOW BACK PAIN: Primary | ICD-10-CM

## 2021-12-14 DIAGNOSIS — G89.29 ACUTE EXACERBATION OF CHRONIC LOW BACK PAIN: Primary | ICD-10-CM

## 2021-12-14 PROCEDURE — 97140 MANUAL THERAPY 1/> REGIONS: CPT | Performed by: PHYSICAL THERAPIST

## 2021-12-14 PROCEDURE — 97112 NEUROMUSCULAR REEDUCATION: CPT

## 2021-12-14 PROCEDURE — 97110 THERAPEUTIC EXERCISES: CPT | Performed by: PHYSICAL THERAPIST

## 2021-12-16 ENCOUNTER — PATIENT OUTREACH (OUTPATIENT)
Dept: SURGERY | Facility: CLINIC | Age: 61
End: 2021-12-16

## 2021-12-16 ENCOUNTER — OFFICE VISIT (OUTPATIENT)
Dept: PHYSICAL THERAPY | Facility: REHABILITATION | Age: 61
End: 2021-12-16
Payer: MEDICARE

## 2021-12-16 DIAGNOSIS — M54.50 ACUTE EXACERBATION OF CHRONIC LOW BACK PAIN: Primary | ICD-10-CM

## 2021-12-16 DIAGNOSIS — G89.29 ACUTE EXACERBATION OF CHRONIC LOW BACK PAIN: Primary | ICD-10-CM

## 2021-12-16 PROCEDURE — 97112 NEUROMUSCULAR REEDUCATION: CPT | Performed by: PHYSICAL THERAPIST

## 2021-12-16 PROCEDURE — 97140 MANUAL THERAPY 1/> REGIONS: CPT | Performed by: PHYSICAL THERAPIST

## 2021-12-16 PROCEDURE — 97110 THERAPEUTIC EXERCISES: CPT | Performed by: PHYSICAL THERAPIST

## 2021-12-17 ENCOUNTER — PATIENT OUTREACH (OUTPATIENT)
Dept: SURGERY | Facility: CLINIC | Age: 61
End: 2021-12-17

## 2021-12-21 ENCOUNTER — OFFICE VISIT (OUTPATIENT)
Dept: PHYSICAL THERAPY | Facility: REHABILITATION | Age: 61
End: 2021-12-21
Payer: MEDICARE

## 2021-12-21 DIAGNOSIS — G89.29 ACUTE EXACERBATION OF CHRONIC LOW BACK PAIN: Primary | ICD-10-CM

## 2021-12-21 DIAGNOSIS — M54.50 ACUTE EXACERBATION OF CHRONIC LOW BACK PAIN: Primary | ICD-10-CM

## 2021-12-21 PROCEDURE — 97530 THERAPEUTIC ACTIVITIES: CPT | Performed by: PHYSICAL THERAPIST

## 2021-12-21 PROCEDURE — 97140 MANUAL THERAPY 1/> REGIONS: CPT | Performed by: PHYSICAL THERAPIST

## 2021-12-21 PROCEDURE — 97112 NEUROMUSCULAR REEDUCATION: CPT | Performed by: PHYSICAL THERAPIST

## 2021-12-23 ENCOUNTER — EVALUATION (OUTPATIENT)
Dept: PHYSICAL THERAPY | Facility: REHABILITATION | Age: 61
End: 2021-12-23
Payer: MEDICARE

## 2021-12-23 DIAGNOSIS — G89.29 ACUTE EXACERBATION OF CHRONIC LOW BACK PAIN: Primary | ICD-10-CM

## 2021-12-23 DIAGNOSIS — M54.50 ACUTE EXACERBATION OF CHRONIC LOW BACK PAIN: Primary | ICD-10-CM

## 2021-12-23 PROCEDURE — 97530 THERAPEUTIC ACTIVITIES: CPT | Performed by: PHYSICAL THERAPIST

## 2021-12-23 PROCEDURE — 97112 NEUROMUSCULAR REEDUCATION: CPT | Performed by: PHYSICAL THERAPIST

## 2021-12-28 ENCOUNTER — OFFICE VISIT (OUTPATIENT)
Dept: PHYSICAL THERAPY | Facility: REHABILITATION | Age: 61
End: 2021-12-28
Payer: MEDICARE

## 2021-12-28 DIAGNOSIS — G89.29 ACUTE EXACERBATION OF CHRONIC LOW BACK PAIN: Primary | ICD-10-CM

## 2021-12-28 DIAGNOSIS — M54.50 ACUTE EXACERBATION OF CHRONIC LOW BACK PAIN: Primary | ICD-10-CM

## 2021-12-28 PROCEDURE — 97110 THERAPEUTIC EXERCISES: CPT | Performed by: PHYSICAL THERAPIST

## 2021-12-28 PROCEDURE — 97112 NEUROMUSCULAR REEDUCATION: CPT | Performed by: PHYSICAL THERAPIST

## 2021-12-28 PROCEDURE — 97530 THERAPEUTIC ACTIVITIES: CPT | Performed by: PHYSICAL THERAPIST

## 2021-12-29 ENCOUNTER — PATIENT OUTREACH (OUTPATIENT)
Dept: SURGERY | Facility: CLINIC | Age: 61
End: 2021-12-29

## 2021-12-30 ENCOUNTER — OFFICE VISIT (OUTPATIENT)
Dept: PHYSICAL THERAPY | Facility: REHABILITATION | Age: 61
End: 2021-12-30
Payer: MEDICARE

## 2021-12-30 DIAGNOSIS — M54.50 ACUTE EXACERBATION OF CHRONIC LOW BACK PAIN: Primary | ICD-10-CM

## 2021-12-30 DIAGNOSIS — G89.29 ACUTE EXACERBATION OF CHRONIC LOW BACK PAIN: Primary | ICD-10-CM

## 2021-12-30 PROCEDURE — 97530 THERAPEUTIC ACTIVITIES: CPT | Performed by: PHYSICAL THERAPIST

## 2021-12-30 PROCEDURE — 97140 MANUAL THERAPY 1/> REGIONS: CPT | Performed by: PHYSICAL THERAPIST

## 2021-12-30 PROCEDURE — 97112 NEUROMUSCULAR REEDUCATION: CPT | Performed by: PHYSICAL THERAPIST

## 2022-01-04 ENCOUNTER — APPOINTMENT (OUTPATIENT)
Dept: PHYSICAL THERAPY | Facility: REHABILITATION | Age: 62
End: 2022-01-04
Payer: MEDICARE

## 2022-01-06 ENCOUNTER — OFFICE VISIT (OUTPATIENT)
Dept: PHYSICAL THERAPY | Facility: REHABILITATION | Age: 62
End: 2022-01-06
Payer: MEDICARE

## 2022-01-06 DIAGNOSIS — M54.50 ACUTE EXACERBATION OF CHRONIC LOW BACK PAIN: Primary | ICD-10-CM

## 2022-01-06 DIAGNOSIS — G89.29 ACUTE EXACERBATION OF CHRONIC LOW BACK PAIN: Primary | ICD-10-CM

## 2022-01-06 PROCEDURE — 97530 THERAPEUTIC ACTIVITIES: CPT

## 2022-01-06 PROCEDURE — 97140 MANUAL THERAPY 1/> REGIONS: CPT

## 2022-01-06 PROCEDURE — 97112 NEUROMUSCULAR REEDUCATION: CPT

## 2022-01-06 NOTE — PROGRESS NOTES
Daily Note     Today's date: 2022  Patient name: Mark Giraldo  : 1960  MRN: [id number]  Referring provider: Lashae Scott, PT  Dx:   Encounter Diagnosis     ICD-10-CM    1  Acute exacerbation of chronic low back pain  M54 50     G89 29                   Subjective: Vangie Wang reports some pain in LLE at start of session  Objective: See treatment diary below      Assessment: Tolerated treatment well  Pt with improved tolerance to STS this visit and able to complete 2 sets to fatigue  Good tolerance to remainder of TE with appropriate fatigue as a result and no increased pain noted  Patient demonstrated fatigue post treatment, exhibited good technique with therapeutic exercises and would benefit from continued PT  Plan: Continue per plan of care  Progress treatment as tolerated         Precautions: Fall Risk, HIV, PTSD, DDD, HTN, chronic LBP     * Indicates part of HEP  HEP code: [id number]     Daily Treatment Diary     Manuals    Lumbar distraction Feet on ball - done np Feet on ball - done Feet on ball - done Feet on ball-done Feet on ball- done Feet on ball Feet on ball - done                         Therapeutic Exercise           Bike  5' lvl 1 5' lvl1 5' lvl 2 5' lvl 3 5' lv 3 5' lv1 5' lvl 1 5' lvl 1   LTR           Seated ball roll out           Hamstring stretch           Quad stretch           Seated ball roll out           Clamshells*      Supine otb 3x10      SLR flexion           LAQ           SLR abduction           Paloff press           Standing hip extension   3x10 ea leaning on counter     2x10 ea leaning on table w/ mat   Standing hip abduction   3x10 ea leaning on counter     2x10 ea leaning on table w/ mat              Patient education           Neuro Re-ed           Slump slider 15x3" ea 15x3" ea 15x3" ea 15x3" ea 15x3" ea 2x10x3" ea 15x3" ea 15x3" ea   Femoral nerve glide 15x3" L leg extended w/ PF 15x3" L leg extended w/ PF 15x3" L leg extended w/ PF 15x3" L leg extended w/ PF 15x3" L leg extended w/PF 15x3" L leg extended w/ PF 15x3" L leg extended w/ PF 15x3" L leg extended w/ PF   ADIM with PPT           ADIM with marches           ADIM with BKFO           Glute set*      2x10x5" feet on ball  2x10x5" feet on ball    Bridges* 3x15 feet on ball 3x15 3x15 3x15 3x15 3x10 3x12 feet on ball 3x12 feet on ball   Supine ball crush           Standing ball crush           Standing ball crush marches           Bird dogs           Bear holds           Dead bug           Plank shoulder taps                                                       Therapeutic Activity           Sit to stands  5x chair + foam 2x5 chair + foam 1x5 chair + foam 2x5 chair + foam      Leg press 99# 3x10 P10S3 105# 3x10 P8S3 105# 3x10 P8S3 105# 3x12 P8S3 105#  3x12  P8S3  95# 3x8 P10S3 95# 3x10 P10S3   Band resisted side stepping No band 10 laps at counter PTB 5 laps at counter  PTB 10 laps at counter PTB 10 laps at [employer] walks           Goblet squat           Bent over row           Deadlift           Farmer's carry                                            Modalities

## 2022-01-11 ENCOUNTER — OFFICE VISIT (OUTPATIENT)
Dept: PHYSICAL THERAPY | Facility: REHABILITATION | Age: 62
End: 2022-01-11
Payer: MEDICARE

## 2022-01-11 DIAGNOSIS — G89.29 ACUTE EXACERBATION OF CHRONIC LOW BACK PAIN: Primary | ICD-10-CM

## 2022-01-11 DIAGNOSIS — M54.50 ACUTE EXACERBATION OF CHRONIC LOW BACK PAIN: Primary | ICD-10-CM

## 2022-01-11 PROCEDURE — 97112 NEUROMUSCULAR REEDUCATION: CPT | Performed by: PHYSICAL THERAPIST

## 2022-01-11 PROCEDURE — 97140 MANUAL THERAPY 1/> REGIONS: CPT | Performed by: PHYSICAL THERAPIST

## 2022-01-11 PROCEDURE — 97530 THERAPEUTIC ACTIVITIES: CPT | Performed by: PHYSICAL THERAPIST

## 2022-01-11 NOTE — PROGRESS NOTES
Daily Note     Today's date: 2022  Patient name: Moses Henriquez  : 1960  MRN: [id number]  Referring provider: Daniel Sanchez, PT  Dx:   Encounter Diagnosis     ICD-10-CM    1  Acute exacerbation of chronic low back pain  M54 50     G89 29        Start Time: 0848  Stop Time: 09  Total time in clinic (min): 44 minutes    Subjective: Michael Shipley reports he's been having increased pain in his legs since last week  Objective: See treatment diary below       Assessment: Tolerated treatment well  Good tolerance to exercises throughout session with evident fatigue during sit to stands  Patient demonstrated appropriate level of challenge and muscular fatigue throughout session and noted good status at end of visit  Patient demonstrated fatigue post treatment, exhibited good technique with therapeutic exercises and would benefit from continued PT  Plan: Continue per plan of care  Progress treatment as tolerated         Precautions: Fall Risk, HIV, PTSD, DDD, HTN, chronic LBP     * Indicates part of HEP  HEP code: [id number]     Daily Treatment Diary     Manuals    Lumbar distraction Feet on ball - done np Feet on ball - done Feet on ball - done Feet on ball-done Feet on ball - done  Feet on ball - done                         Therapeutic Exercise           Bike  5' lvl 1 5' lvl1 5' lvl 2 5' lvl 3 5' lv 3 5' lvl 3  5' lvl 1   LTR           Seated ball roll out           Hamstring stretch           Quad stretch           Seated ball roll out           Clamshells*           SLR flexion           LAQ           SLR abduction           Paloff press           Standing hip extension   3x10 ea leaning on counter     2x10 ea leaning on table w/ mat   Standing hip abduction   3x10 ea leaning on counter     2x10 ea leaning on table w/ mat              Patient education           Neuro Re-ed           Slump slider 15x3" ea 15x3" ea 15x3" ea 15x3" ea 15x3" ea 15x3" ea  15x3" ea Femoral nerve glide 15x3" L leg extended w/ PF 15x3" L leg extended w/ PF 15x3" L leg extended w/ PF 15x3" L leg extended w/ PF 15x3" L leg extended w/PF 15x3" L leg extended w/PF  15x3" L leg extended w/ PF   ADIM with PPT           ADIM with marches           ADIM with BKFO           Glute set*           Bridges* 3x15 feet on ball 3x15 3x15 3x15 3x15 3x15  3x12 feet on ball   Supine ball crush           Standing ball crush           Standing ball crush marches           Bird dogs           Bear holds           Dead bug           Plank shoulder taps                                                       Therapeutic Activity           Sit to stands  5x chair + foam 2x5 chair + foam 1x5 chair + foam 2x5 chair + foam Chair + foam to fatigue 1x (22 reps)     Leg press 99# 3x10 P10S3 105# 3x10 P8S3 105# 3x10 P8S3 105# 3x12 P8S3 105#  3x12  P8S3 105# 3x12 P8S3  95# 3x10 P10S3   Band resisted side stepping No band 10 laps at counter PTB 5 laps at counter  PTB 10 laps at counter PTB 10 laps at [employer] walks           Goblet squat           Bent over row           Deadlift           Farmer's carry                                            Modalities

## 2022-01-13 ENCOUNTER — OFFICE VISIT (OUTPATIENT)
Dept: PHYSICAL THERAPY | Facility: REHABILITATION | Age: 62
End: 2022-01-13
Payer: MEDICARE

## 2022-01-13 DIAGNOSIS — G89.29 ACUTE EXACERBATION OF CHRONIC LOW BACK PAIN: Primary | ICD-10-CM

## 2022-01-13 DIAGNOSIS — M54.50 ACUTE EXACERBATION OF CHRONIC LOW BACK PAIN: Primary | ICD-10-CM

## 2022-01-13 PROCEDURE — 97140 MANUAL THERAPY 1/> REGIONS: CPT | Performed by: PHYSICAL THERAPIST

## 2022-01-13 PROCEDURE — 97530 THERAPEUTIC ACTIVITIES: CPT | Performed by: PHYSICAL THERAPIST

## 2022-01-13 PROCEDURE — 97112 NEUROMUSCULAR REEDUCATION: CPT | Performed by: PHYSICAL THERAPIST

## 2022-01-13 NOTE — PROGRESS NOTES
Daily Note     Today's date: 2022  Patient name: Moses Henriquez  : 1960  MRN: [id number]  Referring provider: Daniel Sanchez, PT  Dx:   Encounter Diagnosis     ICD-10-CM    1  Acute exacerbation of chronic low back pain  M54 50     G89 29        Start Time: 836  Stop Time: 930  Total time in clinic (min): 54 minutes    Subjective: Michael Shipley reports he's having some bilateral leg pain today  Noted muscle soreness and fatigue following last session  Objective: See treatment diary below      Assessment: Tolerated treatment well  Evident fatigue with exercises throughout session  Increased weight for leg press this session which fatigued BLEs leading to increased difficulty with sit to stands  Patient demonstrated appropriate level of challenge and muscular fatigue throughout session and noted good status at end of visit  Patient demonstrated fatigue post treatment, exhibited good technique with therapeutic exercises and would benefit from continued PT  Plan: Continue per plan of care  Progress treatment as tolerated         Precautions: Fall Risk, HIV, PTSD, DDD, HTN, chronic LBP     * Indicates part of HEP  HEP code: [id number]     Daily Treatment Diary     Manuals     Lumbar distraction Feet on ball - done np Feet on ball - done Feet on ball - done Feet on ball-done Feet on ball - done Feet on ball - done                          Therapeutic Exercise           Bike  5' lvl 1 5' lvl1 5' lvl 2 5' lvl 3 5' lv 3 5' lvl 3 5' lvl 3    LTR           Seated ball roll out           Hamstring stretch           Quad stretch           Seated ball roll out           Clamshells*           SLR flexion           LAQ           SLR abduction           Paloff press           Standing hip extension   3x10 ea leaning on counter        Standing hip abduction   3x10 ea leaning on counter    3x10 ea leaning on counter               Patient education           Neuro Re-ed Slump slider 15x3" ea 15x3" ea 15x3" ea 15x3" ea 15x3" ea 15x3" ea 15x3" ea    Femoral nerve glide 15x3" L leg extended w/ PF 15x3" L leg extended w/ PF 15x3" L leg extended w/ PF 15x3" L leg extended w/ PF 15x3" L leg extended w/PF 15x3" L leg extended w/PF 15x3" L leg extended w/PF    ADIM with PPT           ADIM with marches           ADIM with BKFO           Glute set*           Bridges* 3x15 feet on ball 3x15 3x15 3x15 3x15 3x15 3x15    Supine ball crush           Standing ball crush           Standing ball crush marches           Bird dogs           Bear holds           Dead bug           Plank shoulder taps                                                       Therapeutic Activity           Sit to stands  5x chair + foam 2x5 chair + foam 1x5 chair + foam 2x5 chair + foam Chair + foam to fatigue 1x (22 reps) Chair + foam to fatigue 1x (10 reps)    Leg press 99# 3x10 P10S3 105# 3x10 P8S3 105# 3x10 P8S3 105# 3x12 P8S3 105#  3x12  P8S3 105# 3x12 P8S3 115# 3x12 P8S3    Band resisted side stepping No band 10 laps at counter PTB 5 laps at counter  PTB 10 laps at counter PTB 10 laps at [employer] walks           Goblet squat           Bent over row           Deadlift           Farmer's carry                                            Modalities

## 2022-01-18 ENCOUNTER — OFFICE VISIT (OUTPATIENT)
Dept: PHYSICAL THERAPY | Facility: REHABILITATION | Age: 62
End: 2022-01-18
Payer: MEDICARE

## 2022-01-18 DIAGNOSIS — M54.50 ACUTE EXACERBATION OF CHRONIC LOW BACK PAIN: Primary | ICD-10-CM

## 2022-01-18 DIAGNOSIS — G89.29 ACUTE EXACERBATION OF CHRONIC LOW BACK PAIN: Primary | ICD-10-CM

## 2022-01-18 PROCEDURE — 97112 NEUROMUSCULAR REEDUCATION: CPT | Performed by: PHYSICAL THERAPIST

## 2022-01-18 PROCEDURE — 97110 THERAPEUTIC EXERCISES: CPT | Performed by: PHYSICAL THERAPIST

## 2022-01-18 PROCEDURE — 97140 MANUAL THERAPY 1/> REGIONS: CPT | Performed by: PHYSICAL THERAPIST

## 2022-01-18 NOTE — PROGRESS NOTES
Daily Note     Today's date: 2022  Patient name: Jocelyne Silva  : 1960  MRN: [id number]  Referring provider: Kayla Armstrong, PT  Dx:   Encounter Diagnosis     ICD-10-CM    1  Acute exacerbation of chronic low back pain  M54 50     G89 29        Start Time: 0840  Stop Time: 0932  Total time in clinic (min): 52 minutes    Subjective: Ugo Mojica reports increased pain today in his back and legs and states he thinks he over did it yesterday shoveling  Objective: See treatment diary below      Assessment: Tolerated treatment well  Session primarily focused on decreasing pain due to reports of increased pain level at start of session  Good tolerance to exercises throughout session with adequate muscle fatigue throughout session  Patient demonstrated appropriate level of challenge and muscular fatigue throughout session and noted good status at end of visit  Patient demonstrated fatigue post treatment, exhibited good technique with therapeutic exercises and would benefit from continued PT  Plan: Continue per plan of care  Progress treatment as tolerated         Precautions: Fall Risk, HIV, PTSD, DDD, HTN, chronic LBP     * Indicates part of HEP  HEP code: [id number]     Daily Treatment Diary     Manuals    Lumbar distraction Feet on ball - done np Feet on ball - done Feet on ball - done Feet on ball-done Feet on ball - done Feet on ball - done Feet on ball - done                         Therapeutic Exercise           Bike  5' lvl 1 5' lvl1 5' lvl 2 5' lvl 3 5' lv 3 5' lvl 3 5' lvl 3 5' lvl 2   LTR           Seated ball roll out           Hamstring stretch           Quad stretch           Seated ball roll out           Clamshells*           SLR flexion           LAQ           SLR abduction           Paloff press           Standing hip extension   3x10 ea leaning on counter     3x10 ea leaning on table w/ mat   Standing hip abduction   3x10 ea leaning on counter 3x10 ea leaning on counter 3x10 ea leaning on table w/ mat              Patient education           Neuro Re-ed           Slump slider 15x3" ea 15x3" ea 15x3" ea 15x3" ea 15x3" ea 15x3" ea 15x3" ea 15x3" ea   Femoral nerve glide 15x3" L leg extended w/ PF 15x3" L leg extended w/ PF 15x3" L leg extended w/ PF 15x3" L leg extended w/ PF 15x3" L leg extended w/PF 15x3" L leg extended w/PF 15x3" L leg extended w/PF 15x3" L leg extended w/PF   ADIM with PPT           ADIM with marches           ADIM with BKFO           Glute set*           Bridges* 3x15 feet on ball 3x15 3x15 3x15 3x15 3x15 3x15 3x15   Supine ball crush           Standing ball crush           Standing ball crush marches           Bird dogs           Bear holds           Dead bug           Plank shoulder taps                                                       Therapeutic Activity           Sit to stands  5x chair + foam 2x5 chair + foam 1x5 chair + foam 2x5 chair + foam Chair + foam to fatigue 1x (22 reps) Chair + foam to fatigue 1x (10 reps)    Leg press 99# 3x10 P10S3 105# 3x10 P8S3 105# 3x10 P8S3 105# 3x12 P8S3 105#  3x12  P8S3 105# 3x12 P8S3 115# 3x12 P8S3 115# 3x12 P8S3   Band resisted side stepping No band 10 laps at counter PTB 5 laps at counter  PTB 10 laps at counter PTB 10 laps at [employer] walks           Goblet squat           Bent over row           Deadlift           Farmer's carry                                            Modalities

## 2022-01-20 ENCOUNTER — APPOINTMENT (OUTPATIENT)
Dept: PHYSICAL THERAPY | Facility: REHABILITATION | Age: 62
End: 2022-01-20
Payer: MEDICARE

## 2022-01-24 ENCOUNTER — OFFICE VISIT (OUTPATIENT)
Dept: PHYSICAL THERAPY | Facility: REHABILITATION | Age: 62
End: 2022-01-24
Payer: MEDICARE

## 2022-01-24 DIAGNOSIS — M54.50 ACUTE EXACERBATION OF CHRONIC LOW BACK PAIN: Primary | ICD-10-CM

## 2022-01-24 DIAGNOSIS — G89.29 ACUTE EXACERBATION OF CHRONIC LOW BACK PAIN: Primary | ICD-10-CM

## 2022-01-24 PROCEDURE — 97110 THERAPEUTIC EXERCISES: CPT | Performed by: PHYSICAL THERAPIST

## 2022-01-24 PROCEDURE — 97112 NEUROMUSCULAR REEDUCATION: CPT | Performed by: PHYSICAL THERAPIST

## 2022-01-24 PROCEDURE — 97140 MANUAL THERAPY 1/> REGIONS: CPT | Performed by: PHYSICAL THERAPIST

## 2022-01-24 NOTE — PROGRESS NOTES
Daily Note     Today's date: 2022  Patient name: Cheryl Pickett  : 1960  MRN: [id number]  Referring provider: Aixa Calderon, PT  Dx:   Encounter Diagnosis     ICD-10-CM    1  Acute exacerbation of chronic low back pain  M54 50     G89 29        Start Time: 846  Stop Time: 930  Total time in clinic (min): 44 minutes    Subjective: States on Thursday he slipped and fell on ice; reports landing on the L side of his back/hip and has been having more pain that fluctuates  Reports he needed assistance to get up after falling  Objective: See treatment diary below      Assessment: Tolerated treatment well  Increased challenge with standing hip extension and abduction this session compared to previous sessions likely secondary to increased soreness from falling last week  Continues to report decreased pain with lumbar distraction  Patient demonstrated appropriate level of challenge and muscular fatigue throughout session and noted good status at end of visit  Patient demonstrated fatigue post treatment, exhibited good technique with therapeutic exercises and would benefit from continued PT  Plan: Continue per plan of care  Progress treatment as tolerated         Precautions: Fall Risk, HIV, PTSD, DDD, HTN, chronic LBP     * Indicates part of HEP  HEP code: [id number]     Daily Treatment Diary     Manuals    Lumbar distraction Feet on ball - done  Feet on ball - done Feet on ball - done Feet on ball-done Feet on ball - done Feet on ball - done Feet on ball - done                         Therapeutic Exercise           Bike  5' lvl 1  5' lvl 2 5' lvl 3 5' lv 3 5' lvl 3 5' lvl 3 5' lvl 2   LTR           Seated ball roll out           Hamstring stretch           Quad stretch           Seated ball roll out           Clamshells*           SLR flexion           LAQ           SLR abduction           Paloff press           Standing hip extension 3x10 ea leaning on table w/ mat  3x10 ea leaning on counter     3x10 ea leaning on table w/ mat   Standing hip abduction 3x10 ea leaning on table w/ mat  3x10 ea leaning on counter    3x10 ea leaning on counter 3x10 ea leaning on table w/ mat              Patient education           Neuro Re-ed           Slump slider 15x3" ea  15x3" ea 15x3" ea 15x3" ea 15x3" ea 15x3" ea 15x3" ea   Femoral nerve glide 15x3" L leg extended w/PF  15x3" L leg extended w/ PF 15x3" L leg extended w/ PF 15x3" L leg extended w/PF 15x3" L leg extended w/PF 15x3" L leg extended w/PF 15x3" L leg extended w/PF   ADIM with PPT           ADIM with marches           ADIM with BKFO           Glute set*           Bridges* 3x15  3x15 3x15 3x15 3x15 3x15 3x15   Supine ball crush           Standing ball crush           Standing ball crush marches           Bird dogs           Bear holds           Dead bug           Plank shoulder taps                                                       Therapeutic Activity           Sit to stands   2x5 chair + foam 1x5 chair + foam 2x5 chair + foam Chair + foam to fatigue 1x (22 reps) Chair + foam to fatigue 1x (10 reps)    Leg press   105# 3x10 P8S3 105# 3x12 P8S3 105#  3x12  P8S3 105# 3x12 P8S3 115# 3x12 P8S3 115# 3x12 P8S3   Band resisted side stepping    PTB 10 laps at counter PTB 10 laps at [employer] walks           Goblet squat           Bent over row           Deadlift           Farmer's carry                                            Modalities

## 2022-01-28 ENCOUNTER — OFFICE VISIT (OUTPATIENT)
Dept: PHYSICAL THERAPY | Facility: REHABILITATION | Age: 62
End: 2022-01-28
Payer: MEDICARE

## 2022-01-28 DIAGNOSIS — G89.29 ACUTE EXACERBATION OF CHRONIC LOW BACK PAIN: Primary | ICD-10-CM

## 2022-01-28 DIAGNOSIS — M54.50 ACUTE EXACERBATION OF CHRONIC LOW BACK PAIN: Primary | ICD-10-CM

## 2022-01-28 PROCEDURE — 97140 MANUAL THERAPY 1/> REGIONS: CPT | Performed by: PHYSICAL THERAPIST

## 2022-01-28 PROCEDURE — 97530 THERAPEUTIC ACTIVITIES: CPT | Performed by: PHYSICAL THERAPIST

## 2022-01-28 PROCEDURE — 97112 NEUROMUSCULAR REEDUCATION: CPT | Performed by: PHYSICAL THERAPIST

## 2022-01-28 NOTE — PROGRESS NOTES
Daily Note     Today's date: 2022  Patient name: Rachael Galloway  : 1960  MRN: [id number]  Referring provider: Abe Saavedra, PT  Dx:   Encounter Diagnosis     ICD-10-CM    1  Acute exacerbation of chronic low back pain  M54 50     G89 29        Start Time: 910  Stop Time: 09  Total time in clinic (min): 45 minutes    Subjective: Verónica Mcneal continues to report soreness in the low back and legs  Objective: See treatment diary below      Assessment: Tolerated treatment well  Challenged with sit to stands and introduction of forward step ups this session  Easily fatigued with most TE  Patient demonstrated appropriate level of challenge and muscular fatigue throughout session and noted good status at end of visit  Patient demonstrated fatigue post treatment, exhibited good technique with therapeutic exercises and would benefit from continued PT  Plan: Continue per plan of care  Progress treatment as tolerated         Precautions: Fall Risk, HIV, PTSD, DDD, HTN, chronic LBP     * Indicates part of HEP  HEP code: [id number]     Daily Treatment Diary     Manuals    Lumbar distraction Feet on ball - done Feet on ball - done  Feet on ball - done Feet on ball-done Feet on ball - done Feet on ball - done Feet on ball - done                         Therapeutic Exercise           Bike  5' lvl 1 5' lvl 2  5' lvl 3 5' lv 3 5' lvl 3 5' lvl 3 5' lvl 2   LTR           Seated ball roll out           Hamstring stretch           Quad stretch           Seated ball roll out           Clamshells*           SLR flexion           LAQ           SLR abduction           Paloff press           Standing hip extension 3x10 ea leaning on table w/ mat       3x10 ea leaning on table w/ mat   Standing hip abduction 3x10 ea leaning on table w/ mat      3x10 ea leaning on counter 3x10 ea leaning on table w/ mat              Patient education           Neuro Re-ed           Slump slider 15x3" ea 15x3" ea  15x3" ea 15x3" ea 15x3" ea 15x3" ea 15x3" ea   Femoral nerve glide 15x3" L leg extended w/PF 15x3" L leg extended w/PF  15x3" L leg extended w/ PF 15x3" L leg extended w/PF 15x3" L leg extended w/PF 15x3" L leg extended w/PF 15x3" L leg extended w/PF   ADIM with PPT           ADIM with marches           ADIM with BKFO           Glute set*           Bridges* 3x15 3x15  3x15 3x15 3x15 3x15 3x15   Supine ball crush           Standing ball crush           Standing ball crush marches           Bird dogs           Bear holds           Dead bug           Plank shoulder taps                                                       Therapeutic Activity           Sit to stands  Chair + foam to fatigue 2x (13 reps, 10 reps)  1x5 chair + foam 2x5 chair + foam Chair + foam to fatigue 1x (22 reps) Chair + foam to fatigue 1x (10 reps)    Leg press    105# 3x12 P8S3 105#  3x12  P8S3 105# 3x12 P8S3 115# 3x12 P8S3 115# 3x12 P8S3   Band resisted side stepping    PTB 10 laps at counter PTB 10 laps at [employer] walks           Goblet squat           Bent over row           Deadlift           Farmer's carry           Forward step pus  4" step up/back  10x ea                               Modalities

## 2022-02-01 ENCOUNTER — OFFICE VISIT (OUTPATIENT)
Dept: PHYSICAL THERAPY | Facility: REHABILITATION | Age: 62
End: 2022-02-01
Payer: MEDICARE

## 2022-02-01 DIAGNOSIS — G89.29 ACUTE EXACERBATION OF CHRONIC LOW BACK PAIN: Primary | ICD-10-CM

## 2022-02-01 DIAGNOSIS — M54.50 ACUTE EXACERBATION OF CHRONIC LOW BACK PAIN: Primary | ICD-10-CM

## 2022-02-01 PROCEDURE — 97110 THERAPEUTIC EXERCISES: CPT | Performed by: PHYSICAL THERAPIST

## 2022-02-01 PROCEDURE — 97112 NEUROMUSCULAR REEDUCATION: CPT | Performed by: PHYSICAL THERAPIST

## 2022-02-01 PROCEDURE — 97140 MANUAL THERAPY 1/> REGIONS: CPT | Performed by: PHYSICAL THERAPIST

## 2022-02-01 NOTE — PROGRESS NOTES
Daily Note     Today's date: 2022  Patient name: Steven Stover  : 1960  MRN: [id number]  Referring provider: Natalia Tracey, PT  Dx:   Encounter Diagnosis     ICD-10-CM    1  Acute exacerbation of chronic low back pain  M54 50     G89 29        Start Time: 930  Stop Time: 102  Total time in clinic (min): 52 minutes    Subjective: Ladarius Forde reports pain at start of session but otherwise good status  Objective: See treatment diary below      Assessment: Tolerated treatment well  Evident fatigue with all exercises performed throughout session with short therapeutic rest breaks required  Patient demonstrated appropriate level of challenge and muscular fatigue throughout session and noted good status at end of visit  Patient demonstrated fatigue post treatment, exhibited good technique with therapeutic exercises and would benefit from continued PT  Plan: Continue per plan of care  Progress treatment as tolerated         Precautions: Fall Risk, HIV, PTSD, DDD, HTN, chronic LBP     * Indicates part of HEP  HEP code: [id number]     Daily Treatment Diary     Manuals    Lumbar distraction Feet on ball - done Feet on ball - done Feet on ball - done  Feet on ball-done Feet on ball - done Feet on ball - done Feet on ball - done                         Therapeutic Exercise           Bike  5' lvl 1 5' lvl 2 5' lvl 0  5' lv 3 5' lvl 3 5' lvl 3 5' lvl 2   LTR           Seated ball roll out           Hamstring stretch           Quad stretch           Seated ball roll out           Clamshells*           SLR flexion           LAQ           SLR abduction           Paloff press           Standing hip extension 3x10 ea leaning on table w/ mat  3x10 ea leaning on elevated high/low table     3x10 ea leaning on table w/ mat   Standing hip abduction 3x10 ea leaning on table w/ mat  3x10 ea leaning on elevated high/low table    3x10 ea leaning on counter 3x10 ea leaning on table w/ mat Patient education           Neuro Re-ed           Slump slider 15x3" ea 15x3" ea 15x3" ea  15x3" ea 15x3" ea 15x3" ea 15x3" ea   Femoral nerve glide 15x3" L leg extended w/PF 15x3" L leg extended w/PF 15x3" L leg extended w/PF  15x3" L leg extended w/PF 15x3" L leg extended w/PF 15x3" L leg extended w/PF 15x3" L leg extended w/PF   ADIM with PPT           ADIM with marches           ADIM with BKFO           Glute set*           Bridges* 3x15 3x15 3x15  3x15 3x15 3x15 3x15   Supine ball crush           Standing ball crush           Standing ball crush marches           Bird dogs           Bear holds           Dead bug           Plank shoulder taps                                                       Therapeutic Activity           Sit to stands  Chair + foam to fatigue 2x (13 reps, 10 reps)   2x5 chair + foam Chair + foam to fatigue 1x (22 reps) Chair + foam to fatigue 1x (10 reps)    Leg press   115# 3x12 P8S3  105#  3x12  P8S3 105# 3x12 P8S3 115# 3x12 P8S3    Band resisted side stepping     PTB 10 laps at [employer] walks           Goblet squat           Bent over row           Deadlift           Farmer's carry           Forward step pus  4" step up/back  10x ea                               Modalities

## 2022-02-03 ENCOUNTER — EVALUATION (OUTPATIENT)
Dept: PHYSICAL THERAPY | Facility: REHABILITATION | Age: 62
End: 2022-02-03
Payer: MEDICARE

## 2022-02-03 DIAGNOSIS — G89.29 ACUTE EXACERBATION OF CHRONIC LOW BACK PAIN: Primary | ICD-10-CM

## 2022-02-03 DIAGNOSIS — M54.50 ACUTE EXACERBATION OF CHRONIC LOW BACK PAIN: Primary | ICD-10-CM

## 2022-02-03 PROCEDURE — 97140 MANUAL THERAPY 1/> REGIONS: CPT | Performed by: PHYSICAL THERAPIST

## 2022-02-03 PROCEDURE — 97530 THERAPEUTIC ACTIVITIES: CPT | Performed by: PHYSICAL THERAPIST

## 2022-02-03 PROCEDURE — 97112 NEUROMUSCULAR REEDUCATION: CPT | Performed by: PHYSICAL THERAPIST

## 2022-02-03 NOTE — PROGRESS NOTES
PT Discharge    Today's date: 2/3/2022  Patient name: Angeline Gutiérrez  : 1960  MRN: [id number]  Referring provider: Alyssa Perez, SANG  Dx:   Encounter Diagnosis     ICD-10-CM    1  Acute exacerbation of chronic low back pain  M54 50     G89 29        Start Time: 930  Stop Time: 1018  Total time in clinic (min): 48 minutes    Assessment  Assessment details: Per patient report and clinical findings, Angeline Gutiérrez has made good progress since starting skilled physical therapy services  To this date, Angeline Gutiérrez has completed 20 visits of skilled physical therapy  Dorsyobany Less demonstrates improvements in objective measures and reports achieving what he expected out of skilled PT  [place] agreement between patient and PT was reached to transition to independent HEP at this time due to patient reported confidence in maintaining progress via HEP  Updated and reviewed HEP with patient; patient verbalized and demonstrated understanding of all exercises  Answered all questions prior to end of session  Understanding of Dx/Px/POC: good   Prognosis: good    Goals  STG (5 weeks):  1  Increase L hip abduction strength to at least 4/5 for improved activity tolerance  - met  2  Decrease pain at worst from 9/10 to 7/10 or less for improved QoL  - met    LTG (10 weeks):  1  Increased global hip girdle strength to 4+/5 to promote improved activity tolerance - met  2  Able to sit for 1 hour without symptom exacerbation to promote improved positional and driving tolerance  - progressing  3  Able to walk for at least 10 minutes without symptom exacerbation to promote improved community ambulation  - progressing  4  Able to sleep for 4 hours without waking secondary to pain to promote improved sleep quality  - met  5  Able to ascend/descend 1 flight of stairs without compensation to promote improved household and community ambulation  - met  6  Independent with HEP  - met    Plan  Plan details:  Thank you for referring Se Shrestha to Physical Therapy at [facility] and for the opportunity to coordinate care  Patient would benefit from: skilled physical therapy  Planned therapy interventions: home exercise program  Duration in visits: 1  Plan of Care beginning date: 2/3/2022  Treatment plan discussed with: patient        Subjective Evaluation    History of Present Illness  Mechanism of injury:   22:  Santiago Koehler reports feeling he has made overall improvements since starting PT  Does reports increased achiness today which he attributes to the rainy/cold weather  Notes decreased frequency of pain and slight improvements in activity tolerance  Reports he is able to ascend/descend stairs reciprocally with use of hand rail  21:  Santiago Koehler reports feeling he has made 40% improvement since IE  Notes improvements in lumbar mobility and overall mobility, ability to sleep, standing tolerance (about 8 minutes), some improvement in ability to go up/down stairs, and improvement in frequency and intensity of radiating pain into the legs  21:  Se Shrestha presents to skilled physical therapy with complaints of low back pain  Santiago Koehler reports pain located across the low back  Patient reports symptoms began about 3 years ago  Since exacerbation, symptoms have worsened  Currently, Santiago Koehler is having difficulty with standing (>3 minutes), sitting (>30-45 minutes) vacuuming, walking (> half a block), stair navigation, bending over, laundry, unloading groceries from the car, and balance  Patient admits to difficulty sleeping secondary to low back pain  Patient admits to pain that radiates to the feet and has numbness in bilateral feet  Santiago Koehler currently does not have any other appointments with physicians to follow up for back pain    Pain  Current pain ratin  At best pain ratin  At worst pain ratin  Location: low back  Quality: dull ache, sharp, throbbing and cramping  Relieving factors: medications  Aggravating factors: standing, walking, stair climbing, lifting and sitting    Social Support  Steps to enter house: yes  5  Stairs in house: yes   Lives in: multiple-level home  Lives with: alone    Employment status: not working    Diagnostic Tests  X-ray: abnormal (2008: L1-L5 DDD, OA of LS1 facet joints per imaging report provided by patient)  Treatments  Current treatment: physical therapy  Patient Goals  Patient goals for therapy: decreased pain, improved balance, independence with ADLs/IADLs and return to sport/leisure activities          Objective     Palpation   Left   Hypertonic in the lumbar paraspinals and quadratus lumborum  Tenderness of the lumbar paraspinals and quadratus lumborum  Right   Hypertonic in the lumbar paraspinals and quadratus lumborum  Tenderness of the lumbar paraspinals and quadratus lumborum  Additional Palpation Details  Glute min TTP bilaterally    Tenderness     Lumbar Spine  Tenderness in the spinous process, left transverse process and right transverse process  Active Range of Motion     Lumbar   Flexion:  Restriction level: minimal  Extension:  Restriction level: maximal  Left lateral flexion:  Restriction level: maximal  Right lateral flexion:  Restriction level: maximal  Left Hip   External rotation (90/90): 30 degrees with pain  Internal rotation (90/90): 0 degrees with pain    Right Hip   Flexion: [facility]  External rotation (90/90):  [facility]  Internal rotation (90/90): 30 degrees   Left Knee   Flexion: WFL  Extensor la degrees     Right Knee   Flexion: WFL  Extensor la degrees   Left Ankle/Foot   Dorsiflexion (ke): WFL  Plantar flexion: WFL    Right Ankle/Foot   Dorsiflexion (ke): WFL  Plantar flexion: WFL    Strength/Myotome Testing     Left Hip   Planes of Motion   Flexion: 4+  Abduction: 4+  Adduction: 4+  External rotation: 4+  Internal rotation: 5    Right Hip   Planes of Motion   Flexion: 5  Abduction: 4+  Adduction: 4+  External rotation: 4+  Internal rotation: 5    Left Knee   Flexion: 5  Extension: 5    Right Knee   Flexion: 5  Extension: 5    Left Ankle/Foot   Dorsiflexion: 4+  Plantar flexion: 4+    Right Ankle/Foot   Dorsiflexion: 4+  Plantar flexion: 4+    Tests     Lumbar     Left   Negative slump test      Right   Negative slump test      Left Hip   Positive PADMINI  Right Hip   Negative PADMINI  Additional Tests Details  Patient unable to lay prone, PA glides throughout lumbar spine assessed in sitting  No symptom provocation with lumbar PA or UPA glides bilaterally but TTP throughout  General Comments:      Lumbar Comments  Gait: ambulates w/ SPC on L, transitioned to R to inprove gait  Ambulates with approximately 45* knee flexion bilaterally  Short step length bilaterally              Precautions: Fall Risk, HIV, PTSD, DDD, HTN, chronic LBP     * Indicates part of HEP  HEP code: [id number]     Daily Treatment Diary     Manuals 1/24 1/28 2/1 2/3  1/11 1/13 1/18   Lumbar distraction Feet on ball - done Feet on ball - done Feet on ball - done Feet on ball - done  Feet on ball - done Feet on ball - done Feet on ball - done                         Therapeutic Exercise           Bike  5' lvl 1 5' lvl 2 5' lvl 0 5' lvl 0  5' lvl 3 5' lvl 3 5' lvl 2   LTR           Seated ball roll out           Hamstring stretch           Quad stretch           Seated ball roll out           Clamshells*           SLR flexion           LAQ           SLR abduction           Paloff press           Standing hip extension* 3x10 ea leaning on table w/ mat  3x10 ea leaning on elevated high/low table     3x10 ea leaning on table w/ mat   Standing hip abduction* 3x10 ea leaning on table w/ mat  3x10 ea leaning on elevated high/low table    3x10 ea leaning on counter 3x10 ea leaning on table w/ mat              Patient education           Neuro Re-ed           Slump slider* 15x3" ea 15x3" ea 15x3" ea 15x3" ea  15x3" ea 15x3" ea 15x3" ea   Femoral nerve glide 15x3" L leg extended w/PF 15x3" L leg extended w/PF 15x3" L leg extended w/PF 15x3" L leg extended w/PF  15x3" L leg extended w/PF 15x3" L leg extended w/PF 15x3" L leg extended w/PF   ADIM with PPT           ADIM with marches           ADIM with BKFO           Glute set*           Bridges* 3x15 3x15 3x15 3x15  3x15 3x15 3x15   Supine ball crush           Standing ball crush           Standing ball crush marches           Bird dogs           Bear holds           Dead bug           Plank shoulder taps                                                       Therapeutic Activity           Sit to stands*  Chair + foam to fatigue 2x (13 reps, 10 reps)    Chair + foam to fatigue 1x (22 reps) Chair + foam to fatigue 1x (10 reps)    Leg press   115# 3x12 P8S3 115# 3x15 P8S3  105# 3x12 P8S3 115# 3x12 P8S3    Band resisted side stepping           Monster walks           Goblet squat           Bent over row           Deadlift           Farmer's carry           Forward step pus  4" step up/back  10x ea                               Modalities

## 2022-02-16 ENCOUNTER — PATIENT OUTREACH (OUTPATIENT)
Dept: SURGERY | Facility: CLINIC | Age: 62
End: 2022-02-16

## 2022-04-01 ENCOUNTER — PATIENT OUTREACH (OUTPATIENT)
Dept: SURGERY | Facility: CLINIC | Age: 62
End: 2022-04-01

## 2022-04-22 ENCOUNTER — APPOINTMENT (OUTPATIENT)
Dept: LAB | Facility: CLINIC | Age: 62
End: 2022-04-22
Payer: MEDICARE

## 2022-04-22 DIAGNOSIS — Z72.89 OTHER PROBLEMS RELATED TO LIFESTYLE: ICD-10-CM

## 2022-04-22 DIAGNOSIS — Z20.2 CONTACT WITH AND (SUSPECTED) EXPOSURE TO INFECTIONS WITH A PREDOMINANTLY SEXUAL MODE OF TRANSMISSION: ICD-10-CM

## 2022-04-22 DIAGNOSIS — Z11.3 ENCOUNTER FOR SCREENING FOR BACTERIAL SEXUALLY TRANSMITTED DISEASE: ICD-10-CM

## 2022-04-22 DIAGNOSIS — E78.1 HYPERTRIGLYCERIDEMIA: ICD-10-CM

## 2022-04-22 DIAGNOSIS — I10 ESSENTIAL HYPERTENSION: ICD-10-CM

## 2022-04-22 DIAGNOSIS — R73.03 PREDIABETES: ICD-10-CM

## 2022-04-22 DIAGNOSIS — D72.89 OTHER SPECIFIED DISORDERS OF WHITE BLOOD CELLS: ICD-10-CM

## 2022-04-22 DIAGNOSIS — Z13.220 ENCOUNTER FOR LIPID SCREENING FOR CARDIOVASCULAR DISEASE: ICD-10-CM

## 2022-04-22 DIAGNOSIS — Z13.1 SCREENING FOR DIABETES MELLITUS: ICD-10-CM

## 2022-04-22 DIAGNOSIS — Z79.899 OTHER LONG TERM (CURRENT) DRUG THERAPY: ICD-10-CM

## 2022-04-22 DIAGNOSIS — Z13.6 ENCOUNTER FOR LIPID SCREENING FOR CARDIOVASCULAR DISEASE: ICD-10-CM

## 2022-04-22 DIAGNOSIS — B20 HIV DISEASE (HCC): ICD-10-CM

## 2022-04-22 LAB
ALBUMIN SERPL BCP-MCNC: 3.8 G/DL (ref 3.5–5)
ALP SERPL-CCNC: 89 U/L (ref 46–116)
ALT SERPL W P-5'-P-CCNC: 43 U/L (ref 12–78)
ANION GAP SERPL CALCULATED.3IONS-SCNC: 11 MMOL/L (ref 4–13)
AST SERPL W P-5'-P-CCNC: 24 U/L (ref 5–45)
BASOPHILS # BLD AUTO: 0.08 THOUSANDS/ΜL (ref 0–0.1)
BASOPHILS NFR BLD AUTO: 2 % (ref 0–1)
BILIRUB SERPL-MCNC: 0.56 MG/DL (ref 0.2–1)
BILIRUB UR QL STRIP: NEGATIVE
BUN SERPL-MCNC: 18 MG/DL (ref 5–25)
CALCIUM SERPL-MCNC: 9.3 MG/DL (ref 8.3–10.1)
CHLORIDE SERPL-SCNC: 105 MMOL/L (ref 100–108)
CHOLEST SERPL-MCNC: 155 MG/DL
CLARITY UR: CLEAR
CO2 SERPL-SCNC: 26 MMOL/L (ref 21–32)
COLOR UR: YELLOW
CREAT SERPL-MCNC: 1.17 MG/DL (ref 0.6–1.3)
EOSINOPHIL # BLD AUTO: 0.24 THOUSAND/ΜL (ref 0–0.61)
EOSINOPHIL NFR BLD AUTO: 4 % (ref 0–6)
ERYTHROCYTE [DISTWIDTH] IN BLOOD BY AUTOMATED COUNT: 12.6 % (ref 11.6–15.1)
EST. AVERAGE GLUCOSE BLD GHB EST-MCNC: 126 MG/DL
GFR SERPL CREATININE-BSD FRML MDRD: 67 ML/MIN/1.73SQ M
GLUCOSE P FAST SERPL-MCNC: 140 MG/DL (ref 65–99)
GLUCOSE UR STRIP-MCNC: NEGATIVE MG/DL
HBA1C MFR BLD: 6 %
HCT VFR BLD AUTO: 47.9 % (ref 36.5–49.3)
HCV AB SER QL: NORMAL
HDLC SERPL-MCNC: 34 MG/DL
HGB BLD-MCNC: 16.4 G/DL (ref 12–17)
HGB UR QL STRIP.AUTO: NEGATIVE
IMM GRANULOCYTES # BLD AUTO: 0.01 THOUSAND/UL (ref 0–0.2)
IMM GRANULOCYTES NFR BLD AUTO: 0 % (ref 0–2)
KETONES UR STRIP-MCNC: NEGATIVE MG/DL
LDLC SERPL CALC-MCNC: 90 MG/DL (ref 0–100)
LEUKOCYTE ESTERASE UR QL STRIP: NEGATIVE
LYMPHOCYTES # BLD AUTO: 1.8 THOUSANDS/ΜL (ref 0.6–4.47)
LYMPHOCYTES NFR BLD AUTO: 33 % (ref 14–44)
MCH RBC QN AUTO: 29.5 PG (ref 26.8–34.3)
MCHC RBC AUTO-ENTMCNC: 34.2 G/DL (ref 31.4–37.4)
MCV RBC AUTO: 86 FL (ref 82–98)
MONOCYTES # BLD AUTO: 0.59 THOUSAND/ΜL (ref 0.17–1.22)
MONOCYTES NFR BLD AUTO: 11 % (ref 4–12)
NEUTROPHILS # BLD AUTO: 2.7 THOUSANDS/ΜL (ref 1.85–7.62)
NEUTS SEG NFR BLD AUTO: 50 % (ref 43–75)
NITRITE UR QL STRIP: NEGATIVE
NRBC BLD AUTO-RTO: 0 /100 WBCS
PH UR STRIP.AUTO: 7 [PH]
PLATELET # BLD AUTO: 227 THOUSANDS/UL (ref 149–390)
PMV BLD AUTO: 10.9 FL (ref 8.9–12.7)
POTASSIUM SERPL-SCNC: 4.2 MMOL/L (ref 3.5–5.3)
PROT SERPL-MCNC: 8.4 G/DL (ref 6.4–8.2)
PROT UR STRIP-MCNC: NEGATIVE MG/DL
RBC # BLD AUTO: 5.55 MILLION/UL (ref 3.88–5.62)
RPR SER QL: NORMAL
SODIUM SERPL-SCNC: 142 MMOL/L (ref 136–145)
SP GR UR STRIP.AUTO: 1.02 (ref 1–1.03)
TRIGL SERPL-MCNC: 154 MG/DL
UROBILINOGEN UR QL STRIP.AUTO: 0.2 E.U./DL
WBC # BLD AUTO: 5.42 THOUSAND/UL (ref 4.31–10.16)

## 2022-04-22 PROCEDURE — 85025 COMPLETE CBC W/AUTO DIFF WBC: CPT

## 2022-04-22 PROCEDURE — 87591 N.GONORRHOEAE DNA AMP PROB: CPT

## 2022-04-22 PROCEDURE — 86592 SYPHILIS TEST NON-TREP QUAL: CPT

## 2022-04-22 PROCEDURE — 86803 HEPATITIS C AB TEST: CPT

## 2022-04-22 PROCEDURE — 80053 COMPREHEN METABOLIC PANEL: CPT

## 2022-04-22 PROCEDURE — 81003 URINALYSIS AUTO W/O SCOPE: CPT

## 2022-04-22 PROCEDURE — 87536 HIV-1 QUANT&REVRSE TRNSCRPJ: CPT

## 2022-04-22 PROCEDURE — 86360 T CELL ABSOLUTE COUNT/RATIO: CPT

## 2022-04-22 PROCEDURE — 87491 CHLMYD TRACH DNA AMP PROBE: CPT

## 2022-04-22 PROCEDURE — 80061 LIPID PANEL: CPT

## 2022-04-22 PROCEDURE — 36415 COLL VENOUS BLD VENIPUNCTURE: CPT

## 2022-04-22 PROCEDURE — 83036 HEMOGLOBIN GLYCOSYLATED A1C: CPT

## 2022-04-22 PROCEDURE — 86480 TB TEST CELL IMMUN MEASURE: CPT

## 2022-04-23 LAB
BASOPHILS # BLD AUTO: 0.1 X10E3/UL (ref 0–0.2)
BASOPHILS NFR BLD AUTO: 1 %
C TRACH DNA SPEC QL NAA+PROBE: NEGATIVE
CD3+CD4+ CELLS # BLD: 752 /UL (ref 359–1519)
CD3+CD4+ CELLS NFR BLD: 41.8 % (ref 30.8–58.5)
CD3+CD4+ CELLS/CD3+CD8+ CLL BLD: 1.33 % (ref 0.92–3.72)
CD3+CD8+ CELLS # BLD: 567 /UL (ref 109–897)
CD3+CD8+ CELLS NFR BLD: 31.5 % (ref 12–35.5)
EOSINOPHIL # BLD AUTO: 0.3 X10E3/UL (ref 0–0.4)
EOSINOPHIL NFR BLD AUTO: 5 %
ERYTHROCYTE [DISTWIDTH] IN BLOOD BY AUTOMATED COUNT: 13.3 % (ref 11.6–15.4)
HCT VFR BLD AUTO: 42.5 % (ref 37.5–51)
HGB BLD-MCNC: 14.7 G/DL (ref 13–17.7)
IMM GRANULOCYTES # BLD: 0 X10E3/UL (ref 0–0.1)
IMM GRANULOCYTES NFR BLD: 1 %
LYMPHOCYTES # BLD AUTO: 1.8 X10E3/UL (ref 0.7–3.1)
LYMPHOCYTES NFR BLD AUTO: 32 %
MCH RBC QN AUTO: 30.1 PG (ref 26.6–33)
MCHC RBC AUTO-ENTMCNC: 34.6 G/DL (ref 31.5–35.7)
MCV RBC AUTO: 87 FL (ref 79–97)
MONOCYTES # BLD AUTO: 0.6 X10E3/UL (ref 0.1–0.9)
MONOCYTES NFR BLD AUTO: 11 %
N GONORRHOEA DNA SPEC QL NAA+PROBE: NEGATIVE
NEUTROPHILS # BLD AUTO: 2.8 X10E3/UL (ref 1.4–7)
NEUTROPHILS NFR BLD AUTO: 50 %
PLATELET # BLD AUTO: 240 X10E3/UL (ref 150–450)
RBC # BLD AUTO: 4.88 X10E6/UL (ref 4.14–5.8)
WBC # BLD AUTO: 5.6 X10E3/UL (ref 3.4–10.8)

## 2022-04-25 LAB
GAMMA INTERFERON BACKGROUND BLD IA-ACNC: 0.02 IU/ML
M TB IFN-G BLD-IMP: NEGATIVE
M TB IFN-G CD4+ BCKGRND COR BLD-ACNC: 0 IU/ML
M TB IFN-G CD4+ BCKGRND COR BLD-ACNC: 0.04 IU/ML
MITOGEN IGNF BCKGRD COR BLD-ACNC: >10 IU/ML

## 2022-04-26 LAB
HIV1 RNA # SERPL NAA+PROBE: <20 COPIES/ML
HIV1 RNA SERPL NAA+PROBE-LOG#: NORMAL LOG10COPY/ML

## 2022-05-02 ENCOUNTER — PATIENT OUTREACH (OUTPATIENT)
Dept: SURGERY | Facility: CLINIC | Age: 62
End: 2022-05-02

## 2022-05-02 NOTE — PROGRESS NOTES
Care Everywhere: updated  Immunization: no profile in links  Health Maintenance: updated  Media Review: reviewed for possible outside mammogram and colon cancer report  Legacy Review: n/a  Order placed: n/a  Upcoming appts:n/a         Cm reached out to ct to schedule recert

## 2022-05-10 ENCOUNTER — OFFICE VISIT (OUTPATIENT)
Dept: SURGERY | Facility: CLINIC | Age: 62
End: 2022-05-10
Payer: MEDICARE

## 2022-05-10 ENCOUNTER — DOCUMENTATION (OUTPATIENT)
Dept: SURGERY | Facility: CLINIC | Age: 62
End: 2022-05-10

## 2022-05-10 ENCOUNTER — PATIENT OUTREACH (OUTPATIENT)
Dept: SURGERY | Facility: CLINIC | Age: 62
End: 2022-05-10

## 2022-05-10 VITALS
OXYGEN SATURATION: 96 % | TEMPERATURE: 99 F | SYSTOLIC BLOOD PRESSURE: 124 MMHG | HEART RATE: 106 BPM | BODY MASS INDEX: 28.39 KG/M2 | WEIGHT: 214.2 LBS | HEIGHT: 73 IN | DIASTOLIC BLOOD PRESSURE: 82 MMHG

## 2022-05-10 VITALS
OXYGEN SATURATION: 96 % | SYSTOLIC BLOOD PRESSURE: 124 MMHG | DIASTOLIC BLOOD PRESSURE: 82 MMHG | HEART RATE: 97 BPM | TEMPERATURE: 99 F | HEIGHT: 73 IN | WEIGHT: 214.3 LBS | BODY MASS INDEX: 28.4 KG/M2

## 2022-05-10 DIAGNOSIS — D22.9 MULTIPLE ATYPICAL SKIN MOLES: ICD-10-CM

## 2022-05-10 DIAGNOSIS — G89.29 CHRONIC PAIN OF BOTH KNEES: ICD-10-CM

## 2022-05-10 DIAGNOSIS — M25.561 CHRONIC PAIN OF BOTH KNEES: ICD-10-CM

## 2022-05-10 DIAGNOSIS — B20 HIV DISEASE (HCC): Primary | ICD-10-CM

## 2022-05-10 DIAGNOSIS — Z00.00 MEDICARE ANNUAL WELLNESS VISIT, SUBSEQUENT: Primary | ICD-10-CM

## 2022-05-10 DIAGNOSIS — I10 ESSENTIAL HYPERTENSION: ICD-10-CM

## 2022-05-10 DIAGNOSIS — T65.292D TOXIC EFFECT OF TOBACCO, INTENTIONAL SELF-HARM, SUBSEQUENT ENCOUNTER: ICD-10-CM

## 2022-05-10 DIAGNOSIS — R73.03 PREDIABETES: ICD-10-CM

## 2022-05-10 DIAGNOSIS — B20 HIV DISEASE (HCC): ICD-10-CM

## 2022-05-10 DIAGNOSIS — R79.89 ELEVATED SERUM CREATININE: ICD-10-CM

## 2022-05-10 DIAGNOSIS — R63.5 WEIGHT GAIN: ICD-10-CM

## 2022-05-10 DIAGNOSIS — M25.562 CHRONIC PAIN OF BOTH KNEES: ICD-10-CM

## 2022-05-10 DIAGNOSIS — D75.1 POLYCYTHEMIA: ICD-10-CM

## 2022-05-10 PROCEDURE — 90734 MENACWYD/MENACWYCRM VACC IM: CPT

## 2022-05-10 PROCEDURE — G0439 PPPS, SUBSEQ VISIT: HCPCS | Performed by: NURSE PRACTITIONER

## 2022-05-10 PROCEDURE — 90471 IMMUNIZATION ADMIN: CPT

## 2022-05-10 PROCEDURE — 99215 OFFICE O/P EST HI 40 MIN: CPT | Performed by: INTERNAL MEDICINE

## 2022-05-10 NOTE — PATIENT INSTRUCTIONS
Medicare Preventive Visit Patient Instructions  Thank you for completing your Welcome to Medicare Visit or Medicare Annual Wellness Visit today  Your next wellness visit will be due in one year (5/11/2023)  The screening/preventive services that you may require over the next 5-10 years are detailed below  Some tests may not apply to you based off risk factors and/or age  Screening tests ordered at today's visit but not completed yet may show as past due  Also, please note that scanned in results may not display below  Preventive Screenings:  Service Recommendations Previous Testing/Comments   Colorectal Cancer Screening  · Colonoscopy    · Fecal Occult Blood Test (FOBT)/Fecal Immunochemical Test (FIT)  · Fecal DNA/Cologuard Test  · Flexible Sigmoidoscopy Age: 54-65 years old   Colonoscopy: every 10 years (May be performed more frequently if at higher risk)  OR  FOBT/FIT: every 1 year  OR  Cologuard: every 3 years  OR  Sigmoidoscopy: every 5 years  Screening may be recommended earlier than age 48 if at higher risk for colorectal cancer  Also, an individualized decision between you and your healthcare provider will decide whether screening between the ages of 74-80 would be appropriate   Colonoscopy: 01/27/2017  FOBT/FIT: Not on file  Cologuard: Not on file  Sigmoidoscopy: Not on file    Screening Current     Prostate Cancer Screening Individualized decision between patient and health care provider in men between ages of 53-78   Medicare will cover every 12 months beginning on the day after your 50th birthday PSA: No results in last 5 years           Hepatitis C Screening Once for adults born between 1945 and 1965  More frequently in patients at high risk for Hepatitis C Hep C Antibody: 04/22/2022    Screening Current   Diabetes Screening 1-2 times per year if you're at risk for diabetes or have pre-diabetes Fasting glucose: 140 mg/dL   A1C: 6 0 %    Screening Current   Cholesterol Screening Once every 5 years if you don't have a lipid disorder  May order more often based on risk factors  Lipid panel: 04/22/2022    Screening Not Indicated  History Lipid Disorder      Other Preventive Screenings Covered by Medicare:  1  Abdominal Aortic Aneurysm (AAA) Screening: covered once if your at risk  You're considered to be at risk if you have a family history of AAA or a male between the age of 73-68 who smoking at least 100 cigarettes in your lifetime  2  Lung Cancer Screening: covers low dose CT scan once per year if you meet all of the following conditions: (1) Age 50-69; (2) No signs or symptoms of lung cancer; (3) Current smoker or have quit smoking within the last 15 years; (4) You have a tobacco smoking history of at least 30 pack years (packs per day x number of years you smoked); (5) You get a written order from a healthcare provider  3  Glaucoma Screening: covered annually if you're considered high risk: (1) You have diabetes OR (2) Family history of glaucoma OR (3)  aged 48 and older OR (3)  American aged 72 and older  3  Osteoporosis Screening: covered every 2 years if you meet one of the following conditions: (1) Have a vertebral abnormality; (2) On glucocorticoid therapy for more than 3 months; (3) Have primary hyperparathyroidism; (4) On osteoporosis medications and need to assess response to drug therapy  5  HIV Screening: covered annually if you're between the age of 12-76  Also covered annually if you are younger than 13 and older than 72 with risk factors for HIV infection  For pregnant patients, it is covered up to 3 times per pregnancy      Immunizations:  Immunization Recommendations   Influenza Vaccine Annual influenza vaccination during flu season is recommended for all persons aged >= 6 months who do not have contraindications   Pneumococcal Vaccine (Prevnar and Pneumovax)  * Prevnar = PCV13  * Pneumovax = PPSV23 Adults 25-60 years old: 1-3 doses may be recommended based on certain risk factors  Adults 72 years old: Prevnar (PCV13) vaccine recommended followed by Pneumovax (PPSV23) vaccine  If already received PPSV23 since turning 65, then PCV13 recommended at least one year after PPSV23 dose  Hepatitis B Vaccine 3 dose series if at intermediate or high risk (ex: diabetes, end stage renal disease, liver disease)   Tetanus (Td) Vaccine - COST NOT COVERED BY MEDICARE PART B Following completion of primary series, a booster dose should be given every 10 years to maintain immunity against tetanus  Td may also be given as tetanus wound prophylaxis  Tdap Vaccine - COST NOT COVERED BY MEDICARE PART B Recommended at least once for all adults  For pregnant patients, recommended with each pregnancy  Shingles Vaccine (Shingrix) - COST NOT COVERED BY MEDICARE PART B  2 shot series recommended in those aged 48 and above     Health Maintenance Due:      Topic Date Due    Lung Cancer Screening  Never done    Colorectal Cancer Screening  01/27/2027    Hepatitis C Screening  Completed     Immunizations Due:      Topic Date Due    Meningococcal ACWY Vaccine (1 - Risk start 2-23 months series) Never done    COVID-19 Vaccine (1) Never done     Advance Directives   What are advance directives? Advance directives are legal documents that state your wishes and plans for medical care  These plans are made ahead of time in case you lose your ability to make decisions for yourself  Advance directives can apply to any medical decision, such as the treatments you want, and if you want to donate organs  What are the types of advance directives? There are many types of advance directives, and each state has rules about how to use them  You may choose a combination of any of the following:  · Living will: This is a written record of the treatment you want  You can also choose which treatments you do not want, which to limit, and which to stop at a certain time   This includes surgery, medicine, IV fluid, and tube feedings  · Durable power of  for healthcare [facility]): This is a written record that states who you want to make healthcare choices for you when you are unable to make them for yourself  This person, called a proxy, is usually a family member or a friend  You may choose more than 1 proxy  · Do not resuscitate (DNR) order:  A DNR order is used in case your heart stops beating or you stop breathing  It is a request not to have certain forms of treatment, such as CPR  A DNR order may be included in other types of advance directives  · Medical directive: This covers the care that you want if you are in a coma, near death, or unable to make decisions for yourself  You can list the treatments you want for each condition  Treatment may include pain medicine, surgery, blood transfusions, dialysis, IV or tube feedings, and a ventilator (breathing machine)  · Values history: This document has questions about your views, beliefs, and how you feel and think about life  This information can help others choose the care that you would choose  Why are advance directives important? An advance directive helps you control your care  Although spoken wishes may be used, it is better to have your wishes written down  Spoken wishes can be misunderstood, or not followed  Treatments may be given even if you do not want them  An advance directive may make it easier for your family to make difficult choices about your care  Cigarette Smoking and Your Health   Risks to your health if you smoke:  Nicotine and other chemicals found in tobacco damage every cell in your body  Even if you are a light smoker, you have an increased risk for cancer, heart disease, and lung disease  If you are pregnant or have diabetes, smoking increases your risk for complications  Benefits to your health if you stop smoking:   · You decrease respiratory symptoms such as coughing, wheezing, and shortness of breath     · You reduce your risk for cancers of the lung, mouth, throat, kidney, bladder, pancreas, stomach, and cervix  If you already have cancer, you increase the benefits of chemotherapy  You also reduce your risk for cancer returning or a second cancer from developing  · You reduce your risk for heart disease, blood clots, heart attack, and stroke  · You reduce your risk for lung infections, and diseases such as pneumonia, asthma, chronic bronchitis, and emphysema  · Your circulation improves  More oxygen can be delivered to your body  If you have diabetes, you lower your risk for complications, such as kidney, artery, and eye diseases  You also lower your risk for nerve damage  Nerve damage can lead to amputations, poor vision, and blindness  · You improve your body's ability to heal and to fight infections  For more information and support to stop smoking:   · [employer]  Phone: 5- 594 - [phone]  Web Address: [employer]  Weight Management   Why it is important to manage your weight:  Being overweight increases your risk of health conditions such as heart disease, high blood pressure, type 2 diabetes, and certain types of cancer  It can also increase your risk for osteoarthritis, sleep apnea, and other respiratory problems  Aim for a slow, steady weight loss  Even a small amount of weight loss can lower your risk of health problems  How to lose weight safely:  A safe and healthy way to lose weight is to eat fewer calories and get regular exercise  You can lose up about 1 pound a week by decreasing the number of calories you eat by 500 calories each day  Healthy meal plan for weight management:  A healthy meal plan includes a variety of foods, contains fewer calories, and helps you stay healthy  A healthy meal plan includes the following:  · Eat whole-grain foods more often  A healthy meal plan should contain fiber  Fiber is the part of grains, fruits, and vegetables that is not broken down by your body   Whole-grain foods are healthy and provide extra fiber in your diet  Some examples of whole-grain foods are whole-wheat breads and pastas, oatmeal, brown rice, and bulgur  · Eat a variety of vegetables every day  Include dark, leafy greens such as spinach, kale, emndel greens, and mustard greens  Eat yellow and orange vegetables such as carrots, sweet potatoes, and winter squash  · Eat a variety of fruits every day  Choose fresh or canned fruit (canned in its own juice or light syrup) instead of juice  Fruit juice has very little or no fiber  · Eat low-fat dairy foods  Drink fat-free (skim) milk or 1% milk  Eat fat-free yogurt and low-fat cottage cheese  Try low-fat cheeses such as mozzarella and other reduced-fat cheeses  · Choose meat and other protein foods that are low in fat  Choose beans or other legumes such as split peas or lentils  Choose fish, skinless poultry (chicken or turkey), or lean cuts of red meat (beef or pork)  Before you cook meat or poultry, cut off any visible fat  · Use less fat and oil  Try baking foods instead of frying them  Add less fat, such as margarine, sour cream, regular salad dressing and mayonnaise to foods  Eat fewer high-fat foods  Some examples of high-fat foods include french fries, doughnuts, ice cream, and cakes  · Eat fewer sweets  Limit foods and drinks that are high in sugar  This includes candy, cookies, regular soda, and sweetened drinks  Exercise:  Exercise at least 30 minutes per day on most days of the week  Some examples of exercise include walking, biking, dancing, and swimming  You can also fit in more physical activity by taking the stairs instead of the elevator or parking farther away from stores  Ask your healthcare provider about the best exercise plan for you  © Copyright [employer] 2018 Information is for End User's use only and may not be sold, redistributed or otherwise used for commercial purposes   All illustrations and images included in CareNotes® are the copyrighted property of A D A M , Inc  or [address] [place], [place] [address]tali marilu

## 2022-05-10 NOTE — PROGRESS NOTES
Ct came in to do recert  There are no e-signatures due to covid 19  Per ct not sexually active  Per ct 0 partners within the last 3 months  Per ct not drinking or doing drugs  Per ct hasn't been to the dentist in a few years due to insurance not covering  [facility]  Per ct denies any oral issues  Per ct seen every 6 months  Per ct cd4 count is 750  Per ct undetectable  Per ct takes 5 pills a day, 2 of them being Tivicay and Preczobix  RW fee scale was completed

## 2022-05-10 NOTE — ASSESSMENT & PLAN NOTE
Blood pressure: STABLE  BP Readings from Last 3 Encounters:   05/10/22 124/82   05/10/22 124/82   11/09/21 127/91       Continue current antihypertensive  Educated on the following lifestyle modifications to lower BP and decrease cardiovascular risk factors  limit alcohol intake, reduce salt in diet, maintain a healthy weight, engage in 30 minutes of cardiovascular exercise daily, and not smoke

## 2022-05-10 NOTE — PROGRESS NOTES
Progress Note - Infectious Disease   Usha Trejo 63 y o  male MRN: [id number]  Unit/Bed#:  Encounter: [id number]      Impression/Plan:  1   HIV-doing well on ART with an here undetectable viral load and CD4 count in the 700s    Patient doing well with only once daily Tivicay in addition to the Prezcobix and tenofovir   Recheck labs in 5 months and follow up in 6 months   Stressed adherence      2   Polycythemia-improved with decreased tobacco use   Will continue to monitor the CBC with diff   H&H has decreased      3   Nicotine dependence-Stressed the importance of complete tobacco cessation    No longer doing nicotine replacement therapy but he has cut back in the number of cigarettes     4  Hypertension-asymptomatic   Improved control   Discussed this issue with the primary     5  Elevated serum creatinine-  Creatinine is now stabilized   Will continue to monitor the BMP       6  Weight gain-still getting weight   Stressed the importance diet exercise  Dietary evaluation      Patient was provided medication, adherence and prevention education    Subjective:  Routine follow-up for HIV  Patient claims 100% adherence with Prezcobix, Tivicay, Viread    Patient denies any notable side effects  Overall the feeling well  The patient denies any fever chills or sweats, denies any nausea vomiting or diarrhea, denies any cough or shortness of breath  ROS:  A complete review of systems is negative other than that noted above in the subjective    Followup portions patient history reviewed and updated as: Allergies, current medications, past medical history, past social history, past surgical history, and the problem list    Objective:  Vitals:  Vitals:    05/10/22 1558   BP: 124/82   Pulse: (!) 106   Temp: 99 °F (37 2 °C)   SpO2: 96%   Weight: 97 2 kg (214 lb 4 8 oz)   Height: 6' 1" (1 854 m)       Physical Exam:   General Appearance:  Alert, interactive, appearing well,  nontoxic, no acute distress     Neck: Supple without lymphadenopathy, no thyromegaly or masses   Throat: Oropharynx moist without lesions  Lungs:   Clear to auscultation bilaterally; no wheezes, rhonchi or rales; respirations unlabored   Heart:  RRR; no murmur, rub or gallop   Abdomen:   Soft, non-tender, non-distended, positive bowel sounds  Extremities: No clubbing, cyanosis or edema   Skin: No new rashes or lesions  No draining wounds noted  Labs, Imaging, & Other studies:   All pertinent labs and imaging studies were personally reviewed    Lab Results   Component Value Date     10/26/2015    K 4 2 04/22/2022     04/22/2022    CO2 26 04/22/2022    ANIONGAP 9 10/26/2015    BUN 18 04/22/2022    CREATININE 1 17 04/22/2022    GLUCOSE 133 10/26/2015    GLUF 140 (H) 04/22/2022    CALCIUM 9 3 04/22/2022    AST 24 04/22/2022    ALT 43 04/22/2022    ALKPHOS 89 04/22/2022    PROT 8 2 10/26/2015    BILITOT 0 23 10/26/2015    EGFR 67 04/22/2022     Lab Results   Component Value Date    WBC 5 42 04/22/2022    WBC 5 6 04/22/2022    HGB 16 4 04/22/2022    HGB 14 7 04/22/2022    HCT 47 9 04/22/2022    HCT 42 5 04/22/2022    MCV 86 04/22/2022    MCV 87 04/22/2022     04/22/2022     04/22/2022     Lab Results   Component Value Date    HEPCAB Non-reactive 04/22/2022     Lab Results   Component Value Date    HEPCAB Non-reactive 04/22/2022     Lab Results   Component Value Date    RPR Non-Reactive 04/22/2022     CD4 ABS   Date/Time Value Ref Range Status   04/22/2022 07:54  359 - 1519 /uL Final     HIV-1 RNA by PCR, Qn   Date/Time Value Ref Range Status   04/22/2022 07:54 AM <20 copies/mL Final     Comment:     HIV-1 RNA detected  The reportable range for this assay is 20 to 10,000,000  copies HIV-1 RNA/mL             Current Outpatient Medications:     amLODIPine (NORVASC) 2 5 mg tablet, Take 1 tablet (2 5 mg total) by mouth daily, Disp: 30 tablet, Rfl: 5    amoxicillin (AMOXIL) 500 MG tablet, TAKE 4 TABLETS BY MOUTH 1 HOUR PRIOR TO PROCEDURE (Patient not taking: Reported on 5/10/2022), Disp: , Rfl:     aspirin 81 mg chewable tablet, Chew 1 tablet (81 mg total) daily, Disp: 30 tablet, Rfl: 3    Darunavir-Cobicistat (Prezcobix) 800-150 MG TABS, Take 1 tablet by mouth daily, Disp: 30 tablet, Rfl: 3    Diclofenac Sodium (VOLTAREN) 1 %, Apply 4 g topically 4 (four) times a day, Disp: 100 g, Rfl: 2    dolutegravir (Tivicay) 50 MG TABS, Take 1 tablet (50 mg total) by mouth daily, Disp: 30 tablet, Rfl: 5    PARoxetine (PAXIL) 20 mg tablet, Take 1 tablet (20 mg total) by mouth daily, Disp: 30 tablet, Rfl: 5    tenofovir (VIREAD) 300 mg tablet, Take 1 tablet (300 mg total) by mouth daily, Disp: 30 tablet, Rfl: 5

## 2022-05-10 NOTE — ASSESSMENT & PLAN NOTE
Lab Results   Component Value Date/Time    HGBA1C 6 0 (H) 04/22/2022 07:54 AM    HGBA1C 5 9 08/12/2015 07:06 AM            Educated to follow diabetic diet, maintain a healthy weight, and exercise regularly  Referred to dietician for additional education

## 2022-05-10 NOTE — PROGRESS NOTES
Assessment    Annual nutritional assessment    Clinical Data/Client History    HIV: yes  AIDS: no    : Pierre Burleson    Socio- Economic Status: Eats Out and Cooks    Living Situation: House    Food Prep/Access: Refrigerator, Stove and Microwave    Functional Status: Ambulatory, Able to [employer] and does own cooking  Ambulatory with a cane  Activity Level: patient has been going to physical therapy    Ambulation Difficulty with Arthritis and pain  Oral Problems: Chewing Difficulty denies, Pain denies, Inability to Chew denies     Last Dental Exam: not discussed    Typical Food/Beverage Intake:    · Breakfast cheerios or wheaties w/ milk  · Lunch often skips  · Dinner 's salad; whole grains on the side ( brown rice)  · Snacks loves [employer] ice cream ( eats 1/2 pint a week)  · Fluids coffee, OJ, water    Appetite: Excellent    Supplements: No n/a    Food Intolerance: no GI problems reported    Weight Change Percent: 10 % weight gain    Time Period of Weight Change: 12 months    Usual Body Weight: 195 lbs in May 2021    Current Body Weight: 214  25 lbs; patient is 116 % of his IBW    IBW is 184 lbs( 84 kg)    Nutrition Diagnosis    Problem: Not ready for Diet/Lifestyle change    Related to: Denial of need to change    As Evidenced By: Weight Gain and Patient Interview    Intervention Diet Prescription    Nutritional needs based on: IBW 84 kg    · ~ 2000 kcal  · 80 to 85 gm protein  · 2000 ml fluid  · 2 gm Na    Current intake: adequate    Snack/Supplement Recommendations: try to avoid ice cream    Nutrition Recommendations: reviewed    Goals: no goals set with this patient    Nutrition Education Intervention: Provided     Person Educated: patient    Topics Discussed: ways to improve blood sugar    Teaching Method: Verbal    Readiness to Change: Pre-Contemplation:   (Not yet acknowledging that there is a problem behavior that needs to change)    Visit Summary    Annual nutritional assessment completed  Patient states he has a very good appetite  He states he is trying to eat healthy, but not willing to "give up" eating ice cream   Encouraged him to continue with physical therapy and healthy eating as best as he can  Patient verbalized understanding  Will continue to monitor patient's nutritional status    Jaycee Walter, LEONARDON, LDN, [facility]

## 2022-05-10 NOTE — PROGRESS NOTES
Assessment and Plan:     Problem List Items Addressed This Visit     None        BMI Counseling: Body mass index is 28 26 kg/m²  The BMI is above normal  Nutrition recommendations include encouraging healthy choices of fruits and vegetables  Exercise recommendations include exercising 3-5 times per week  Rationale for BMI follow-up plan is due to patient being overweight or obese  Preventive health issues were discussed with patient, and age appropriate screening tests were ordered as noted in patient's After Visit Summary  Personalized health advice and appropriate referrals for health education or preventive services given if needed, as noted in patient's After Visit Summary  History of Present Illness:     Patient presents for Medicare Annual Wellness visit    Patient Care Team:  Sandford Lennox as PCP - CATHIE Lyon MD Mardee Congo Colon as  ()     Problem List:     Patient Active Problem List   Diagnosis    Anxiety    Essential hypertension    Heart murmur    Hiatal hernia    HIV disease ([facility] [address]    Hypertriglyceridemia    Inguinal hernia    Toxic effect of tobacco and nicotine    Prediabetes    Other male erectile dysfunction      Past Medical and Surgical History:     Past Medical History:   Diagnosis Date    DDD (degenerative disc disease), lumbar     Facet joint disease     Human immunodeficiency virus (HIV) infection ([facility] [address] )     resolved 11/5/15    Hypertension     resolved 5/29/15    Other specified disorders of white blood cells     resolved 5/12/16     Past Surgical History:   Procedure Laterality Date    HERNIA REPAIR      SHOULDER SURGERY Left       Family History:     No family history on file     Social History:     Social History     Socioeconomic History    Marital status: Single     Spouse name: None    Number of children: None    Years of education: None    Highest education level: None   Occupational History    None   Tobacco Use    Smoking status: Current Every Day Smoker     Packs/day: 0 50     Years: 43 00     Pack years: 21 50    Smokeless tobacco: Never Used    Tobacco comment: 10 cigs/day   Vaping Use    Vaping Use: Never used   Substance and Sexual Activity    Alcohol use: Not Currently    Drug use: Never    Sexual activity: Not Currently     Partners: Female   Other Topics Concern    None   Social History Narrative    None     Social Determinants of Health     Financial Resource Strain: Not on file   Food Insecurity: Not on file   Transportation Needs: Not on file   Physical Activity: Not on file   Stress: Not on file   Social Connections: Not on file   Intimate Partner Violence: Not on file   Housing Stability: Not on file      Medications and Allergies:     Current Outpatient Medications   Medication Sig Dispense Refill    amLODIPine (NORVASC) 2 5 mg tablet Take 1 tablet (2 5 mg total) by mouth daily 30 tablet 5    aspirin 81 mg chewable tablet Chew 1 tablet (81 mg total) daily 30 tablet 3    Darunavir-Cobicistat (Prezcobix) 800-150 MG TABS Take 1 tablet by mouth daily 30 tablet 3    Diclofenac Sodium (VOLTAREN) 1 % Apply 4 g topically 4 (four) times a day 100 g 2    dolutegravir (Tivicay) 50 MG TABS Take 1 tablet (50 mg total) by mouth daily 30 tablet 5    PARoxetine (PAXIL) 20 mg tablet Take 1 tablet (20 mg total) by mouth daily 30 tablet 5    tenofovir (VIREAD) 300 mg tablet Take 1 tablet (300 mg total) by mouth daily 30 tablet 5    amoxicillin (AMOXIL) 500 MG tablet TAKE 4 TABLETS BY MOUTH 1 HOUR PRIOR TO PROCEDURE (Patient not taking: Reported on 5/10/2022)       No current facility-administered medications for this visit       No Known Allergies   Immunizations:     Immunization History   Administered Date(s) Administered    Hep A, adult 04/25/2017, 04/24/2018    Hep B, adult 09/18/2008, 01/26/2009, 05/07/2009, 05/28/2019, 05/28/2019, 11/26/2019, 11/26/2019, 11/03/2020, 11/03/2020    Influenza Quadrivalent Preservative Free 3 years and older IM 11/05/2015    Influenza Quadrivalent, 6-35 Months IM 12/13/2016, 10/16/2017    Influenza, recombinant, quadrivalent,injectable, preservative free 10/08/2018, 11/26/2019, 11/03/2020, 11/09/2021    Influenza, seasonal, injectable 12/15/2011, 10/04/2012, 10/10/2012, 10/30/2014    Pneumococcal Conjugate 13-Valent 09/27/2016    Pneumococcal Polysaccharide PPV23 09/18/2008, 04/25/2017    Tdap 05/07/2009, 05/28/2019      Health Maintenance:         Topic Date Due    Lung Cancer Screening  Never done    Colorectal Cancer Screening  01/27/2027    Hepatitis C Screening  Completed         Topic Date Due    Meningococcal ACWY Vaccine (1 - Risk start 2-23 months series) Never done    COVID-19 Vaccine (1) Never done      Medicare Health Risk Assessment:     /82   Pulse (!) 106   Temp 99 °F (37 2 °C)   Ht 6' 1" (1 854 m)   Wt 97 2 kg (214 lb 3 2 oz)   SpO2 96%   BMI 28 26 kg/m²      Henrietta Baer is here for his Subsequent Wellness visit  Health Risk Assessment:   Patient rates overall health as fair  Patient feels that their physical health rating is slightly worse  Patient is satisfied with their life  Eyesight was rated as same  Hearing was rated as same  Patient feels that their emotional and mental health rating is same  Patients states they are never, rarely angry  Patient states they are sometimes unusually tired/fatigued  Pain experienced in the last 7 days has been some  Patient's pain rating has been 5/10  Patient states that he has experienced no weight loss or gain in last 6 months  Fall Risk Screening: In the past year, patient has experienced: history of falling in past year    Number of falls: 2 or more  Injured during fall?: No    Feels unsteady when standing or walking?: Yes    Worried about falling?: Yes      Home Safety:  Patient has trouble with stairs inside or outside of their home   Patient has working smoke alarms and has working carbon monoxide detector  Home safety hazards include: none  Nutrition:   Current diet is Other (please comment)  Low salt diet  Medications:   Patient is not currently taking any over-the-counter supplements  Patient is able to manage medications  Activities of Daily Living (ADLs)/Instrumental Activities of Daily Living (IADLs):   Walk and transfer into and out of bed and chair?: Yes  Dress and groom yourself?: Yes    Bathe or shower yourself?: Yes    Feed yourself? Yes  Do your laundry/housekeeping?: Yes  Manage your money, pay your bills and track your expenses?: Yes  Make your own meals?: Yes    Do your own shopping?: Yes    Previous Hospitalizations:   Any hospitalizations or ED visits within the last 12 months?: No      Advance Care Planning:     Durable POA for healthcare: Yes    Advanced directive: Yes      PREVENTIVE SCREENINGS      Cardiovascular Screening:    General: Screening Not Indicated and History Lipid Disorder      Diabetes Screening:     General: Screening Current      Colorectal Cancer Screening:     General: Screening Current      Osteoporosis Screening:    General: Screening Not Indicated      Abdominal Aortic Aneurysm (AAA) Screening:    Risk factors include: tobacco use        Lung Cancer Screening:     General: Screening Not Indicated      Hepatitis C Screening:    General: Screening Current    Screening, Brief Intervention, and Referral to Treatment (SBIRT)    Screening  Typical number of drinks in a day: 0    Single Item Drug Screening:  How often have you used an illegal drug (including marijuana) or a prescription medication for non-medical reasons in the past year? less than monthly    Single Item Drug Screen Score: 1  Interpretation: POSITIVE screen for possible drug use disorder    Drug Abuse Screening Test (DAST-10):  1) Have you used drugs other than those required for medical reasons? Yes  2) Do you abuse more than one drug at a time?  No  3) Are you always able to stop using drugs when you want to? Yes  4) Have you had "blackouts" or "flashbacks" as a result of drug use? No  5) Do you ever feel bad or guilty about your drug use? No  6) Does your spouse (or parents) ever complain about your involvement with drugs? No  7) Have you neglected your family because of your use of drugs? No  8) Have you engaged in illegal activities in order to obtain drugs? No  9) Have you ever experienced withdrawal symptoms (felt sick) when you stopped taking drugs? No  10) Have you had medical problems as a result of your drug use (e g , memory loss, hepatitis, convulsions, bleeding, etc )?  No    DAST-10 Score: 1  Interpretation: Low level problems related to drug abuse      CATHIE Shelton

## 2022-05-10 NOTE — ASSESSMENT & PLAN NOTE
Counseled for greater than 15 minutes on the importance of smoking cessation  Advised to quit  Educated on the increased risk of heart and lung disease associated with smoking    Referred to [facility] for enrollment in smoking cessation program

## 2022-05-10 NOTE — ASSESSMENT & PLAN NOTE
CD4 T CELL ABSOLUTE   Date/Time Value Ref Range Status   10/26/2015 07:58  359 - 1,519 /uL Final     CD4 ABS   Date/Time Value Ref Range Status   2022 07:54  359 - 1519 /uL Final     HIV-1 RNA by PCR, Qn   Date/Time Value Ref Range Status   2022 07:54 AM <20 copies/mL Final     Comment:     HIV-1 RNA detected  The reportable range for this assay is 20 to 10,000,000  copies HIV-1 RNA/mL  HIV-1 RNA Viral Load Log   Date/Time Value Ref Range Status   2022 07:54 AM COMMENT [id number]/mL Final     Comment:     Unable to calculate result since non-numeric result obtained for  component test          ART: Tivicay,tenofovir, prezcobix        Denies side effects  Stressed the importance of adherence  Continue follow up with ID clinic  Reviewed most recent labs, including Cd4 and viral load  Discussed the risks and benefits of treatment options, instructions for management, importance of treatment adherence, and reduction of risk factor  Educated on possible  medication side effects  Counseled on routes of HIV transmission, including the risk of  infection  Emphasized that viral suppression is the best method to prevent HIV transmission  At this time pt denies the need for HIV testing of anyone in their life  Total encounter time was 45 minutes  Greater then 20 minutes were spent on counseling and patient education  Pt voices understanding and agreement with treatment plan

## 2022-05-11 NOTE — PROGRESS NOTES
Assessment/Plan: [facility] approached pt with assigned [facility] to get acquainted along with exploring pt's emotional, cognitive and behavioral displays' stability by completing the PHQ-9 screening  During today's contact, pt shared family reunification which seems to increase pt's aware of his life's completion by stating that he has been thinking about "settling things for his family"  Pt expressed ongoing contemplation of his life's transition, yet no SI were disclosed nor displayed by pt  Pt emphasized the importance of embracing life in its own terms  Positive reinforcements were provided to pt for his self-awareness applications being encouraged to continue reviewing his cognitive and emotional processing regarding his life's contemplation within ongoing [address] [place] interventions  Before contact's completion, it was explored pt's willingness to complete the PHQ-9 screening, scoring 10 as a display of moderate depressive traits without SI or HI  Pt's answers were related to pain management which seemed to create situational depressive traits at the time  Pt was encouraged to consider smoking cessation counseling which he described currently using the Px nicotine patch, sharing that he's "trying at least"  Positive reinforcements were provided to pt for his willingness to focus on harm reduction, and encourage to benefit from the smoking cessation counseling  Pt agreed to consider the option  [facility]     Today patient present with   Chief Complaint   Patient presents with    Follow-up     PT OFFERS NO C/O AT THIS TIME  Patient would likely benefit from: Smoking cessation counseling  Monitoring pt's life contemplations by addressing cognitive and emotional processing  Consider/focus/continue: Monitoring pt's nicotine patches' utilization by addressing stages of change towards tobacco use remission     Stage of change: Pre-contemplation  Plan/ Behavioral Recommendations: Ongoing  interventions per pt's coordinated care, and/or pt's request of services  Diagnoses and all orders for this visit:    HIV disease ([facility] [address] )  -     CBC and differential  -     T-helper cells CD4/CD8 %  -     Comprehensive metabolic panel  -     HIV-1 RNA, quantitative, PCR    Essential hypertension    Toxic effect of tobacco, intentional self-harm, subsequent encounter    Polycythemia    Weight gain    Elevated serum creatinine          Subjective:     Patient ID: Nadeem Tijerina is a 63 y o  male  HPI    History of Present Illness:      Patient is being seen for annual behavioral health assessment  Patient denies current behavioral health concerns  [unfilled]       Review of Systems      Objective:     Physical Exam      [facility]    Orientation     Person: yes    Place: yes    Time: yes    Appearance    Well Developed: yes healthy    Uncomfortable: no    Normal Body Odor: yes    Smells of Feces: no    Smells of Urine: no    Disheveled: no    Well Nourished: yes overweight    Grooming Unkempt: no    Poor Eye Contact: no    Hirsute: yes    Looks Tired: yes    Acutely Exhausted: noappearance reflects stated age    Mood and Affect:     Appropriate: yes    Euthymicyes    Irritable: no    Angry: no    Anxious: yes    Depressed:yes    Blunted:no    Labile: no    Restricted: no    Harm to Self or Others: No SI or HI were disclosed, nor displayed by pt       Substance Abuse: Tobacco Use Disorder, Severe

## 2022-05-11 NOTE — PROGRESS NOTES
Pastoral Care Progress Note    2022  Patient: Musa Lopez : 1960  Encounter Date & Time: 5/10/2022   MRN: [id number]      The  met with Mr Liane Mccullough for continued support and care  Mr Liane Mccullough shared he had new developments in his life since we last spoke  His mother  and he re-united with his children  With excitement he advised, "I have grandchildren"  He reported meeting an important milestone in his life  Mr Liane Mccullough shared he is exercising patience about uniting with the oldest child  Also, he admitted after his son asked about his [employer] service, he began having nightmares; sharing, it felt like it was happening to me over again  The  explained his symptom were common for veterans and his conversation with his son probably triggered it  The  offered to refer him to [facility] for additional support  Mr Blanca Franco reported he owed his son an explanation of why he was gone so much when he was younger  He expressed relief and happiness about finding his children and grandchildren and deep hope for the future of meeting his eldest son; reiterating, I told you I wanted to find my kids and I found them     Mr Blanca Franco asked the  to assist him with an advance directive (5 Wishes packet)  The  celebrated with Mr Blanca Franco and facilitated story telling  Mr Blanca Franco envisioned a hopeful future with his adult children  The  offered healing prayers for the loss of his mother, pets and for new beginnings  The  spoke to the Behavior Health Specialist about possible PTSD symptoms  The  gave Mr Blanca Franco her card so that he can set-up a time to update his AD   will follow for spiritual emotional and administrative support                   Chaplaincy Interventions Utilized:   Empowerment: Encouraged focus on present and Encouraged self-care    Exploration: Explored hope and Explored spiritual needs & resources    Collaboration: Consulted with interdisciplinary team    Relationship Building: Cultivated a relationship of care and support and Listened empathically    Ritual: Provided prayer    Chaplaincy Outcomes Achieved:  Expressed gratitude, Expressed intermediate hope, Identified priorities and Made decisions      Spiritual Coping Strategies Utilized:   Connectedness, Spiritual comfort and Positive spiritual reframing

## 2022-05-18 ENCOUNTER — PATIENT OUTREACH (OUTPATIENT)
Dept: SURGERY | Facility: CLINIC | Age: 62
End: 2022-05-18

## 2022-05-23 ENCOUNTER — HOSPITAL ENCOUNTER (OUTPATIENT)
Dept: RADIOLOGY | Facility: HOSPITAL | Age: 62
Discharge: HOME | End: 2022-05-23
Payer: MEDICARE

## 2022-05-23 ENCOUNTER — OFFICE VISIT (OUTPATIENT)
Dept: OBGYN CLINIC | Facility: CLINIC | Age: 62
End: 2022-05-23
Payer: MEDICARE

## 2022-05-23 VITALS — HEART RATE: 90 BPM | OXYGEN SATURATION: 98 % | WEIGHT: 213 LBS | HEIGHT: 73 IN | BODY MASS INDEX: 28.23 KG/M2

## 2022-05-23 DIAGNOSIS — M25.561 PAIN IN BOTH KNEES, UNSPECIFIED CHRONICITY: Primary | ICD-10-CM

## 2022-05-23 DIAGNOSIS — M17.0 PRIMARY OSTEOARTHRITIS OF BOTH KNEES: ICD-10-CM

## 2022-05-23 DIAGNOSIS — G89.29 CHRONIC PAIN OF BOTH KNEES: ICD-10-CM

## 2022-05-23 DIAGNOSIS — M25.561 CHRONIC PAIN OF BOTH KNEES: ICD-10-CM

## 2022-05-23 DIAGNOSIS — M25.561 PAIN IN BOTH KNEES, UNSPECIFIED CHRONICITY: ICD-10-CM

## 2022-05-23 DIAGNOSIS — M25.562 PAIN IN BOTH KNEES, UNSPECIFIED CHRONICITY: ICD-10-CM

## 2022-05-23 DIAGNOSIS — M25.562 CHRONIC PAIN OF BOTH KNEES: ICD-10-CM

## 2022-05-23 DIAGNOSIS — M25.562 PAIN IN BOTH KNEES, UNSPECIFIED CHRONICITY: Primary | ICD-10-CM

## 2022-05-23 PROCEDURE — 73562 X-RAY EXAM OF KNEE 3: CPT

## 2022-05-23 PROCEDURE — 99203 OFFICE O/P NEW LOW 30 MIN: CPT | Performed by: PHYSICIAN ASSISTANT

## 2022-05-23 NOTE — PROGRESS NOTES
Assessment/Plan   Diagnoses and all orders for this visit:    Pain in both knees, unspecified chronicity    Primary osteoarthritis of both knees  - Start PT for ROM, particularly extension  - Pt declined cortisone injections today because he does not like needles  - Voltaren gel as needed  - Ice as needed  - Follow up with Dr Nahun Ramires in 2-3 months          Subjective   Patient ID: Rafal Odonnell is a 63 y o  male  Vitals:    05/23/22 0910   Pulse: 90   SpO2: 98%     61yo male comes in for an evaluation of his b/l knees  He has been having chronic b/l knee pain with no specific injury  The pain is worse on the right, but the stiffness is worse on the left  The pain is dull in character, mild in severity, pain does not radiate and is not associated with numbness  The following portions of the patient's history were reviewed and updated as appropriate: allergies, current medications, past family history, past medical history, past social history, past surgical history and problem list     Review of Systems  Ortho Exam  Past Medical History:   Diagnosis Date    DDD (degenerative disc disease), lumbar     Facet joint disease     Human immunodeficiency virus (HIV) infection ([facility] [address] )     resolved 11/5/15    Hypertension     resolved 5/29/15    Other specified disorders of white blood cells     resolved 5/12/16     Past Surgical History:   Procedure Laterality Date    HERNIA REPAIR      SHOULDER SURGERY Left      History reviewed  No pertinent family history    Social History     Occupational History    Not on file   Tobacco Use    Smoking status: Current Every Day Smoker     Packs/day: 0 50     Years: 43 00     Pack years: 21 50    Smokeless tobacco: Never Used    Tobacco comment: 10 cigs/day   Vaping Use    Vaping Use: Never used   Substance and Sexual Activity    Alcohol use: Not Currently    Drug use: Never    Sexual activity: Not Currently     Partners: Female       Review of Systems Constitutional: Negative  HENT: Negative  Eyes: Negative  Respiratory: Negative  Cardiovascular: Negative  Gastrointestinal: Negative  Endocrine: Negative  Genitourinary: Negative  Musculoskeletal: As below      Allergic/Immunologic: Negative  Neurological: Negative  Hematological: Negative  Psychiatric/Behavioral: Negative  Objective   Physical Exam    · Constitutional: Awake, Alert, Oriented  · Eyes: EOMI  · Psych: Mood and affect appropriate  · Heart: regular rate   · Lungs: No audible wheezing  · Abdomen: No guarding  · Lymph: no lymphedema   bilateral Knee:  - Appearance   No swelling, discoloration, or ecchymosis  + valgus deformity right  - Effusion   trace  - Palpation   + Tenderness medial joint line  No tenderness of the patella, patellar tendon, pes anserine, lateral joint line, MCL, LCL, medial / lateral plateau  - ROM   Right: Extension: 15 and Flexion: 130   Left: Extension:25 and Flexion 110  - Special Tests   Anterior Drawer Test negative, Posterior Drawer Test negative, Valgus Stress Test negative, Varus Stress Test negative and Patellar apprehension negative  - Motor   normal 5/5 in all planes  - NVI distally    I have personally reviewed pertinent films in PACS and my interpretation is Significant osteoarthritis with joint space narrowing and osteophyte formation b/l

## 2022-05-23 NOTE — PATIENT INSTRUCTIONS
Diclofenac (On the skin)   Diclofenac (dye-KLOE-fen-ak)  Treats actinic keratoses  Also treats pain and swelling caused by arthritis, including osteoarthritis of the knee  This is an NSAID  Brand Name(s): Aspercreme Arthritis Pain Reliever, Diclo Gel, Diclofono, Diclozor, Klofensaid II, Leader Arthritis Pain Reliever, Lexixryl, Pennsaid, Solaravix, Solaraze, Voltaren Gel, Xrylix   There may be other brand names for this medicine  When This Medicine Should Not Be Used: This medicine is not right for everyone  Do not use it if you had an allergic reaction to diclofenac, aspirin or another NSAID medication  How to Use This Medicine:   Gel/Jelly, Liquid  Use your medicine as directed  There are several brands of this medicine  Make sure you understand how to use the brand you have been prescribed  Ask your doctor if you have any questions  The diclofenac 1% gel comes with a dosing card to measure the correct dose  If you do not receive or misplace your dosing card, call your pharmacist to ask for a new one  Voltaren® gel: Follow the instructions on the medicine label if you are using this medicine without a prescription  Use this medicine only on your skin  Rinse it off right away if it gets on a cut or scrape  Do not get the medicine in your eyes, nose, or mouth  Wash your hands with soap and water before and after you use this medicine  Apply a thin layer of the medicine to the affected area  Rub it in gently  Do not shower, bathe, or wash the affected area for at least 30 minutes after you apply Pennsaid® or Solaraze® or 1 hour after you apply Voltaren®  Wait until the medicine dries before you cover the treated skin with gloves or clothing  Do not let the treated skin touch any other person's skin until the medicine is completely dry  Do not use external heat or bandages on the treated skin or joint  This medicine should come with a Medication Guide   Ask your pharmacist for a copy if you do not have one   Missed dose: Apply a dose as soon as you can  If it is almost time for your next dose, wait until then and apply a regular dose  Do not apply extra medicine to make up for a missed dose  Store the medicine in a closed container at room temperature, away from heat, moisture, and direct light  Drugs and Foods to Avoid:   Ask your doctor or pharmacist before using any other medicine, including over-the-counter medicines, vitamins, and herbal products  Do not use any other NSAID unless your doctor says it is okay  Some other NSAIDs are aspirin, diflunisal, ibuprofen, naproxen, or salsalate  Some medicines can affect how diclofenac works  Tell your doctor if you are using any of the following:  Acetaminophen, cyclosporine, digoxin, lithium, methotrexate, pemetrexed  Blood pressure medicine (including ACE inhibitors, ARBs, beta blockers)  Blood thinner (including warfarin)  Diuretic (water pill)  Medicine to treat depression (including SNRIs, SSRIs)  Steroids (including dexamethasone, hydrocortisone, methylprednisolone, prednisolone, prednisone)  Do not put cosmetics or skin care products on the treated skin  You may use sunscreen, insect repellant, lotion, or other topical medicines after using Pennsaid®  However, wait until the medicine is completely dry before you apply anything else  Warnings While Using This Medicine:   Tell your doctor if you are pregnant  Do not use this medicine during the later part of a pregnancy, unless your doctor tells you to  Tell your doctor if you are breastfeeding, or if you have kidney disease, liver disease, asthma, bleeding problems, heart failure, high blood pressure, other heart or blood vessel problems, a recent heart attack, or a history of stomach ulcers or bleeding  Tell your doctor if you drink alcohol    This medicine may cause the following problems:  Higher risk of blood clot, heart attack, heart failure, or stroke  Stomach or bowel problems (including bleeding, ulcers, or perforation)  Liver problems  High blood pressure  Kidney problems  Serious skin reactions, including Guaman-Jerry syndrome, exfoliative dermatitis, toxic epidermal necrolysis, and drug reaction with eosinophilia and systemic symptoms (DRESS)  This medicine may cause a delay in ovulation for women and may affect their ability to have children  If you plan to have children, talk with your doctor before using this medicine  This medicine may make your skin more sensitive to sunlight  Wear sunscreen  Do not use sunlamps or tanning beds  Your doctor will do lab tests at regular visits to check on the effects of this medicine  Keep all appointments  Keep all medicine out of the reach of children  Never share your medicine with anyone  Possible Side Effects While Using This Medicine:   Call your doctor right away if you notice any of these side effects: Allergic reaction: Itching or hives, swelling in your face or hands, swelling or tingling in your mouth or throat, chest tightness, trouble breathing  Blistering, peeling, or red skin rash  Bloody or black, tarry stools, severe stomach pain, vomiting blood or material that looks like coffee grounds  Change in how much or how often you urinate  Chest pain that may spread to your arms, jaw, back, or neck, trouble breathing, unusual sweating, faintness  Dark urine or pale stools, nausea, vomiting, loss of appetite, stomach pain, yellow skin or eyes  Numbness or weakness in your arm or leg, or on one side of your body, pain in your lower leg, sudden or severe headache, or problems with vision, speech, or walking  Rapid weight gain, swelling in your hands, ankles, or feet  Unusual bleeding, bruising, or weakness  If you notice these less serious side effects, talk with your doctor:   Dry, flaky, or scaly skin  Mild headache  Mild skin rash, itching, or redness  If you notice other side effects that you think are caused by this medicine, tell your doctor  Call your doctor for medical advice about side effects  You may report side effects to FDA at 0-626-PWF-9074    © Copyright [facility] 2022 Information is for End User's use only and may not be sold, redistributed or otherwise used for commercial purposes  The above information is an  only  It is not intended as medical advice for individual conditions or treatments  Talk to your doctor, nurse or pharmacist before following any medical regimen to see if it is safe and effective for you

## 2022-05-25 DIAGNOSIS — F43.10 PTSD (POST-TRAUMATIC STRESS DISORDER): ICD-10-CM

## 2022-05-25 DIAGNOSIS — B20 HIV INFECTION, UNSPECIFIED SYMPTOM STATUS (HCC): ICD-10-CM

## 2022-05-25 DIAGNOSIS — B20 HIV (HUMAN IMMUNODEFICIENCY VIRUS INFECTION) (HCC): ICD-10-CM

## 2022-05-25 DIAGNOSIS — I10 ESSENTIAL HYPERTENSION: ICD-10-CM

## 2022-05-25 DIAGNOSIS — I25.10 ASCVD (ARTERIOSCLEROTIC CARDIOVASCULAR DISEASE): ICD-10-CM

## 2022-05-31 RX ORDER — TENOFOVIR DISOPROXIL FUMARATE 300 MG/1
TABLET, FILM COATED ORAL
Qty: 30 TABLET | Refills: 5 | Status: SHIPPED | OUTPATIENT
Start: 2022-05-31

## 2022-05-31 RX ORDER — PAROXETINE HYDROCHLORIDE 20 MG/1
TABLET, FILM COATED ORAL
Qty: 30 TABLET | Refills: 5 | Status: SHIPPED | OUTPATIENT
Start: 2022-05-31

## 2022-05-31 RX ORDER — ASPIRIN 81 MG
TABLET,CHEWABLE ORAL
Qty: 30 TABLET | Refills: 3 | Status: SHIPPED | OUTPATIENT
Start: 2022-05-31 | End: 2022-06-24

## 2022-05-31 RX ORDER — DOLUTEGRAVIR SODIUM 50 MG/1
TABLET, FILM COATED ORAL
Qty: 30 TABLET | Refills: 5 | Status: SHIPPED | OUTPATIENT
Start: 2022-05-31

## 2022-05-31 RX ORDER — DARUNAVIR ETHANOLATE AND COBICISTAT 800; 150 MG/1; MG/1
1 TABLET, FILM COATED ORAL DAILY
Qty: 30 TABLET | Refills: 3 | Status: SHIPPED | OUTPATIENT
Start: 2022-05-31

## 2022-05-31 RX ORDER — AMLODIPINE BESYLATE 2.5 MG/1
TABLET ORAL
Qty: 30 TABLET | Refills: 5 | Status: SHIPPED | OUTPATIENT
Start: 2022-05-31

## 2022-06-08 ENCOUNTER — EVALUATION (OUTPATIENT)
Dept: PHYSICAL THERAPY | Facility: REHABILITATION | Age: 62
End: 2022-06-08
Payer: MEDICARE

## 2022-06-08 DIAGNOSIS — M17.0 PRIMARY OSTEOARTHRITIS OF BOTH KNEES: Primary | ICD-10-CM

## 2022-06-08 PROCEDURE — 97110 THERAPEUTIC EXERCISES: CPT | Performed by: PHYSICAL THERAPIST

## 2022-06-08 PROCEDURE — 97140 MANUAL THERAPY 1/> REGIONS: CPT | Performed by: PHYSICAL THERAPIST

## 2022-06-08 PROCEDURE — 97161 PT EVAL LOW COMPLEX 20 MIN: CPT | Performed by: PHYSICAL THERAPIST

## 2022-06-08 NOTE — PROGRESS NOTES
PT Evaluation     Today's date: 2022  Patient name: Arin Lynn  : 1960  MRN: [id number]  Referring provider: Betito Silva  Dx:   Encounter Diagnosis     ICD-10-CM    1  Primary osteoarthritis of both knees  M17 0 Ambulatory Referral to Physical Therapy     PT plan of care cert/re-cert       Start Time: 938  Stop Time: 1031  Total time in clinic (min): 53 minutes    Assessment  Assessment details: Arin Lynn is a 63y o  year old male who presents with bilateral knee pain  Current deficits include impaired knee ROM and patellar mobility bilaterally, impaired LE strength, altered gait mechanics, and pain  These deficits impair his ability to perform all typical I/ADLs, household tasks, and social/recreational activities  Signs and symptoms are consistent with referring diagnosis of bilateral knee OA  Recommend skilled PT services to address previously stated deficits to promote progress towards PLOF  Impairments: abnormal gait, abnormal or restricted ROM, activity intolerance, impaired balance, impaired physical strength, lacks appropriate home exercise program, pain with function, weight-bearing intolerance and poor body mechanics  Understanding of Dx/Px/POC: good   Prognosis: good    Goals  STG (4 weeks):  1  Decrease knee extension lag to 10* or less to promote improved gait mechanics  2  Increase knee extension strength to at least 4+/5 bilaterally for improved activity tolerance  3  Decrease pain at worst from 10/10 to 7/10 or less for improved QoL  LTG (8 weeks):  1  Increased global knee strength to 5/5 to promote improved activity tolerance  2  Able to stand after prolonged sitting without symptom exacerbation to promote improved functional mobility  3  Able to walk for 20 minutes without symptom exacerbation to promote improved community ambulation  4  Able to sleep through the night without waking secondary to pain to promote improved sleep quality    5  Able to ascend/descend 1 flight of stairs without symptom exacerbation to promote improved household and community ambulation  6  Independent with HEP  Plan  Plan details: Thank you for referring Deyanira Grey to Physical Therapy at [place] [address] and for the opportunity to coordinate care  Patient would benefit from: skilled physical therapy  Planned modality interventions: cryotherapy, electrical stimulation/Russian stimulation, TENS, thermotherapy: hydrocollator packs and unattended electrical stimulation  Planned therapy interventions: abdominal trunk stabilization, activity modification, ADL retraining, balance, balance/weight bearing training, behavior modification, body mechanics training, breathing training, fine motor coordination training, flexibility, functional ROM exercises, gait training, graded activity, graded exercise, graded motor, home exercise program, transfer training, therapeutic training, therapeutic exercise, therapeutic activities, stretching, strengthening, self care, postural training, patient education, neuromuscular re-education, muscle pump exercises, motor coordination training, Nguyen taping, manual therapy, joint mobilization and IADL retraining  Frequency: 2x week  Duration in weeks: 8  Plan of Care beginning date: 6/8/2022  Plan of Care expiration date: 8/3/2022  Treatment plan discussed with: patient        Subjective Evaluation    History of Present Illness  Mechanism of injury:   06/08/22:  Deyanira Grey presents to skilled physical therapy with complaints of bilateral knee pain, R>L  Patient reports symptoms began about 5 years ago  Celsa Pulliam reports pain located through the whole knee, bilaterally  Since onset, symptoms have worsened  Currently, Celsa Pulliam is having difficulty with stair navigating stairs, walking, bending/squatting, sleeping, laying supine  Patient admits to difficulty sleeping secondary to bilateral knee pain   Patient denies numbness, tingling in right leg, left leg, right foot and left foot  Next follow up appointment with physician is schedule on 22  Pain  Pain scale: L knee: 6/10  R knee: 6/10  Pain scale at lowest: L knee: 4/10  R knee: 4/10  Pain scale at highest: L knee: 10/10  R knee: 10/10  Location: bilateral knees  Quality: sharp, dull ache and pressure    Social Support  Steps to enter house: yes (bilateral hand rails)  Stairs in house: yes (R hand rail)   Lives in: multiple-level home  Lives with: alone (occasional his son)    Employment status: not working    Diagnostic Tests  X-ray: abnormal (22)  Treatments  Current treatment: physical therapy  Patient Goals  Patient goals for therapy: decreased pain, increased strength and increased motion  Patient goal: to be able to ride his motorcycle again, a sense of normalcy        Objective     Observations     Additional Observation Details  Gait: ambulates with SPC on R, unable to achieve TKE bilaterally, forward trunk flexion, lacks heel/toe pattern    Tenderness   Left Knee   No tenderness in the inferior patella, lateral patella, medial patella, patellar tendon and superior patella  Right Knee   No tenderness in the inferior patella, lateral patella, medial patella, patellar tendon and superior patella       Active Range of Motion   Left Knee   Flexion: 130 degrees   Extensor la degrees     Right Knee   Flexion: 125 degrees   Extensor la degrees     Mobility   Patellar Mobility:   Left Knee   Hypomobile: left medial, left lateral, left superior and left inferior    Right Knee   Hypomobile: medial, lateral, superior and inferior     Strength/Myotome Testing     Left Knee   Flexion: 4+  Extension: 4 (pain)    Right Knee   Flexion: 4+  Extension: 4+           Precautions: Fall Risk, HIV, PTSD, DDD, HTN, chronic LBP     * Indicates part of HEP   HEP code: [id number]     Daily Treatment Diary     Manuals           L knee ext overpressure done          R knee ext overpressure done L patellar mobility           R patellar mobility                      Therapeutic Exercise           Bike  nv          Hamstring stretch* 10x10" ea          Quad stretch           Piriformis stretch           Bridge* 2x10          Clamshells           LAQ nv          SLR flexion           SLR abduction                                 Patient education done                     Neuro Re-ed           Lateral eccentric step down           Forward eccentric step down                                                       Therapeutic Activity           Sit to stands           Leg press           Heel raises           Toe raises           Band resisted side stepping           Monster walks           Goblet squat           Deadlift           Forward step ups           Lateral step ups                                                       Modalities

## 2022-06-09 ENCOUNTER — TELEPHONE (OUTPATIENT)
Dept: SURGERY | Facility: CLINIC | Age: 62
End: 2022-06-09

## 2022-06-09 DIAGNOSIS — F17.200 NICOTINE DEPENDENCE, UNCOMPLICATED, UNSPECIFIED NICOTINE PRODUCT TYPE: Primary | ICD-10-CM

## 2022-06-09 RX ORDER — NICOTINE 21 MG/24HR
1 PATCH, TRANSDERMAL 24 HOURS TRANSDERMAL EVERY 24 HOURS
Qty: 28 PATCH | Refills: 2 | Status: SHIPPED | OUTPATIENT
Start: 2022-06-09 | End: 2022-06-24

## 2022-06-10 ENCOUNTER — OFFICE VISIT (OUTPATIENT)
Dept: PHYSICAL THERAPY | Facility: REHABILITATION | Age: 62
End: 2022-06-10
Payer: MEDICARE

## 2022-06-10 DIAGNOSIS — M17.0 PRIMARY OSTEOARTHRITIS OF BOTH KNEES: Primary | ICD-10-CM

## 2022-06-10 PROCEDURE — 97110 THERAPEUTIC EXERCISES: CPT | Performed by: PHYSICAL THERAPIST

## 2022-06-10 PROCEDURE — 97140 MANUAL THERAPY 1/> REGIONS: CPT | Performed by: PHYSICAL THERAPIST

## 2022-06-10 NOTE — PROGRESS NOTES
Daily Note     Today's date: 6/10/2022  Patient name: Elyse Ojeda  : 1960  MRN: [id number]  Referring provider: Angelika Loza  Dx:   Encounter Diagnosis     ICD-10-CM    1  Primary osteoarthritis of both knees  M17 0        Start Time: 930  Stop Time:   Total time in clinic (min): 45 minutes    Subjective: Macario Serrato reports he's doing okay at start of session; no significant changes since previous session  Objective: See treatment diary below      Assessment: Tolerated treatment fair  Continues to demonstrate improved knee extension ROM following manual therapy  Increased discomfort with L knee extension over press this session but tolerated with rest breaks  Good tolerance to initiation of POC this visit  Patient demonstrated appropriate level of challenge and muscular fatigue throughout session and noted good status at end of visit  Patient demonstrated fatigue post treatment, exhibited good technique with therapeutic exercises and would benefit from continued PT  Plan: Continue per plan of care  Progress treatment as tolerated         Precautions: Fall Risk, HIV, PTSD, DDD, HTN, chronic LBP     * Indicates part of HEP   HEP code: [id number]     Daily Treatment Diary     Manuals 6/8 6/10         L knee ext overpressure done Done         R knee ext overpressure done Done          L patellar mobility           R patellar mobility                      Therapeutic Exercise           Bike  nv 5' lvl1         Hamstring stretch* 10x10" ea 10x10" ea         Quad stretch           Piriformis stretch           Bridge* 2x10 3x10         Clamshells  2x10 ea         LAQ nv 2x10 ea         SLR flexion           SLR abduction                                 Patient education done                     Neuro Re-ed           Lateral eccentric step down           Forward eccentric step down                                                       Therapeutic Activity           Sit to stands           Leg press           Heel raises           Toe raises           Band resisted side stepping           Monster walks           Goblet squat           Deadlift           Forward step ups           Lateral step ups                                                       Modalities

## 2022-06-15 ENCOUNTER — APPOINTMENT (OUTPATIENT)
Dept: PHYSICAL THERAPY | Facility: REHABILITATION | Age: 62
End: 2022-06-15
Payer: MEDICARE

## 2022-06-16 ENCOUNTER — APPOINTMENT (OUTPATIENT)
Dept: PHYSICAL THERAPY | Facility: REHABILITATION | Age: 62
End: 2022-06-16
Payer: MEDICARE

## 2022-06-22 ENCOUNTER — HOSPITAL ENCOUNTER (INPATIENT)
Facility: HOSPITAL | Age: 62
LOS: 2 days | Discharge: HOME HEALTH | DRG: 175 | End: 2022-06-24
Attending: INTERNAL MEDICINE | Admitting: INTERNAL MEDICINE
Payer: MEDICARE

## 2022-06-22 ENCOUNTER — HOSPITAL ENCOUNTER (EMERGENCY)
Facility: HOSPITAL | Age: 62
End: 2022-06-22
Attending: EMERGENCY MEDICINE
Payer: MEDICARE

## 2022-06-22 ENCOUNTER — APPOINTMENT (OUTPATIENT)
Dept: PHYSICAL THERAPY | Facility: REHABILITATION | Age: 62
End: 2022-06-22
Payer: MEDICARE

## 2022-06-22 ENCOUNTER — APPOINTMENT (EMERGENCY)
Dept: RADIOLOGY | Facility: HOSPITAL | Age: 62
End: 2022-06-22
Payer: MEDICARE

## 2022-06-22 ENCOUNTER — APPOINTMENT (EMERGENCY)
Dept: CT IMAGING | Facility: HOSPITAL | Age: 62
End: 2022-06-22
Payer: MEDICARE

## 2022-06-22 ENCOUNTER — APPOINTMENT (INPATIENT)
Dept: NON INVASIVE DIAGNOSTICS | Facility: HOSPITAL | Age: 62
DRG: 175 | End: 2022-06-22
Payer: MEDICARE

## 2022-06-22 VITALS
RESPIRATION RATE: 16 BRPM | DIASTOLIC BLOOD PRESSURE: 95 MMHG | WEIGHT: 210.1 LBS | TEMPERATURE: 97.9 F | SYSTOLIC BLOOD PRESSURE: 148 MMHG | HEART RATE: 88 BPM | OXYGEN SATURATION: 99 % | BODY MASS INDEX: 27.72 KG/M2

## 2022-06-22 DIAGNOSIS — I26.99 PULMONARY EMBOLISM (HCC): Primary | ICD-10-CM

## 2022-06-22 LAB
2HR DELTA HS TROPONIN: 0 NG/L
4HR DELTA HS TROPONIN: 0 NG/L
ALBUMIN SERPL BCP-MCNC: 4.2 G/DL (ref 3.5–5)
ALP SERPL-CCNC: 78 U/L (ref 34–104)
ALT SERPL W P-5'-P-CCNC: 24 U/L (ref 7–52)
ANION GAP SERPL CALCULATED.3IONS-SCNC: 11 MMOL/L (ref 4–13)
AORTIC ROOT: 3.9 CM
APICAL FOUR CHAMBER EJECTION FRACTION: 53 %
APTT PPP: 128 SECONDS (ref 23–37)
APTT PPP: 29 SECONDS (ref 23–37)
ASCENDING AORTA: 3.2 CM
AST SERPL W P-5'-P-CCNC: 17 U/L (ref 13–39)
BASOPHILS # BLD AUTO: 0.08 THOUSANDS/ΜL (ref 0–0.1)
BASOPHILS NFR BLD AUTO: 1 % (ref 0–1)
BILIRUB SERPL-MCNC: 0.56 MG/DL (ref 0.2–1)
BNP SERPL-MCNC: 5 PG/ML (ref 0–100)
BUN SERPL-MCNC: 14 MG/DL (ref 5–25)
CALCIUM SERPL-MCNC: 9.9 MG/DL (ref 8.4–10.2)
CARDIAC TROPONIN I PNL SERPL HS: 4 NG/L
CHLORIDE SERPL-SCNC: 99 MMOL/L (ref 96–108)
CO2 SERPL-SCNC: 26 MMOL/L (ref 21–32)
CREAT SERPL-MCNC: 1.1 MG/DL (ref 0.6–1.3)
E WAVE DECELERATION TIME: 249 MS
EOSINOPHIL # BLD AUTO: 0.25 THOUSAND/ΜL (ref 0–0.61)
EOSINOPHIL NFR BLD AUTO: 3 % (ref 0–6)
ERYTHROCYTE [DISTWIDTH] IN BLOOD BY AUTOMATED COUNT: 12.2 % (ref 11.6–15.1)
FLUAV RNA RESP QL NAA+PROBE: NEGATIVE
FLUBV RNA RESP QL NAA+PROBE: NEGATIVE
FRACTIONAL SHORTENING: 28 (ref 28–44)
GFR SERPL CREATININE-BSD FRML MDRD: 72 ML/MIN/1.73SQ M
GLUCOSE SERPL-MCNC: 163 MG/DL (ref 65–140)
HCT VFR BLD AUTO: 45.6 % (ref 36.5–49.3)
HGB BLD-MCNC: 15.7 G/DL (ref 12–17)
IMM GRANULOCYTES # BLD AUTO: 0.05 THOUSAND/UL (ref 0–0.2)
IMM GRANULOCYTES NFR BLD AUTO: 1 % (ref 0–2)
INR PPP: 0.98 (ref 0.84–1.19)
INTERVENTRICULAR SEPTUM IN DIASTOLE (PARASTERNAL SHORT AXIS VIEW): 1.3 CM
INTERVENTRICULAR SEPTUM: 1.3 CM (ref 0.6–1.1)
LAAS-AP2: 21.7 CM2
LAAS-AP4: 15.7 CM2
LEFT ATRIUM SIZE: 3.5 CM
LEFT INTERNAL DIMENSION IN SYSTOLE: 2.8 CM (ref 2.1–4)
LEFT VENTRICULAR INTERNAL DIMENSION IN DIASTOLE: 3.9 CM (ref 3.5–6)
LEFT VENTRICULAR POSTERIOR WALL IN END DIASTOLE: 1.1 CM
LEFT VENTRICULAR STROKE VOLUME: 34 ML
LVSV (TEICH): 34 ML
LYMPHOCYTES # BLD AUTO: 2.51 THOUSANDS/ΜL (ref 0.6–4.47)
LYMPHOCYTES NFR BLD AUTO: 27 % (ref 14–44)
MCH RBC QN AUTO: 29.2 PG (ref 26.8–34.3)
MCHC RBC AUTO-ENTMCNC: 34.4 G/DL (ref 31.4–37.4)
MCV RBC AUTO: 85 FL (ref 82–98)
MONOCYTES # BLD AUTO: 0.77 THOUSAND/ΜL (ref 0.17–1.22)
MONOCYTES NFR BLD AUTO: 8 % (ref 4–12)
MV E'TISSUE VEL-SEP: 9 CM/S
MV PEAK A VEL: 0.84 M/S
MV PEAK E VEL: 63 CM/S
MV STENOSIS PRESSURE HALF TIME: 72 MS
MV VALVE AREA P 1/2 METHOD: 3.06
NEUTROPHILS # BLD AUTO: 5.64 THOUSANDS/ΜL (ref 1.85–7.62)
NEUTS SEG NFR BLD AUTO: 60 % (ref 43–75)
NRBC BLD AUTO-RTO: 0 /100 WBCS
NT-PROBNP SERPL-MCNC: 17 PG/ML
PLATELET # BLD AUTO: 244 THOUSANDS/UL (ref 149–390)
PMV BLD AUTO: 10.3 FL (ref 8.9–12.7)
POTASSIUM SERPL-SCNC: 3.9 MMOL/L (ref 3.5–5.3)
PROT SERPL-MCNC: 8.5 G/DL (ref 6.4–8.4)
PROTHROMBIN TIME: 12.9 SECONDS (ref 11.6–14.5)
RBC # BLD AUTO: 5.37 MILLION/UL (ref 3.88–5.62)
RIGHT ATRIUM AREA SYSTOLE A4C: 18.2 CM2
RIGHT VENTRICLE ID DIMENSION: 3.9 CM
RSV RNA RESP QL NAA+PROBE: NEGATIVE
SARS-COV-2 RNA RESP QL NAA+PROBE: NEGATIVE
SL CV LEFT ATRIUM LENGTH A2C: 5.4 CM
SL CV LV EF: 55
SL CV PED ECHO LEFT VENTRICLE DIASTOLIC VOLUME (MOD BIPLANE) 2D: 65 ML
SL CV PED ECHO LEFT VENTRICLE SYSTOLIC VOLUME (MOD BIPLANE) 2D: 31 ML
SODIUM SERPL-SCNC: 136 MMOL/L (ref 135–147)
WBC # BLD AUTO: 9.3 THOUSAND/UL (ref 4.31–10.16)

## 2022-06-22 PROCEDURE — 71045 X-RAY EXAM CHEST 1 VIEW: CPT

## 2022-06-22 PROCEDURE — 0241U HB NFCT DS VIR RESP RNA 4 TRGT: CPT | Performed by: EMERGENCY MEDICINE

## 2022-06-22 PROCEDURE — 96365 THER/PROPH/DIAG IV INF INIT: CPT

## 2022-06-22 PROCEDURE — 85610 PROTHROMBIN TIME: CPT | Performed by: EMERGENCY MEDICINE

## 2022-06-22 PROCEDURE — 99285 EMERGENCY DEPT VISIT HI MDM: CPT | Performed by: EMERGENCY MEDICINE

## 2022-06-22 PROCEDURE — 93005 ELECTROCARDIOGRAM TRACING: CPT

## 2022-06-22 PROCEDURE — 93306 TTE W/DOPPLER COMPLETE: CPT | Performed by: INTERNAL MEDICINE

## 2022-06-22 PROCEDURE — 99223 1ST HOSP IP/OBS HIGH 75: CPT | Performed by: INTERNAL MEDICINE

## 2022-06-22 PROCEDURE — 80053 COMPREHEN METABOLIC PANEL: CPT

## 2022-06-22 PROCEDURE — 96375 TX/PRO/DX INJ NEW DRUG ADDON: CPT

## 2022-06-22 PROCEDURE — 71275 CT ANGIOGRAPHY CHEST: CPT

## 2022-06-22 PROCEDURE — 93306 TTE W/DOPPLER COMPLETE: CPT

## 2022-06-22 PROCEDURE — 85025 COMPLETE CBC W/AUTO DIFF WBC: CPT

## 2022-06-22 PROCEDURE — 96366 THER/PROPH/DIAG IV INF ADDON: CPT

## 2022-06-22 PROCEDURE — 84484 ASSAY OF TROPONIN QUANT: CPT

## 2022-06-22 PROCEDURE — 85730 THROMBOPLASTIN TIME PARTIAL: CPT | Performed by: EMERGENCY MEDICINE

## 2022-06-22 PROCEDURE — 99285 EMERGENCY DEPT VISIT HI MDM: CPT

## 2022-06-22 PROCEDURE — 85730 THROMBOPLASTIN TIME PARTIAL: CPT | Performed by: INTERNAL MEDICINE

## 2022-06-22 PROCEDURE — 83880 ASSAY OF NATRIURETIC PEPTIDE: CPT | Performed by: EMERGENCY MEDICINE

## 2022-06-22 PROCEDURE — 96360 HYDRATION IV INFUSION INIT: CPT

## 2022-06-22 PROCEDURE — 83880 ASSAY OF NATRIURETIC PEPTIDE: CPT | Performed by: STUDENT IN AN ORGANIZED HEALTH CARE EDUCATION/TRAINING PROGRAM

## 2022-06-22 PROCEDURE — 36415 COLL VENOUS BLD VENIPUNCTURE: CPT

## 2022-06-22 RX ORDER — ASPIRIN 81 MG/1
324 TABLET, CHEWABLE ORAL ONCE
Status: COMPLETED | OUTPATIENT
Start: 2022-06-22 | End: 2022-06-22

## 2022-06-22 RX ORDER — HEPARIN SODIUM 1000 [USP'U]/ML
7600 INJECTION, SOLUTION INTRAVENOUS; SUBCUTANEOUS
Status: DISCONTINUED | OUTPATIENT
Start: 2022-06-22 | End: 2022-06-24

## 2022-06-22 RX ORDER — HEPARIN SODIUM 1000 [USP'U]/ML
7600 INJECTION, SOLUTION INTRAVENOUS; SUBCUTANEOUS ONCE
Status: COMPLETED | OUTPATIENT
Start: 2022-06-22 | End: 2022-06-22

## 2022-06-22 RX ORDER — TENOFOVIR DISOPROXIL FUMARATE 300 MG/1
300 TABLET, FILM COATED ORAL DAILY
Status: DISCONTINUED | OUTPATIENT
Start: 2022-06-22 | End: 2022-06-24 | Stop reason: HOSPADM

## 2022-06-22 RX ORDER — HEPARIN SODIUM 10000 [USP'U]/100ML
3-30 INJECTION, SOLUTION INTRAVENOUS
Status: DISCONTINUED | OUTPATIENT
Start: 2022-06-22 | End: 2022-06-22 | Stop reason: HOSPADM

## 2022-06-22 RX ORDER — HEPARIN SODIUM 10000 [USP'U]/100ML
3-30 INJECTION, SOLUTION INTRAVENOUS
Status: DISCONTINUED | OUTPATIENT
Start: 2022-06-22 | End: 2022-06-24

## 2022-06-22 RX ORDER — HEPARIN SODIUM 1000 [USP'U]/ML
3800 INJECTION, SOLUTION INTRAVENOUS; SUBCUTANEOUS
Status: DISCONTINUED | OUTPATIENT
Start: 2022-06-22 | End: 2022-06-24

## 2022-06-22 RX ORDER — PAROXETINE HYDROCHLORIDE 20 MG/1
20 TABLET, FILM COATED ORAL DAILY
Status: DISCONTINUED | OUTPATIENT
Start: 2022-06-22 | End: 2022-06-24 | Stop reason: HOSPADM

## 2022-06-22 RX ORDER — HEPARIN SODIUM 1000 [USP'U]/ML
7600 INJECTION, SOLUTION INTRAVENOUS; SUBCUTANEOUS
Status: DISCONTINUED | OUTPATIENT
Start: 2022-06-22 | End: 2022-06-22 | Stop reason: HOSPADM

## 2022-06-22 RX ORDER — HEPARIN SODIUM 1000 [USP'U]/ML
3800 INJECTION, SOLUTION INTRAVENOUS; SUBCUTANEOUS
Status: DISCONTINUED | OUTPATIENT
Start: 2022-06-22 | End: 2022-06-22 | Stop reason: HOSPADM

## 2022-06-22 RX ORDER — NITROGLYCERIN 0.4 MG/1
0.4 TABLET SUBLINGUAL ONCE
Status: COMPLETED | OUTPATIENT
Start: 2022-06-22 | End: 2022-06-22

## 2022-06-22 RX ADMIN — SODIUM CHLORIDE 500 ML: 0.9 INJECTION, SOLUTION INTRAVENOUS at 09:26

## 2022-06-22 RX ADMIN — TENOFOVIR DISOPROXIL FUMARATE 300 MG: 300 TABLET ORAL at 16:46

## 2022-06-22 RX ADMIN — SODIUM CHLORIDE 500 ML: 0.9 INJECTION, SOLUTION INTRAVENOUS at 08:07

## 2022-06-22 RX ADMIN — DOLUTEGRAVIR SODIUM 50 MG: 50 TABLET, FILM COATED ORAL at 16:46

## 2022-06-22 RX ADMIN — HEPARIN SODIUM 18 UNITS/KG/HR: 10000 INJECTION, SOLUTION INTRAVENOUS at 14:29

## 2022-06-22 RX ADMIN — HEPARIN SODIUM 7600 UNITS: 1000 INJECTION INTRAVENOUS; SUBCUTANEOUS at 09:40

## 2022-06-22 RX ADMIN — COBICISTAT 150 MG: 150 TABLET, FILM COATED ORAL at 16:47

## 2022-06-22 RX ADMIN — PAROXETINE HYDROCHLORIDE 20 MG: 20 TABLET, FILM COATED ORAL at 16:47

## 2022-06-22 RX ADMIN — NITROGLYCERIN 0.4 MG: 0.4 TABLET SUBLINGUAL at 07:57

## 2022-06-22 RX ADMIN — ASPIRIN 324 MG: 81 TABLET, CHEWABLE ORAL at 07:42

## 2022-06-22 RX ADMIN — HEPARIN SODIUM AND DEXTROSE 18 UNITS/KG/HR: 10000; 5 INJECTION INTRAVENOUS at 09:41

## 2022-06-22 RX ADMIN — IOHEXOL 80 ML: 350 INJECTION, SOLUTION INTRAVENOUS at 09:09

## 2022-06-22 NOTE — H&P
[address] [facility]  History and Physical   - Kim Lockett 1960, 63 y o  male MRN: [id number]  Unit/Bed#: [facility] 811-01 Encounter: [id number]  Primary Care Provider: CATHIE Segura   Date and time admitted to hospital: 6/22/2022  1:48 PM     HIV disease [facility])  Assessment & Plan  Patient with a patient with a history of HIV followed with Infectious Disease, will continue home medications     Essential hypertension  Assessment & Plan  Patient with history of hypertension on amlodipine 2 5 mg, will hold for now     Anxiety  Assessment & Plan  Patient with a history anxiety, will continue paroxetine  Currently denies significant anxiety dysphoria     * Acute pulmonary embolism ([facility] [address] )  Assessment & Plan  Patient presented with right-sided pleuritic chest pain and shortness of breath found to have extensive pulmonary embolism  -transferred for pulmonary and possible interventional radiology intervention  -patient currently appears comfortable on 2 L nasal cannula, blood pressure improved  -continue heparin drip, pending possible intervention transition to oral anticoagulant once able  -malignancy screening, thrombosis panel once off anticoagulation, can consider genetic testing for now, unknown etiology           VTE Pharmacologic Prophylaxis: VTE Score: 7 High Risk (Score >/= 5) - Pharmacological DVT Prophylaxis Ordered: heparin drip  Sequential Compression Devices Ordered    Code Status: Level 3 - DNAR and DNI   Discussion with family: Patient declined call to        Anticipated Length of Stay: Patient will be admitted on an inpatient basis with an anticipated length of stay of greater than 2 midnights secondary to Acute pulmonary embolism      Total Time for Visit, including Counseling / Coordination of Care: 30 minutes Greater than 50% of this total time spent on direct patient counseling and coordination of care      Chief Complaint:  Chest pain and shortness of breath     History of Present Illness:  Deyanira Grey is a 63 y o  male with a PMH of HIV, hypertension, presented to ER for evaluation of chest pain or shortness of breath  Patient reports waking up this morning secondary to acute onset sharp and pleuritic which has pain on the right side of his chest   He also reported shortness of breath at rest and worsening with exertion  Denies any recent travel, denies prolonged periods of inactivity  Denies any recent sick contacts  He does report a cough for the past several days but he also recently quit smoking  On lab testing patient denied have any significant lab abnormalities  However CTA chest showed extensive acute pulmonary emboli with RV LV ratio of 1 3  Also noted was small peripheral ground-glass opacities concerning for small pulmonary infarcts  As well as severely enlarged multinodular right thyroid lobe  Patient was transferred to [facility] for evaluation by pulmonology and possible interventional radiology intervention  Initially in the ER patient was saturating 90-92% on room air tomorrow which improved with 2 L nasal cannula  On arrival to [place] patient appears comfortable on current 2 L nasal cannula without any increased work of breathing  He denies any acute complaints          Review of Systems:  Review of Systems   Constitutional: Positive for fatigue  Negative for chills, diaphoresis and fever  HENT: Negative for ear pain and sore throat  Eyes: Negative for pain and visual disturbance  Respiratory: Positive for cough and shortness of breath  Negative for wheezing and stridor  Cardiovascular: Positive for chest pain  Negative for palpitations and leg swelling  Gastrointestinal: Negative for abdominal pain, diarrhea, nausea and vomiting  Endocrine: Negative for polydipsia, polyphagia and polyuria  Genitourinary: Negative for dysuria and hematuria  Musculoskeletal: Negative for arthralgias and back pain  Skin: Negative for color change, pallor, rash and wound  Neurological: Negative for dizziness, tremors, seizures, syncope, weakness and numbness  All other systems reviewed and are negative         Past Medical and Surgical History:   Medical History   Past Medical History:   Diagnosis Date    DDD (degenerative disc disease), lumbar      Facet joint disease      Human immunodeficiency virus (HIV) infection ([facility] [address] )       resolved 11/5/15    Hypertension       resolved 5/29/15    Other specified disorders of white blood cells       resolved 5/12/16            Surgical History         Past Surgical History:   Procedure Laterality Date    HERNIA REPAIR        SHOULDER SURGERY Left              Meds/Allergies:          Prior to Admission medications    Medication Sig Start Date End Date Taking?  Authorizing Provider   amLODIPine (NORVASC) 2 5 mg tablet TAKE 1 TABLET BY MOUTH DAILY 5/31/22     CATHIE Roblero   amoxicillin (AMOXIL) 500 MG tablet TAKE 4 TABLETS BY MOUTH 1 HOUR PRIOR TO PROCEDURE  Patient not taking: No sig reported 4/5/21     Historical Provider, MD   Aspirin Low Dose 81 MG chewable tablet CHEW 1 TABLET DAILY 5/31/22     CATHIE Roblero   Diclofenac Sodium (VOLTAREN) 1 % Apply 4 g topically 4 (four) times a day  Patient not taking: Reported on 6/22/2022 11/9/21     CATHIE Roblero   nicotine (NICODERM CQ) 14 mg/24hr TD 24 hr patch Place 1 patch on the skin every 24 hours  Patient not taking: Reported on 6/22/2022 6/9/22     CATHIE oRblero   PARoxetine (PAXIL) 20 mg tablet TAKE 1 TABLET BY MOUTH DAILY 5/31/22     CATHIE Roblero   Prezcobix 800-150 MG TABS TAKE 1 TABLET BY MOUTH DAILY 5/31/22     CATHIE Roblero   tenofovir (VIREAD) 300 mg tablet TAKE 1 TABLET BY MOUTH DAILY 5/31/22     CATHIE Roblero   Tivicay 50 MG TABS TAKE 1 TABLET BY MOUTH DAILY 5/31/22     CATHIE Roblero      I have reviewed home medications with patient personally      Allergies: No Known Allergies     Social History:  Marital Status: Single   Occupation:   Patient Pre-hospital Living Situation: Home  Patient Pre-hospital Level of Mobility: walks  Patient Pre-hospital Diet Restrictions:   Substance Use History:   Social History          Substance and Sexual Activity   Alcohol Use Not Currently      Social History           Tobacco Use   Smoking Status Current Every Day Smoker    Packs/day: 0 50    Years: 43 00    Pack years: 21 50   Smokeless Tobacco Never Used   Tobacco Comment     10 cigs/day      Social History          Substance and Sexual Activity   Drug Use Never         Family History:  No family history on file      Physical Exam:      Vitals:   Blood Pressure: 148/96 (06/22/22 1514)  Pulse: 87 (06/22/22 1514)  Temperature: (!) 97 3 °F (36 3 °C) (06/22/22 1511)  Respirations: 20 (06/22/22 1511)  Height: 6' 2" (188 cm) (06/22/22 1514)  Weight - Scale: 95 3 kg (210 lb) (06/22/22 1514)  SpO2: 97 % (06/22/22 1511)     Physical Exam  Vitals and nursing note reviewed  Constitutional:       General: He is not in acute distress  Appearance: He is well-developed  He is not ill-appearing, toxic-appearing or diaphoretic  HENT:      Head: Normocephalic and atraumatic  Eyes:      General: No scleral icterus  Conjunctiva/sclera: Conjunctivae normal    Cardiovascular:      Rate and Rhythm: Normal rate and regular rhythm  Heart sounds: Murmur heard  Pulmonary:      Effort: Pulmonary effort is normal  No respiratory distress  Breath sounds: Normal breath sounds  No stridor  No wheezing, rhonchi or rales  Comments: 2 L nasal cannula, decreased breath sounds bilaterally  Chest:      Chest wall: No tenderness  Abdominal:      General: There is no distension  Palpations: Abdomen is soft  There is no mass  Tenderness: There is no abdominal tenderness  There is no guarding or rebound  Hernia: No hernia is present     Musculoskeletal:         General: No swelling, tenderness, deformity or signs of injury  Cervical back: Neck supple  Right lower leg: No edema  Left lower leg: No edema  Skin:     General: Skin is warm and dry  Neurological:      Mental Status: He is alert and oriented to person, place, and time           Additional Data:      Lab Results:       Results from last 7 days   Lab Units 06/22/22  0730   WBC Thousand/uL 9 30   HEMOGLOBIN g/dL 15 7   HEMATOCRIT % 45 6   PLATELETS Thousands/uL 244   NEUTROS PCT % 60   LYMPHS PCT % 27   MONOS PCT % 8   EOS PCT % 3           Results from last 7 days   Lab Units 06/22/22  0730   SODIUM mmol/L 136   POTASSIUM mmol/L 3 9   CHLORIDE mmol/L 99   CO2 mmol/L 26   BUN mg/dL 14   CREATININE mg/dL 1 10   ANION GAP mmol/L 11   CALCIUM mg/dL 9 9   ALBUMIN g/dL 4 2   TOTAL BILIRUBIN mg/dL 0 56   ALK PHOS U/L 78   ALT U/L 24   AST U/L 17   GLUCOSE RANDOM mg/dL 163*           Results from last 7 days   Lab Units 06/22/22  0715   INR   0 98                     Imaging: No pertinent imaging reviewed  No orders to display         EKG and Other Studies Reviewed on Admission:   · EKG: ekg pending      ** Please Note: This note has been constructed using a voice recognition system   **

## 2022-06-22 NOTE — PLAN OF CARE
Problem: PAIN - ADULT  Goal: Verbalizes/displays adequate comfort level or baseline comfort level  Description: Interventions:  - Encourage patient to monitor pain and request assistance  - Assess pain using appropriate pain scale  - Administer analgesics based on type and severity of pain and evaluate response  - Implement non-pharmacological measures as appropriate and evaluate response  - Consider cultural and social influences on pain and pain management  - Notify physician/advanced practitioner if interventions unsuccessful or patient reports new pain  Outcome: Progressing     Problem: INFECTION - ADULT  Goal: Absence or prevention of progression during hospitalization  Description: INTERVENTIONS:  - Assess and monitor for signs and symptoms of infection  - Monitor lab/diagnostic results  - Monitor all insertion sites, i e  indwelling lines, tubes, and drains  - Monitor endotracheal if appropriate and nasal secretions for changes in amount and color  - [place] appropriate cooling/warming therapies per order  - Administer medications as ordered  - Instruct and encourage patient and family to use good hand hygiene technique  - Identify and instruct in appropriate isolation precautions for identified infection/condition  Outcome: Progressing  Goal: Absence of fever/infection during neutropenic period  Description: INTERVENTIONS:  - Monitor WBC    Outcome: Progressing     Problem: SAFETY ADULT  Goal: Patient will remain free of falls  Description: INTERVENTIONS:  - Educate patient/family on patient safety including physical limitations  - Instruct patient to call for assistance with activity   - Consult OT/PT to assist with strengthening/mobility   - Keep Call bell within reach  - Keep bed low and locked with side rails adjusted as appropriate  - Keep care items and personal belongings within reach  - Initiate and maintain comfort rounds  - Make Fall Risk Sign visible to staff  - Offer Toileting every 2 Hours, in advance of need  - Initiate/Maintain alarm  - Obtain necessary fall risk management equipment  - Apply yellow socks and bracelet for high fall risk patients  - Consider moving patient to room near nurses station  Outcome: Progressing  Goal: Maintain or return to baseline ADL function  Description: INTERVENTIONS:  -  Assess patient's ability to carry out ADLs; assess patient's baseline for ADL function and identify physical deficits which impact ability to perform ADLs (bathing, care of mouth/teeth, toileting, grooming, dressing, etc )  - Assess/evaluate cause of self-care deficits   - Assess range of motion  - Assess patient's mobility; develop plan if impaired  - Assess patient's need for assistive devices and provide as appropriate  - Encourage maximum independence but intervene and supervise when necessary  - Involve family in performance of ADLs  - Assess for home care needs following discharge   - Consider OT consult to assist with ADL evaluation and planning for discharge  - Provide patient education as appropriate  Outcome: Progressing  Goal: Maintains/Returns to pre admission functional level  Description: INTERVENTIONS:  - Perform BMAT or MOVE assessment daily    - Set and communicate daily mobility goal to care team and patient/family/caregiver  - Collaborate with rehabilitation services on mobility goals if consulted  - Reposition patient every 2 hours  - Stand patient 3 times a day  - Ambulate patient 3 times a day  - Out of bed to chair 3 times a day   - Out of bed for meals 3 times a day  - Out of bed for toileting  - Record patient progress and toleration of activity level   Outcome: Progressing     Problem: Knowledge Deficit  Goal: Patient/family/caregiver demonstrates understanding of disease process, treatment plan, medications, and discharge instructions  Description: Complete learning assessment and assess knowledge base    Interventions:  - Provide teaching at level of understanding  - Provide teaching via preferred learning methods  Outcome: Progressing

## 2022-06-22 NOTE — ASSESSMENT & PLAN NOTE
Patient with a history anxiety, will continue paroxetine  Currently denies significant anxiety dysphoria

## 2022-06-22 NOTE — ASSESSMENT & PLAN NOTE
Patient with a patient with a history of HIV followed with Infectious Disease, will continue home medications

## 2022-06-22 NOTE — ED NOTES
Dr Des Guo aware pt BP 81/55, verbal order for 500ml NSS bolus       [address] [facility], RN  06/22/22 5826

## 2022-06-22 NOTE — ASSESSMENT & PLAN NOTE
Patient presented with right-sided pleuritic chest pain and shortness of breath found to have extensive pulmonary embolism  -transferred for pulmonary and possible interventional radiology intervention  -patient currently appears comfortable on 2 L nasal cannula, blood pressure improved  -continue heparin drip, pending possible intervention transition to oral anticoagulant once able  -malignancy screening, thrombosis panel once off anticoagulation, can consider genetic testing for now, unknown etiology

## 2022-06-22 NOTE — PROGRESS NOTES
[facility] [facility]  Progress Note - Patricia Hampton 1960, 63 y o  male MRN: [id number]  Unit/Bed#: [facility] 811-01 Encounter: [id number]  Primary Care Provider: CATHIE [place]   Date and time admitted to hospital: 6/22/2022  1:48 PM    HIV disease [facility])  Assessment & Plan  Patient with a patient with a history of HIV followed with Infectious Disease, will continue home medications    Essential hypertension  Assessment & Plan  Patient with history of hypertension on amlodipine 2 5 mg, will hold for now    Anxiety  Assessment & Plan  Patient with a history anxiety, will continue paroxetine  Currently denies significant anxiety dysphoria    * Acute pulmonary embolism ([facility] [address] )  Assessment & Plan  Patient presented with right-sided pleuritic chest pain and shortness of breath found to have extensive pulmonary embolism  -transferred for pulmonary and possible interventional radiology intervention  -patient currently appears comfortable on 2 L nasal cannula, blood pressure improved  -continue heparin drip, pending possible intervention transition to oral anticoagulant once able  -malignancy screening, thrombosis panel once off anticoagulation, can consider genetic testing for now, unknown etiology        VTE Pharmacologic Prophylaxis: VTE Score: 7 High Risk (Score >/= 5) - Pharmacological DVT Prophylaxis Ordered: heparin drip  Sequential Compression Devices Ordered  Code Status: Level 3 - DNAR and DNI   Discussion with family: Patient declined call to   Anticipated Length of Stay: Patient will be admitted on an inpatient basis with an anticipated length of stay of greater than 2 midnights secondary to Acute pulmonary embolism  Total Time for Visit, including Counseling / Coordination of Care: 30 minutes Greater than 50% of this total time spent on direct patient counseling and coordination of care      Chief Complaint:  Chest pain and shortness of breath    History of Present Illness:  Musa Lopez is a 63 y o  male with a PMH of HIV, hypertension, presented to ER for evaluation of chest pain or shortness of breath  Patient reports waking up this morning secondary to acute onset sharp and pleuritic which has pain on the right side of his chest   He also reported shortness of breath at rest and worsening with exertion  Denies any recent travel, denies prolonged periods of inactivity  Denies any recent sick contacts  He does report a cough for the past several days but he also recently quit smoking  On lab testing patient denied have any significant lab abnormalities  However CTA chest showed extensive acute pulmonary emboli with RV LV ratio of 1 3  Also noted was small peripheral ground-glass opacities concerning for small pulmonary infarcts  As well as severely enlarged multinodular right thyroid lobe  Patient was transferred to [facility] for evaluation by pulmonology and possible interventional radiology intervention  Initially in the ER patient was saturating 90-92% on room air tomorrow which improved with 2 L nasal cannula  On arrival to [place] patient appears comfortable on current 2 L nasal cannula without any increased work of breathing  He denies any acute complaints  Review of Systems:  Review of Systems   Constitutional: Positive for fatigue  Negative for chills, diaphoresis and fever  HENT: Negative for ear pain and sore throat  Eyes: Negative for pain and visual disturbance  Respiratory: Positive for cough and shortness of breath  Negative for wheezing and stridor  Cardiovascular: Positive for chest pain  Negative for palpitations and leg swelling  Gastrointestinal: Negative for abdominal pain, diarrhea, nausea and vomiting  Endocrine: Negative for polydipsia, polyphagia and polyuria  Genitourinary: Negative for dysuria and hematuria  Musculoskeletal: Negative for arthralgias and back pain     Skin: Negative for color change, pallor, rash and wound  Neurological: Negative for dizziness, tremors, seizures, syncope, weakness and numbness  All other systems reviewed and are negative  Past Medical and Surgical History:   Past Medical History:   Diagnosis Date    DDD (degenerative disc disease), lumbar     Facet joint disease     Human immunodeficiency virus (HIV) infection ([facility] [address] )     resolved 11/5/15    Hypertension     resolved 5/29/15    Other specified disorders of white blood cells     resolved 5/12/16       Past Surgical History:   Procedure Laterality Date    HERNIA REPAIR      SHOULDER SURGERY Left        Meds/Allergies:  Prior to Admission medications    Medication Sig Start Date End Date Taking? Authorizing Provider   amLODIPine (NORVASC) 2 5 mg tablet TAKE 1 TABLET BY MOUTH DAILY 5/31/22   CATHIE Roblero   amoxicillin (AMOXIL) 500 MG tablet TAKE 4 TABLETS BY MOUTH 1 HOUR PRIOR TO PROCEDURE  Patient not taking: No sig reported 4/5/21   Historical Provider, MD   Aspirin Low Dose 81 MG chewable tablet CHEW 1 TABLET DAILY 5/31/22   CATHIE Roblero   Diclofenac Sodium (VOLTAREN) 1 % Apply 4 g topically 4 (four) times a day  Patient not taking: Reported on 6/22/2022 11/9/21   CATHIE Roblero   nicotine (NICODERM CQ) 14 mg/24hr TD 24 hr patch Place 1 patch on the skin every 24 hours  Patient not taking: Reported on 6/22/2022 6/9/22   CATHIE Roblero   PARoxetine (PAXIL) 20 mg tablet TAKE 1 TABLET BY MOUTH DAILY 5/31/22   CATHIE Roblero   Prezcobix 800-150 MG TABS TAKE 1 TABLET BY MOUTH DAILY 5/31/22   CATHIE Roblero   tenofovir (VIREAD) 300 mg tablet TAKE 1 TABLET BY MOUTH DAILY 5/31/22   CAHTIE Roblero   Tivicay 50 MG TABS TAKE 1 TABLET BY MOUTH DAILY 5/31/22   CATHIE Mckinley     I have reviewed home medications with patient personally      Allergies: No Known Allergies    Social History:  Marital Status: Single   Occupation:   Patient Pre-hospital Living Situation: Home  Patient Pre-hospital Level of Mobility: walks  Patient Pre-hospital Diet Restrictions:   Substance Use History:   Social History     Substance and Sexual Activity   Alcohol Use Not Currently     Social History     Tobacco Use   Smoking Status Current Every Day Smoker    Packs/day: 0 50    Years: 43 00    Pack years: 21 50   Smokeless Tobacco Never Used   Tobacco Comment    10 cigs/day     Social History     Substance and Sexual Activity   Drug Use Never       Family History:  No family history on file  Physical Exam:     Vitals:   Blood Pressure: 148/96 (06/22/22 1514)  Pulse: 87 (06/22/22 1514)  Temperature: (!) 97 3 °F (36 3 °C) (06/22/22 1511)  Respirations: 20 (06/22/22 1511)  Height: 6' 2" (188 cm) (06/22/22 1514)  Weight - Scale: 95 3 kg (210 lb) (06/22/22 1514)  SpO2: 97 % (06/22/22 1511)    Physical Exam  Vitals and nursing note reviewed  Constitutional:       General: He is not in acute distress  Appearance: He is well-developed  He is not ill-appearing, toxic-appearing or diaphoretic  HENT:      Head: Normocephalic and atraumatic  Eyes:      General: No scleral icterus  Conjunctiva/sclera: Conjunctivae normal    Cardiovascular:      Rate and Rhythm: Normal rate and regular rhythm  Heart sounds: Murmur heard  Pulmonary:      Effort: Pulmonary effort is normal  No respiratory distress  Breath sounds: Normal breath sounds  No stridor  No wheezing, rhonchi or rales  Comments: 2 L nasal cannula, decreased breath sounds bilaterally  Chest:      Chest wall: No tenderness  Abdominal:      General: There is no distension  Palpations: Abdomen is soft  There is no mass  Tenderness: There is no abdominal tenderness  There is no guarding or rebound  Hernia: No hernia is present  Musculoskeletal:         General: No swelling, tenderness, deformity or signs of injury  Cervical back: Neck supple  Right lower leg: No edema        Left lower leg: No edema    Skin:     General: Skin is warm and dry  Neurological:      Mental Status: He is alert and oriented to person, place, and time  Additional Data:     Lab Results:  Results from last 7 days   Lab Units 06/22/22  0730   WBC Thousand/uL 9 30   HEMOGLOBIN g/dL 15 7   HEMATOCRIT % 45 6   PLATELETS Thousands/uL 244   NEUTROS PCT % 60   LYMPHS PCT % 27   MONOS PCT % 8   EOS PCT % 3     Results from last 7 days   Lab Units 06/22/22  0730   SODIUM mmol/L 136   POTASSIUM mmol/L 3 9   CHLORIDE mmol/L 99   CO2 mmol/L 26   BUN mg/dL 14   CREATININE mg/dL 1 10   ANION GAP mmol/L 11   CALCIUM mg/dL 9 9   ALBUMIN g/dL 4 2   TOTAL BILIRUBIN mg/dL 0 56   ALK PHOS U/L 78   ALT U/L 24   AST U/L 17   GLUCOSE RANDOM mg/dL 163*     Results from last 7 days   Lab Units 06/22/22  0715   INR  0 98                   Imaging: No pertinent imaging reviewed  No orders to display       EKG and Other Studies Reviewed on Admission:   · EKG: ekg pending  ** Please Note: This note has been constructed using a voice recognition system   **

## 2022-06-22 NOTE — CONSULTS
Initially the  Pulmonary Consultation   Francisco J Hargrove 63 y o  male MRN: [id number]   [address] Encounter: [id number]      Reason for consultation:   Acute pulmonary embolism  Requesting physician:   Sai Bishop DO      Impressions:    Acute pulmonary embolism  This is submassive unprovoked acute pulmonary embolism   Possible right lower extremity DVT   Acute hypoxemic respiratory failure   HIV  Recommendations:   Lovenox 1 milligram/kilogram subQ b i d  Chelo Serum  Transition to DOAC oral   The patient will need anticoagulation for at least 6 months  He also needs cancer screening in workup for hypercoagulable state   Titrate oxygen to maintain O2 saturation above 88%  History of Present Illness   HPI:  Francisco J Hargrove is a 63 y o  male who was transferred from [facility] in emergency department because of acute pulmonary embolism  The patient stated that for the last 2 days he notice shortness of breath on exertion  This morning the patient did felt right-sided anterior chest pain and also felt more out of breath  For this reason he went to the emergency room  He had a workup which included CT scan of the chest that showed pulmonary embolism  He was transferred to [facility] for further management  The patient denies long trips recently  He has not had any cancer screening done in the past   Denies any previous blood clotting disorders  However, he has been having right lower extremity swelling for several days  The patient has HIV  It is well treated and he follows up regularly in the [address] [place] [address]  Review of systems:  12 point review of systems was completed and was otherwise negative except as listed in HPI        Historical Information   Past Medical History:   Diagnosis Date    DDD (degenerative disc disease), lumbar     Facet joint disease     Human immunodeficiency virus (HIV) infection ([facility] [address] )     resolved 11/5/15    Hypertension     resolved 5/29/15    Other specified disorders of white blood cells     resolved 5/12/16     Past Surgical History:   Procedure Laterality Date    HERNIA REPAIR      SHOULDER SURGERY Left      No family history on file  Family History:  No family history of blood clotting  Social History:  The patient lives with his son  He is an active smoker  He used marijuana and other recreational drugs in the past   He has more than 45 pack year smoking history  He worked in different jobs and was a   He is currently retired  Meds/Allergies   Current Facility-Administered Medications   Medication Dose Route Frequency    cobicistat (TYBOST) 150 MG tablet 150 mg  150 mg Oral Daily    And    Darunavir TABS 800 mg  800 mg Oral Daily    dolutegravir (TIVICAY) tablet 50 mg  50 mg Oral Daily    heparin (porcine) 25,000 units in 0 45% NaCl 250 mL infusion (premix)  3-30 Units/kg/hr (Order-Specific) Intravenous Titrated    heparin (porcine) injection 3,800 Units  3,800 Units Intravenous Q1H PRN    heparin (porcine) injection 7,600 Units  7,600 Units Intravenous Q1H PRN    PARoxetine (PAXIL) tablet 20 mg  20 mg Oral Daily    tenofovir (VIREAD) tablet 300 mg  300 mg Oral Daily     Medications Prior to Admission   Medication    amLODIPine (NORVASC) 2 5 mg tablet    amoxicillin (AMOXIL) 500 MG tablet    Aspirin Low Dose 81 MG chewable tablet    Diclofenac Sodium (VOLTAREN) 1 %    nicotine (NICODERM CQ) 14 mg/24hr TD 24 hr patch    PARoxetine (PAXIL) 20 mg tablet    Prezcobix 800-150 MG TABS    tenofovir (VIREAD) 300 mg tablet    Tivicay 50 MG TABS     No Known Allergies    Vitals: Blood pressure 148/96, pulse 87, temperature (!) 97 3 °F (36 3 °C), resp   rate 20, height 6' 2" (1 88 m), weight 95 3 kg (210 lb), SpO2 97 % ,    No intake or output data in the 24 hours ending 06/22/22 1647    Physical exam:        Head/eyes:    Normocephalic, without obvious abnormality, atraumatic,         PERRL, extraocular muscles intact, no scleral icterus    Nose:   Nares normal, septum midline, mucosa normal, no drainage    or sinus tenderness   Throat:   Moist mucous membranes, no thrush   Neck:   Supple, trachea midline, no adenopathy; no carotid    bruit or JVD   Lungs:     Decreased breath sounds  No wheezing or rhonchi  Heart:    Regular rate and rhythm, S1 and S2 normal, no murmur, rub   or gallop   Abdomen:     Soft, non-tender, bowel sounds active all four quadrants,     no masses, no organomegaly   Extremities:   Extremities normal, atraumatic, no cyanosis  2+ edema of the right lower extremity  Skin:   Warm, dry, turgor normal, no rashes or lesions   Neurologic:   CNII-XII intact, normal strength, non-focal             Labs:   CBC:   Lab Results   Component Value Date    WBC 9 30 06/22/2022    HGB 15 7 06/22/2022    HCT 45 6 06/22/2022    MCV 85 06/22/2022     06/22/2022    MCH 29 2 06/22/2022    MCHC 34 4 06/22/2022    RDW 12 2 06/22/2022    MPV 10 3 06/22/2022    NRBC 0 06/22/2022   , CMP:   Lab Results   Component Value Date    SODIUM 136 06/22/2022    K 3 9 06/22/2022    CL 99 06/22/2022    CO2 26 06/22/2022    BUN 14 06/22/2022    CREATININE 1 10 06/22/2022    CALCIUM 9 9 06/22/2022    AST 17 06/22/2022    ALT 24 06/22/2022    ALKPHOS 78 06/22/2022    EGFR 72 06/22/2022       Imaging and other studies: I have personally reviewed pertinent films in PACS CT of the chest is reviewed on the [facility] system  It showed bilateral pulmonary emboli          Trevin Martinez MD

## 2022-06-22 NOTE — EMTALA/ACUTE CARE TRANSFER
[facility] EMERGENCY DEPARTMENT  [address] [facility] [address]  Dept: [phone]      EMTALA TRANSFER CONSENT    NAME Elyse THOMAS 1960                              MRN [id number]    I have been informed of my rights regarding examination, treatment, and transfer   by Dr Jason Quinones MD    Benefits:      Risks:        Transfer Request   I acknowledge that my medical condition has been evaluated and explained to me by the emergency department physician or other qualified medical person and/or my attending physician who has recommended and offered to me further medical examination and treatment  I understand the Hospital's obligation with respect to the treatment and stabilization of my emergency medical condition  I nevertheless request to be transferred  I release the Hospital, the doctor, and any other persons caring for me from all responsibility or liability for any injury or ill effects that may result from my transfer and agree to accept all responsibility for the consequences of my choice to transfer, rather than receive stabilizing treatment at the Hospital  I understand that because the transfer is my request, my insurance may not provide reimbursement for the services  The Hospital will assist and direct me and my family in how to make arrangements for transfer, but the hospital is not liable for any fees charged by the transport service  In spite of this understanding, I refuse to consent to further medical examination and treatment which has been offered to me, and request transfer to    I authorize the performance of emergency medical procedures and treatments upon me in both transit and upon arrival at the receiving facility  Additionally, I authorize the release of any and all medical records to the receiving facility and request they be transported with me, if possible      I authorize the performance of emergency medical procedures and treatments upon me in both transit and upon arrival at the receiving facility  Additionally, I authorize the release of any and all medical records to the receiving facility and request they be transported with me, if possible  I understand that the safest mode of transportation during a medical emergency is an ambulance and that the Hospital advocates the use of this mode of transport  Risks of traveling to the receiving facility by car, including absence of medical control, life sustaining equipment, such as oxygen, and medical personnel has been explained to me and I fully understand them  (JANUSZ CORRECT BOX BELOW)  [  ]  I consent to the stated transfer and to be transported by ambulance/helicopter  [  ]  I consent to the stated transfer, but refuse transportation by ambulance and accept full responsibility for my transportation by car  I understand the risks of non-ambulance transfers and I exonerate the Hospital and its staff from any deterioration in my condition that results from this refusal     X___________________________________________    DATE  22  TIME________  Signature of patient or legally responsible individual signing on patient behalf           RELATIONSHIP TO PATIENT_________________________          Provider Certification    NAME Ale Maya                                         1960                              MRN [id number]    A medical screening exam was performed on the above named patient  Based on the examination:    Condition Necessitating Transfer The encounter diagnosis was Pulmonary embolism ([address] )      Patient Condition:      Reason for Transfer:      Transfer Requirements: Facility     · Space available and qualified personnel available for treatment as acknowledged by    · Agreed to accept transfer and to provide appropriate medical treatment as acknowledged by          · Appropriate medical records of the examination and treatment of the patient are provided at the time of transfer   [address] _______  · Transfer will be performed by qualified personnel from    and appropriate transfer equipment as required, including the use of necessary and appropriate life support measures  Provider Certification: I have examined the patient and explained the following risks and benefits of being transferred/refusing transfer to the patient/family:         Based on these reasonable risks and benefits to the patient and/or the unborn child(rere), and based upon the information available at the time of the patients examination, I certify that the medical benefits reasonably to be expected from the provision of appropriate medical treatments at another medical facility outweigh the increasing risks, if any, to the individuals medical condition, and in the case of labor to the unborn child, from effecting the transfer      X____________________________________________ DATE 06/22/22        TIME_______      ORIGINAL - SEND TO MEDICAL RECORDS   COPY - SEND WITH PATIENT DURING TRANSFER

## 2022-06-22 NOTE — ED PROVIDER NOTES
History  Chief Complaint   Patient presents with    Chest Pain     Pt reports right sided rib/chest pain started about 0530 today with SOB  To er with right sided cp that is sharp, pleuritic and described as a "irish horse" on the right  Sob is reported with sx  No HA, focal neuro sx, leg swelling, dvt /pe hx  Has had a bit of a cough for the last 3 days, clear sputum and he reproted this 2 2 stopping smoking  Prior to Admission Medications   Prescriptions Last Dose Informant Patient Reported? Taking? PARoxetine (PAXIL) 20 mg tablet 6/21/2022 at Unknown time  No Yes   Sig: TAKE 1 TABLET BY MOUTH DAILY   Prezcobix 800-150 MG TABS 6/21/2022 at Unknown time  No Yes   Sig: TAKE 1 TABLET BY MOUTH DAILY   Tivicay 50 MG TABS 6/21/2022 at Unknown time  No Yes   Sig: TAKE 1 TABLET BY MOUTH DAILY   amLODIPine (NORVASC) 2 5 mg tablet 6/21/2022 at Unknown time  No Yes   Sig: TAKE 1 TABLET BY MOUTH DAILY   tenofovir (VIREAD) 300 mg tablet 6/21/2022 at Unknown time  No Yes   Sig: TAKE 1 TABLET BY MOUTH DAILY      Facility-Administered Medications: None       Past Medical History:   Diagnosis Date    DDD (degenerative disc disease), lumbar     Facet joint disease     Human immunodeficiency virus (HIV) infection ([facility] [address] )     resolved 11/5/15    Hypertension     resolved 5/29/15    Other specified disorders of white blood cells     resolved 5/12/16       Past Surgical History:   Procedure Laterality Date    HERNIA REPAIR      SHOULDER SURGERY Left        History reviewed  No pertinent family history  I have reviewed and agree with the history as documented      E-Cigarette/Vaping    E-Cigarette Use Never User      E-Cigarette/Vaping Substances     Social History     Tobacco Use    Smoking status: Current Every Day Smoker     Packs/day: 0 50     Years: 43 00     Pack years: 21 50    Smokeless tobacco: Never Used    Tobacco comment: 10 cigs/day   Vaping Use    Vaping Use: Never used   Substance Use Topics  Alcohol use: Not Currently    Drug use: Never       Review of Systems   All other systems reviewed and are negative  Physical Exam  Physical Exam  Vitals and nursing note reviewed  Constitutional:       General: He is not in acute distress  Appearance: Normal appearance  He is well-developed and normal weight  He is not ill-appearing, toxic-appearing or diaphoretic  HENT:      Head: Normocephalic and atraumatic  Right Ear: External ear normal       Left Ear: External ear normal       Nose: Nose normal       Mouth/Throat:      Mouth: Mucous membranes are moist       Pharynx: No oropharyngeal exudate  Eyes:      General:         Right eye: No discharge  Left eye: No discharge  Conjunctiva/sclera: Conjunctivae normal       Pupils: Pupils are equal, round, and reactive to light  Neck:      Vascular: No JVD  Trachea: No tracheal deviation  Cardiovascular:      Rate and Rhythm: Normal rate and regular rhythm  Pulses: Normal pulses  Heart sounds: Normal heart sounds  No murmur heard  No friction rub  No gallop  Pulmonary:      Effort: Pulmonary effort is normal  No respiratory distress  Breath sounds: Normal breath sounds  No stridor  No wheezing, rhonchi or rales  Chest:      Chest wall: No tenderness  Abdominal:      General: Bowel sounds are normal  There is no distension  Palpations: Abdomen is soft  There is no mass  Tenderness: There is no abdominal tenderness  There is no guarding or rebound  Hernia: No hernia is present  Musculoskeletal:         General: No swelling, tenderness, deformity or signs of injury  Normal range of motion  Cervical back: Normal range of motion and neck supple  Right lower leg: No edema  Left lower leg: No edema  Skin:     General: Skin is warm and dry  Capillary Refill: Capillary refill takes less than 2 seconds  Coloration: Skin is not jaundiced or pale        Findings: No bruising, erythema, lesion or rash  Neurological:      General: No focal deficit present  Mental Status: He is alert and oriented to person, place, and time  Cranial Nerves: No cranial nerve deficit  Sensory: No sensory deficit  Motor: No weakness or abnormal muscle tone        Coordination: Coordination normal       Gait: Gait normal       Deep Tendon Reflexes: Reflexes normal    Psychiatric:         Mood and Affect: Mood normal          Vital Signs  ED Triage Vitals   Temperature Pulse Respirations Blood Pressure SpO2   06/22/22 0724 06/22/22 0724 06/22/22 0724 06/22/22 0724 06/22/22 0724   97 9 °F (36 6 °C) 95 20 116/89 95 %      Temp Source Heart Rate Source Patient Position - Orthostatic VS BP Location FiO2 (%)   06/22/22 0724 06/22/22 0724 -- -- --   Oral Monitor         Pain Score       06/22/22 0719       9           Vitals:    06/22/22 0923 06/22/22 1046 06/22/22 1149 06/22/22 1309   BP: 125/77 130/82 130/79 148/95   Pulse: 86 92 80 88         Visual Acuity      ED Medications  Medications   aspirin chewable tablet 324 mg (324 mg Oral Given 6/22/22 0742)   nitroglycerin (NITROSTAT) SL tablet 0 4 mg (0 4 mg Sublingual Given 6/22/22 0757)   sodium chloride 0 9 % bolus 500 mL (0 mL Intravenous Stopped 6/22/22 0847)   iohexol (OMNIPAQUE) 350 MG/ML injection (SINGLE-DOSE) 100 mL (80 mL Intravenous Given 6/22/22 0909)   sodium chloride 0 9 % bolus 500 mL (0 mL Intravenous Stopped 6/22/22 1026)   heparin (porcine) injection 7,600 Units (7,600 Units Intravenous Given 6/22/22 0940)       Diagnostic Studies  Results Reviewed     Procedure Component Value Units Date/Time    HS Troponin I 4hr [[id number]]  (Normal) Collected: 06/22/22 1131    Lab Status: Final result Specimen: Blood from Arm, Right Updated: 06/22/22 1158     hs TnI 4hr 4 ng/L      Delta 4hr hsTnI 0 ng/L     HS Troponin I 2hr [[id number]]  (Normal) Collected: 06/22/22 0923    Lab Status: Final result Specimen: Blood from Arm, Right Updated: 06/22/22 0953     hs TnI 2hr 4 ng/L      Delta 2hr hsTnI 0 ng/L     B-Type Natriuretic Peptide(BNP) AN, CA, EA Campuses Only [[id number]]  (Normal) Collected: 06/22/22 0730    Lab Status: Final result Specimen: Blood from Arm, Right Updated: 06/22/22 0932     BNP 5 pg/mL     COVID/FLU/RSV - 2 hour TAT [[id number]]  (Normal) Collected: 06/22/22 0742    Lab Status: Final result Specimen: Nares from Nasopharyngeal Swab Updated: 06/22/22 0829     SARS-CoV-2 Negative     INFLUENZA A PCR Negative     INFLUENZA B PCR Negative     RSV PCR Negative    Narrative:      FOR PEDIATRIC PATIENTS - copy/paste COVID Guidelines URL to browser: [email or web address]/  [employer]    SARS-CoV-2 assay is a Nucleic Acid Amplification assay intended for the  qualitative detection of nucleic acid from SARS-CoV-2 in nasopharyngeal  swabs  Results are for the presumptive identification of SARS-CoV-2 RNA  Positive results are indicative of infection with SARS-CoV-2, the virus  causing COVID-19, but do not rule out bacterial infection or co-infection  with other viruses  Laboratories within the [place] and its  territories are required to report all positive results to the appropriate  public health authorities  Negative results do not preclude SARS-CoV-2  infection and should not be used as the sole basis for treatment or other  patient management decisions  Negative results must be combined with  clinical observations, patient history, and epidemiological information  This test has not been FDA cleared or approved  This test has been authorized by FDA under an Emergency Use Authorization  (EUA)   This test is only authorized for the duration of time the  declaration that circumstances exist justifying the authorization of the  emergency use of an in vitro diagnostic tests for detection of SARS-CoV-2  virus and/or diagnosis of COVID-19 infection under section 564(b)(1) of  the Act, [place]  360bbb-3(b)(1), unless the authorization is terminated  or revoked sooner  The test has been validated but independent review by FDA  and CLIA is pending  Test performed using [employer] GeneXpert: This RT-PCR assay targets N2,  a region unique to SARS-CoV-2  A conserved region in the E-gene was chosen  for pan-Sarbecovirus detection which includes SARS-CoV-2      HS Troponin 0hr (reflex protocol) [[id number]]  (Normal) Collected: 06/22/22 0730    Lab Status: Final result Specimen: Blood from Arm, Right Updated: 06/22/22 0759     hs TnI 0hr 4 ng/L     Comprehensive metabolic panel [[id number]]  (Abnormal) Collected: 06/22/22 0730    Lab Status: Final result Specimen: Blood from Arm, Right Updated: 06/22/22 0752     Sodium 136 mmol/L      Potassium 3 9 mmol/L      Chloride 99 mmol/L      CO2 26 mmol/L      ANION GAP 11 mmol/L      BUN 14 mg/dL      Creatinine 1 10 mg/dL      Glucose 163 mg/dL      Calcium 9 9 mg/dL      AST 17 U/L      ALT 24 U/L      Alkaline Phosphatase 78 U/L      Total Protein 8 5 g/dL      Albumin 4 2 g/dL      Total Bilirubin 0 56 mg/dL      eGFR 72 ml/min/1 73sq m     Narrative:      [place] guidelines for Chronic Kidney Disease (CKD):     Stage 1 with normal or high GFR (GFR > 90 mL/min/1 73 square meters)    Stage 2 Mild CKD (GFR = 60-89 mL/min/1 73 square meters)    Stage 3A Moderate CKD (GFR = 45-59 mL/min/1 73 square meters)    Stage 3B Moderate CKD (GFR = 30-44 mL/min/1 73 square meters)    Stage 4 Severe CKD (GFR = 15-29 mL/min/1 73 square meters)    Stage 5 End Stage CKD (GFR <15 mL/min/1 73 square meters)  Note: GFR calculation is accurate only with a steady state creatinine    APTT [[id number]]  (Normal) Collected: 06/22/22 0715    Lab Status: Final result Specimen: Blood Updated: 06/22/22 0751     PTT 29 seconds     Protime-INR [[id number]]  (Normal) Collected: 06/22/22 0715    Lab Status: Final result Specimen: Blood Updated: 06/22/22 0750 Protime 12 9 seconds      INR 0 98    CBC and differential [[id number]] Collected: 06/22/22 0730    Lab Status: Final result Specimen: Blood from Arm, Right Updated: 06/22/22 0735     WBC 9 30 Thousand/uL      RBC 5 37 Million/uL      Hemoglobin 15 7 g/dL      Hematocrit 45 6 %      MCV 85 fL      MCH 29 2 pg      MCHC 34 4 g/dL      RDW 12 2 %      MPV 10 3 fL      Platelets 018 Thousands/uL      nRBC 0 /100 WBCs      Neutrophils Relative 60 %      Immat GRANS % 1 %      Lymphocytes Relative 27 %      Monocytes Relative 8 %      Eosinophils Relative 3 %      Basophils Relative 1 %      Neutrophils Absolute 5 64 Thousands/µL      Immature Grans Absolute 0 05 Thousand/uL      Lymphocytes Absolute 2 51 Thousands/µL      Monocytes Absolute 0 77 Thousand/µL      Eosinophils Absolute 0 25 Thousand/µL      Basophils Absolute 0 08 Thousands/µL                  CTA ED chest PE Study   Final Result by Bridget Oglesby MD (06/22 5988)      1  Extensive acute pulmonary emboli, as described  The calculated ratio of right ventricular to left ventricular diameter (RV/LV ratio) is 1 3  This is greater than 0 9, which is abnormal and indicates right heart strain  An abnormal RV/LV ratio has been shown to be associated with an increased risk of 30 day mortality in the setting of acute pulmonary embolism  Urgent consultation with the    medical critical care team is recommended  2   Scattered small peripheral groundglass opacities in the right greater than left lungs, likely small pulmonary infarcts  3   Severely enlarged multinodular right thyroid lobe  Outpatient thyroid ultrasound is recommended for further evaluation  I personally discussed this study with Lesvia Gilliam on 6/22/2022 at 9:20 AM                Workstation performed: [id number]         XR chest 1 view portable   Final Result by Chula Grey MD (06/22 3533)      No acute cardiopulmonary disease                    Workstation performed: [id number]                    Procedures  Procedures         ED Course                                             MDM  Number of Diagnoses or Management Options  Pulmonary embolism ([facility] [address] )  Diagnosis management comments: bp dropped sp nitro  Did come up with IVF    To er with right sided cp and sob that started this AM  In er he does appear to by hemodynamically stable, dropped his bp once with nitro  sats about 90-02 %  On ra and was placed on 2L  He is non distressed at this point  Ct noted  Heparin ordered  Discussed case at [facility] with dr Meaghan Torres who has seen ct and is aware of clinical situation and he request pt be sent to step down rather than icu at a facility with 24 hours IR should he require cath associated tpa but at this point does not feel he needs this  Spoke to dr Angela Shrestha of icu at [facility], states pt ok for [facility], wants him placed on step down level 2  Spoke to dr Amelia Baer at [facility] who has accepted  Dr Haleigh Kimble has requested level 1 step down and priority transfer         Disposition  Final diagnoses:   [facility]ulmonary [facility] ([facility] )     Time reflects when diagnosis was documented in both MDM as applicable and the Disposition within this note     Time User Action Codes Description Comment    6/22/2022 10:23 AM Nga Fonseca Add [I26 99] Pulmonary embolism [facility])       ED Disposition     ED Disposition   Transfer to Another Facility-In Network    Condition   --    Date/Time   Wed Jun 22, 2022 10:23 AM    Comment   Patricia Hampton should be transferred out to [facility] [address] [place] [address] Most Recent Value   Accepting Physician Dr Maria T Glover Name, Vianney Roberts by (Company and Unit #) [facility]   Sending MD Conti Name, [address]  SL[address]   Bed Assignment 654   Transport Mode Ambulance   Transported by [employer] and Unit #) [facility]   Level [facility] Advanced life support   Transfer Date 06/22/22   Transfer Time 1300      Follow-up Information    None         Discharge Medication List as of 6/22/2022  1:29 PM      CONTINUE these medications which have NOT CHANGED    Details   amLODIPine (NORVASC) 2 5 mg tablet TAKE 1 TABLET BY MOUTH DAILY, Normal      PARoxetine (PAXIL) 20 mg tablet TAKE 1 TABLET BY MOUTH DAILY, Normal      Prezcobix 800-150 MG TABS TAKE 1 TABLET BY MOUTH DAILY, Starting Tue 5/31/2022, Normal      tenofovir (VIREAD) 300 mg tablet TAKE 1 TABLET BY MOUTH DAILY, Normal      Tivicay 50 MG TABS TAKE 1 TABLET BY MOUTH DAILY, Normal      amoxicillin (AMOXIL) 500 MG tablet TAKE 4 TABLETS BY MOUTH 1 HOUR PRIOR TO PROCEDURE, Historical Med      Aspirin Low Dose 81 MG chewable tablet CHEW 1 TABLET DAILY, Normal      Diclofenac Sodium (VOLTAREN) 1 % Apply 4 g topically 4 (four) times a day, Starting Tue 11/9/2021, Normal      nicotine (NICODERM CQ) 14 mg/24hr TD 24 hr patch Place 1 patch on the skin every 24 hours, Starting Thu 6/9/2022, Normal             No discharge procedures on file      PDMP Review     None          ED Provider  Electronically Signed by           Arthur Hogan MD  06/25/22 9784

## 2022-06-23 PROBLEM — I26.09 ACUTE PULMONARY EMBOLISM WITH ACUTE COR PULMONALE (HCC): Status: ACTIVE | Noted: 2022-06-22

## 2022-06-23 PROBLEM — J96.00 ACUTE RESPIRATORY FAILURE (HCC): Status: ACTIVE | Noted: 2022-06-23

## 2022-06-23 LAB
ALBUMIN SERPL BCP-MCNC: 3.2 G/DL (ref 3.5–5)
ALP SERPL-CCNC: 86 U/L (ref 46–116)
ALT SERPL W P-5'-P-CCNC: 26 U/L (ref 12–78)
ANION GAP SERPL CALCULATED.3IONS-SCNC: 10 MMOL/L (ref 4–13)
APTT PPP: 49 SECONDS (ref 23–37)
APTT PPP: 50 SECONDS (ref 23–37)
APTT PPP: 62 SECONDS (ref 23–37)
AST SERPL W P-5'-P-CCNC: 20 U/L (ref 5–45)
ATRIAL RATE: 102 BPM
BASOPHILS # BLD AUTO: 0.06 THOUSANDS/ΜL (ref 0–0.1)
BASOPHILS NFR BLD AUTO: 1 % (ref 0–1)
BILIRUB SERPL-MCNC: 0.6 MG/DL (ref 0.2–1)
BUN SERPL-MCNC: 12 MG/DL (ref 5–25)
CALCIUM ALBUM COR SERPL-MCNC: 9.7 MG/DL (ref 8.3–10.1)
CALCIUM SERPL-MCNC: 9.1 MG/DL (ref 8.3–10.1)
CHLORIDE SERPL-SCNC: 105 MMOL/L (ref 100–108)
CO2 SERPL-SCNC: 23 MMOL/L (ref 21–32)
CREAT SERPL-MCNC: 0.85 MG/DL (ref 0.6–1.3)
EOSINOPHIL # BLD AUTO: 0.2 THOUSAND/ΜL (ref 0–0.61)
EOSINOPHIL NFR BLD AUTO: 3 % (ref 0–6)
ERYTHROCYTE [DISTWIDTH] IN BLOOD BY AUTOMATED COUNT: 12.3 % (ref 11.6–15.1)
GFR SERPL CREATININE-BSD FRML MDRD: 94 ML/MIN/1.73SQ M
GLUCOSE SERPL-MCNC: 128 MG/DL (ref 65–140)
HCT VFR BLD AUTO: 40.7 % (ref 36.5–49.3)
HGB BLD-MCNC: 14 G/DL (ref 12–17)
IMM GRANULOCYTES # BLD AUTO: 0.03 THOUSAND/UL (ref 0–0.2)
IMM GRANULOCYTES NFR BLD AUTO: 0 % (ref 0–2)
LYMPHOCYTES # BLD AUTO: 2.09 THOUSANDS/ΜL (ref 0.6–4.47)
LYMPHOCYTES NFR BLD AUTO: 28 % (ref 14–44)
MCH RBC QN AUTO: 29.4 PG (ref 26.8–34.3)
MCHC RBC AUTO-ENTMCNC: 34.4 G/DL (ref 31.4–37.4)
MCV RBC AUTO: 85 FL (ref 82–98)
MONOCYTES # BLD AUTO: 0.76 THOUSAND/ΜL (ref 0.17–1.22)
MONOCYTES NFR BLD AUTO: 10 % (ref 4–12)
NEUTROPHILS # BLD AUTO: 4.29 THOUSANDS/ΜL (ref 1.85–7.62)
NEUTS SEG NFR BLD AUTO: 58 % (ref 43–75)
NRBC BLD AUTO-RTO: 0 /100 WBCS
P AXIS: 77 DEGREES
PLATELET # BLD AUTO: 214 THOUSANDS/UL (ref 149–390)
PMV BLD AUTO: 10.8 FL (ref 8.9–12.7)
POTASSIUM SERPL-SCNC: 3.8 MMOL/L (ref 3.5–5.3)
PR INTERVAL: 150 MS
PROT SERPL-MCNC: 7.7 G/DL (ref 6.4–8.2)
QRS AXIS: 52 DEGREES
QRSD INTERVAL: 90 MS
QT INTERVAL: 354 MS
QTC INTERVAL: 461 MS
RBC # BLD AUTO: 4.77 MILLION/UL (ref 3.88–5.62)
SODIUM SERPL-SCNC: 138 MMOL/L (ref 136–145)
T WAVE AXIS: 63 DEGREES
VENTRICULAR RATE: 102 BPM
WBC # BLD AUTO: 7.43 THOUSAND/UL (ref 4.31–10.16)

## 2022-06-23 PROCEDURE — 99232 SBSQ HOSP IP/OBS MODERATE 35: CPT | Performed by: INTERNAL MEDICINE

## 2022-06-23 PROCEDURE — 93010 ELECTROCARDIOGRAM REPORT: CPT | Performed by: INTERNAL MEDICINE

## 2022-06-23 PROCEDURE — 85730 THROMBOPLASTIN TIME PARTIAL: CPT | Performed by: INTERNAL MEDICINE

## 2022-06-23 PROCEDURE — 97163 PT EVAL HIGH COMPLEX 45 MIN: CPT

## 2022-06-23 PROCEDURE — 97167 OT EVAL HIGH COMPLEX 60 MIN: CPT

## 2022-06-23 PROCEDURE — 85025 COMPLETE CBC W/AUTO DIFF WBC: CPT | Performed by: INTERNAL MEDICINE

## 2022-06-23 PROCEDURE — 94761 N-INVAS EAR/PLS OXIMETRY MLT: CPT

## 2022-06-23 PROCEDURE — 97116 GAIT TRAINING THERAPY: CPT

## 2022-06-23 PROCEDURE — 80053 COMPREHEN METABOLIC PANEL: CPT | Performed by: INTERNAL MEDICINE

## 2022-06-23 RX ADMIN — PAROXETINE HYDROCHLORIDE 20 MG: 20 TABLET, FILM COATED ORAL at 08:56

## 2022-06-23 RX ADMIN — COBICISTAT 150 MG: 150 TABLET, FILM COATED ORAL at 08:55

## 2022-06-23 RX ADMIN — TENOFOVIR DISOPROXIL FUMARATE 300 MG: 300 TABLET ORAL at 08:55

## 2022-06-23 RX ADMIN — HEPARIN SODIUM 17 UNITS/KG/HR: 10000 INJECTION, SOLUTION INTRAVENOUS at 08:55

## 2022-06-23 RX ADMIN — HEPARIN SODIUM 3800 UNITS: 1000 INJECTION INTRAVENOUS; SUBCUTANEOUS at 06:46

## 2022-06-23 RX ADMIN — HEPARIN SODIUM 3800 UNITS: 1000 INJECTION INTRAVENOUS; SUBCUTANEOUS at 20:45

## 2022-06-23 RX ADMIN — DOLUTEGRAVIR SODIUM 50 MG: 50 TABLET, FILM COATED ORAL at 08:55

## 2022-06-23 NOTE — RESPIRATORY THERAPY NOTE
Home Oxygen Qualifying Test     Patient name: Vignesh Irving        : 1960   Date of Test:  2022  Diagnosis:    Home Oxygen Test:    **Medicare Guidelines require item(s) 1-5 on all ambulatory patients or 1 and 2 on non-ambulatory patients  1  Baseline SPO2 on Room Air at rest 94 %   a  If <= 88% on Room Air add O2 via NC to obtain SpO2 >=88%  If LPM needed, document LPM  needed to reach =>88%    2  SPO2 during exertion on Room Air 89  %  a  During exertion monitor SPO2  If SPO2 increases >=89%, do not add supplemental oxygen    3  SPO2 on Oxygen at Rest n/a % at n/a LPM    4  SPO2 during exertion on Oxygen n/a % at n/a LPM    5  Test performed during exertion activity  []  Supplemental Home Oxygen is indicated  [x]  Client does not qualify for home oxygen  Respiratory Additional Notes-pt does not require home o2 at this time     Vitaly Rahman, RT

## 2022-06-23 NOTE — RESPIRATORY THERAPY NOTE
RT Protocol Note  Garcia Bettencourt 63 y o  male MRN: [id number]  Unit/Bed#: [facility] 811-01 Encounter: [id number]               Resp Comments: (P) spoke with case management at this time  pt does not have an expected discharge date   home o2 eval on hold at this time

## 2022-06-23 NOTE — PLAN OF CARE
Problem: PAIN - ADULT  Goal: Verbalizes/displays adequate comfort level or baseline comfort level  Description: Interventions:  - Encourage patient to monitor pain and request assistance  - Assess pain using appropriate pain scale  - Administer analgesics based on type and severity of pain and evaluate response  - Implement non-pharmacological measures as appropriate and evaluate response  - Consider cultural and social influences on pain and pain management  - Notify physician/advanced practitioner if interventions unsuccessful or patient reports new pain  Outcome: Progressing     Problem: INFECTION - ADULT  Goal: Absence or prevention of progression during hospitalization  Description: INTERVENTIONS:  - Assess and monitor for signs and symptoms of infection  - Monitor lab/diagnostic results  - Monitor all insertion sites, i e  indwelling lines, tubes, and drains  - Monitor endotracheal if appropriate and nasal secretions for changes in amount and color  - [place] appropriate cooling/warming therapies per order  - Administer medications as ordered  - Instruct and encourage patient and family to use good hand hygiene technique  - Identify and instruct in appropriate isolation precautions for identified infection/condition  Outcome: Progressing  Goal: Absence of fever/infection during neutropenic period  Description: INTERVENTIONS:  - Monitor WBC    Outcome: Progressing     Problem: SAFETY ADULT  Goal: Patient will remain free of falls  Description: INTERVENTIONS:  - Educate patient/family on patient safety including physical limitations  - Instruct patient to call for assistance with activity   - Consult OT/PT to assist with strengthening/mobility   - Keep Call bell within reach  - Keep bed low and locked with side rails adjusted as appropriate  - Keep care items and personal belongings within reach  - Initiate and maintain comfort rounds  - Make Fall Risk Sign visible to staff  - Apply yellow socks and bracelet for high fall risk patients  - Consider moving patient to room near nurses station  Outcome: Progressing  Goal: Maintain or return to baseline ADL function  Description: INTERVENTIONS:  -  Assess patient's ability to carry out ADLs; assess patient's baseline for ADL function and identify physical deficits which impact ability to perform ADLs (bathing, care of mouth/teeth, toileting, grooming, dressing, etc )  - Assess/evaluate cause of self-care deficits   - Assess range of motion  - Assess patient's mobility; develop plan if impaired  - Assess patient's need for assistive devices and provide as appropriate  - Encourage maximum independence but intervene and supervise when necessary  - Involve family in performance of ADLs  - Assess for home care needs following discharge   - Consider OT consult to assist with ADL evaluation and planning for discharge  - Provide patient education as appropriate  Outcome: Progressing  Goal: Maintains/Returns to pre admission functional level  Description: INTERVENTIONS:  - Perform BMAT or MOVE assessment daily    - Set and communicate daily mobility goal to care team and patient/family/caregiver     - Collaborate with rehabilitation services on mobility goals if consulted  - Out of bed for toileting  - Record patient progress and toleration of activity level   Outcome: Progressing     Problem: DISCHARGE PLANNING  Goal: Discharge to home or other facility with appropriate resources  Description: INTERVENTIONS:  - Identify barriers to discharge w/patient and caregiver  - Arrange for needed discharge resources and transportation as appropriate  - Identify discharge learning needs (meds, wound care, etc )  - Arrange for interpretive services to assist at discharge as needed  - Refer to Case Management Department for coordinating discharge planning if the patient needs post-hospital services based on physician/advanced practitioner order or complex needs related to functional status, cognitive ability, or social support system  Outcome: Progressing     Problem: Knowledge Deficit  Goal: Patient/family/caregiver demonstrates understanding of disease process, treatment plan, medications, and discharge instructions  Description: Complete learning assessment and assess knowledge base  Interventions:  - Provide teaching at level of understanding  - Provide teaching via preferred learning methods  Outcome: Progressing     Problem: Nutrition/Hydration-ADULT  Goal: Nutrient/Hydration intake appropriate for improving, restoring or maintaining nutritional needs  Description: Monitor and assess patient's nutrition/hydration status for malnutrition  Collaborate with interdisciplinary team and initiate plan and interventions as ordered  Monitor patient's weight and dietary intake as ordered or per policy  Utilize nutrition screening tool and intervene as necessary  Determine patient's food preferences and provide high-protein, high-caloric foods as appropriate       INTERVENTIONS:  - Monitor oral intake, urinary output, labs, and treatment plans  - Assess nutrition and hydration status and recommend course of action  - Evaluate amount of meals eaten  - Assist patient with eating if necessary   - Allow adequate time for meals  - Recommend/ encourage appropriate diets, oral nutritional supplements, and vitamin/mineral supplements  - Order, calculate, and assess calorie counts as needed  - Recommend, monitor, and adjust tube feedings and TPN/PPN based on assessed needs  - Assess need for intravenous fluids  - Provide specific nutrition/hydration education as appropriate  - Include patient/family/caregiver in decisions related to nutrition  Outcome: Progressing     Problem: MOBILITY - ADULT  Goal: Maintain or return to baseline ADL function  Description: INTERVENTIONS:  -  Assess patient's ability to carry out ADLs; assess patient's baseline for ADL function and identify physical deficits which impact ability to perform ADLs (bathing, care of mouth/teeth, toileting, grooming, dressing, etc )  - Assess/evaluate cause of self-care deficits   - Assess range of motion  - Assess patient's mobility; develop plan if impaired  - Assess patient's need for assistive devices and provide as appropriate  - Encourage maximum independence but intervene and supervise when necessary  - Involve family in performance of ADLs  - Assess for home care needs following discharge   - Consider OT consult to assist with ADL evaluation and planning for discharge  - Provide patient education as appropriate  Outcome: Progressing  Goal: Maintains/Returns to pre admission functional level  Description: INTERVENTIONS:  - Perform BMAT or MOVE assessment daily    - Set and communicate daily mobility goal to care team and patient/family/caregiver     - Out of bed for toileting  - Record patient progress and toleration of activity level   Outcome: Progressing     Problem: Potential for Falls  Goal: Patient will remain free of falls  Description: INTERVENTIONS:  - Educate patient/family on patient safety including physical limitations  - Instruct patient to call for assistance with activity   - Consult OT/PT to assist with strengthening/mobility   - Keep Call bell within reach  - Keep bed low and locked with side rails adjusted as appropriate  - Keep care items and personal belongings within reach  - Initiate and maintain comfort rounds  - Make Fall Risk Sign visible to staff  - Apply yellow socks and bracelet for high fall risk patients  - Consider moving patient to room near nurses station  Outcome: Progressing     Problem: CARDIOVASCULAR - ADULT  Goal: Maintains optimal cardiac output and hemodynamic stability  Description: INTERVENTIONS:  - Monitor I/O, vital signs and rhythm  - Monitor for S/S and trends of decreased cardiac output  - Administer and titrate ordered vasoactive medications to optimize hemodynamic stability  - Assess quality of pulses, skin color and temperature  - Assess for signs of decreased coronary artery perfusion  - Instruct patient to report change in severity of symptoms  Outcome: Progressing  Goal: Absence of cardiac dysrhythmias or at baseline rhythm  Description: INTERVENTIONS:  - Continuous cardiac monitoring, vital signs, obtain 12 lead EKG if ordered  - Administer antiarrhythmic and heart rate control medications as ordered  - Monitor electrolytes and administer replacement therapy as ordered  Outcome: Progressing     Problem: RESPIRATORY - ADULT  Goal: Achieves optimal ventilation and oxygenation  Description: INTERVENTIONS:  - Assess for changes in respiratory status  - Assess for changes in mentation and behavior  - Position to facilitate oxygenation and minimize respiratory effort  - Oxygen administered by appropriate delivery if ordered  - Initiate smoking cessation education as indicated  - Encourage broncho-pulmonary hygiene including cough, deep breathe, Incentive Spirometry  - Assess the need for suctioning and aspirate as needed  - Assess and instruct to report SOB or any respiratory difficulty  - Respiratory Therapy support as indicated  Outcome: Progressing

## 2022-06-23 NOTE — OCCUPATIONAL THERAPY NOTE
Occupational Therapy Evaluation     Patient Name: Juan M Ramsay  [id number] Date: 6/23/2022  Problem List  Principal Problem:    Acute pulmonary embolism [facility])  Active Problems:    Anxiety    Essential hypertension    HIV disease ([facility] [address] )    Past Medical History  Past Medical History:   Diagnosis Date    DDD (degenerative disc disease), lumbar     Facet joint disease     Human immunodeficiency virus (HIV) infection ([facility] [address] )     resolved 11/5/15    Hypertension     resolved 5/29/15    Other specified disorders of white blood cells     resolved 5/12/16     Past Surgical History  Past Surgical History:   Procedure Laterality Date    HERNIA REPAIR      SHOULDER SURGERY Left            06/23/22 1020   OT Last Visit   OT Visit Date 06/23/22   Note Type   Note type Evaluation   Restrictions/Precautions   Weight Bearing Precautions Per Order No   Other Precautions Fall Risk;Multiple lines;O2   Pain Assessment   Pain Assessment Tool 0-10   Pain Score 5   Pain Location/Orientation Location: Chest  (describes as dull)   Home Living   Type of [address] [place] [address] Two level; Able to live on main level with bedroom/bathroom; Performs ADLs on one level  (6 KORIN)   Bathroom Shower/Tub Walk-in shower   Bathroom Toilet Standard   Bathroom Equipment   (none)   Bathroom Accessibility Accessible   Home Equipment Cane   Additional Comments Pt stays on the first floor  He furniture walks in the house and uses a cane in the community  Prior Function   Level of [place] Independent with ADLs and functional mobility   Lives With Son   Receives Help From Family   ADL Assistance Independent   IADLs Independent   Falls in the last 6 months 1 to 4  (2 falls)   Vocational Retired   Lifestyle   Autonomy Pt reports being I w/ ADLs and is mod I for mobility with a SPC for community distances  He has groceries delivered to the house and his son can assist w/ IADLs as needed     Reciprocal Relationships Supportive son   Service to Others Retired/ not working   [address] Enjoys riding motorcycles   Psychosocial   Psychosocial (WDL) WDL   ADL   Where Assessed Chair   Eating Assistance 6  Modified independent   Eating Deficit Setup   Grooming Assistance 5  Supervision/Setup   UB [address] 5  Supervision/Setup   LB [address] 4  Minimal Assistance   [address] 5  Supervision/Setup   LB Dressing Assistance 4  Minimal Assistance   LB Dressing Deficit Don/doff R shoe;Don/doff L shoe   Toileting Assistance  5  Supervision/Setup   Functional Assistance 4  Minimal Assistance   Bed Mobility   Supine to Sit 5  Supervision   Sit to Supine 5  Supervision   Transfers   Sit to Stand 5  Supervision   Stand to Sit 5  Supervision   Toilet transfer 5  Supervision   Functional Mobility   Functional Mobility 5  Supervision   Additional Comments With suppl  O2 (2L), pt's SpO2 was mid-high 90s  Without suppl  O2, pt's SpO2 was low-mid 90s with mobility  His SpO2 dropped to 91% at the lowest    Additional items [facility]   Balance   Static Sitting Fair +   Dynamic Sitting Fair +   Static Standing Fair   Dynamic Standing Fair   Activity Tolerance   Activity Tolerance Patient tolerated treatment well   Medical Staff Made Aware Co eval with PT 2* medical complexity   Nurse Made Aware Cleared to work with per RN   RUE Assessment   RUE Assessment WFL   LUE Assessment   LUE Assessment WFL   Hand Function   Gross Motor Coordination Functional   Fine Motor Coordination Functional   Sensation   Light Touch No apparent deficits   Cognition   Overall Cognitive Status WFL   Arousal/Participation Alert; Responsive; Cooperative   Attention Within functional limits   Orientation Level Oriented X4   Memory Within functional limits   Following Commands Follows all commands and directions without difficulty   Comments Pt pleasant and agreeable to session  He is motivated to get better   Assessment   Limitation Decreased ADL status; Decreased endurance;Decreased self-care trans;Decreased high-level ADLs   Prognosis Good   Assessment Pt is a 63 y o  male who was admitted to [facility] on 6/22/2022 with Acute pulmonary embolism ([facility] [address] )  Pt  has a past medical history of DDD (degenerative disc disease), lumbar, Facet joint disease, Human immunodeficiency virus (HIV) infection ([facility] [address] ), Hypertension, and Other specified disorders of white blood cells  At baseline pt was I w/ ADLs, some IADLs, and mod I for mobility w/ SPC  Pt lives In a [address] [place] [address] w/ FF set up with his son  Currently pt requires S-min A for overall ADLS and min A for functional mobility/transfers w/ RW  Pt currently presents with impairments in the following categories -limited home support, difficulty performing ADLS, difficulty performing IADLS,  activity tolerance, endurance and standing balance/tolerance  These impairments, as well as pt's fatigue, SOB and risk for falls  limit pt's ability to safely engage in all baseline areas of occupation, including bathing, dressing, toileting, functional mobility/transfers and leisure activities  The patient's raw score on the -PAC Daily Activity inpatient short form is 19, standardized score is 40 22, greater than 39 4  Patients at this level are likely to benefit from discharge to home  Please refer to the recommendation of the Occupational Therapist for safe discharge planning  From OT standpoint, recommend home w/ increased support and home OT upon D/C  OT will continue to follow to address the below stated goals  Goals   Patient Goals to be able to ride his motorcycle again   LTG Time Frame 10-14   Long Term Goal see below goals   Plan   Treatment Interventions ADL retraining;Functional transfer training; Endurance training;Patient/family training;Equipment evaluation/education; Compensatory technique education; Energy conservation   Goal Expiration Date 07/07/22   OT Frequency 3-5x/wk   Recommendation   OT Discharge Recommendation Home with home health rehabilitation   OT - OK to Discharge Yes  (when medically stable)   AM-PAC Daily Activity Inpatient   Lower Body Dressing 2   Bathing 2   Toileting 3   Upper Body Dressing 4   Grooming 4   Eating 4   Daily Activity Raw Score 19   Daily Activity Standardized Score (Calc for Raw Score >=11) 40 22       LTG (10-14 DAYS)  Pt will improve activity tolerance to G for min 30-45 min txment sessions to increase participation in ADLs    Pt will complete ADLs/self care w/ mod I using adaptive device and DME as needed    Pt will complete toileting w/ mod I w/ G hygiene/thoroughness using DME as needed    Pt will improve functional transfers on/off all surfaces to mod I using DME as needed w/ G balance/safety  Pt will improve functional mobility during ADL/IADL/leisure tasks to mod I using DME as needed w/ G balance/safety  Pt will improve standing balance to G for 8-10 minutes of purposeful activity w/ G endurance  Pt will demonstrate 100% carryover of energy conservation techniques w/ mod I t/o functional I/ADL/leisure tasks w/o cues s/p skilled education     Pt will participate in simulated IADL/home management task w/ G participation/safey awareness and G endurance to improve independence in IADL tasks          Stefan Almodovar, OTR/L

## 2022-06-23 NOTE — PLAN OF CARE
Problem: PHYSICAL THERAPY ADULT  Goal: Performs mobility at highest level of function for planned discharge setting  See evaluation for individualized goals  Description: Treatment/Interventions: Functional transfer training, LE strengthening/ROM, Elevations, Therapeutic exercise, Endurance training, Patient/family training, Equipment eval/education, Gait training, Bed mobility, Compensatory technique education, Spoke to MD, OT, Spoke to case management (balance training)  Equipment Recommended:  (r/o RW)       See flowsheet documentation for full assessment, interventions and recommendations  Note: Prognosis: Good  Problem List: Decreased strength, Decreased range of motion, Decreased endurance, Impaired balance, Decreased mobility, Impaired judgement, Decreased safety awareness, Pain  Assessment: Pt is 63 y o  male seen for a high complexity PT evaluation s/p admit to [facility] on 6/22/2022  Pt presenting w/ acute PE - no IR intervention  Please see above for other active problem list / PMH  PT now consulted to assess functional mobility and needs for safe d/c planning  Prior to admission, pt was ambulatory via furniture walking household distances, uses [facility] distances, lives w/ his son in a house w/ stairs  Currently pt requires S for bed skills; S for functional transfers; S-CGA for ambulation w/ SPC; S for stair neogtiation  Pt would benefit from RW for ambulation - ? Whether pt would be agreeable  Pt presents functioning below baseline and w/ overall mobility deficits 2* to: decreased LE strength; decreased ROM; decreased endurance; impaired balance; gait deviations; pain; fatigue; impaired safety and judgement; limited insight into current deficits; multiple lines  Pt will continue to benefit from skilled PT interventions to address stated impairments; to maximize functional potential; for ongoing pt/ family training; and DME needs  PT is currently recommending HHPT   Pt agreeable to plan and goals as stated on evaluation  Barriers to Discharge: Inaccessible home environment, Decreased caregiver support        PT Discharge Recommendation: Home with home health rehabilitation          See flowsheet documentation for full assessment

## 2022-06-23 NOTE — PROGRESS NOTES
Progress Note - Pulmonary   Marina Sneed 63 y o  male MRN: [id number]  Unit/Bed#: [facility] 811-01 Encounter: [id number]      Assessment:  · Acute hypoxemic respiratory failure secondary to acute pulmonary embolism  · HIV  Plan:  · Transition to oral anticoagulation  The patient needs least 6 months of anticoagulation  · He needs cancer screening and workup of hypercoagulable state  · Titrate oxygen to maintain O2 saturation above 88%  Subjective: The patient was sitting in the chair  He was eating his breakfast   His breathing is better  His oxygen requirement has decreased  Denies pain  No overnight events  Vitals: Blood pressure 138/96, pulse 85, temperature 97 7 °F (36 5 °C), resp  rate 20, height 6' 2" (1 88 m), weight 95 3 kg (210 lb), SpO2 93 %  , Body mass index is 26 96 kg/m²  Intake/Output Summary (Last 24 hours) at 6/23/2022 1116  Last data filed at 6/23/2022 0732  Gross per 24 hour   Intake 0 ml   Output 300 ml   Net -300 ml       Physical Exam  Gen: Awake, alert, oriented x 3, no acute distress  HEENT: Mucous membranes moist, no oral lesions, no thrush  NECK: No accessory muscle use, JVP not elevated  Cardiac: Regular, single S1, single S2, no murmurs, no rubs, no gallops  Lungs:  Decreased breath sounds  Abdomen: normoactive bowel sounds, soft nontender, nondistended, no rebound or rigidity, no guarding  Extremities: no cyanosis, no clubbing, no   No wheezing  2+  edema of the right leg      Labs:   CBC:   Lab Results   Component Value Date    WBC 7 43 06/23/2022    HGB 14 0 06/23/2022    HCT 40 7 06/23/2022    MCV 85 06/23/2022     06/23/2022    MCH 29 4 06/23/2022    MCHC 34 4 06/23/2022    RDW 12 3 06/23/2022    MPV 10 8 06/23/2022    NRBC 0 06/23/2022   , CMP:   Lab Results   Component Value Date    SODIUM 138 06/23/2022    K 3 8 06/23/2022     06/23/2022    CO2 23 06/23/2022    BUN 12 06/23/2022    CREATININE 0 85 06/23/2022    CALCIUM 9 1 06/23/2022    AST 20 06/23/2022    ALT 26 06/23/2022    ALKPHOS 86 06/23/2022    EGFR 94 06/23/2022           Sebastian Sun MD

## 2022-06-23 NOTE — ASSESSMENT & PLAN NOTE
Patient presented with right-sided pleuritic chest pain and shortness of breath found to have extensive pulmonary embolism  -transferred for pulmonary and possible interventional radiology intervention    With acute pulmonology as evidenced by increased RV to LV ratio  -patient currently appears comfortable on 2 L nasal cannula, blood pressure improved  -continue heparin drip, pending possible intervention transition to oral anticoagulant once able-Eliquis prior shock pending  -malignancy screening, thrombosis panel once off anticoagulation, can consider genetic testing for now, suspect unprovoked

## 2022-06-23 NOTE — PLAN OF CARE
Problem: OCCUPATIONAL THERAPY ADULT  Goal: Performs self-care activities at highest level of function for planned discharge setting  See evaluation for individualized goals  Description: Treatment Interventions: ADL retraining, Functional transfer training, Endurance training, Patient/family training, Equipment evaluation/education, Compensatory technique education, Energy conservation          See flowsheet documentation for full assessment, interventions and recommendations  Note: Limitation: Decreased ADL status, Decreased endurance, Decreased self-care trans, Decreased high-level ADLs  Prognosis: Good  Assessment: Pt is a 63 y o  male who was admitted to [facility] on 6/22/2022 with Acute pulmonary embolism ([facility] [address] )  Pt  has a past medical history of DDD (degenerative disc disease), lumbar, Facet joint disease, Human immunodeficiency virus (HIV) infection ([facility] [address] ), Hypertension, and Other specified disorders of white blood cells  At baseline pt was I w/ ADLs, some IADLs, and mod I for mobility w/ SPC  Pt lives In a [address] [place] [address] w/ FF set up with his son  Currently pt requires S-min A for overall ADLS and min A for functional mobility/transfers w/ RW  Pt currently presents with impairments in the following categories -limited home support, difficulty performing ADLS, difficulty performing IADLS,  activity tolerance, endurance and standing balance/tolerance  These impairments, as well as pt's fatigue, SOB and risk for falls  limit pt's ability to safely engage in all baseline areas of occupation, including bathing, dressing, toileting, functional mobility/transfers and leisure activities  The patient's raw score on the AM-PAC Daily Activity inpatient short form is 19, standardized score is 40 22, greater than 39 4  Patients at this level are likely to benefit from discharge to home  Please refer to the recommendation of the Occupational Therapist for safe discharge planning   From OT standpoint, recommend home w/ increased support and home OT upon D/C  OT will continue to follow to address the below stated goals       OT Discharge Recommendation: Home with home health rehabilitation  OT - OK to Discharge: Yes (when medically stable)

## 2022-06-23 NOTE — ASSESSMENT & PLAN NOTE
Acute respiratory failure with hypoxia secondary to pulmonary embolism requiring 2 L nasal cannula to maintain oxygenation above 90%  -patient currently on 2 L nasal cannula attempt to wean, if unable to wean will need home oxygen testing

## 2022-06-23 NOTE — PHYSICAL THERAPY NOTE
Physical Therapy Evaluation    Patient's Name: Giuliana Thomas    Admitting Diagnosis  saddle pe    Problem List  Patient Active Problem List   Diagnosis    Anxiety    Essential hypertension    Heart murmur    Hiatal hernia    HIV disease ([facility] )    Hypertriglyceridemia    Inguinal hernia    Toxic effect of tobacco and nicotine    Prediabetes    Other male erectile dysfunction    Acute pulmonary embolism with acute cor pulmonale (HCC)    Acute respiratory failure (HCC)       Past Medical History  Past Medical History:   Diagnosis Date    DDD (degenerative disc disease), lumbar     Facet joint disease     Human immunodeficiency virus (HIV) infection ([facility] [address] )     resolved 11/5/15    Hypertension     resolved 5/29/15    Other specified disorders of white blood cells     resolved 5/12/16       Past Surgical History  Past Surgical History:   Procedure Laterality Date    HERNIA REPAIR      SHOULDER SURGERY Left         06/23/22 1019   PT Last Visit   PT Visit Date 06/23/22   Note Type   Note type Evaluation  (+ treatment)   Pain Assessment   Pain Assessment Tool 0-10   Pain Score 5   Pain Location/Orientation Orientation: Left; Location: Chest   Pain Onset/Description Descriptor: Dull   Restrictions/Precautions   Weight Bearing Precautions Per Order No   Other Precautions O2;Fall Risk;Multiple lines;Pain;Telemetry  (2L/min)   Home Living   Type of [address] [facility] 1/2 bath on main level;Stairs to enter with rails  (6 KORIN, stays on 1st floor primarily)   [address] [facility]   Prior Function   Level of [place] Independent with ADLs and functional mobility   Lives With Son  (son works during the day as a )   Receives Help From Family; Outpatient therapy  (was attending OP PT for B knee pain)   ADL Assistance Independent   IADLs   (mostly I w/ IADLs, does his own cooking, cleaning, and laundry, gets groceries delivered)   Falls in the last 6 months 1 to 4  (2)   Comments At baseline pt ambulates w/ SPC in the community, admits to furniture walking in the house  General   Family/Caregiver Present No   Cognition   Arousal/Participation Alert   Attention Within functional limits   Orientation Level Oriented X4   Memory Within functional limits   Following Commands Follows all commands and directions without difficulty   Comments pleasant + cooperative   Subjective   Subjective Pt agreeable to mobilize  RLE Assessment   RLE Assessment   (grossly 4/5)   LLE Assessment   LLE Assessment   (grossly 4/5)   Coordination   Movements are Fluid and Coordinated 1   Bed Mobility   Supine to Sit 5  Supervision   Additional Comments Pt greeted in supine  Transfers   Sit to Stand 5  Supervision   Stand to Sit 5  Supervision   Toilet transfer 5  Supervision   Additional items Increased time required;Standard toilet   Additional Comments [facility]   Ambulation/Elevation   Gait pattern Antalgic; Improper Weight shift; Shuffling;Decreased heel strike;Decreased foot clearance  (decreased knee EXT during stance phase)   Gait Assistance 5  Supervision  (CGA -> CS)   Additional items Verbal cues   Assistive Device [facility]   Distance 60'x2   Stair Management Assistance 5  Supervision   Stair Management Technique Reciprocal;One rail R   Number of Stairs 3   Ambulation/Elevation Additional Comments Adjusted pt's person SPC to correct height  Balance   Static Sitting Fair +   Dynamic Sitting Fair +   Static Standing Fair   Dynamic Standing Fair   Ambulatory Fair  ([facility])   Endurance Deficit   Endurance Deficit Yes   Endurance Deficit Description [place], SpO2 91% and above RA w/ activity, stayed 95% and above on 2L/min   Activity Tolerance   Activity Tolerance Patient tolerated treatment well   Medical Staff Made Aware OT SHIKHA De Souza   Assessment   Prognosis Good   Problem List Decreased strength;Decreased range of motion;Decreased endurance; Impaired balance;Decreased mobility; Impaired judgement;Decreased safety awareness;Pain   Assessment Pt is 62 y o  male seen for a high complexity PT evaluation s/p admit to [facility] on 6/22/2022  Pt presenting w/ acute PE - no IR intervention  Please see above for other active problem list / PMH  PT now consulted to assess functional mobility and needs for safe d/c planning  Prior to admission, pt was ambulatory via furniture walking household distances, uses [employer] community distances, lives w/ his son in a house w/ stairs  Currently pt requires S for bed skills; S for functional transfers; S-CGA for ambulation w/ SPC; S for stair neogtiation  Pt would benefit from RW for ambulation - ? Whether pt would be agreeable  Pt presents functioning below baseline and w/ overall mobility deficits 2* to: decreased LE strength; decreased ROM; decreased endurance; impaired balance; gait deviations; pain; fatigue; impaired safety and judgement; limited insight into current deficits; multiple lines  Pt will continue to benefit from skilled PT interventions to address stated impairments; to maximize functional potential; for ongoing pt/ family training; and DME needs  PT is currently recommending HHPT  Pt agreeable to plan and goals as stated on evaluation  Barriers to Discharge Inaccessible home environment;Decreased caregiver support   Goals   Patient Goals to ride his motorcycle again   STG Expiration Date 07/07/22   Short Term Goal #1 In 14 days pt will complete: 1) Bed mobility skills with I to facilitate safe return to previous living environment  2) Functional transfers with Kristin to facilitate safe return to previous living environment  3) Ambulation with least restrictive ' w/ Kristin without LOB for safe ambulation in home/community environment  4) Improve balance scores by 1 grade to decrease fall risk  5) Improve LE strength grades by 1 to increase independence w/ all functional mobility, transfers and gait   6) PT for ongoing pt and family education; DME needs and D/C planning to promote highest level of function in least restrictive environment  7) Stair training up/ down 6 steps with 1 rail and Kristin for safe access to previous living environment and to increase community access  PT Treatment Day 0   Plan   Treatment/Interventions Functional transfer training;LE strengthening/ROM; Elevations; Therapeutic exercise; Endurance training;Patient/family training;Equipment eval/education;Gait training;Bed mobility; Compensatory technique education;Spoke to MD;OT;Spoke to case management  (balance training)   PT Frequency 3-5x/wk   Recommendation   PT Discharge Recommendation Home with home health rehabilitation   Equipment Recommended   (r/o RW)   AM-PAC Basic Mobility Inpatient   Turning in Bed Without Bedrails 3   Lying on Back to Sitting on Edge of Flat Bed 3   Moving Bed to Chair 3   Standing Up From Chair 3   Walk in Room 3   Climb 3-5 Stairs 3   Basic Mobility Inpatient Raw Score 18   Basic Mobility Standardized Score 41 05   Highest Level Of Mobility   JH-HLM Goal 6: Walk 10 steps or more   JH-HLM Achieved 7: Walk 25 feet or more   Additional Treatment Session   Start Time 1004   End Time 1019   Treatment Assessment Additional stair training 3 stairs w/ CS w/ 1 rail w/ cues for safety w/ O2 entanglement  Additional gait training 60' w/ SPC w/ CS w/ improved foot clearance w/ increased distance  End of Consult   Patient Position at End of Consult Bedside chair; All needs within reach  (on waffle cushion, all lines in tact, in NAD)     Mary Green, PT, DPT

## 2022-06-24 ENCOUNTER — TELEPHONE (OUTPATIENT)
Dept: SURGERY | Facility: CLINIC | Age: 62
End: 2022-06-24

## 2022-06-24 ENCOUNTER — HOME CARE VISIT (OUTPATIENT)
Dept: HOME HEALTH SERVICES | Facility: HOME HEALTHCARE | Age: 62
End: 2022-06-24

## 2022-06-24 ENCOUNTER — APPOINTMENT (OUTPATIENT)
Dept: PHYSICAL THERAPY | Facility: REHABILITATION | Age: 62
End: 2022-06-24
Payer: MEDICARE

## 2022-06-24 ENCOUNTER — HOME HEALTH ADMISSION (OUTPATIENT)
Dept: HOME HEALTH SERVICES | Facility: HOME HEALTHCARE | Age: 62
End: 2022-06-24

## 2022-06-24 VITALS
RESPIRATION RATE: 14 BRPM | DIASTOLIC BLOOD PRESSURE: 88 MMHG | WEIGHT: 210 LBS | BODY MASS INDEX: 26.95 KG/M2 | SYSTOLIC BLOOD PRESSURE: 140 MMHG | HEIGHT: 74 IN | HEART RATE: 86 BPM | TEMPERATURE: 98.1 F | OXYGEN SATURATION: 95 %

## 2022-06-24 LAB
APTT PPP: 56 SECONDS (ref 23–37)
ERYTHROCYTE [DISTWIDTH] IN BLOOD BY AUTOMATED COUNT: 12 % (ref 11.6–15.1)
HCT VFR BLD AUTO: 40.8 % (ref 36.5–49.3)
HGB BLD-MCNC: 14 G/DL (ref 12–17)
MCH RBC QN AUTO: 29.3 PG (ref 26.8–34.3)
MCHC RBC AUTO-ENTMCNC: 34.3 G/DL (ref 31.4–37.4)
MCV RBC AUTO: 85 FL (ref 82–98)
PLATELET # BLD AUTO: 221 THOUSANDS/UL (ref 149–390)
PMV BLD AUTO: 9.8 FL (ref 8.9–12.7)
RBC # BLD AUTO: 4.78 MILLION/UL (ref 3.88–5.62)
WBC # BLD AUTO: 6.83 THOUSAND/UL (ref 4.31–10.16)

## 2022-06-24 PROCEDURE — 99239 HOSP IP/OBS DSCHRG MGMT >30: CPT | Performed by: INTERNAL MEDICINE

## 2022-06-24 PROCEDURE — 85027 COMPLETE CBC AUTOMATED: CPT | Performed by: INTERNAL MEDICINE

## 2022-06-24 PROCEDURE — 99232 SBSQ HOSP IP/OBS MODERATE 35: CPT | Performed by: INTERNAL MEDICINE

## 2022-06-24 PROCEDURE — 85730 THROMBOPLASTIN TIME PARTIAL: CPT | Performed by: INTERNAL MEDICINE

## 2022-06-24 RX ADMIN — TENOFOVIR DISOPROXIL FUMARATE 300 MG: 300 TABLET ORAL at 09:56

## 2022-06-24 RX ADMIN — HEPARIN SODIUM 19 UNITS/KG/HR: 10000 INJECTION, SOLUTION INTRAVENOUS at 01:09

## 2022-06-24 RX ADMIN — DOLUTEGRAVIR SODIUM 50 MG: 50 TABLET, FILM COATED ORAL at 09:56

## 2022-06-24 RX ADMIN — COBICISTAT 150 MG: 150 TABLET, FILM COATED ORAL at 09:56

## 2022-06-24 RX ADMIN — APIXABAN 10 MG: 5 TABLET, FILM COATED ORAL at 09:56

## 2022-06-24 RX ADMIN — PAROXETINE HYDROCHLORIDE 20 MG: 20 TABLET, FILM COATED ORAL at 09:56

## 2022-06-24 RX ADMIN — HEPARIN SODIUM 3800 UNITS: 1000 INJECTION INTRAVENOUS; SUBCUTANEOUS at 03:55

## 2022-06-24 NOTE — DISCHARGE INSTR - AVS FIRST PAGE
1) important adjustment to your dosing for eliquis, take 5mg twice day for one week, after one week is over, start 2 5mg twice a day  2) you will be on eliquis for 6 months and then your pcp will discuss continuing it or stopping based on further evaluation  3) enjoy the bikes/cars, if I see you out there on the road, ill give you a run for your money

## 2022-06-24 NOTE — DISCHARGE INSTRUCTIONS
Pulmonary Embolism   AMBULATORY CARE:   A pulmonary embolism (PE)  is the sudden blockage of a blood vessel in the lungs by an embolus  An embolus is a small piece of blood clot, fat, air, or tumor cells  The embolus cuts off the blood supply to your lungs  A PE can become life-threatening  Common signs and symptoms of a PE:   Sudden shortness of breath or fast breathing    Sudden chest pain that is worse when you take a deep breath    A fast or irregular heartbeat    Fever    Coughing up blood, or coughing that does not go away    Sweating at rest    Bluish nails    Cold, pale, clammy skin    Fainting    Call your local emergency number (911 in the [address] [facility]) if:   You feel lightheaded, short of breath, and have chest pain  You cough up blood  Seek care immediately if:   Your arm or leg feels warm, tender, and painful  It may look swollen and red  Call your doctor or hematologist if:   You have questions or concerns about your condition or care  Treatment  depends on what the embolus is made of and where it is in your lung  Treatment may include any of the following:  Clot busters  are emergency medicines that work to dissolve blood clots  Blood thinners  help prevent blood clots  Clots can cause strokes, heart attacks, and death  The following are general safety guidelines to follow while you are taking a blood thinner:    Watch for bleeding and bruising while you take blood thinners  Watch for bleeding from your gums or nose  Watch for blood in your urine and bowel movements  Use a soft washcloth on your skin, and a soft toothbrush to brush your teeth  This can keep your skin and gums from bleeding  If you shave, use an electric shaver  Do not play contact sports  Tell your dentist and other healthcare providers that you take a blood thinner  Wear a bracelet or necklace that says you take this medicine       Do not start or stop any other medicines unless your healthcare provider tells you to  Many medicines cannot be used with blood thinners  Take your blood thinner exactly as prescribed by your healthcare provider  Do not skip does or take less than prescribed  Tell your provider right away if you forget to take your blood thinner, or if you take too much  Warfarin  is a blood thinner that you may need to take  The following are things you should be aware of if you take warfarin:     Foods and medicines can affect the amount of warfarin in your blood  Do not make major changes to your diet while you take warfarin  Warfarin works best when you eat about the same amount of vitamin K every day  Vitamin K is found in green leafy vegetables and certain other foods  Ask for more information about what to eat when you are taking warfarin  You will need to see your healthcare provider for follow-up visits when you are on warfarin  You will need regular blood tests  These tests are used to decide how much medicine you need  A vena cava filter  may be placed inside your vena cava to prevent another PE  The vena cava is a large vein that brings blood from your lower body up to your heart  The filter may help trap an embolus and prevent it from going into your lungs  Surgery  called a thrombectomy may be done to remove the PE  A procedure called thrombolysis may instead be done to inject a clot buster that helps dissolve or break the clot apart  Prevent another PE:   Change your body position or move around often  Move and stretch in your seat several times each hour if you travel by car or work at a desk  In an airplane, get up and walk every hour  Exercise regularly to help increase your blood flow  Walking is a good low-impact exercise  Talk to your healthcare provider about the best exercise plan for you  Maintain a healthy weight  Ask your healthcare provider what a healthy weight is for you  Ask him or her to help you create a weight loss plan, if needed  Do not smoke  Nicotine and other chemicals in cigarettes and cigars can damage blood vessels and increase your risk for another PE  Ask your healthcare provider for information if you currently smoke and need help to quit  E-cigarettes or smokeless tobacco still contain nicotine  Talk to your healthcare provider before you use these products  Ask about birth control if you are a woman who takes the pill  A birth control pill increases the risk for blood clots in certain women  The risk is higher if you are also older than 35, smoke cigarettes, or have a blood clotting disorder  Talk to your healthcare provider about other ways to prevent pregnancy, such as a cervical cap or intrauterine device (IUD)  Follow up with your doctor or hematologist as directed:  Make an appointment as soon as possible  You may also need to come in regularly for scans to check for blood clots  Your blood may checked to see how long it takes to clot  Your doctor or specialist will tell you if you need to have this test and how often to have it  Write down your questions so you remember to ask them during your visits  © Copyright [employer] 2022 Information is for End User's use only and may not be sold, redistributed or otherwise used for commercial purposes  All illustrations and images included in CareNotes® are the copyrighted property of [employer]  or Misha Morin   The above information is an  only  It is not intended as medical advice for individual conditions or treatments  Talk to your doctor, nurse or pharmacist before following any medical regimen to see if it is safe and effective for you

## 2022-06-24 NOTE — CASE COMMUNICATION
Left voicemail on patients telephone stating physical therapist will be contacting him to schedule apt for [facility] date 6 27

## 2022-06-24 NOTE — PROGRESS NOTES
PULMONOLOGY PROGRESS NOTE     Name: Gucci Anthony   Age & Sex: 63 y o  male   MRN: [id number]  Unit/Bed#: [facility] 811-01   Encounter: [id number]    PATIENT INFORMATION     Name: Gucci Anthony   Age & Sex: 63 y o  male   MRN: [id number]  Hospital Stay Days: 2    ASSESSMENT/PLAN     Assessment   1  Acute respiratory failure with hypoxia - improving  2  Acute pulmonary embolism with pulmonary infarcts without cor pulmonale  3  HIV  4  HTN    Plan:  · Will need AC for a minimum of 3-6 months  Can transition to oral AC with DOAC as approved per insurance  Follow up as an outpatient to determine additional [facility]  · Age appropriate cancer screenings  · Hypercoaguable work-up later date after off AC and removed from acute VTE  · Continue supplemental O2 as needed to maintain SpO2 >88%  Does not qualify for home O2  · All other care per primary team      SUBJECTIVE     Patient seen and examined  No acute events overnight  No acute complaints    OBJECTIVE     Vitals:    22 1431 22 1849 22 2233 22 0246   BP: 136/91 135/90 138/84 140/84   Pulse: 77 90 84 85   Resp: 18 16 16 16   Temp: (!) 97 4 °F (36 3 °C) 97 7 °F (36 5 °C) 98 °F (36 7 °C) 98 1 °F (36 7 °C)   SpO2: 95% 94%  94%   Weight:       Height:          Temperature:   Temp (24hrs), Av 8 °F (36 6 °C), Min:97 4 °F (36 3 °C), Max:98 1 °F (36 7 °C)    Temperature: 98 1 °F (36 7 °C)  Intake & Output:  I/O        07 07 0701   07 07 0700    P  O   180     Total Intake(mL/kg)  180 (1 9)     Urine (mL/kg/hr)  300 (0 1)     Total Output  300     Net  -120            Unmeasured Urine Occurrence 1 x      Unmeasured Stool Occurrence 1 x          Weights:   IBW (Ideal Body Weight): 82 2 kg    Body mass index is 26 96 kg/m²  Weight (last 2 days)     Date/Time Weight    22 1514 95 3 (210)    22 13:52:03 95 3 (210 1)        Physical Exam  Vitals and nursing note reviewed     Constitutional: General: He is not in acute distress  Appearance: Normal appearance  He is well-developed and normal weight  He is not ill-appearing or toxic-appearing  Interventions: He is not intubated  HENT:      Head: Normocephalic and atraumatic  Right Ear: External ear normal       Left Ear: External ear normal       Nose: Nose normal       Mouth/Throat:      Mouth: Mucous membranes are moist       Pharynx: Oropharynx is clear  Eyes:      General: No scleral icterus  Conjunctiva/sclera: Conjunctivae normal    Cardiovascular:      Rate and Rhythm: Normal rate and regular rhythm  Pulses: Normal pulses  Heart sounds: Normal heart sounds  No murmur heard  No friction rub  No gallop  Pulmonary:      Effort: Pulmonary effort is normal  No tachypnea, accessory muscle usage or respiratory distress  He is not intubated  Breath sounds: Normal breath sounds and air entry  No decreased air movement  No decreased breath sounds, wheezing, rhonchi or rales  Abdominal:      General: Abdomen is flat  Bowel sounds are normal       Palpations: Abdomen is soft  Musculoskeletal:         General: No swelling or tenderness  Cervical back: Neck supple  No tenderness  Skin:     General: Skin is warm and dry  Neurological:      General: No focal deficit present  Mental Status: He is alert and oriented to person, place, and time  Mental status is at baseline  LABORATORY DATA     Labs: I have personally reviewed pertinent reports    Results from last 7 days   Lab Units 06/24/22  0331 06/23/22  0507 06/22/22  0730   WBC Thousand/uL 6 83 7 43 9 30   HEMOGLOBIN g/dL 14 0 14 0 15 7   HEMATOCRIT % 40 8 40 7 45 6   PLATELETS Thousands/uL 221 214 244   NEUTROS PCT %  --  58 60   MONOS PCT %  --  10 8      Results from last 7 days   Lab Units 06/23/22  0507 06/22/22  0730   POTASSIUM mmol/L 3 8 3 9   CHLORIDE mmol/L 105 99   CO2 mmol/L 23 26   BUN mg/dL 12 14   CREATININE mg/dL 0 85 1 10   CALCIUM mg/dL 9 1 9 9   ALK PHOS U/L 86 78   ALT U/L 26 24   AST U/L 20 17              Results from last 7 days   Lab Units 06/24/22  0255 06/23/22 2007 06/23/22 1319 06/22/22 2127 06/22/22  0715   INR   --   --   --   --  0 98   PTT seconds 56* 50* 62*   < > 29    < > = values in this interval not displayed  ABG:       IMAGING & DIAGNOSTIC TESTING     Radiology Results: I have personally reviewed pertinent reports  XR chest 1 view portable    Result Date: 6/22/2022  Impression: No acute cardiopulmonary disease  Workstation performed: [id number]     CTA ED chest PE Study    Result Date: 6/22/2022  Impression: 1  Extensive acute pulmonary emboli, as described  The calculated ratio of right ventricular to left ventricular diameter (RV/LV ratio) is 1 3  This is greater than 0 9, which is abnormal and indicates right heart strain  An abnormal RV/LV ratio has been shown to be associated with an increased risk of 30 day mortality in the setting of acute pulmonary embolism  Urgent consultation with the medical critical care team is recommended  2   Scattered small peripheral groundglass opacities in the right greater than left lungs, likely small pulmonary infarcts  3   Severely enlarged multinodular right thyroid lobe  Outpatient thyroid ultrasound is recommended for further evaluation  I personally discussed this study with Lesvia Gilliam on 6/22/2022 at 9:20 AM  Workstation performed: [id number]     Other Diagnostic Testing: I have personally reviewed pertinent reports      ACTIVE MEDICATIONS     Current Facility-Administered Medications   Medication Dose Route Frequency    cobicistat (TYBOST) 150 MG tablet 150 mg  150 mg Oral Daily    And    Darunavir TABS 800 mg  800 mg Oral Daily    dolutegravir (TIVICAY) tablet 50 mg  50 mg Oral Daily    heparin (porcine) 25,000 units in 0 45% NaCl 250 mL infusion (premix)  3-30 Units/kg/hr (Order-Specific) Intravenous Titrated    heparin (porcine) injection 3,800 Units  3,800 Units Intravenous Q1H PRN    heparin (porcine) injection 7,600 Units  7,600 Units Intravenous Q1H PRN    PARoxetine (PAXIL) tablet 20 mg  20 mg Oral Daily    tenofovir (VIREAD) tablet 300 mg  300 mg Oral Daily       VTE Pharmacologic Prophylaxis: Heparin  VTE Mechanical Prophylaxis: sequential compression device      Disclaimer: Portions of the record may have been created with voice recognition software  Occasional wrong word or "sound a like" substitutions may have occurred due to the inherent limitations of voice recognition software  Careful consideration should be taken to recognize, using context, where substitutions have occurred      Timoteo García DO   Pulmonary and Critical Care Fellow, PGY-IV  [facility]

## 2022-06-24 NOTE — CASE MANAGEMENT
Case Management Assessment & Discharge Planning Note    Patient name Baljinder Running  [facility]-02 MRN [id number]  : 1960 Date 2022       Current Admission Date: 2022  Current Admission Diagnosis:Acute pulmonary embolism with acute cor pulmonale [facility])   Patient Active Problem List    Diagnosis Date Noted    Acute respiratory failure ([facility] ) 2022    Acute pulmonary embolism with acute cor pulmonale ([facility] ) 2022    Other male erectile dysfunction 2021    Prediabetes 2019    Toxic effect of tobacco and nicotine 2017    Inguinal hernia 2017    Essential hypertension 2015    HIV disease ([facility] ) 2015    Anxiety 2015    Heart murmur 2015    Hiatal hernia 2015    Hypertriglyceridemia 2015      LOS (days): 2  Geometric Mean LOS (GMLOS) (days): 4 10  Days to GMLOS:2 2     OBJECTIVE:    Risk of Unplanned Readmission Score: 8 71         Current admission status: Inpatient       Preferred Pharmacy:   [facility], [address]   [address] [facility] [address]  Phone: [phone] Fax: [address], [address] [facility]   [address] [facility] [address]  Phone: [phone] Fax: [address] [address]  Phone: [phone] Fax: [phone]    Primary Care Provider: CATHIE Barraza    Primary Insurance: MEDICARE  Secondary Insurance: [address] [place] [address] Blvd:  [address] Proxies    There are no active Health Care Proxies on file         Advance Directives  Does patient have a Health Care POA?: Yes Desire Tomlinson (son))         Readmission Root Cause  30 Day Readmission: No    Patient Information  Admitted from[de-identified] Home  Mental Status: Alert  During Assessment patient was accompanied by: Not accompanied during assessment  Assessment information provided by[de-identified] Patient  Primary Caregiver: Self  Support Systems: [address] of Residence: [address] [place] [address] do you live in?: [place] entry access options   Select all that apply : Stairs  Number of steps to enter home : 7  Do the steps have railings?: Yes  Type of Current Residence: 3 story home  Upon entering residence, is there a bedroom on the main floor (no further steps)?: No  A bedroom is located on the following floor levels of residence (select all that apply):: 2nd Floor  Upon entering residence, is there a bathroom on the main floor (no further steps)?: No  Indicate which floors of current residence have a bathroom (select all the apply):: 2nd Floor  Number of steps to 2nd floor from main floor: One Flight  In the last 12 months, was there a time when you were not able to pay the mortgage or rent on time?: No  In the last 12 months, how many places have you lived?: 1  In the last 12 months, was there a time when you did not have a steady place to sleep or slept in a shelter (including now)?: No  Homeless/housing insecurity resource given?: N/A    Activities of Daily Living Prior to Admission  Functional Status: Independent  Completes ADLs independently?: Yes  Ambulates independently?: Yes  Does patient use assisted devices?: Yes  Assisted Devices (DME) used: Straight Cane  Does patient currently own DME?: Yes  What DME does the patient currently own?: Straight Cane  Does patient have a history of Outpatient Therapy (PT/OT)?: No  Does the patient have a history of Short-Term Rehab?: No  Does patient have a history of HHC?: No  Does patient currently have [facility]?: No         Patient Information Continued  Income Source: SSI/SSD  Does patient have prescription coverage?: Yes  Within the past 12 months, you worried that your food would run out before you got the money to buy more : Never true  Within the past 12 months, the food you bought just didn't last and you didn't have money to get more : Never true  Food insecurity resource given?: N/A  Does patient receive dialysis treatments?: No  Does patient have a history of substance abuse?: No  Does patient have a history of Mental Health Diagnosis?: Yes (Anxiety)  Has patient received inpatient treatment related to mental health in the last 2 years?: No         Means of Transportation  Means of Transport to Appts[de-identified] Drives Self  In the past 12 months, has lack of transportation kept you from medical appointments or from getting medications?: No  In the past 12 months, has lack of transportation kept you from meetings, work, or from getting things needed for daily living?: No        DISCHARGE DETAILS:    Discharge planning discussed with[de-identified] Pt  Freedom of Choice: Yes  Comments - Freedom of Choice: Pt prefers referral to [facility]  CM contacted family/caregiver?: No- see comments (Pt alert and oriented)  Were Treatment Team discharge recommendations reviewed with patient/caregiver?: Yes  Did patient/caregiver verbalize understanding of patient care needs?: Yes  Were patient/caregiver advised of the risks associated with not following Treatment Team discharge recommendations?: Yes         [address]         Is the patient interested in [address] at discharge?: Yes  [address] requested[de-identified] Occupational Therapy, Physical [address] Name[de-identified] [address] [facility] Provider[de-identified] PCP  Home Health Services Needed[de-identified] Strengthening/Theraputic Exercises to Improve Function, Gait/ADL Training  Homebound Criteria Met[de-identified] Requires the Assistance of Another Person for Safe Ambulation or to Leave the Home  Supporting Clincal Findings[de-identified] Fatigues Easliy in [employer], Limited Endurance         Other Referral/Resources/Interventions Provided:  Referral Comments: Referral sent to [facility]

## 2022-06-27 ENCOUNTER — TRANSITIONAL CARE MANAGEMENT (OUTPATIENT)
Dept: SURGERY | Facility: CLINIC | Age: 62
End: 2022-06-27

## 2022-06-28 ENCOUNTER — HOME CARE VISIT (OUTPATIENT)
Dept: HOME HEALTH SERVICES | Facility: HOME HEALTHCARE | Age: 62
End: 2022-06-28

## 2022-06-28 NOTE — CASE COMMUNICATION
TC 6/27 and 6/28 to schedule PT homecare PT evalaution  Left voicemail on both attempts with PT contact information

## 2022-06-29 ENCOUNTER — TELEPHONE (OUTPATIENT)
Dept: HEMATOLOGY ONCOLOGY | Facility: CLINIC | Age: 62
End: 2022-06-29

## 2022-06-29 ENCOUNTER — APPOINTMENT (OUTPATIENT)
Dept: PHYSICAL THERAPY | Facility: REHABILITATION | Age: 62
End: 2022-06-29
Payer: MEDICARE

## 2022-06-29 ENCOUNTER — TELEMEDICINE (OUTPATIENT)
Dept: SURGERY | Facility: CLINIC | Age: 62
End: 2022-06-29
Payer: MEDICARE

## 2022-06-29 VITALS
HEART RATE: 83 BPM | DIASTOLIC BLOOD PRESSURE: 86 MMHG | OXYGEN SATURATION: 97 % | BODY MASS INDEX: 26.96 KG/M2 | TEMPERATURE: 98.3 F | SYSTOLIC BLOOD PRESSURE: 127 MMHG | HEIGHT: 74 IN

## 2022-06-29 DIAGNOSIS — B20 HIV DISEASE (HCC): Primary | ICD-10-CM

## 2022-06-29 DIAGNOSIS — T65.292D TOXIC EFFECT OF TOBACCO, INTENTIONAL SELF-HARM, SUBSEQUENT ENCOUNTER: ICD-10-CM

## 2022-06-29 DIAGNOSIS — I26.09 ACUTE PULMONARY EMBOLISM WITH ACUTE COR PULMONALE, UNSPECIFIED PULMONARY EMBOLISM TYPE (HCC): ICD-10-CM

## 2022-06-29 DIAGNOSIS — E04.1 THYROID NODULE: ICD-10-CM

## 2022-06-29 PROCEDURE — 99442 PR PHYS/QHP TELEPHONE EVALUATION 11-20 MIN: CPT | Performed by: NURSE PRACTITIONER

## 2022-06-29 NOTE — PATIENT INSTRUCTIONS
Pulmonary Embolism   WHAT YOU NEED TO KNOW:   A pulmonary embolism (PE) is the sudden blockage of a blood vessel in the lungs by an embolus  A PE can become life-threatening  Go to follow-up appointments and take blood thinners as directed  These are especially important if you were discharged home from the emergency department  DISCHARGE INSTRUCTIONS:   Call your local emergency number (911 in the [address] [facility] [facility]) if:   You feel lightheaded, short of breath, and have chest pain  You cough up blood  Seek care immediately if:   Your arm or leg feels warm, tender, and painful  It may look swollen and red  Call your doctor or hematologist if:   You have questions or concerns about your condition or care  Medicines:   Blood thinners  help prevent blood clots  Clots can cause strokes, heart attacks, and death  The following are general safety guidelines to follow while you are taking a blood thinner:    Watch for bleeding and bruising while you take blood thinners  Watch for bleeding from your gums or nose  Watch for blood in your urine and bowel movements  Use a soft washcloth on your skin, and a soft toothbrush to brush your teeth  This can keep your skin and gums from bleeding  If you shave, use an electric shaver  Do not play contact sports  Tell your dentist and other healthcare providers that you take a blood thinner  Wear a bracelet or necklace that says you take this medicine  Do not start or stop any other medicines unless your healthcare provider tells you to  Many medicines cannot be used with blood thinners  Take your blood thinner exactly as prescribed by your healthcare provider  Do not skip does or take less than prescribed  Tell your provider right away if you forget to take your blood thinner, or if you take too much  Warfarin  is a blood thinner that you may need to take   The following are things you should be aware of if you take warfarin:     Foods and medicines can affect the amount of warfarin in your blood  Do not make major changes to your diet while you take warfarin  Warfarin works best when you eat about the same amount of vitamin K every day  Vitamin K is found in green leafy vegetables and certain other foods  Ask for more information about what to eat when you are taking warfarin  You will need to see your healthcare provider for follow-up visits when you are on warfarin  You will need regular blood tests  These tests are used to decide how much medicine you need  Take your medicine as directed  Contact your healthcare provider if you think your medicine is not helping or if you have side effects  Tell him or her if you are allergic to any medicine  Keep a list of the medicines, vitamins, and herbs you take  Include the amounts, and when and why you take them  Bring the list or the pill bottles to follow-up visits  Carry your medicine list with you in case of an emergency  Prevent another PE:   Change your body position or move around often  Move and stretch in your seat several times each hour if you travel by car or work at a desk  In an airplane, get up and walk every hour  Exercise regularly to help increase your blood flow  Walking is a good low-impact exercise  Talk to your healthcare provider about the best exercise plan for you  Maintain a healthy weight  Ask your healthcare provider what a healthy weight is for you  Ask him or her to help you create a weight loss plan, if needed  Do not smoke  Nicotine and other chemicals in cigarettes and cigars can damage blood vessels and increase your risk for another PE  Ask your healthcare provider for information if you currently smoke and need help to quit  E-cigarettes or smokeless tobacco still contain nicotine  Talk to your healthcare provider before you use these products  Ask about birth control if you are a woman who takes the pill    A birth control pill increases the risk for blood clots in certain women  The risk is higher if you are also older than 35, smoke cigarettes, or have a blood clotting disorder  Talk to your healthcare provider about other ways to prevent pregnancy, such as a cervical cap or intrauterine device (IUD)  Follow up with your doctor or hematologist as directed: You may need to come in regularly for scans to check for blood clots  Your blood may checked to see how long it takes to clot  Your doctor or specialist will tell you if you need to have this test and how often to have it  Write down your questions so you remember to ask them during your visits  © Copyright [employer] 2022 Information is for End User's use only and may not be sold, redistributed or otherwise used for commercial purposes  All illustrations and images included in CareNotes® are the copyrighted property of [employer]  or Misha Keller  The above information is an  only  It is not intended as medical advice for individual conditions or treatments  Talk to your doctor, nurse or pharmacist before following any medical regimen to see if it is safe and effective for you

## 2022-06-29 NOTE — PROGRESS NOTES
Virtual TCM Visit:    Verification of patient location:    Patient is located in the following state in which I hold an active license [place]        Assessment/Plan:        Problem List Items Addressed This Visit        Endocrine    Thyroid nodule    Relevant Orders    CBC and differential    Comprehensive metabolic panel    TSH, 3rd generation with Free T4 reflex    US thyroid       Cardiovascular and Mediastinum    Acute pulmonary embolism with acute cor pulmonale ([facility] )    Relevant Orders    Ambulatory referral to Hematology / Oncology    CBC and differential    Comprehensive metabolic panel       Other    HIV disease ([facility] ) - Primary     CD4 T CELL ABSOLUTE   Date/Time Value Ref Range Status   10/26/2015 07:58  359 - 1,519 /uL Final     CD4 ABS   Date/Time Value Ref Range Status   2022 07:54  359 - 1519 /uL Final     HIV-1 RNA by PCR, Qn   Date/Time Value Ref Range Status   2022 07:54 AM <20 copies/mL Final     Comment:     HIV-1 RNA detected  The reportable range for this assay is 20 to 10,000,000  copies HIV-1 RNA/mL  HIV-1 RNA Viral Load Log   Date/Time Value Ref Range Status   2022 07:54 AM COMMENT [id number]/mL Final     Comment:     Unable to calculate result since non-numeric result obtained for  component test          ART: Prezcobix,tenofovir, tivicay        Denies side effects  Stressed the importance of adherence  Continue follow up with ID clinic  Reviewed most recent labs, including Cd4 and viral load  Discussed the risks and benefits of treatment options, instructions for management, importance of treatment adherence, and reduction of risk factor  Educated on possible  medication side effects  Counseled on routes of HIV transmission, including the risk of  infection  Emphasized that viral suppression is the best method to prevent HIV transmission  At this time pt denies the need for HIV testing of anyone in their life       Total encounter time was 45 minutes  Greater then 20 minutes were spent on counseling and patient education  Pt voices understanding and agreement with treatment plan  Toxic effect of tobacco and nicotine     PT  Quit smoking! Counseled for greater than 15 minutes on the importance of smoking cessation  Advised to quit  Educated on the increased risk of heart and lung disease associated with smoking  Encouraged continued progress with cessation goals  Reason for visit is TCM after IP stay for PE    Encounter provider CATHIE Arredondo       Provider located at [address]  [phone]      Recent Visits  Date Type Provider Dept   06/24/22 Telephone CATHIE Arredondo [facility]   Showing recent visits within past 7 days and meeting all other requirements  Today's Visits  Date Type Provider Dept   06/29/22 Telemedicine CATHIE Arredondo [facility]   Showing today's visits and meeting all other requirements  Future Appointments  No visits were found meeting these conditions  Showing future appointments within next 150 days and meeting all other requirements       After connecting through [employer], the patient was identified by name and date of birth  Ru Schroeder was informed that this is a telemedicine visit and that the visit is being conducted through Telephone  My office door was closed  No one else was in the room  He acknowledged consent and understanding of privacy and security of the video platform  The patient has agreed to participate and understands they can discontinue the visit at any time  Patient is aware this is a billable service  Subjective:     Patient ID: Ru Schroeder is a 63 y o  male  Clemente Mota is contacted today for TCM visit  PMHx significant for HIV, HTN, hyperlipidemia, polycythemia, and anxiety  Cornejo Amari reports lower back pain       He was hospitalized 6/22-6/24 for PE  He was discharged on 10mg apixabn BID and instructed to transition to 5 mg BID on 6/30/22  Instructed to f/u with heme/oncology  CT of the chest showed incidental finding of thyroid nodule  Advised to have OP f/u  Colonoscopy: 1/27/17, repeat in 5 years  AAA screen: 5/2/2019      Review of Systems   Constitutional: Positive for fatigue  Negative for chills and fever  HENT: Negative for ear pain and sore throat  Eyes: Negative for pain and visual disturbance  Respiratory: Negative for cough and shortness of breath  Cardiovascular: Negative for chest pain and palpitations  Gastrointestinal: Negative for abdominal pain and vomiting  Genitourinary: Negative for dysuria and hematuria  Musculoskeletal: Negative for arthralgias and back pain  Skin: Negative for color change and rash  Neurological: Negative for seizures and syncope  All other systems reviewed and are negative  Objective:    Vitals:    06/29/22 0934   BP: 127/86   Pulse: 83   Temp: 98 3 °F (36 8 °C)   SpO2: 97%   Height: 6' 2" (1 88 m)       Physical Exam  Pulmonary:      Effort: No respiratory distress  Neurological:      Mental Status: He is alert  Psychiatric:         Mood and Affect: Mood normal          Behavior: Behavior normal          Thought Content: Thought content normal              Transitional Care Management Review:  Ashley Corley is a 63 y o  male here for TCM follow up  During the TCM phone call patient stated:    TCM Call (since 5/29/2022)     Date and time call was made  6/27/2022 10:54 AM    Hospital care reviewed  Records reviewed    Patient was hospitialized at  [facility]    Date of Admission  06/22/22    Date of discharge  06/24/22    Diagnosis  Acute pulmonary embolism with acute cor pulmonale    Disposition  Home    Were the patients medications reviewed and updated  Yes    Current Symptoms  Cough; Weakness;  Fatigue    Cough Severity  Moderate    Weakness severity  Mild Fatigue severity  Mild      TCM Call (since 5/29/2022)     Post hospital issues  Reduced activity    Should patient be enrolled in anticoag monitoring? No    Scheduled for follow up? Yes    Did you obtain your prescribed medications  Yes    Do you need help managing your prescriptions or medications  No    Is transportation to your appointment needed  Yes    Specify why  Pt unsure whether or not he will be able to drive  Pt will work on getting a ride to [facility] and will sign a Lyft waiver at that appt  I have advised the patient to call PCP with any new or worsening symptoms  Sarah Enriquez, [address] members    Support System  Family; Friends; Neighboors; Children    The type of support provided  Emotional    Do you have social support  Yes, as much as I need    Are you recieving any outpatient services  No    Are you recieving home care services  Yes    Types of home care services  Nurse visit  pt waiting to hear from [facility]    Are you using any community resources  No    Current waiver services  No    Have you fallen in the last 12 months  No    Interperter language line needed  No    Counseling  Patient    Counseling topics  Importance of RX compliance          I spent 45 minutes with the patient during this visit  CATHIE Cortez      VIRTUAL VISIT DISCLAIMER    Gucci Anthony verbally agrees to participate in [employer]  Pt is aware that [employer] could be limited without vital signs or the ability to perform a full hands-on physical Lenard Woodward understands he or the provider may request at any time to terminate the video visit and request the patient to seek care or treatment in person  It was my intent to perform this visit via video technology but the patient was not able to do a video connection so the visit was completed via audio telephone only

## 2022-06-29 NOTE — ASSESSMENT & PLAN NOTE
PT  Quit smoking! Counseled for greater than 15 minutes on the importance of smoking cessation  Advised to quit  Educated on the increased risk of heart and lung disease associated with smoking  Encouraged continued progress with cessation goals

## 2022-06-29 NOTE — ASSESSMENT & PLAN NOTE
CD4 T CELL ABSOLUTE   Date/Time Value Ref Range Status   10/26/2015 07:58  359 - 1,519 /uL Final     CD4 ABS   Date/Time Value Ref Range Status   2022 07:54  359 - 1519 /uL Final     HIV-1 RNA by PCR, Qn   Date/Time Value Ref Range Status   2022 07:54 AM <20 copies/mL Final     Comment:     HIV-1 RNA detected  The reportable range for this assay is 20 to 10,000,000  copies HIV-1 RNA/mL  HIV-1 RNA Viral Load Log   Date/Time Value Ref Range Status   2022 07:54 AM COMMENT [id number]/mL Final     Comment:     Unable to calculate result since non-numeric result obtained for  component test          ART: Prezcobix,tenofovir, tivicay        Denies side effects  Stressed the importance of adherence  Continue follow up with ID clinic  Reviewed most recent labs, including Cd4 and viral load  Discussed the risks and benefits of treatment options, instructions for management, importance of treatment adherence, and reduction of risk factor  Educated on possible  medication side effects  Counseled on routes of HIV transmission, including the risk of  infection  Emphasized that viral suppression is the best method to prevent HIV transmission  At this time pt denies the need for HIV testing of anyone in their life  Total encounter time was 45 minutes  Greater then 20 minutes were spent on counseling and patient education  Pt voices understanding and agreement with treatment plan

## 2022-06-29 NOTE — TELEPHONE ENCOUNTER
New Patient Intake Form   Patient Details:    Tamara Mulligan  1960    Appointment Information   Who is calling to schedule? Physician Office   If not self, what is the caller's name? [facility]    [facility] [address] WITH PATIENT? Yes   Referring provider CATHIE Anderson   What is the diagnosis? Acute pulmonary embolism with acute cor pulmonale, unspecified pulmonary embolism type ([address] )     Is there a confirmed tissue diagnosis? No     Is there a biopsy ordered or pending? Please specify dates  If yes, route to NN/OCC   n/a     Is patient aware of diagnosis? Yes     Have you had any imaging or labs done? If yes, where? (If imaging done outside of [facility], please remind patient to bring a disk ) Yes   Labs -6/24  Imaging -6/22     If imaging done at outside facility, did you instruct patient to obtain discs and bring to visit? N/a       Have you been seen by another Oncologist/Hematologist?  If so, who and where? yes   Are the records in [facility] or Care Everywhere? yes   Does the patient have records at another facility/hospital?    If yes, Name of facility, city and state where facility is located  no     Did you instruct patient to have records faxed to rightfax and provide rightfax number?   n/a   Preferred [place]   Is the patient willing to be seen by another provider? (This is for breast patients only) N /a     Did you send new patient paperwork? Email or mail? [address] [facility] [address]   Miscellaneous Information: 8/2 at 11:20am in [place] with Dr Tim Salas       Patient was originally scheduled with Dr Mamta Miller in error, I called and left a voicemail for the patient to inform him of his new appointment time  I provided the [employer] number for the patient to reschedule if the new appt time is not a good time for the patient

## 2022-07-01 ENCOUNTER — HOME CARE VISIT (OUTPATIENT)
Dept: HOME HEALTH SERVICES | Facility: HOME HEALTHCARE | Age: 62
End: 2022-07-01

## 2022-07-01 NOTE — CASE COMMUNICATION
TC and spoke with pt about homecare PT evaluation and [facility]  Pt reported that he has an appointment wit out pt therapy on July 5   pt reported that he is getting around better in the house and able to [facility] steps, also reproted that he is going to start with driving  Discussed with pt that he will need a new referral if he ever needs homecare PT  Pt is in agreement

## 2022-07-05 ENCOUNTER — OFFICE VISIT (OUTPATIENT)
Dept: OBGYN CLINIC | Facility: CLINIC | Age: 62
End: 2022-07-05
Payer: MEDICARE

## 2022-07-05 VITALS
BODY MASS INDEX: 27.08 KG/M2 | DIASTOLIC BLOOD PRESSURE: 90 MMHG | HEIGHT: 74 IN | WEIGHT: 211 LBS | HEART RATE: 99 BPM | SYSTOLIC BLOOD PRESSURE: 126 MMHG

## 2022-07-05 DIAGNOSIS — M17.0 PRIMARY OSTEOARTHRITIS OF BOTH KNEES: Primary | ICD-10-CM

## 2022-07-05 PROCEDURE — 99213 OFFICE O/P EST LOW 20 MIN: CPT | Performed by: ORTHOPAEDIC SURGERY

## 2022-07-05 NOTE — PATIENT INSTRUCTIONS
STRENGTHENING:   Quadriceps:   Isometric Quad sets                        Progression for Quadriceps/VMO:  Hold at 45 degree angle (Picture #1)    Key: Slow & Intentional  Two ways to perform:  Start with 10 reps on each side maintaining neutral pelvis  *   Hold position for a 3-5 count  When form and exercise because less challenging; increase the REPITITIONS or DURATION your holding

## 2022-07-05 NOTE — PROGRESS NOTES
Assessment:  1  Primary osteoarthritis of both knees         Plan:     Patient has chronic bilateral knee pain due to osteoarthritis   Weightbearing as tolerated by lower extremities    Provided patient with VMO strengthening exercises    Patient at this time would like to hold off on formal physical therapy until he is cleared from to return to physical therapy after having a PE   Will contact patient in two months for an update    Follow-up p r n  The above stated was discussed in layman's terms and the patient expressed understanding  All questions were answered to the patient's satisfaction  Subjective:   Samantha Guadarrama is a 63 y o  male who presents today evaluation for bilateral knee pain  He was seen by Estela Hahn PA-C on 22 and was ordered PT for ROM exercises, Voltaren gel and declined CSI  Patient states he was unable to go to physical therapy due to being in the hospital 22  for right PE and was put on Eliquis  States he is having generalized bilateral knee pain worse on the right  He does state instability and grinding at times  Pain is worse with weight-bearing increased activities  Patient is currently not taking pain medications  Review of systems negative unless otherwise specified in HPI    Past Medical History:   Diagnosis Date    DDD (degenerative disc disease), lumbar     Facet joint disease     Human immunodeficiency virus (HIV) infection ([facility] [address] )     resolved 11/5/15    Hypertension     resolved 5/29/15    Other specified disorders of white blood cells     resolved 16       Past Surgical History:   Procedure Laterality Date    HERNIA REPAIR      SHOULDER SURGERY Left        History reviewed  No pertinent family history      Social History     Occupational History    Not on file   Tobacco Use    Smoking status: Former Smoker     Packs/day: 0 50     Years: 43 00     Pack years: 21 50     Quit date: 6/15/2022     Years since quittin 0    Smokeless tobacco: Never Used    Tobacco comment: 10 cigs/day   Vaping Use    Vaping Use: Never used   Substance and Sexual Activity    Alcohol use: Not Currently    Drug use: Never    Sexual activity: Not Currently     Partners: Female         Current Outpatient Medications:     amLODIPine (NORVASC) 2 5 mg tablet, TAKE 1 TABLET BY MOUTH DAILY, Disp: 30 tablet, Rfl: 5    apixaban (Eliquis) 5 mg, Take 2 tablets (10 mg total) by mouth 2 (two) times a day for 7 days, THEN 1 tablet (5 mg total) 2 (two) times a day for 23 days  , Disp: 74 tablet, Rfl: 0    PARoxetine (PAXIL) 20 mg tablet, TAKE 1 TABLET BY MOUTH DAILY, Disp: 30 tablet, Rfl: 5    Prezcobix 800-150 MG TABS, TAKE 1 TABLET BY MOUTH DAILY, Disp: 30 tablet, Rfl: 3    tenofovir (VIREAD) 300 mg tablet, TAKE 1 TABLET BY MOUTH DAILY, Disp: 30 tablet, Rfl: 5    Tivicay 50 MG TABS, TAKE 1 TABLET BY MOUTH DAILY, Disp: 30 tablet, Rfl: 5    No Known Allergies         Vitals:    07/05/22 0907   BP: 126/90   Pulse: 99       Objective:            Physical Exam  Physical Exam:      General Appearance:    Alert, cooperative, no distress, appears stated age   Head:    Normocephalic, without obvious abnormality, atraumatic   Eyes:    conjunctiva/corneas clear, both eyes         Nose:   Nares normal, septum midline, no drainage    Throat:   Lips normal; teeth and gums normal   Neck:    symmetrical, trachea midline, ;     thyroid:  no enlargement/   Back:     Symmetric, no curvature, ROM normal   Lungs:   No audible wheezing or labored breathing   Chest Wall:    No tenderness or deformity    Heart:    Regular rate and rhythm                         Pulses:   2+ and symmetric all extremities   Skin:   Skin color, texture, turgor normal, no rashes or lesions   Neurologic:   normal strength, sensation and reflexes     throughout                       Ortho Exam     Right knee   Skin intact  No erythema   TTP medial and lateral joint lines   Minimal effusion   Stable to varus and Valgus stress   Neurovascularly Intact Distally     Diagnostics, reviewed and taken today if performed as documented: The attending physician has personally reviewed the pertinent films in PACS and interpretation is as follows:  X-ray right knee demonstrates severe lateral compartment joint space narrowing, osteophyte formation noted on the bone    X-ray left knee joint space narrowing, osteophyte formation on lateral       Procedures, if performed today:    Procedures    None performed      Scribe Attestation    I,:  Kristofer Higgins am acting as a scribe while in the presence of the attending physician :       I,:  Ana Laura Garcia MD personally performed the services described in this documentation    as scribed in my presence :             Portions of the record may have been created with voice recognition software  Occasional wrong word or "sound a like" substitutions may have occurred due to the inherent limitations of voice recognition software  Read the chart carefully and recognize, using context, where substitutions have occurred

## 2022-07-09 NOTE — DISCHARGE SUMMARY
[address] [facility]  Discharge- Gwenith Bliss 1960, 63 y o  male MRN: [id number]  Unit/Bed#: [facility] 811-01 Encounter: [id number]  Primary Care Provider: CATHIE Segura   Date and time admitted to hospital: 6/22/2022  1:48 PM    Acute respiratory failure [facility])  Assessment & Plan  Acute respiratory failure with hypoxia secondary to pulmonary embolism requiring 2 L nasal cannula to maintain oxygenation above 90%  Patient has been weaned to room air, denies any shortness of breath    HIV disease ([facility] [address] )  Assessment & Plan  Patient with a patient with a history of HIV followed with Infectious Disease, will continue home medications    Essential hypertension  Assessment & Plan  Resume amlodipine on discharge    Anxiety  Assessment & Plan  Patient with a history anxiety, will continue paroxetine  Currently denies significant anxiety dysphoria    * Acute pulmonary embolism with acute cor pulmonale ([facility] [address] )  Assessment & Plan  Patient presented with right-sided pleuritic chest pain and shortness of breath found to have extensive pulmonary embolism  -transferred for pulmonary and possible interventional radiology intervention  With acute pulmonology as evidenced by increased RV to LV ratio  Patient wean to room air, ambulating without any difficulty or to saturations denies any shortness of breath  -continue Eliquis 5 mg b i d  For 1 week, then reduce to 2 5 mg b i d   Afterwards due to patient being on HIV medications which increase anticoagulation affects of Eliquis  -malignancy screening, thrombosis panel once off anticoagulation, can consider genetic testing for now, suspect unprovoked        Medical Problems             Resolved Problems  Date Reviewed: 7/9/2022   None               Discharging Physician / Practitioner: Lorrie Alexis DO  PCP: Silvia Segura  Admission Date:   Admission Orders (From admission, onward)     Ordered        06/22/22 1407  Inpatient Admission  Once Discharge Date: 06/24/22  Consultations During Hospital Stay:  · pulmonary    Procedures Performed:   · none    Significant Findings / Test Results:   No orders to display   ·     Incidental Findings:   · Thyroid nodule, discussed and oriented outpatient follow-up    Test Results Pending at Discharge (will require follow up):   · none     Outpatient Tests Requested:  · none    Complications:  none    Reason for Admission: shortness of breath    Hospital Course:   Ana Bustamante is a 63 y o  male patient who originally presented to the hospital on 6/22/2022 due to shortness of breath for 2 days  He was found to have pulmonary embolism on CT scan and transferred to [facility] for further management  He was evaluated by Pulmonary and Internal Medicine, here is transition from heparin drip to Eliquis  He was weaned to room air and denies any shortness of breath on day of discharge  Patient had dose reduction and Eliquis from 10 mg b i d  For 1 week to 5 mg b i d  For 1 week and then start 2 5 mg b i d  Due to due to interactions with his HIV medications  Please see above list of diagnoses and related plan for additional information  Condition at Discharge: stable    Discharge Day Visit / Exam:   Subjective:  Patient denies any acute complaints  Vitals: Blood Pressure: 140/88 (06/24/22 1058)  Pulse: 86 (06/24/22 1058)  Temperature: 98 1 °F (36 7 °C) (06/24/22 1058)  Respirations: 14 (06/24/22 1058)  Height: 6' 2" (188 cm) (06/22/22 1514)  Weight - Scale: 95 3 kg (210 lb) (06/22/22 1514)  SpO2: 95 % (06/24/22 1058)  Exam:   Physical Exam  Vitals and nursing note reviewed  Constitutional:       General: He is not in acute distress  Appearance: He is well-developed  He is not ill-appearing, toxic-appearing or diaphoretic  HENT:      Head: Normocephalic and atraumatic  Eyes:      General: No scleral icterus       Conjunctiva/sclera: Conjunctivae normal    Cardiovascular: Rate and Rhythm: Normal rate and regular rhythm  Heart sounds: Murmur heard  No friction rub  No gallop  Pulmonary:      Effort: Pulmonary effort is normal  No respiratory distress  Breath sounds: Normal breath sounds  No stridor  No wheezing, rhonchi or rales  Chest:      Chest wall: No tenderness  Abdominal:      General: There is no distension  Palpations: Abdomen is soft  There is no mass  Tenderness: There is no abdominal tenderness  There is no guarding or rebound  Hernia: No hernia is present  Musculoskeletal:         General: No swelling, tenderness, deformity or signs of injury  Cervical back: Neck supple  Skin:     General: Skin is warm and dry  Coloration: Skin is not jaundiced or pale  Findings: No bruising, erythema, lesion or rash  Neurological:      Mental Status: He is alert and oriented to person, place, and time  Discussion with Family: Patient declined call to   Discharge instructions/Information to patient and family:   See after visit summary for information provided to patient and family  Provisions for Follow-Up Care:  See after visit summary for information related to follow-up care and any pertinent home health orders  Disposition:   Home    Planned Readmission: no     Discharge Statement:  I spent 35 minutes discharging the patient  This time was spent on the day of discharge  I had direct contact with the patient on the day of discharge  Greater than 50% of the total time was spent examining patient, answering all patient questions, arranging and discussing plan of care with patient as well as directly providing post-discharge instructions  Additional time then spent on discharge activities  Discharge Medications:  See after visit summary for reconciled discharge medications provided to patient and/or family        **Please Note: This note may have been constructed using a voice recognition system**

## 2022-07-09 NOTE — ASSESSMENT & PLAN NOTE
Patient presented with right-sided pleuritic chest pain and shortness of breath found to have extensive pulmonary embolism  -transferred for pulmonary and possible interventional radiology intervention  With acute pulmonology as evidenced by increased RV to LV ratio  Patient wean to room air, ambulating without any difficulty or to saturations denies any shortness of breath  -continue Eliquis 5 mg b i d  For 1 week, then reduce to 2 5 mg b i d   Afterwards due to patient being on HIV medications which increase anticoagulation affects of Eliquis  -malignancy screening, thrombosis panel once off anticoagulation, can consider genetic testing for now, suspect unprovoked

## 2022-07-09 NOTE — ASSESSMENT & PLAN NOTE
Acute respiratory failure with hypoxia secondary to pulmonary embolism requiring 2 L nasal cannula to maintain oxygenation above 90%  Patient has been weaned to room air, denies any shortness of breath

## 2022-07-13 ENCOUNTER — APPOINTMENT (OUTPATIENT)
Dept: LAB | Facility: CLINIC | Age: 62
End: 2022-07-13
Payer: MEDICARE

## 2022-07-13 DIAGNOSIS — E04.1 THYROID NODULE: ICD-10-CM

## 2022-07-13 DIAGNOSIS — I26.09 ACUTE PULMONARY EMBOLISM WITH ACUTE COR PULMONALE, UNSPECIFIED PULMONARY EMBOLISM TYPE (HCC): ICD-10-CM

## 2022-07-13 LAB
ALBUMIN SERPL BCP-MCNC: 4.2 G/DL (ref 3.5–5)
ALP SERPL-CCNC: 76 U/L (ref 34–104)
ALT SERPL W P-5'-P-CCNC: 29 U/L (ref 7–52)
ANION GAP SERPL CALCULATED.3IONS-SCNC: 9 MMOL/L (ref 4–13)
AST SERPL W P-5'-P-CCNC: 19 U/L (ref 13–39)
BASOPHILS # BLD AUTO: 0.07 THOUSANDS/ΜL (ref 0–0.1)
BASOPHILS NFR BLD AUTO: 1 % (ref 0–1)
BILIRUB SERPL-MCNC: 0.54 MG/DL (ref 0.2–1)
BUN SERPL-MCNC: 19 MG/DL (ref 5–25)
CALCIUM SERPL-MCNC: 9.9 MG/DL (ref 8.4–10.2)
CHLORIDE SERPL-SCNC: 103 MMOL/L (ref 96–108)
CO2 SERPL-SCNC: 27 MMOL/L (ref 21–32)
CREAT SERPL-MCNC: 1.05 MG/DL (ref 0.6–1.3)
EOSINOPHIL # BLD AUTO: 0.32 THOUSAND/ΜL (ref 0–0.61)
EOSINOPHIL NFR BLD AUTO: 5 % (ref 0–6)
ERYTHROCYTE [DISTWIDTH] IN BLOOD BY AUTOMATED COUNT: 12.8 % (ref 11.6–15.1)
GFR SERPL CREATININE-BSD FRML MDRD: 76 ML/MIN/1.73SQ M
GLUCOSE P FAST SERPL-MCNC: 148 MG/DL (ref 65–99)
HCT VFR BLD AUTO: 46.5 % (ref 36.5–49.3)
HGB BLD-MCNC: 15.7 G/DL (ref 12–17)
IMM GRANULOCYTES # BLD AUTO: 0.02 THOUSAND/UL (ref 0–0.2)
IMM GRANULOCYTES NFR BLD AUTO: 0 % (ref 0–2)
LYMPHOCYTES # BLD AUTO: 2.8 THOUSANDS/ΜL (ref 0.6–4.47)
LYMPHOCYTES NFR BLD AUTO: 40 % (ref 14–44)
MCH RBC QN AUTO: 29.2 PG (ref 26.8–34.3)
MCHC RBC AUTO-ENTMCNC: 33.8 G/DL (ref 31.4–37.4)
MCV RBC AUTO: 86 FL (ref 82–98)
MONOCYTES # BLD AUTO: 0.63 THOUSAND/ΜL (ref 0.17–1.22)
MONOCYTES NFR BLD AUTO: 9 % (ref 4–12)
NEUTROPHILS # BLD AUTO: 3.1 THOUSANDS/ΜL (ref 1.85–7.62)
NEUTS SEG NFR BLD AUTO: 45 % (ref 43–75)
NRBC BLD AUTO-RTO: 0 /100 WBCS
PLATELET # BLD AUTO: 215 THOUSANDS/UL (ref 149–390)
PMV BLD AUTO: 10.5 FL (ref 8.9–12.7)
POTASSIUM SERPL-SCNC: 4.1 MMOL/L (ref 3.5–5.3)
PROT SERPL-MCNC: 8.2 G/DL (ref 6.4–8.4)
RBC # BLD AUTO: 5.38 MILLION/UL (ref 3.88–5.62)
SODIUM SERPL-SCNC: 139 MMOL/L (ref 135–147)
T4 FREE SERPL-MCNC: 1.14 NG/DL (ref 0.76–1.46)
TSH SERPL DL<=0.05 MIU/L-ACNC: 4.59 UIU/ML (ref 0.45–4.5)
WBC # BLD AUTO: 6.94 THOUSAND/UL (ref 4.31–10.16)

## 2022-07-13 PROCEDURE — 84443 ASSAY THYROID STIM HORMONE: CPT

## 2022-07-13 PROCEDURE — 85025 COMPLETE CBC W/AUTO DIFF WBC: CPT

## 2022-07-13 PROCEDURE — 80053 COMPREHEN METABOLIC PANEL: CPT

## 2022-07-13 PROCEDURE — 84439 ASSAY OF FREE THYROXINE: CPT

## 2022-07-13 PROCEDURE — 36415 COLL VENOUS BLD VENIPUNCTURE: CPT

## 2022-07-14 ENCOUNTER — TELEPHONE (OUTPATIENT)
Dept: PULMONOLOGY | Facility: CLINIC | Age: 62
End: 2022-07-14

## 2022-07-18 ENCOUNTER — HOSPITAL ENCOUNTER (OUTPATIENT)
Dept: ULTRASOUND IMAGING | Facility: HOSPITAL | Age: 62
Discharge: HOME | End: 2022-07-18
Payer: MEDICARE

## 2022-07-18 DIAGNOSIS — E04.1 THYROID NODULE: ICD-10-CM

## 2022-07-18 PROCEDURE — 76536 US EXAM OF HEAD AND NECK: CPT

## 2022-07-20 DIAGNOSIS — I26.99 PULMONARY EMBOLISM, OTHER, UNSPECIFIED CHRONICITY, UNSPECIFIED WHETHER ACUTE COR PULMONALE PRESENT (HCC): Primary | ICD-10-CM

## 2022-07-20 DIAGNOSIS — E04.1 THYROID NODULE: ICD-10-CM

## 2022-07-22 ENCOUNTER — PATIENT OUTREACH (OUTPATIENT)
Dept: SURGERY | Facility: CLINIC | Age: 62
End: 2022-07-22

## 2022-07-27 ENCOUNTER — PATIENT OUTREACH (OUTPATIENT)
Dept: SURGERY | Facility: CLINIC | Age: 62
End: 2022-07-27

## 2022-07-28 NOTE — PROGRESS NOTES
PT Discharge    Today's date: 2022  Patient name: Shane Muhammad  : 1960  MRN: [id number]  Referring provider: Yana Junior  Dx:   Encounter Diagnosis     ICD-10-CM    1  Primary osteoarthritis of both knees  M17 0        Start Time: 930  Stop Time:   Total time in clinic (min): 45 minutes    Assessment/Plan     Shane Muhammad has not returned since last appointment; unable to perform objective measures since then  Spoke with patient over the phone who reports he has some other health concerns that he is addressing at the moment and would like to hold on returning to PT at this time; answered all questions prior to end of call  Shane Muhammad will be discharged from skilled PT in compliance with office policy to discharge episodes of care with 30 days of inactivity  Encouraged Angelica Penny to call the office with any future questions or concerns  At this time, Shane Muhammad will be discharged from skilled physical therapy         Subjective    Objective

## 2022-08-02 ENCOUNTER — CONSULT (OUTPATIENT)
Dept: HEMATOLOGY ONCOLOGY | Facility: CLINIC | Age: 62
End: 2022-08-02
Payer: MEDICARE

## 2022-08-02 VITALS
TEMPERATURE: 97.5 F | SYSTOLIC BLOOD PRESSURE: 150 MMHG | HEART RATE: 92 BPM | DIASTOLIC BLOOD PRESSURE: 100 MMHG | WEIGHT: 215 LBS | BODY MASS INDEX: 27.59 KG/M2 | OXYGEN SATURATION: 95 % | HEIGHT: 74 IN | RESPIRATION RATE: 16 BRPM

## 2022-08-02 DIAGNOSIS — I26.09 ACUTE PULMONARY EMBOLISM WITH ACUTE COR PULMONALE, UNSPECIFIED PULMONARY EMBOLISM TYPE (HCC): ICD-10-CM

## 2022-08-02 PROCEDURE — 99205 OFFICE O/P NEW HI 60 MIN: CPT | Performed by: INTERNAL MEDICINE

## 2022-08-02 NOTE — PROGRESS NOTES
Keli Aguirre  1960  [address] [facility] [place]  [address] [facility] [id number]  HEMATOLOGY/ONCOLOGY CONSULTATION REPORT    DISCUSSION/SUMMARY:    44-year-old male with multiple medical problems recently found to have extensive bilateral PE  Issues    Bilateral pulmonary emboli  Mr Jesusita Banks seems to be doing okay/better from a respiratory standpoint  No problems with excessive bruising or bleeding  Patient will continue with the Eliquis 5 mg twice a day  Patient knows to call the office if he is scheduled for any invasive procedure or surgery (although nonemergent procedures should not take place now anyway)  We discussed what to monitor for as far as return of the respiratory problems, chest pain, trouble taking a deep breath etc  The plan is to complete 3-6 months of anticoagulation treatment  Patient is to return in approximately 3 months with a CTA/chest before  To be sure, bilateral lower extremity Dopplers have been ordered for now - etiology for the bilateral PE is still not clear (immobility, possibly an acquired or inherited thrombophilia)  The plan is to complete the anticoagulation, recheck the CTA/chest and if negative, discontinue the Eliquis  Patient will then go for a thrombophilia evaluation off anticoagulation (for clean testing)  Those results will determine whether or not patient requires lifelong anticoagulation  Elevated RV/LV ratio  This is obviously associated/secondary to the recent PE  Patient may require evaluation by Cardiology if not already performed - hematology will defer to the PCP  Cystic/solid right-sided thyroid nodule  Workup/treatment as per PCP  Patient is to return in approximately 3 5 months  Mr Jesusita Banks knows to call the hematology/oncology office if there are any other questions or concerns      Carefully review your medication list and verify that the list is accurate and up-to-date  Please call the hematology/oncology office if there are medications missing from the list, medications on the list that you are not currently taking or if there is a dosage or instruction that is different from how you're taking that medication  Patient goals and areas of care: Anticoagulation  Barriers to care:  None  Patient is able to self-care   ______________________________________________________________________________________    Chief Complaint   Patient presents with    Consult     History of Present Illness:  79-year-old male with a number of medical problems recently developing progressive shortness of breath and dyspnea on exertion  Patient was seen in the emergency room and found to have a PE  Mr Renteria Bur was started on Eliquis  Presently patient states feeling okay, better than before  Patient can take a deep breath, no shortness of breath or dyspnea on exertion  No cough, sputum or hemoptysis  No problems with excessive bruising or bleeding, no other problems with the Eliquis  No headaches, blurred vision or dizziness  No lower extremity swelling recently  Patient denies any obvious precipitating or provoking incident  No prior thrombotic episodes, no family history of a thrombophilia  Patient has generalized body aches and chronic knee pain - not new - limits activities  Review of Systems   Constitutional: Positive for fatigue  HENT: Negative  Eyes: Negative  Respiratory: Negative  Cardiovascular: Negative  Gastrointestinal: Negative  Endocrine: Negative  Genitourinary: Negative  Musculoskeletal: Positive for arthralgias  Skin: Negative  Allergic/Immunologic: Negative  Neurological: Negative  Hematological: Negative  Psychiatric/Behavioral: Negative  All other systems reviewed and are negative      Patient Active Problem List   Diagnosis    Anxiety    Essential hypertension    Heart murmur    Hiatal hernia    HIV disease ([facility] )    Hypertriglyceridemia    Inguinal hernia    Toxic effect of tobacco and nicotine    Prediabetes    Other male erectile dysfunction    Acute pulmonary embolism with acute cor pulmonale (HCC)    Acute respiratory failure (HCC)    Thyroid nodule     Past Medical History:   Diagnosis Date    DDD (degenerative disc disease), lumbar     Facet joint disease     Human immunodeficiency virus (HIV) infection ([facility] )     resolved 11/5/15    Hypertension     resolved 5/29/15    Other specified disorders of white blood cells     resolved 16     Past Surgical History:   Procedure Laterality Date    HERNIA REPAIR      SHOULDER SURGERY Left      Family history:  No known familial or genetic diseases including any thrombophilia, children in good general health    Social History     Socioeconomic History    Marital status: Single     Spouse name: Not on file    Number of children: Not on file    Years of education: Not on file    Highest education level: Not on file   Occupational History    Not on file   Tobacco Use    Smoking status: Former Smoker     Packs/day: 0 50     Years: 43 00     Pack years: 21 50     Quit date: 6/15/2022     Years since quittin 1    Smokeless tobacco: Never Used    Tobacco comment: 10 cigs/day   Vaping Use    Vaping Use: Never used   Substance and Sexual Activity    Alcohol use: Not Currently    Drug use: Never    Sexual activity: Not Currently     Partners: Female   Other Topics Concern    Not on file   Social History Narrative    Not on file     Social Determinants of Health     Financial Resource Strain: Not on file   Food Insecurity: No Food Insecurity    Worried About Running Out of Food in the Last Year: Never true    Eduardo of Food in the Last Year: Never true   Transportation Needs: No Transportation Needs    Lack of Transportation (Medical): No    Lack of Transportation (Non-Medical):  No   Physical Activity: Not on file   Stress: Not on file Social Connections: Not on file   Intimate Partner Violence: Not on file   Housing Stability: Low Risk     Unable to Pay for Housing in the Last Year: No    Number of Places Lived in the Last Year: 1    Unstable Housing in the Last Year: No   Social history:  Discontinued tobacco use recently (40 pack-year history), no drug or alcohol abuse, no toxic exposure    Current Outpatient Medications:     amLODIPine (NORVASC) 2 5 mg tablet, TAKE 1 TABLET BY MOUTH DAILY, Disp: 30 tablet, Rfl: 5    apixaban (Eliquis) 2 5 mg, Take 1 tablet (2 5 mg total) by mouth 2 (two) times a day, Disp: 60 tablet, Rfl: 2    PARoxetine (PAXIL) 20 mg tablet, TAKE 1 TABLET BY MOUTH DAILY, Disp: 30 tablet, Rfl: 5    Prezcobix 800-150 MG TABS, TAKE 1 TABLET BY MOUTH DAILY, Disp: 30 tablet, Rfl: 3    tenofovir (VIREAD) 300 mg tablet, TAKE 1 TABLET BY MOUTH DAILY, Disp: 30 tablet, Rfl: 5    Tivicay 50 MG TABS, TAKE 1 TABLET BY MOUTH DAILY, Disp: 30 tablet, Rfl: 5    No Known Allergies    Vitals:    08/02/22 1111   BP: 150/100   Pulse: 92   Resp: 16   Temp: 97 5 °F (36 4 °C)   SpO2: 95%     Physical Exam  Constitutional:       Appearance: He is well-developed  Comments: Middle-aged male, no respiratory distress, no signs of pain   HENT:      Head: Normocephalic and atraumatic  Right Ear: External ear normal       Left Ear: External ear normal    Eyes:      Conjunctiva/sclera: Conjunctivae normal       Pupils: Pupils are equal, round, and reactive to light  Cardiovascular:      Rate and Rhythm: Normal rate and regular rhythm  Heart sounds: Normal heart sounds  Pulmonary:      Effort: Pulmonary effort is normal       Breath sounds: Normal breath sounds  Comments: Good air entry bilaterally, few scattered rhonchi  Abdominal:      General: Bowel sounds are normal       Palpations: Abdomen is soft        Comments: Soft, nontender, slightly distended, +bowel sounds, no guarding, can not palpate liver or spleen Musculoskeletal:         General: Normal range of motion  Cervical back: Normal range of motion and neck supple  Skin:     General: Skin is warm  Comments: Warm, moist, good color, no petechiae or ecchymoses   Neurological:      Mental Status: He is alert and oriented to person, place, and time  Deep Tendon Reflexes: Reflexes are normal and symmetric  Psychiatric:         Behavior: Behavior normal          Thought Content: Thought content normal          Judgment: Judgment normal      Extremities:  No lower extremity edema bilaterally, no cords, pulses are 1+  Lymphatics:  No adenopathy in the neck, supraclavicular region, axilla bilaterally    Labs    07/13/2022 WBC = 6 94 hemoglobin = 15 7 hematocrit = 46 5 MCV = 86 platelet = 957 neutrophil = 45% BUN = 19 creatinine = 1 05 calcium = 9 9 AST = 19 ALT = 29 alkaline phosphatase = 76 total protein = 8 2 total bilirubin = 0 54    Imaging    07/19/2022 US thyroid    Impression: Dominant cystic and solid nodule in the lower pole the right thyroid lobe  on image 25 meets ACR criteria for recommendation of ultrasound-guided fine-needle aspiration biopsy based on TI-RADS Classification of TR 3 (3 points) and size >2 5 cm  Reference: ACR Thyroid Imaging, Reporting and Data System (TI-RADS): White Paper of the [employer]  J [employer] Radiol 8373;62:550-420  (additional recommendations based on American Thyroid Association 2015 guidelines ) This examination was marked "significant notification" in Epic in order to begin the standard process by which the radiology reading room liaison alerts the referring practitioner         06/22/2022 CTA chest PE study    PULMONARY ARTERIAL TREE:  Extensive acute pulmonary emboli involving the right greater than left main, all lobar, all right and multiple left segmental branches and multiple bilateral subsegmental branches      LUNGS:  Scattered small peripheral groundglass opacities in the right greater than left lungs, likely pulmonary infarcts  There is no tracheal or endobronchial lesion  IMPRESSION:    1  Extensive acute pulmonary emboli, as described  The calculated ratio of right ventricular to left ventricular diameter (RV/LV ratio) is 1 3  This is greater than 0 9, which is abnormal and indicates right heart strain  An abnormal RV/LV ratio has been shown to be associated with an increased risk of 30 day mortality in the setting of acute pulmonary embolism  Urgent consultation with the medical critical care team is recommended      2   Scattered small peripheral groundglass opacities in the right greater than left lungs, likely small pulmonary infarcts      3  Severely enlarged multinodular right thyroid lobe  Outpatient thyroid ultrasound is recommended for further evaluation

## 2022-08-02 NOTE — PROGRESS NOTES
Writer messaged client asking for their participation in [facility] yearly Satisfaction Survey

## 2022-08-05 ENCOUNTER — TELEPHONE (OUTPATIENT)
Dept: SURGERY | Facility: CLINIC | Age: 62
End: 2022-08-05

## 2022-08-12 ENCOUNTER — HOSPITAL ENCOUNTER (OUTPATIENT)
Dept: VASCULAR ULTRASOUND | Facility: HOSPITAL | Age: 62
Discharge: HOME | End: 2022-08-12
Attending: INTERNAL MEDICINE
Payer: MEDICARE

## 2022-08-12 DIAGNOSIS — I26.09 ACUTE PULMONARY EMBOLISM WITH ACUTE COR PULMONALE, UNSPECIFIED PULMONARY EMBOLISM TYPE (HCC): ICD-10-CM

## 2022-08-12 PROCEDURE — 93970 EXTREMITY STUDY: CPT | Performed by: SURGERY

## 2022-08-12 PROCEDURE — 93970 EXTREMITY STUDY: CPT

## 2022-08-15 ENCOUNTER — TELEPHONE (OUTPATIENT)
Dept: HEMATOLOGY ONCOLOGY | Facility: CLINIC | Age: 62
End: 2022-08-15

## 2022-08-15 NOTE — TELEPHONE ENCOUNTER
Attempted to call patient to discuss, no answer  Left detailed message with normal results  Provided office number for any questions or concerns

## 2022-08-15 NOTE — TELEPHONE ENCOUNTER
----- Message from Ivette King PA-C sent at 8/15/2022 12:30 PM EDT -----  No evidence of DVT  Please notify pt

## 2022-10-10 DIAGNOSIS — I26.99 PULMONARY EMBOLISM, OTHER, UNSPECIFIED CHRONICITY, UNSPECIFIED WHETHER ACUTE COR PULMONALE PRESENT (HCC): ICD-10-CM

## 2022-10-10 DIAGNOSIS — B20 HIV INFECTION, UNSPECIFIED SYMPTOM STATUS (HCC): ICD-10-CM

## 2022-10-12 RX ORDER — APIXABAN 2.5 MG/1
TABLET, FILM COATED ORAL
Qty: 60 TABLET | Refills: 2 | Status: SHIPPED | OUTPATIENT
Start: 2022-10-12

## 2022-10-12 RX ORDER — DARUNAVIR ETHANOLATE AND COBICISTAT 800; 150 MG/1; MG/1
1 TABLET, FILM COATED ORAL DAILY
Qty: 30 TABLET | Refills: 3 | Status: SHIPPED | OUTPATIENT
Start: 2022-10-12

## 2022-10-21 ENCOUNTER — APPOINTMENT (OUTPATIENT)
Dept: LAB | Facility: CLINIC | Age: 62
End: 2022-10-21
Payer: MEDICARE

## 2022-10-21 DIAGNOSIS — R73.03 PREDIABETES: ICD-10-CM

## 2022-10-21 LAB
ALBUMIN SERPL BCP-MCNC: 4.3 G/DL (ref 3.5–5)
ALP SERPL-CCNC: 68 U/L (ref 34–104)
ALT SERPL W P-5'-P-CCNC: 29 U/L (ref 7–52)
ANION GAP SERPL CALCULATED.3IONS-SCNC: 10 MMOL/L (ref 4–13)
AST SERPL W P-5'-P-CCNC: 20 U/L (ref 13–39)
BASOPHILS # BLD AUTO: 0.07 THOUSANDS/ΜL (ref 0–0.1)
BASOPHILS NFR BLD AUTO: 1 % (ref 0–1)
BILIRUB SERPL-MCNC: 0.68 MG/DL (ref 0.2–1)
BUN SERPL-MCNC: 14 MG/DL (ref 5–25)
CALCIUM SERPL-MCNC: 10 MG/DL (ref 8.4–10.2)
CHLORIDE SERPL-SCNC: 100 MMOL/L (ref 96–108)
CO2 SERPL-SCNC: 27 MMOL/L (ref 21–32)
CREAT SERPL-MCNC: 1.12 MG/DL (ref 0.6–1.3)
EOSINOPHIL # BLD AUTO: 0.2 THOUSAND/ΜL (ref 0–0.61)
EOSINOPHIL NFR BLD AUTO: 3 % (ref 0–6)
ERYTHROCYTE [DISTWIDTH] IN BLOOD BY AUTOMATED COUNT: 12.8 % (ref 11.6–15.1)
EST. AVERAGE GLUCOSE BLD GHB EST-MCNC: 160 MG/DL
GFR SERPL CREATININE-BSD FRML MDRD: 71 ML/MIN/1.73SQ M
GLUCOSE SERPL-MCNC: 167 MG/DL (ref 65–140)
HBA1C MFR BLD: 7.2 %
HCT VFR BLD AUTO: 44.8 % (ref 36.5–49.3)
HGB BLD-MCNC: 15.5 G/DL (ref 12–17)
IMM GRANULOCYTES # BLD AUTO: 0.03 THOUSAND/UL (ref 0–0.2)
IMM GRANULOCYTES NFR BLD AUTO: 1 % (ref 0–2)
LYMPHOCYTES # BLD AUTO: 1.76 THOUSANDS/ΜL (ref 0.6–4.47)
LYMPHOCYTES NFR BLD AUTO: 28 % (ref 14–44)
MCH RBC QN AUTO: 29.9 PG (ref 26.8–34.3)
MCHC RBC AUTO-ENTMCNC: 34.6 G/DL (ref 31.4–37.4)
MCV RBC AUTO: 86 FL (ref 82–98)
MONOCYTES # BLD AUTO: 0.79 THOUSAND/ΜL (ref 0.17–1.22)
MONOCYTES NFR BLD AUTO: 12 % (ref 4–12)
NEUTROPHILS # BLD AUTO: 3.52 THOUSANDS/ΜL (ref 1.85–7.62)
NEUTS SEG NFR BLD AUTO: 55 % (ref 43–75)
NRBC BLD AUTO-RTO: 0 /100 WBCS
PLATELET # BLD AUTO: 222 THOUSANDS/UL (ref 149–390)
PMV BLD AUTO: 10.3 FL (ref 8.9–12.7)
POTASSIUM SERPL-SCNC: 4 MMOL/L (ref 3.5–5.3)
PROT SERPL-MCNC: 8.2 G/DL (ref 6.4–8.4)
RBC # BLD AUTO: 5.19 MILLION/UL (ref 3.88–5.62)
SODIUM SERPL-SCNC: 137 MMOL/L (ref 135–147)
WBC # BLD AUTO: 6.37 THOUSAND/UL (ref 4.31–10.16)

## 2022-10-21 PROCEDURE — 83036 HEMOGLOBIN GLYCOSYLATED A1C: CPT

## 2022-10-22 LAB
BASOPHILS # BLD AUTO: 0.1 X10E3/UL (ref 0–0.2)
BASOPHILS NFR BLD AUTO: 1 %
CD3+CD4+ CELLS # BLD: 590 /UL (ref 359–1519)
CD3+CD4+ CELLS NFR BLD: 32.8 % (ref 30.8–58.5)
CD3+CD4+ CELLS/CD3+CD8+ CLL BLD: 0.94 % (ref 0.92–3.72)
CD3+CD8+ CELLS # BLD: 626 /UL (ref 109–897)
CD3+CD8+ CELLS NFR BLD: 34.8 % (ref 12–35.5)
EOSINOPHIL # BLD AUTO: 0.2 X10E3/UL (ref 0–0.4)
EOSINOPHIL NFR BLD AUTO: 3 %
ERYTHROCYTE [DISTWIDTH] IN BLOOD BY AUTOMATED COUNT: 13.2 % (ref 11.6–15.4)
HCT VFR BLD AUTO: 46.1 % (ref 37.5–51)
HGB BLD-MCNC: 15.9 G/DL (ref 13–17.7)
IMM GRANULOCYTES # BLD: 0 X10E3/UL (ref 0–0.1)
IMM GRANULOCYTES NFR BLD: 0 %
LYMPHOCYTES # BLD AUTO: 1.8 X10E3/UL (ref 0.7–3.1)
LYMPHOCYTES NFR BLD AUTO: 28 %
MCH RBC QN AUTO: 30.2 PG (ref 26.6–33)
MCHC RBC AUTO-ENTMCNC: 34.5 G/DL (ref 31.5–35.7)
MCV RBC AUTO: 88 FL (ref 79–97)
MONOCYTES # BLD AUTO: 0.7 X10E3/UL (ref 0.1–0.9)
MONOCYTES NFR BLD AUTO: 11 %
NEUTROPHILS # BLD AUTO: 3.5 X10E3/UL (ref 1.4–7)
NEUTROPHILS NFR BLD AUTO: 57 %
PLATELET # BLD AUTO: 224 X10E3/UL (ref 150–450)
RBC # BLD AUTO: 5.27 X10E6/UL (ref 4.14–5.8)
WBC # BLD AUTO: 6.2 X10E3/UL (ref 3.4–10.8)

## 2022-10-24 LAB
HIV1 RNA # SERPL NAA+PROBE: <20 COPIES/ML
HIV1 RNA SERPL NAA+PROBE-LOG#: NORMAL LOG10COPY/ML

## 2022-11-07 ENCOUNTER — CONSULT (OUTPATIENT)
Dept: ENDOCRINOLOGY | Facility: CLINIC | Age: 62
End: 2022-11-07

## 2022-11-07 VITALS
WEIGHT: 226 LBS | BODY MASS INDEX: 29.02 KG/M2 | SYSTOLIC BLOOD PRESSURE: 150 MMHG | HEART RATE: 105 BPM | DIASTOLIC BLOOD PRESSURE: 82 MMHG

## 2022-11-07 DIAGNOSIS — R79.89 ABNORMAL TSH: ICD-10-CM

## 2022-11-07 DIAGNOSIS — E04.1 RIGHT THYROID NODULE: Primary | ICD-10-CM

## 2022-11-07 NOTE — PROGRESS NOTES
Uriel Marquez 63 y o  male MRN: [id number]    Encounter: [id number]      Assessment/Plan     Assessment: This is a 63y o -year-old male with right thyroid nodule  Plan:  Diagnoses and all orders for this visit:    Right thyroid nodule  Discussed, natural history, pre bones and evaluation of thyroid nodule as outlined by KRZYSZTOF and professional societies  Risk of malignancy in most thyroid nodule is low and course of cancer is generally indolent, slow-growing and responds well to therapy  Recommendations for biopsy are there by based on patient's risk factors for thyroid cancer, size and ultra sonography appearance of thyroid nodule, life expectancy and comorbidities of the patient  Since malignancy can develop in pre-existing nodule ongoing ultrasound surveillance is recommended  I discussed the possibility outcomes of fine-needle aspiration biopsy including benign result malignant result or indeterminate results, and rarely nondiagnostic or insufficient result  I briefly discussed the utility of gene expression  testing in event of indeterminate result as well  Patient agreed and verbalized understanding  If the results come back benign, will follow-up with ultrasound in 8 months for stability    -     US guided thyroid biopsy with AFIRMA; Future  -     US thyroid; Future  -     T4, free; Future  -     TSH, 3rd generation; Future  -     Thyroid Antibodies Panel; Future    Abnormal TSH  Obtain TSH, free T4 and thyroid antibodies now  Thyroid blood work is suggestive of hypothyroidism, patient will benefit from levothyroxine therapy   -     TSH, 3rd generation; Future        CC:   Right thyroid nodule    History of Present Illness     HPI:      Uriel Marquez is 82-year-old gentleman with medical history of pulmonary embolism in June 2022, right thyroid nodule which was incidentally found on CTA chest, is here for evaluation and management of thyroid nodule    Patient had ultrasound of thyroid which was done in July 2022 showed right lower pole nodule measuring 6 4 x 3 2 x 5 9 cm 82, left mid gland nodule measuring 0 9 x 0 5 x 0 9 cm  Dominant cystic and solid nodule in lower pole meets criteria for ultrasound-guided fine-needle aspiration biopsy, as this nodule is more than 2 5 cm  Patient was evaluated by Endocrinology at [facility], and fine-needle aspiration cytology was ordered, however patient did not pursue it  At present he denies any obstructive symptoms, such as difficulty swallowing, difficulty breathing or change in voice  He feels sometimes pressure in his neck, but on occasional times, which is not bothering him  He denies family history of thyroid cancer  He denies exposure to radiation to the neck/face or chest area  Patient was also found to have elevated TSH in July 2022, thyroid antibody workup was not ordered  Lab Results   Component Value Date    INN2LWSRHYVS 4 588 (H) 07/13/2022       Thyroid USG   THYROID ULTRASOUND     INDICATION:    E04 1: Nontoxic single thyroid nodule      COMPARISON:  None     TECHNIQUE:   Ultrasound of the thyroid was performed with a high frequency linear transducer in transverse and sagittal planes including volumetric imaging sweeps as well as traditional still imaging technique      FINDINGS:  Normal homogeneous smooth echotexture      Right lobe: 7 1 x 3 2 x 3 5 cm  Volume 37 9 mL  Left lobe:  3 7 x 1 5 x 1 2 cm  Volume 3 2 mL  Isthmus: 0 5  cm      Nodule #1  Image 25  RIGHT lower pole nodule measuring 6 4 x 3 2 x 5 9 cm  COMPOSITION:  1 point, mixed cystic and solid  ECHOGENICITY:  1 point, hyperechoic or isoechoic  SHAPE:  0 points, wider-than-tall  MARGIN: 0 points, smooth  ECHOGENIC FOCI:  1 point, macrocalcifications  TI-RADS Classification: TR 3 (3 points), FNA if >2 5 cm  Follow if >1 5 cm        Nodule #2  Image 49  LEFT midgland nodule measuring 0 9 x 0 5 x 0 9 cm    COMPOSITION:  2 points, solid or almost completely solid (this nodule is hypoechoic but, given the presence of posterior acoustic enhancement, 8 may represent in fact be a complex cystic lesion)  ECHOGENICITY:  2 points, hypoechoic  SHAPE:  0 points, wider-than-tall  MARGIN: 0 points, smooth  ECHOGENIC FOCI:  0 points, none or large comet-tail artifacts  TI-RADS Classification: TR 4 (4-6 points), FNA if > 1 5 cm  Follow if > 1cm        There is a spongiform 9 mm nodule in the lateral lower pole left thyroid lobe           IMPRESSION:     Dominant cystic and solid nodule in the lower pole the right thyroid lobe  on image 25 meets ACR criteria for recommendation of ultrasound-guided fine-needle aspiration biopsy based on TI-RADS Classification of TR 3 (3 points) and size >2 5 cm             Review of Systems   Constitutional: Positive for activity change  Negative for diaphoresis, fatigue, fever and unexpected weight change  HENT: Negative  Eyes: Negative for visual disturbance  Respiratory: Negative for cough, chest tightness and shortness of breath  Cardiovascular: Negative for chest pain, palpitations and leg swelling  Gastrointestinal: Negative for abdominal pain, blood in stool, constipation, diarrhea, nausea and vomiting  Endocrine: Negative for cold intolerance, heat intolerance, polydipsia, polyphagia and polyuria  Genitourinary: Negative for dysuria, enuresis, frequency and urgency  Musculoskeletal: Positive for arthralgias, back pain, gait problem and myalgias  Skin: Negative for pallor, rash and wound  Allergic/Immunologic: Negative  Neurological: Negative for dizziness, tremors, weakness and numbness  Hematological: Negative  Psychiatric/Behavioral: Negative          Historical Information   Past Medical History:   Diagnosis Date   • DDD (degenerative disc disease), lumbar    • Facet joint disease    • [facility] )     resolved 11/5/15   • Hypertension     resolved 5/29/15   • Other specified disorders of white blood cells     resolved 16     Past Surgical History:   Procedure Laterality Date   • HERNIA REPAIR     • SHOULDER SURGERY Left      Social History   Social History     Substance and Sexual Activity   Alcohol Use Not Currently     Social History     Substance and Sexual Activity   Drug Use Never     Social History     Tobacco Use   Smoking Status Former Smoker   • Packs/day: 0 50   • Years: 43 00   • Pack years: 21 50   • Quit date: 6/15/2022   • Years since quittin 3   Smokeless Tobacco Never Used   Tobacco Comment    10 cigs/day     Family History: History reviewed  No pertinent family history  Meds/Allergies   Current Outpatient Medications   Medication Sig Dispense Refill   • amLODIPine (NORVASC) 2 5 mg tablet TAKE 1 TABLET BY MOUTH DAILY 30 tablet 5   • Eliquis 2 5 MG TAKE 1 TABLET BY MOUTH TWICE A DAY 60 tablet 2   • PARoxetine (PAXIL) 20 mg tablet TAKE 1 TABLET BY MOUTH DAILY 30 tablet 5   • Prezcobix 800-150 MG TABS TAKE 1 TABLET BY MOUTH DAILY 30 tablet 3   • tenofovir (VIREAD) 300 mg tablet TAKE 1 TABLET BY MOUTH DAILY 30 tablet 5   • Tivicay 50 MG TABS TAKE 1 TABLET BY MOUTH DAILY 30 tablet 5     No current facility-administered medications for this visit  No Known Allergies    Objective   Vitals: Blood pressure 150/82, pulse 105, weight 103 kg (226 lb)  Physical Exam  Vitals reviewed  Constitutional:       General: He is not in acute distress  Appearance: Normal appearance  He is well-developed  HENT:      Head: Normocephalic and atraumatic  Eyes:      Pupils: Pupils are equal, round, and reactive to light  Neck:      Thyroid: No thyromegaly  Cardiovascular:      Rate and Rhythm: Normal rate and regular rhythm  Pulses: Normal pulses  Heart sounds: Normal heart sounds  Pulmonary:      Effort: Pulmonary effort is normal  No respiratory distress  Breath sounds: Normal breath sounds     Abdominal:      General: Bowel sounds are normal  Palpations: Abdomen is soft  Musculoskeletal:         General: No deformity  Normal range of motion  Cervical back: Normal range of motion and neck supple  Right lower leg: No edema  Left lower leg: No edema  Skin:     General: Skin is warm and dry  Findings: No erythema  Neurological:      General: No focal deficit present  Mental Status: He is alert and oriented to person, place, and time  Psychiatric:         Mood and Affect: Mood normal          Behavior: Behavior normal          The history was obtained from the review of the chart, patient  Lab Results:   Lab Results   Component Value Date/Time    TSH 3RD GENERATON 4 588 (H) 07/13/2022 07:59 AM    Free T4 1 14 07/13/2022 07:59 AM       Imaging Studies:   Results for orders placed during the hospital encounter of 07/18/22    US thyroid    Impression  Dominant cystic and solid nodule in the lower pole the right thyroid lobe  on image 25 meets ACR criteria for recommendation of ultrasound-guided fine-needle aspiration biopsy based on TI-RADS Classification of TR 3 (3 points) and size >2 5 cm  Reference: ACR Thyroid Imaging, Reporting and Data System (TI-RADS): White Paper of the [employer]  J [employer] Radiol 5980;19:170-921  (additional recommendations based on American Thyroid Association 2015 guidelines )    This examination was marked "significant notification" in Epic in order to begin the standard process by which the radiology reading room liaison alerts the referring practitioner  Workstation performed: [id number]      I have personally reviewed pertinent reports  Portions of the record may have been created with voice recognition software  Occasional wrong word or "sound a like" substitutions may have occurred due to the inherent limitations of voice recognition software  Read the chart carefully and recognize, using context, where substitutions have occurred

## 2022-11-08 ENCOUNTER — OFFICE VISIT (OUTPATIENT)
Dept: SURGERY | Facility: CLINIC | Age: 62
End: 2022-11-08

## 2022-11-08 ENCOUNTER — DOCUMENTATION (OUTPATIENT)
Dept: SURGERY | Facility: CLINIC | Age: 62
End: 2022-11-08

## 2022-11-08 VITALS
SYSTOLIC BLOOD PRESSURE: 137 MMHG | WEIGHT: 228.8 LBS | TEMPERATURE: 99.4 F | BODY MASS INDEX: 29.36 KG/M2 | HEIGHT: 74 IN | DIASTOLIC BLOOD PRESSURE: 81 MMHG | OXYGEN SATURATION: 96 % | HEART RATE: 106 BPM

## 2022-11-08 VITALS
BODY MASS INDEX: 29.36 KG/M2 | OXYGEN SATURATION: 99 % | HEART RATE: 95 BPM | TEMPERATURE: 99.4 F | DIASTOLIC BLOOD PRESSURE: 95 MMHG | HEIGHT: 74 IN | WEIGHT: 228.8 LBS | SYSTOLIC BLOOD PRESSURE: 164 MMHG

## 2022-11-08 DIAGNOSIS — Z20.2 CONTACT WITH AND (SUSPECTED) EXPOSURE TO INFECTIONS WITH A PREDOMINANTLY SEXUAL MODE OF TRANSMISSION: ICD-10-CM

## 2022-11-08 DIAGNOSIS — Z13.6 ENCOUNTER FOR LIPID SCREENING FOR CARDIOVASCULAR DISEASE: ICD-10-CM

## 2022-11-08 DIAGNOSIS — E04.2 GOITER, NONTOXIC, MULTINODULAR: ICD-10-CM

## 2022-11-08 DIAGNOSIS — I26.09 OTHER CHRONIC PULMONARY EMBOLISM WITH ACUTE COR PULMONALE (HCC): ICD-10-CM

## 2022-11-08 DIAGNOSIS — E04.1 THYROID NODULE: ICD-10-CM

## 2022-11-08 DIAGNOSIS — E11.8 TYPE 2 DIABETES MELLITUS WITH UNSPECIFIED COMPLICATIONS (HCC): ICD-10-CM

## 2022-11-08 DIAGNOSIS — Z11.1 SCREENING-PULMONARY TB: ICD-10-CM

## 2022-11-08 DIAGNOSIS — I26.99 PULMONARY EMBOLISM, OTHER, UNSPECIFIED CHRONICITY, UNSPECIFIED WHETHER ACUTE COR PULMONALE PRESENT (HCC): ICD-10-CM

## 2022-11-08 DIAGNOSIS — Z72.89 OTHER PROBLEMS RELATED TO LIFESTYLE: ICD-10-CM

## 2022-11-08 DIAGNOSIS — I26.09 OTHER ACUTE PULMONARY EMBOLISM WITH ACUTE COR PULMONALE (HCC): ICD-10-CM

## 2022-11-08 DIAGNOSIS — Z23 NEED FOR INFLUENZA VACCINATION: Primary | ICD-10-CM

## 2022-11-08 DIAGNOSIS — B20 HIV DISEASE (HCC): Primary | ICD-10-CM

## 2022-11-08 DIAGNOSIS — Z11.3 ENCOUNTER FOR SCREENING FOR BACTERIAL SEXUALLY TRANSMITTED DISEASE: ICD-10-CM

## 2022-11-08 DIAGNOSIS — T65.292D TOXIC EFFECT OF TOBACCO, INTENTIONAL SELF-HARM, SUBSEQUENT ENCOUNTER: ICD-10-CM

## 2022-11-08 DIAGNOSIS — B20 HIV (HUMAN IMMUNODEFICIENCY VIRUS INFECTION) (HCC): ICD-10-CM

## 2022-11-08 DIAGNOSIS — D72.89 OTHER SPECIFIED DISORDERS OF WHITE BLOOD CELLS: ICD-10-CM

## 2022-11-08 DIAGNOSIS — M17.0 PRIMARY OSTEOARTHRITIS OF BOTH KNEES: ICD-10-CM

## 2022-11-08 DIAGNOSIS — Z13.220 ENCOUNTER FOR LIPID SCREENING FOR CARDIOVASCULAR DISEASE: ICD-10-CM

## 2022-11-08 DIAGNOSIS — I10 ESSENTIAL HYPERTENSION: ICD-10-CM

## 2022-11-08 DIAGNOSIS — E78.1 HYPERTRIGLYCERIDEMIA: ICD-10-CM

## 2022-11-08 DIAGNOSIS — B20 HIV INFECTION, UNSPECIFIED SYMPTOM STATUS (HCC): ICD-10-CM

## 2022-11-08 DIAGNOSIS — I27.82 OTHER CHRONIC PULMONARY EMBOLISM WITH ACUTE COR PULMONALE (HCC): ICD-10-CM

## 2022-11-08 DIAGNOSIS — B20 HIV DISEASE (HCC): ICD-10-CM

## 2022-11-08 DIAGNOSIS — F43.10 PTSD (POST-TRAUMATIC STRESS DISORDER): ICD-10-CM

## 2022-11-08 PROBLEM — R73.03 PREDIABETES: Status: RESOLVED | Noted: 2019-04-29 | Resolved: 2022-11-08

## 2022-11-08 PROBLEM — J96.00 ACUTE RESPIRATORY FAILURE (HCC): Status: RESOLVED | Noted: 2022-06-23 | Resolved: 2022-11-08

## 2022-11-08 RX ORDER — METFORMIN HYDROCHLORIDE 500 MG/1
500 TABLET, EXTENDED RELEASE ORAL
Qty: 30 TABLET | Refills: 2 | Status: SHIPPED | OUTPATIENT
Start: 2022-11-08

## 2022-11-08 RX ORDER — AMLODIPINE BESYLATE 2.5 MG/1
2.5 TABLET ORAL DAILY
Qty: 30 TABLET | Refills: 5 | Status: SHIPPED | OUTPATIENT
Start: 2022-11-08

## 2022-11-08 RX ORDER — DARUNAVIR ETHANOLATE AND COBICISTAT 800; 150 MG/1; MG/1
1 TABLET, FILM COATED ORAL DAILY
Qty: 30 TABLET | Refills: 3 | Status: SHIPPED | OUTPATIENT
Start: 2022-11-08

## 2022-11-08 RX ORDER — PAROXETINE HYDROCHLORIDE 20 MG/1
20 TABLET, FILM COATED ORAL DAILY
Qty: 30 TABLET | Refills: 5 | Status: SHIPPED | OUTPATIENT
Start: 2022-11-08

## 2022-11-08 RX ORDER — DOLUTEGRAVIR SODIUM 50 MG/1
50 TABLET, FILM COATED ORAL DAILY
Qty: 30 TABLET | Refills: 5 | Status: SHIPPED | OUTPATIENT
Start: 2022-11-08

## 2022-11-08 RX ORDER — TENOFOVIR DISOPROXIL FUMARATE 300 MG/1
300 TABLET, FILM COATED ORAL DAILY
Qty: 30 TABLET | Refills: 5 | Status: SHIPPED | OUTPATIENT
Start: 2022-11-08

## 2022-11-08 NOTE — PROGRESS NOTES
Assessment/Plan:    Toxic effect of tobacco and nicotine  He has not smoked since June  Encouraged continued progress with cessation goals  Counseled for greater than 15 minutes on the importance of smoking cessation  Advised to quit  Educated on the increased risk of heart and lung disease associated with smoking  Type 2 diabetes mellitus with unspecified complications [facility])    Lab Results   Component Value Date    HGBA1C 7 2 (H) 10/21/2022     Start metformin  Educated on side effects  Stressed the importance of following a diabetic diet  Refer to dietitian for additional education  Essential hypertension  Blood pressure:   BP Readings from Last 3 Encounters:   11/08/22 164/95   11/08/22 137/81   11/07/22 150/82       Continue current antihypertensive  Educated on the following lifestyle modifications to lower BP and decrease cardiovascular risk factors  limit alcohol intake, reduce salt in diet, maintain a healthy weight, engage in 30 minutes of cardiovascular exercise daily, and not smoke  HIV disease ([facility] )  CD4 T CELL ABSOLUTE   Date/Time Value Ref Range Status   10/26/2015 07:58  359 - 1,519 /uL Final     CD4 ABS   Date/Time Value Ref Range Status   10/21/2022 09:48  359 - 1519 /uL Final     HIV-1 RNA by PCR, Qn   Date/Time Value Ref Range Status   10/21/2022 09:48 AM <20 copies/mL Final     Comment:     HIV-1 RNA not detected  The reportable range for this assay is 20 to 10,000,000  copies HIV-1 RNA/mL  HIV-1 RNA Viral Load Log   Date/Time Value Ref Range Status   10/21/2022 09:48 AM COMMENT [id number]/mL Final     Comment:     Unable to calculate result since non-numeric result obtained for  component test          ART: Tivicay, Viread, and Prezcobix        Denies side effects  Stressed the importance of adherence  Scheduled for follow-up with ID clinic tonight  Reviewed most recent labs, including Cd4 and viral load   Discussed the risks and benefits of treatment options, instructions for management, importance of treatment adherence, and reduction of risk factor  Educated on possible  medication side effects  Counseled on routes of HIV transmission, including the risk of  infection  Emphasized that viral suppression is the best method to prevent HIV transmission  At this time pt denies the need for HIV testing of anyone in their life  Total encounter time was 45 minutes  Greater then 20 minutes were spent on counseling and patient education  Pt voices understanding and agreement with treatment plan  Thyroid nodule  Following with endocrinology  Scheduled for thyroid biopsy and additional labs  Other pulmonary embolism with acute cor pulmonale (HCC)  Continue anticoagulation with apixaban  Follow up with heme oncology as directed  Diagnoses and all orders for this visit:    Need for influenza vaccination  -     influenza vaccine, quadrivalent, recombinant, PF, 0 5 mL, for patients 18 yr+ (FLUBLOK)    Primary osteoarthritis of both knees  -     Lido-Capsaicin-Men-Methyl Sal 4-0 025-5-20 % PTCH; Apply 2 patches topically in the morning    Type 2 diabetes mellitus with unspecified complications (HCC)  -     metFORMIN (GLUCOPHAGE-XR) 500 mg 24 hr tablet; Take 1 tablet (500 mg total) by mouth daily with dinner    Essential hypertension  -     amLODIPine (NORVASC) 2 5 mg tablet; Take 1 tablet (2 5 mg total) by mouth daily    Pulmonary embolism, other, unspecified chronicity, unspecified whether acute cor pulmonale present (HCC)  -     apixaban (Eliquis) 2 5 mg; Take 1 tablet (2 5 mg total) by mouth 2 (two) times a day    PTSD (post-traumatic stress disorder)  -     PARoxetine (PAXIL) 20 mg tablet;  Take 1 tablet (20 mg total) by mouth daily    HIV infection, unspecified symptom status (HCC)  -     Darunavir-Cobicistat (Prezcobix) 800-150 MG TABS; Take 1 tablet by mouth daily  -     dolutegravir (Tivicay) 50 MG TABS; Take 1 tablet (50 mg total) by mouth daily    HIV (human immunodeficiency virus infection) ([facility] )  -     tenofovir (VIREAD) 300 mg tablet; Take 1 tablet (300 mg total) by mouth daily    Toxic effect of tobacco, intentional self-harm, subsequent encounter    HIV disease ([facility] )    Thyroid nodule    Other chronic pulmonary embolism with acute cor pulmonale (HCC)          Subjective:      Patient ID: Nadeem Tijerina is a 63 y o  male  Deysi Trinidad Griffin Griffiths) Solange Brumfield is contacted today for primary care follow-up  Judit Alvarez PMHx significant for HIV, HTN, hyperlipidemia, polycythemia, and anxiety  Deysi Trinidad reports lower back pain and knee pain  He was doing physical therapy which had helped with his pain  He stops after being diagnosed in June with a PE  He is using acetaminophen with minimal relief  He does not wish to follow-up with orthopedics at this time  Colonoscopy: 1/27/17, repeat in 5 years  AAA screen: 5/2/2019      The following portions of the patient's history were reviewed and updated as appropriate: allergies, current medications, past family history, past medical history, past social history, past surgical history and problem list     Review of Systems   Constitutional: Negative for chills and fever  HENT: Negative for ear pain and sore throat  Eyes: Negative for pain and visual disturbance  Respiratory: Negative for cough and shortness of breath  Cardiovascular: Negative for chest pain and palpitations  Gastrointestinal: Negative for abdominal pain and vomiting  Genitourinary: Negative for dysuria and hematuria  Musculoskeletal: Positive for arthralgias and gait problem  Negative for back pain  Skin: Negative for color change and rash  Neurological: Negative for seizures and syncope  All other systems reviewed and are negative          Objective:      /81   Pulse (!) 106   Temp 99 4 °F (37 4 °C)   Ht 6' 2" (1 88 m)   Wt 104 kg (228 lb 12 8 oz)   SpO2 96%   BMI 29 38 kg/m²       Lab Results   Component Value Date    NA 138 10/26/2015    K 4 0 10/21/2022     10/21/2022    CO2 27 10/21/2022    ANIONGAP 9 10/26/2015    BUN 14 10/21/2022    CREATININE 1 12 10/21/2022    GLUCOSE 133 10/26/2015    GLUF 148 (H) 07/13/2022    CALCIUM 10 0 10/21/2022    CORRECTEDCA 9 7 06/23/2022    AST 20 10/21/2022    ALT 29 10/21/2022    ALKPHOS 68 10/21/2022    PROT 8 2 10/26/2015    BILITOT 0 23 10/26/2015    EGFR 71 10/21/2022     Lab Results   Component Value Date    WBC 6 37 10/21/2022    WBC 6 2 10/21/2022    HGB 15 5 10/21/2022    HGB 15 9 10/21/2022    HCT 44 8 10/21/2022    HCT 46 1 10/21/2022    MCV 86 10/21/2022    MCV 88 10/21/2022     10/21/2022     10/21/2022     Lab Results   Component Value Date    HEPCAB Non-reactive 04/22/2022     Lab Results   Component Value Date    HEPCAB Non-reactive 04/22/2022     Lab Results   Component Value Date    RPR Non-Reactive 04/22/2022          Physical Exam  Constitutional:       General: He is not in acute distress  Appearance: He is well-developed  HENT:      Head: Normocephalic  Right Ear: External ear normal       Left Ear: External ear normal       Nose: Nose normal       Mouth/Throat:      Pharynx: No oropharyngeal exudate  Eyes:      General:         Right eye: No discharge  Left eye: No discharge  Conjunctiva/sclera: Conjunctivae normal       Pupils: Pupils are equal, round, and reactive to light  Neck:      Thyroid: No thyromegaly  Cardiovascular:      Rate and Rhythm: Normal rate and regular rhythm  Heart sounds: Normal heart sounds  No murmur heard  Pulmonary:      Effort: Pulmonary effort is normal       Breath sounds: Normal breath sounds  No wheezing  Abdominal:      General: Bowel sounds are normal       Palpations: Abdomen is soft  There is no mass  Tenderness: There is no abdominal tenderness  Musculoskeletal:         General: Tenderness present  Normal range of motion  Cervical back: Normal range of motion  Lymphadenopathy:      Cervical: No cervical adenopathy  Skin:     General: Skin is warm and dry  Findings: No rash  Neurological:      Mental Status: He is alert and oriented to person, place, and time     Psychiatric:         Behavior: Behavior normal

## 2022-11-08 NOTE — ASSESSMENT & PLAN NOTE
Blood pressure:   BP Readings from Last 3 Encounters:   11/08/22 164/95   11/08/22 137/81   11/07/22 150/82       Continue current antihypertensive  Educated on the following lifestyle modifications to lower BP and decrease cardiovascular risk factors  limit alcohol intake, reduce salt in diet, maintain a healthy weight, engage in 30 minutes of cardiovascular exercise daily, and not smoke

## 2022-11-08 NOTE — ASSESSMENT & PLAN NOTE
CD4 T CELL ABSOLUTE   Date/Time Value Ref Range Status   10/26/2015 07:58  359 - 1,519 /uL Final     CD4 ABS   Date/Time Value Ref Range Status   10/21/2022 09:48  359 - 1519 /uL Final     HIV-1 RNA by PCR, Qn   Date/Time Value Ref Range Status   10/21/2022 09:48 AM <20 copies/mL Final     Comment:     HIV-1 RNA not detected  The reportable range for this assay is 20 to 10,000,000  copies HIV-1 RNA/mL  HIV-1 RNA Viral Load Log   Date/Time Value Ref Range Status   10/21/2022 09:48 AM COMMENT [id number]/mL Final     Comment:     Unable to calculate result since non-numeric result obtained for  component test          ART: Tivicay, Viread, and Prezcobix        Denies side effects  Stressed the importance of adherence  Scheduled for follow-up with ID clinic tonight  Reviewed most recent labs, including Cd4 and viral load  Discussed the risks and benefits of treatment options, instructions for management, importance of treatment adherence, and reduction of risk factor  Educated on possible  medication side effects  Counseled on routes of HIV transmission, including the risk of  infection  Emphasized that viral suppression is the best method to prevent HIV transmission  At this time pt denies the need for HIV testing of anyone in their life  Total encounter time was 45 minutes  Greater then 20 minutes were spent on counseling and patient education  Pt voices understanding and agreement with treatment plan

## 2022-11-08 NOTE — PROGRESS NOTES
Progress Note - Infectious Disease   Ashley Corley 63 y o  male MRN: [id number]  Unit/Bed#:  Encounter: [id number]      Impression/Plan:  1   HIV-doing well on ART with an here undetectable viral load and CD4 count in the 500s     Recheck labs in 5 months and follow up in 6 months   Stressed adherence      2   Polycythemia-resolved since stopping smoking      3   Nicotine dependence-quit smoking  Continue to stressed complete tobacco cessation     4  Hypertension-asymptomatic   Suboptimal control   Discussed this issue with the primary     5  Elevated serum creatinine-normal     6  Weight gain-still gaining weight   Stressed the importance diet exercise  Dietary evaluation    7  Diabetes mellitus-newly diagnosed  Started on metformin and following up with dietitian    8  Hypothyroidism- in the setting of thyroid nodules  ongoing workup by endo    9  Pulmonary embolism-on Eliquis      Patient was provided medication, adherence and prevention education    Subjective:  Routine follow-up for HIV  Patient claims 100% adherence with Prezcobix, Tivicay, tenofovir  Patient denies any notable side effects  Overall the feeling well  The patient denies any fever chills or sweats, denies any nausea vomiting or diarrhea, denies any cough or shortness of breath  ROS:  A complete review of systems is negative other than that noted above in the subjective    Followup portions patient history reviewed and updated as: Allergies, current medications, past medical history, past social history, past surgical history, and the problem list    Objective:  Vitals:  Vitals:    11/08/22 1609   BP: 164/95   Pulse: 95   Temp: 99 4 °F (37 4 °C)   SpO2: 99%   Weight: 104 kg (228 lb 12 8 oz)   Height: 6' 2" (1 88 m)       Physical Exam:   General Appearance:  Alert, interactive, appearing well,  nontoxic, no acute distress  Neck:   Supple without lymphadenopathy, no thyromegaly or masses   Throat: Oropharynx moist without lesions  Lungs:   Clear to auscultation bilaterally; no wheezes, rhonchi or rales; respirations unlabored   Heart:  RRR; no murmur, rub or gallop   Abdomen:   Soft, non-tender, non-distended, positive bowel sounds  Extremities: No clubbing, cyanosis or edema   Skin: No new rashes or lesions  No draining wounds noted  Labs, Imaging, & Other studies:   All pertinent labs and imaging studies were personally reviewed    Lab Results   Component Value Date     10/26/2015    K 4 0 10/21/2022     10/21/2022    CO2 27 10/21/2022    ANIONGAP 9 10/26/2015    BUN 14 10/21/2022    CREATININE 1 12 10/21/2022    GLUCOSE 133 10/26/2015    GLUF 148 (H) 07/13/2022    CALCIUM 10 0 10/21/2022    CORRECTEDCA 9 7 06/23/2022    AST 20 10/21/2022    ALT 29 10/21/2022    ALKPHOS 68 10/21/2022    PROT 8 2 10/26/2015    BILITOT 0 23 10/26/2015    EGFR 71 10/21/2022     Lab Results   Component Value Date    WBC 6 37 10/21/2022    WBC 6 2 10/21/2022    HGB 15 5 10/21/2022    HGB 15 9 10/21/2022    HCT 44 8 10/21/2022    HCT 46 1 10/21/2022    MCV 86 10/21/2022    MCV 88 10/21/2022     10/21/2022     10/21/2022     Lab Results   Component Value Date    HEPCAB Non-reactive 04/22/2022     Lab Results   Component Value Date    HEPCAB Non-reactive 04/22/2022     Lab Results   Component Value Date    RPR Non-Reactive 04/22/2022     CD4 ABS   Date/Time Value Ref Range Status   10/21/2022 09:48  359 - 1519 /uL Final     HIV-1 RNA by PCR, Qn   Date/Time Value Ref Range Status   10/21/2022 09:48 AM <20 copies/mL Final     Comment:     HIV-1 RNA not detected  The reportable range for this assay is 20 to 10,000,000  copies HIV-1 RNA/mL             Current Outpatient Medications:   •  amLODIPine (NORVASC) 2 5 mg tablet, Take 1 tablet (2 5 mg total) by mouth daily, Disp: 30 tablet, Rfl: 5  •  apixaban (Eliquis) 2 5 mg, Take 1 tablet (2 5 mg total) by mouth 2 (two) times a day, Disp: 60 tablet, Rfl: 2  •  Darunavir-Cobicistat (Prezcobix) 800-150 MG TABS, Take 1 tablet by mouth daily, Disp: 30 tablet, Rfl: 3  •  dolutegravir (Tivicay) 50 MG TABS, Take 1 tablet (50 mg total) by mouth daily, Disp: 30 tablet, Rfl: 5  •  Lido-Capsaicin-Men-Methyl Sal 4-0 025-5-20 % PTCH, Apply 2 patches topically in the morning, Disp: 60 patch, Rfl: 2  •  metFORMIN (GLUCOPHAGE-XR) 500 mg 24 hr tablet, Take 1 tablet (500 mg total) by mouth daily with dinner, Disp: 30 tablet, Rfl: 2  •  PARoxetine (PAXIL) 20 mg tablet, Take 1 tablet (20 mg total) by mouth daily, Disp: 30 tablet, Rfl: 5  •  tenofovir (VIREAD) 300 mg tablet, Take 1 tablet (300 mg total) by mouth daily, Disp: 30 tablet, Rfl: 5

## 2022-11-08 NOTE — PROGRESS NOTES
Pastoral Care Progress Note    2022  Patient: Oscar Santana : 1960  Encounter Date & Time: 2022   MRN: [id number]      The  met with Mr Veronique Vincent for continued care and support  He shared things had changed since I saw him in   He reported medical developments and expressed feeling stressed and overwhelmed with all of the medical challenges and requirements  He asked if the  could walk with him to the care  The  walked with patient to the car and offered words of encouragement and prayers  Mr Veronique Vincent asked for assistance with handicap parking in the future  The  sent a request to the staff and will reach out to the  for proper roni  dot forms  Further support as needed

## 2022-11-08 NOTE — ASSESSMENT & PLAN NOTE
Lab Results   Component Value Date    HGBA1C 7 2 (H) 10/21/2022     Start metformin  Educated on side effects  Stressed the importance of following a diabetic diet  Refer to dietitian for additional education

## 2022-11-08 NOTE — ASSESSMENT & PLAN NOTE
He has not smoked since June  Encouraged continued progress with cessation goals  Counseled for greater than 15 minutes on the importance of smoking cessation  Advised to quit  Educated on the increased risk of heart and lung disease associated with smoking

## 2022-11-09 ENCOUNTER — DOCUMENTATION (OUTPATIENT)
Dept: SURGERY | Facility: CLINIC | Age: 62
End: 2022-11-09

## 2022-11-09 NOTE — PROGRESS NOTES
Nutrition Consult:  Assessment:   Pt seen during ID clinic  Pt recently quit smoking & experienced weight gain & elevated A1C  Wt history:Current weight 228(11/08/22)  214(5/10/22) +14#(6 5%) x 6 months  186(3/31/2021)+28#(13%) x >1 year    per pt  Lab Results   Component Value Date    HGBA1C 7 2 (H) 10/21/2022     Nutrition Diagnosis    Problem: Unitended weight gain and Altered nutrition related laboratory values    Related to: Increase in estimated food intake, quit smoking     As Evidenced By: Weight Gain and Elevated HbA1C    Nutrition Education Intervention: No Interest     Person Educated: Person     Topics Discussed: labs, weight & readiness to make dietary changes     Teaching Method: Verbal    Goals    Pt is not ready to make goals at this time  Visit Summary  Pt aware of weight gain & elevated nutrition related labs  Pt is focused on continuing to not smoke and is not ready to make dietary changes at this time  RD congratulated pt on quitting smoking  RD contact information provided for follow up when pt is ready  Will continue to encourage healthy eating habits & provide nutrition education as needed

## 2022-11-11 ENCOUNTER — PATIENT OUTREACH (OUTPATIENT)
Dept: SURGERY | Facility: CLINIC | Age: 62
End: 2022-11-11

## 2022-11-14 NOTE — PROGRESS NOTES
Assessment/Plan:   [facility] met with pt at request of NP, Rowena Barragan  Pt stated he is struggling with his mortality  He recently stated he has been struggling with his health and is realizing he is not doing well  Pt stated all this has triggered his PTSD and he is struggling to manage his emotions  Pt appears to utilize humor to change discussion when he begins to become uncomfortable  [facility] offered reflective listening and offered interventions to assist pt in managing his emotional responses  [facility] encouraged pt to schedule sessions with this [facility] to discuss further care, pt stated he is not sure he is ready to "dig into issues"  [facility] provided pt with information on support group and encouraged pt to attend  [facility] will reach out to pt at next appt for follow up  [facility]     Today patient present with   Chief Complaint   Patient presents with   • Follow-up     Pt offers no c/o at this time  Patient would likely benefit from ongoing counseling to manage emotional response  Consider/focus/continue interventions to manage depression, sadness and anxiety  Stage of change:   Plan/ Behavioral Recommendations: Pt was offered ongoing  interventions, services        Diagnoses and all orders for this visit:    HIV disease ([facility] [address] )  -     CBC and differential  -     Chlamydia/GC amplified DNA by PCR  -     Comprehensive metabolic panel  -     Hepatitis C antibody  -     HIV-1 RNA, quantitative, PCR  -     Quantiferon TB Gold Plus  -     T-helper cells CD4/CD8 %  -     UA (URINE) with reflex to Scope  -     RPR  -     Lipid Panel with Direct LDL reflex    Contact with and (suspected) exposure to infections with a predominantly sexual mode of transmission  -     Chlamydia/GC amplified DNA by PCR  -     RPR    Encounter for screening for bacterial sexually transmitted disease  -     Chlamydia/GC amplified DNA by PCR  -     RPR    Encounter for lipid screening for cardiovascular disease  -     Lipid Panel with Direct LDL reflex    Other problems related to lifestyle  -     Chlamydia/GC amplified DNA by PCR  -     RPR    Other specified disorders of white blood cells  -     Chlamydia/GC amplified DNA by PCR  -     RPR    Screening-pulmonary TB  -     Quantiferon TB Gold Plus    Hypertriglyceridemia  -     Lipid Panel with Direct LDL reflex    Type 2 diabetes mellitus with unspecified complications (HCC)  -     Hemoglobin A1C    Essential hypertension    Other acute pulmonary embolism with acute cor pulmonale (HCC)    Toxic effect of tobacco, intentional self-harm, subsequent encounter    Goiter, nontoxic, multinodular          Discussion:     Patient can reach out to [facility] PRN if they experiences changes in their behavioral health  Subjective:     Patient ID: Steven Stover is a 63 y o  male  HPI    History of Present Illness: The patient is seeing the [facility] today for a routine behavioral health follow up      Review of Systems      Objective:     Physical Exam      [facility]    Orientation     Person: yes    Place: yes    Time: yes    Appearance    Well Developed: yes healthy    Uncomfortable: yes    Normal Body Odor: yes    Smells of Feces: no    Smells of Urine: no    Disheveled: no    Well Nourished: yes weight WNL of ideal    Grooming Unkempt: no    Poor Eye Contact: yes    Hirsute: no    Looks Tired: yes    Acutely Exhausted: noappears older    Mood and Affect:     Appropriate: yes    Euthymicyes    Irritable: no    Angry: no    Anxious: yes    Depressed:no    Blunted:no    Labile: no    Restricted: no    Harm to Self or Others: pt denies SI/HI    Substance Abuse: pt denies history

## 2022-11-17 NOTE — PROGRESS NOTES
Esvin cMkee states she spoke with Ct and he is interested in a possible handicap placecard assistance   states will review and ask PCP

## 2022-11-21 ENCOUNTER — APPOINTMENT (OUTPATIENT)
Dept: LAB | Facility: CLINIC | Age: 62
End: 2022-11-21

## 2022-11-21 ENCOUNTER — HOSPITAL ENCOUNTER (OUTPATIENT)
Dept: CT IMAGING | Facility: HOSPITAL | Age: 62
Discharge: HOME | End: 2022-11-21
Attending: INTERNAL MEDICINE

## 2022-11-21 DIAGNOSIS — E04.1 RIGHT THYROID NODULE: ICD-10-CM

## 2022-11-21 DIAGNOSIS — R79.89 ABNORMAL TSH: ICD-10-CM

## 2022-11-21 DIAGNOSIS — I26.09 ACUTE PULMONARY EMBOLISM WITH ACUTE COR PULMONALE, UNSPECIFIED PULMONARY EMBOLISM TYPE (HCC): ICD-10-CM

## 2022-11-21 LAB
ALBUMIN SERPL BCP-MCNC: 4.1 G/DL (ref 3.5–5)
ALP SERPL-CCNC: 70 U/L (ref 34–104)
ALT SERPL W P-5'-P-CCNC: 21 U/L (ref 7–52)
ANION GAP SERPL CALCULATED.3IONS-SCNC: 9 MMOL/L (ref 4–13)
AST SERPL W P-5'-P-CCNC: 15 U/L (ref 13–39)
BASOPHILS # BLD AUTO: 0.06 THOUSANDS/ÂΜL (ref 0–0.1)
BASOPHILS NFR BLD AUTO: 1 % (ref 0–1)
BILIRUB SERPL-MCNC: 0.58 MG/DL (ref 0.2–1)
BUN SERPL-MCNC: 12 MG/DL (ref 5–25)
CALCIUM SERPL-MCNC: 9.5 MG/DL (ref 8.4–10.2)
CHLORIDE SERPL-SCNC: 103 MMOL/L (ref 96–108)
CHOLEST SERPL-MCNC: 161 MG/DL
CO2 SERPL-SCNC: 25 MMOL/L (ref 21–32)
CREAT SERPL-MCNC: 0.9 MG/DL (ref 0.6–1.3)
EOSINOPHIL # BLD AUTO: 0.25 THOUSAND/ÂΜL (ref 0–0.61)
EOSINOPHIL NFR BLD AUTO: 5 % (ref 0–6)
ERYTHROCYTE [DISTWIDTH] IN BLOOD BY AUTOMATED COUNT: 12.9 % (ref 11.6–15.1)
EST. AVERAGE GLUCOSE BLD GHB EST-MCNC: 163 MG/DL
GFR SERPL CREATININE-BSD FRML MDRD: 92 ML/MIN/1.73SQ M
GLUCOSE P FAST SERPL-MCNC: 202 MG/DL (ref 65–99)
HBA1C MFR BLD: 7.3 %
HCT VFR BLD AUTO: 43 % (ref 36.5–49.3)
HCV AB SER QL: NORMAL
HDLC SERPL-MCNC: 31 MG/DL
HGB BLD-MCNC: 14.8 G/DL (ref 12–17)
IMM GRANULOCYTES # BLD AUTO: 0.02 THOUSAND/UL (ref 0–0.2)
IMM GRANULOCYTES NFR BLD AUTO: 0 % (ref 0–2)
LDLC SERPL CALC-MCNC: 85 MG/DL (ref 0–100)
LYMPHOCYTES # BLD AUTO: 1.91 THOUSANDS/ÂΜL (ref 0.6–4.47)
LYMPHOCYTES NFR BLD AUTO: 38 % (ref 14–44)
MCH RBC QN AUTO: 29.2 PG (ref 26.8–34.3)
MCHC RBC AUTO-ENTMCNC: 34.4 G/DL (ref 31.4–37.4)
MCV RBC AUTO: 85 FL (ref 82–98)
MONOCYTES # BLD AUTO: 0.72 THOUSAND/ÂΜL (ref 0.17–1.22)
MONOCYTES NFR BLD AUTO: 14 % (ref 4–12)
NEUTROPHILS # BLD AUTO: 2.12 THOUSANDS/ÂΜL (ref 1.85–7.62)
NEUTS SEG NFR BLD AUTO: 42 % (ref 43–75)
NRBC BLD AUTO-RTO: 0 /100 WBCS
PLATELET # BLD AUTO: 205 THOUSANDS/UL (ref 149–390)
PMV BLD AUTO: 10.5 FL (ref 8.9–12.7)
POTASSIUM SERPL-SCNC: 4 MMOL/L (ref 3.5–5.3)
PROT SERPL-MCNC: 8.2 G/DL (ref 6.4–8.4)
RBC # BLD AUTO: 5.06 MILLION/UL (ref 3.88–5.62)
SODIUM SERPL-SCNC: 137 MMOL/L (ref 135–147)
T4 FREE SERPL-MCNC: 0.91 NG/DL (ref 0.76–1.46)
TRIGL SERPL-MCNC: 227 MG/DL
TSH SERPL DL<=0.05 MIU/L-ACNC: 4.54 UIU/ML (ref 0.45–4.5)
WBC # BLD AUTO: 5.08 THOUSAND/UL (ref 4.31–10.16)

## 2022-11-21 RX ADMIN — IOHEXOL 85 ML: 350 INJECTION, SOLUTION INTRAVENOUS at 10:44

## 2022-11-22 LAB
BASOPHILS # BLD AUTO: 0.1 X10E3/UL (ref 0–0.2)
BASOPHILS NFR BLD AUTO: 1 %
CD3+CD4+ CELLS # BLD: 729 /UL (ref 359–1519)
CD3+CD4+ CELLS NFR BLD: 40.5 % (ref 30.8–58.5)
CD3+CD4+ CELLS/CD3+CD8+ CLL BLD: 1.39 % (ref 0.92–3.72)
CD3+CD8+ CELLS # BLD: 526 /UL (ref 109–897)
CD3+CD8+ CELLS NFR BLD: 29.2 % (ref 12–35.5)
EOSINOPHIL # BLD AUTO: 0.3 X10E3/UL (ref 0–0.4)
EOSINOPHIL NFR BLD AUTO: 5 %
ERYTHROCYTE [DISTWIDTH] IN BLOOD BY AUTOMATED COUNT: 13 % (ref 11.6–15.4)
HCT VFR BLD AUTO: 36.5 % (ref 37.5–51)
HGB BLD-MCNC: 12.8 G/DL (ref 13–17.7)
IMM GRANULOCYTES # BLD: 0 X10E3/UL (ref 0–0.1)
IMM GRANULOCYTES NFR BLD: 0 %
LYMPHOCYTES # BLD AUTO: 1.8 X10E3/UL (ref 0.7–3.1)
LYMPHOCYTES NFR BLD AUTO: 34 %
MCH RBC QN AUTO: 29.9 PG (ref 26.6–33)
MCHC RBC AUTO-ENTMCNC: 35.1 G/DL (ref 31.5–35.7)
MCV RBC AUTO: 85 FL (ref 79–97)
MONOCYTES # BLD AUTO: 0.9 X10E3/UL (ref 0.1–0.9)
MONOCYTES NFR BLD AUTO: 16 %
NEUTROPHILS # BLD AUTO: 2.4 X10E3/UL (ref 1.4–7)
NEUTROPHILS NFR BLD AUTO: 44 %
PLATELET # BLD AUTO: 244 X10E3/UL (ref 150–450)
RBC # BLD AUTO: 4.28 X10E6/UL (ref 4.14–5.8)
RPR SER QL: NORMAL
THYROGLOB AB SERPL-ACNC: <1 IU/ML (ref 0–0.9)
THYROPEROXIDASE AB SERPL-ACNC: <9 IU/ML (ref 0–34)
WBC # BLD AUTO: 5.4 X10E3/UL (ref 3.4–10.8)

## 2022-11-23 LAB
GAMMA INTERFERON BACKGROUND BLD IA-ACNC: 0.07 IU/ML
HIV1 RNA # SERPL NAA+PROBE: 1970 COPIES/ML
HIV1 RNA SERPL NAA+PROBE-LOG#: 3.29 LOG10COPY/ML
M TB IFN-G BLD-IMP: NEGATIVE
M TB IFN-G CD4+ BCKGRND COR BLD-ACNC: 0.02 IU/ML
M TB IFN-G CD4+ BCKGRND COR BLD-ACNC: 0.03 IU/ML
MITOGEN IGNF BCKGRD COR BLD-ACNC: 9.21 IU/ML

## 2022-11-28 ENCOUNTER — PATIENT OUTREACH (OUTPATIENT)
Dept: SURGERY | Facility: CLINIC | Age: 62
End: 2022-11-28

## 2022-11-29 ENCOUNTER — PATIENT OUTREACH (OUTPATIENT)
Dept: SURGERY | Facility: CLINIC | Age: 62
End: 2022-11-29

## 2022-12-05 ENCOUNTER — PATIENT OUTREACH (OUTPATIENT)
Dept: SURGERY | Facility: CLINIC | Age: 62
End: 2022-12-05

## 2022-12-05 ENCOUNTER — TELEPHONE (OUTPATIENT)
Dept: SURGERY | Facility: CLINIC | Age: 62
End: 2022-12-05

## 2022-12-05 NOTE — PROGRESS NOTES
Ct contacted cm to ask for assistance in filling out paperwork for a recertification  Ct also inquired about parking placard application  Cm informed ct that application has been filled out and needs ct's signature and also a notarization   Ct and cm agreed to meet 12/13/2022 at 11:00 am

## 2022-12-05 NOTE — TELEPHONE ENCOUNTER
Pt called asking about his handicap placard, and about getting assistance with recert forms for the state  I transferred his call to [facility]  [facility]

## 2022-12-06 ENCOUNTER — OFFICE VISIT (OUTPATIENT)
Dept: HEMATOLOGY ONCOLOGY | Facility: CLINIC | Age: 62
End: 2022-12-06

## 2022-12-06 VITALS
BODY MASS INDEX: 30.19 KG/M2 | SYSTOLIC BLOOD PRESSURE: 138 MMHG | RESPIRATION RATE: 18 BRPM | OXYGEN SATURATION: 95 % | HEIGHT: 73 IN | DIASTOLIC BLOOD PRESSURE: 84 MMHG | HEART RATE: 101 BPM | TEMPERATURE: 97.7 F

## 2022-12-06 DIAGNOSIS — I26.09 OTHER ACUTE PULMONARY EMBOLISM WITH ACUTE COR PULMONALE (HCC): Primary | ICD-10-CM

## 2022-12-06 NOTE — PROGRESS NOTES
INTERNAL MEDICINE FOLLOW-UP OFFICE VISIT  [facility]    NAME: Brandi Horn  AGE: 63 y o  SEX: male  : 1960     DATE: 2022     Assessment and Plan:     Diagnoses and all orders for this visit:    Other acute pulmonary embolism with acute cor pulmonale (HCC)    Resolved  Repeat CTA shows no acute pulmonary embolism   Continue Eliquis 5 mg BID for a total of 6 months  Will hold Eliquis one week prior to thrombophilia work up    No follow-ups on file  Chief Complaint:     Chief Complaint   Patient presents with   • Follow-up        History of Present Illness:     82-year-old male with a history of bilateral Pulmonary Emboli currently on Eliquis here for a follow up  Patient was seen in the emergency room in  and was found to have a PE and was started on Eliquis with plans to complete 3-6 months of anticoagulation      Presently patient denies any shortness of breath or dyspnea on exertion  No problems with excessive bruising or bleeding, no other problems with the Eliquis  No headaches, blurred vision or dizziness  The following portions of the patient's history were reviewed and updated as appropriate: allergies, current medications, past family history, past medical history, past social history, past surgical history and problem list      Review of Systems:     Review of Systems   Constitutional: Negative for chills and fever  HENT: Negative for ear pain and sore throat  Eyes: Negative for pain and visual disturbance  Respiratory: Negative for cough and shortness of breath  Cardiovascular: Negative for chest pain and palpitations  Gastrointestinal: Negative for abdominal pain and vomiting  Genitourinary: Negative for dysuria and hematuria  Musculoskeletal: Negative for arthralgias and back pain  Skin: Negative for color change and rash  Neurological: Negative for seizures and syncope     All other systems reviewed and are negative  Problem List:     Patient Active Problem List   Diagnosis   • Anxiety   • Essential hypertension   • Heart murmur   • Hiatal hernia   • HIV disease ([address] )   • Hypertriglyceridemia   • Inguinal hernia   • Toxic effect of tobacco and nicotine   • Other male erectile dysfunction   • Thyroid nodule   • Goiter, nontoxic, multinodular   • Other pulmonary embolism with acute cor pulmonale (HCC)   • Primary osteoarthritis of both knees   • Type 2 diabetes mellitus with unspecified complications (HCC)        Objective:     /84 (BP Location: Right arm, Patient Position: Sitting, Cuff Size: Adult)   Pulse 101   Temp 97 7 °F (36 5 °C)   Resp 18   Ht 6' 1" (1 854 m)   SpO2 95%   BMI 30 19 kg/m²     Physical Exam  Vitals and nursing note reviewed  Constitutional:       General: He is not in acute distress  Appearance: He is well-developed  HENT:      Head: Normocephalic and atraumatic  Eyes:      Conjunctiva/sclera: Conjunctivae normal    Cardiovascular:      Rate and Rhythm: Normal rate and regular rhythm  Heart sounds: No murmur heard  Pulmonary:      Effort: Pulmonary effort is normal  No respiratory distress  Breath sounds: Normal breath sounds  Abdominal:      Palpations: Abdomen is soft  Tenderness: There is no abdominal tenderness  Musculoskeletal:         General: No swelling  Cervical back: Neck supple  Skin:     General: Skin is warm and dry  Capillary Refill: Capillary refill takes less than 2 seconds  Neurological:      Mental Status: He is alert     Psychiatric:         Mood and Affect: Mood normal          Pertinent Laboratory/Diagnostic Studies:    Laboratory Results: I have personally reviewed the pertinent laboratory results/reports     CBC:   Results from Last 12 Months   Lab Units 11/21/22  1001   WBC Thousand/uL 5 08   WHITE BLOOD CELL COUNT  x10E3/uL 5 4   RBC Million/uL 5 06   RED BLOOD CELL COUNT  x10E6/uL 4 28 HEMOGLOBIN g/dL 14 8   HEMOGLOBIN  g/dL 12 8*   HEMATOCRIT % 43 0   HEMATOCRIT  % 36 5*   MCV fL 85   MCV  fL 85   MCH pg 29 2   MCH  pg 29 9   MCHC g/dL 34 4   MCHC  g/dL 35 1   RDW % 12 9   RDW  % 13 0   MPV fL 10 5   PLATELETS Thousands/uL 205   PLATELETS  Z40R7/   NRBC AUTO /100 WBCs 0   NEUTROS PCT % 42*   NEUTROS PCT  % 44   LYMPHS PCT % 38   LYMPHS PCT  % 34   MONOS PCT % 14*   MONOS PCT  % 16   EOS PCT % 5   EOS PCT  % 5   BASOS PCT % 1   NEUTROS ABS Thousands/µL 2 12   NEUTROS ABS  x10E3/uL 2 4   LYMPHS ABS Thousands/µL 1 91   LYMPHS ABS  x10E3/uL 1 8   MONOS ABS Thousand/µL 0 72   MONOS ABS  x10E3/uL 0 9   EOS ABS Thousand/µL 0 25   EOS ABS  x10E3/uL 0 3       Radiology/Other Diagnostic Testing Results: I have personally reviewed pertinent reports        Serge Silver MD  [address] [place] [address]

## 2022-12-12 ENCOUNTER — PATIENT OUTREACH (OUTPATIENT)
Dept: SURGERY | Facility: CLINIC | Age: 62
End: 2022-12-12

## 2022-12-12 NOTE — PROGRESS NOTES
Cm contacted ct after seeing a text from ct  Ct wanted to come in earlier 12/12/2022 to be able to have cm assist with a state recertification followed along with the placard application  Cm agreed to meet with ct earlier

## 2022-12-13 ENCOUNTER — PATIENT OUTREACH (OUTPATIENT)
Dept: SURGERY | Facility: CLINIC | Age: 62
End: 2022-12-13

## 2022-12-13 NOTE — PROGRESS NOTES
Cm met with ct to assist with benefit renewal  Cm assisted with completing benefit renewal alongside completing six month follow up  Ct has informed cm that he has not attended a dental appointment in three years  Cm reminded ct that keeping up with dental appointments is very important  Ct was receptive to the reminder  Ct denies any issues or concerns in regards to his teeth  Cm however continued to encourage to keep up with dental health  Ct inquired about [employer] services for transportation  Cm had ct sign lyft waiver  Cm also had ct sign placard application  Cm informed ct to have placard application notarized  Cm also gave ct recommendations of where to get placard application notarized  See attached

## 2022-12-27 ENCOUNTER — PATIENT OUTREACH (OUTPATIENT)
Dept: SURGERY | Facility: CLINIC | Age: 62
End: 2022-12-27

## 2022-12-27 NOTE — PROGRESS NOTES
Ct got in contact with cm about needing transportation to get his labs done  Ct inquired about [employer] services to be able to go get labs done  Cm inquired with clinic about being able to receive Ly[employer] services for labs   explained to cm that [employer] services can be given for labs to be done for upcoming appointment  Cm explained this to ct and also contacted [employer] services for ct  Cm has scheduled Lyft services for ct for 12/28/2022  Cm informed ct of these services that have been scheduled

## 2022-12-28 ENCOUNTER — PATIENT OUTREACH (OUTPATIENT)
Dept: SURGERY | Facility: CLINIC | Age: 62
End: 2022-12-28

## 2022-12-28 NOTE — PROGRESS NOTES
Ct got in contact with cm this morning to inquire about his Lyft as he was waiting outside for his Lyft ride to his labs  Ct inquired if a fee was needed from his end once getting into the [employer]ft  Cm informed ct that there was no fee required from his end when receiving Lyft services from us  Cm also informed ct that [employer] would be sending ct a text when a scheduled ride is approaching ct  Ct acknowledged what cm informed  Ct then inquired about scheduling Lyft services for medical appointment in 01/05/2023  Cm then inquired with clinical staff if a lyft can be scheduled for ct's appointment with radiology for 01/05/2023  Cm was then informed that lyft services can be provided for ct's appointment with radiology  Cm was also informed that Lyft services cannot be scheduled over a week in advance  Cm then relayed this information to ct  Ct acknowledged the information cm relayed  Cm informed that [employer] services for radiology appointment will be scheduled on Friday 12/30/2022  Ct thanked cm for assisting  Lyft services for ct will be scheduled 12/30/2022  Services will be for 01/05/2023 appointment  Cm will be in contact with ct to confirm scheduling services

## 2022-12-30 ENCOUNTER — PATIENT OUTREACH (OUTPATIENT)
Dept: SURGERY | Facility: CLINIC | Age: 62
End: 2022-12-30

## 2022-12-30 NOTE — PROGRESS NOTES
Cm received a missed call from ct  Cm called ct back to inquire about the missed call  Ct reported to have received a letter from [facility] stating that a bank statement from ct is needed  Ct inquired for cm to assist with this matter and cm was receptive to assisting ct  Ct verbally gave cm permission to have access to bank records to be able to access bank statement  Cm remained on the phone with ct to be able to print a bank statement out for ct  Cm has faxed bank statement over to [facility] for ct and has also received a confirmation of the fax  Cm has also attempted to reach out to [facility] via telephone to confirm receipt of bank statement  [facility] did not appear to be available  Cm has also contacted [facility] to schedule Lyft services for ct  Shelly has been scheduled for 1/5/2023 at 9:30 am for a 10:30 am radiology appointment  Cm has informed ct that Lyft services has been scheduled

## 2023-01-05 ENCOUNTER — HOSPITAL ENCOUNTER (OUTPATIENT)
Dept: ULTRASOUND IMAGING | Facility: HOSPITAL | Age: 63
Discharge: HOME | End: 2023-01-05
Attending: INTERNAL MEDICINE

## 2023-01-05 ENCOUNTER — PATIENT OUTREACH (OUTPATIENT)
Dept: SURGERY | Facility: CLINIC | Age: 63
End: 2023-01-05

## 2023-01-05 DIAGNOSIS — E04.1 RIGHT THYROID NODULE: ICD-10-CM

## 2023-01-05 RX ORDER — LIDOCAINE HYDROCHLORIDE 10 MG/ML
5 INJECTION, SOLUTION EPIDURAL; INFILTRATION; INTRACAUDAL; PERINEURAL ONCE
Status: COMPLETED | OUTPATIENT
Start: 2023-01-05 | End: 2023-01-05

## 2023-01-05 RX ADMIN — LIDOCAINE HYDROCHLORIDE 5 ML: 10 INJECTION, SOLUTION EPIDURAL; INFILTRATION; INTRACAUDAL at 10:34

## 2023-01-05 NOTE — PROGRESS NOTES
Ct got in contact with cm regarding Lyft services  Cm confirmed that Ly[employer] services have been scheduled for pickup for doctor's appointment  Ct showed gratitude and inquired on Lyft services being provided once appointment was over  Cm was receptive to [place] services being required once appointment was over and asked ct to give cm a call once appointment was over  Ct gave cm a call once appointment was over to ask for Lyft services and cm contacted [employer] to schedule services  Ct gave cm a call once Lyft services have been fulfilled to inform cm that everything went well  Ct then inquired for Lyft services to be scheduled for 1/10/2023  Ct inquired for this to be scheduled for a doctor's appointment with oncology/hematology  Cm was receptive to the inquiry and scheduled Lyft services for ct  Cm gave ct a call back once services were scheduled to give ct a confirmation  Ct acknowledged confirmation

## 2023-01-06 ENCOUNTER — TELEPHONE (OUTPATIENT)
Dept: ENDOCRINOLOGY | Facility: CLINIC | Age: 63
End: 2023-01-06

## 2023-01-06 NOTE — TELEPHONE ENCOUNTER
----- Message from Vijay Pruitt MD sent at 1/6/2023  1:53 PM EST -----  Please call the patient regarding his thyroid nodule biopsy report, biopsy result is benign    He should follow-up as scheduled with endocrinology

## 2023-01-09 ENCOUNTER — PATIENT OUTREACH (OUTPATIENT)
Dept: SURGERY | Facility: CLINIC | Age: 63
End: 2023-01-09

## 2023-01-09 NOTE — PROGRESS NOTES
Cm received a missed call from ct  Cm returned ct's call and was able to get in contact with ct  Ct gave cm updates about the results from radiology appointment  Ct reported thyroid detected was not cancer  Ct reported there was difficulty swallowing post appointment due to anxiety and the procedures that took place  Ct denies having difficulty swallowing currently  Ct assured cm that ct is just giving cm for information about themselves  Cm acknowledged and showed gratitude for building rapport with ct

## 2023-01-10 ENCOUNTER — OFFICE VISIT (OUTPATIENT)
Dept: HEMATOLOGY ONCOLOGY | Facility: CLINIC | Age: 63
End: 2023-01-10

## 2023-01-10 ENCOUNTER — PATIENT OUTREACH (OUTPATIENT)
Dept: SURGERY | Facility: CLINIC | Age: 63
End: 2023-01-10

## 2023-01-10 VITALS
HEIGHT: 73 IN | OXYGEN SATURATION: 97 % | BODY MASS INDEX: 30.09 KG/M2 | RESPIRATION RATE: 16 BRPM | HEART RATE: 111 BPM | DIASTOLIC BLOOD PRESSURE: 100 MMHG | TEMPERATURE: 99.2 F | WEIGHT: 227 LBS | SYSTOLIC BLOOD PRESSURE: 120 MMHG

## 2023-01-10 DIAGNOSIS — I26.09 OTHER ACUTE PULMONARY EMBOLISM WITH ACUTE COR PULMONALE (HCC): ICD-10-CM

## 2023-01-10 DIAGNOSIS — F41.9 ANXIETY: Primary | ICD-10-CM

## 2023-01-10 NOTE — PROGRESS NOTES
Keli Aguirre  1960  [address] [facility]  [address]   [place] [id number]    DISCUSSION/SUMMARY:    29-year-old male with multiple medical problems recently found to have extensive bilateral PE  Issues    Bilateral pulmonary emboli  Mr Jesusita Banks seems to be back to baseline as far as respiratory issues  No problems with the Eliquis, no problems with excessive bruising or bleeding  Patient has completed 6 months of anticoagulation treatment  Mr Jesusita Banks will now discontinue, wait 1 to 2 weeks and then go for a thrombophilia evaluation  Afterwards, patient will go back on the Eliquis until he returns to the office to discuss the thrombophilia results  Patient understands that if he has any inherited or acquired prothrombotic risk factors, he will need to be on anticoagulation for life  Also talk about other risk factors that possibly can be modified - such as immobility and obesity  Patient is working on weight reduction  Elevated RV/LV ratio  This is obviously associated/secondary to the recent PE  Patient may require evaluation by Cardiology if not already performed - hematology will defer to the PCP  Cystic/solid right-sided thyroid nodule  Workup/treatment as per PCP  Recent biopsy demonstrated benign pathology  Patient is to return in approximately 5-6 weeks  Mr Jesusita Banks knows to call the hematology/oncology office if there are any other questions or concerns  Carefully review your medication list and verify that the list is accurate and up-to-date  Please call the hematology/oncology office if there are medications missing from the list, medications on the list that you are not currently taking or if there is a dosage or instruction that is different from how you're taking that medication  Patient goals and areas of care:  Thrombophilia evaluation  Barriers to care:  None  Patient is able to self-care   ______________________________________________________________________________________    Chief Complaint   Patient presents with   • Follow-up   • History of PE on Eliquis     History of Present Illness:  63-year-old male with a number of medical problems recently developing progressive shortness of breath and dyspnea on exertion  Patient was seen in the emergency room and found to have a PE  Mr Martínez Louise was started on Eliquis  Patient states feeling okay from a respiratory standpoint  No shortness of breath or dyspnea on exertion  No problems with excessive bruising or bleeding, no other problems with the Eliquis  No lower extremity swelling other than bilateral foot swelling same as before  Generalized body aches are the same as before  Patient walks with a cane  Review of Systems   Constitutional: Positive for fatigue  HENT: Negative  Eyes: Negative  Respiratory: Negative  Cardiovascular: Negative  Gastrointestinal: Negative  Endocrine: Negative  Genitourinary: Negative  Musculoskeletal: Positive for arthralgias  Skin: Negative  Allergic/Immunologic: Negative  Neurological: Negative  Hematological: Negative  Psychiatric/Behavioral: Negative  All other systems reviewed and are negative      Patient Active Problem List   Diagnosis   • Anxiety   • Essential hypertension   • Heart murmur   • Hiatal hernia   • HIV disease ([facility] [address] )   • Hypertriglyceridemia   • Inguinal hernia   • Toxic effect of tobacco and nicotine   • Other male erectile dysfunction   • Thyroid nodule   • Goiter, nontoxic, multinodular   • Other pulmonary embolism with acute cor pulmonale (HCC)   • Primary osteoarthritis of both knees   • Type 2 diabetes mellitus with unspecified complications (HCC)     Past Medical History:   Diagnosis Date   • DDD (degenerative disc disease), lumbar    • Facet joint disease    • Human immunodeficiency virus (HIV) infection ([facility] [address] )     resolved 11/5/15   • Hypertension     resolved 5/29/15   • Other specified disorders of white blood cells     resolved 16     Past Surgical History:   Procedure Laterality Date   • HERNIA REPAIR     • SHOULDER SURGERY Left    • US GUIDED THYROID BIOPSY  2023     Family history:  No known familial or genetic diseases including any thrombophilia, children in good general health    Social History     Socioeconomic History   • Marital status: Single     Spouse name: Not on file   • Number of children: Not on file   • Years of education: Not on file   • Highest education level: Not on file   Occupational History   • Not on file   Tobacco Use   • Smoking status: Former     Packs/day: 0 50     Years: 43 00     Pack years: 21 50     Types: Cigarettes     Quit date: 6/15/2022     Years since quittin 5   • Smokeless tobacco: Never   • Tobacco comments:     10 cigs/day   Vaping Use   • Vaping Use: Never used   Substance and Sexual Activity   • Alcohol use: Not Currently   • Drug use: Never   • Sexual activity: Not Currently     Partners: Female   Other Topics Concern   • Not on file   Social History Narrative   • Not on file     Social Determinants of Health     Financial Resource Strain: Not on file   Food Insecurity: No Food Insecurity   • Worried About Running Out of Food in the Last Year: Never true   • Ran Out of Food in the Last Year: Never true   Transportation Needs: No Transportation Needs   • Lack of Transportation (Medical): No   • Lack of Transportation (Non-Medical):  No   Physical Activity: Not on file   Stress: Not on file   Social Connections: Not on file   Intimate Partner Violence: Not on file   Housing Stability: Low Risk    • Unable to Pay for Housing in the Last Year: No   • Number of Places Lived in the Last Year: 1   • Unstable Housing in the Last Year: No   Social history:  Discontinued tobacco use recently (40 pack-year history), no drug or alcohol abuse, no toxic exposure    Current Outpatient Medications:   • amLODIPine (NORVASC) 2 5 mg tablet, Take 1 tablet (2 5 mg total) by mouth daily, Disp: 30 tablet, Rfl: 5  •  apixaban (Eliquis) 2 5 mg, Take 1 tablet (2 5 mg total) by mouth 2 (two) times a day, Disp: 60 tablet, Rfl: 2  •  Darunavir-Cobicistat (Prezcobix) 800-150 MG TABS, Take 1 tablet by mouth daily, Disp: 30 tablet, Rfl: 3  •  dolutegravir (Tivicay) 50 MG TABS, Take 1 tablet (50 mg total) by mouth daily, Disp: 30 tablet, Rfl: 5  •  Lido-Capsaicin-Men-Methyl Sal 4-0 025-5-20 % PTCH, Apply 2 patches topically in the morning, Disp: 60 patch, Rfl: 2  •  metFORMIN (GLUCOPHAGE-XR) 500 mg 24 hr tablet, Take 1 tablet (500 mg total) by mouth daily with dinner, Disp: 30 tablet, Rfl: 2  •  PARoxetine (PAXIL) 20 mg tablet, Take 1 tablet (20 mg total) by mouth daily, Disp: 30 tablet, Rfl: 5  •  tenofovir (VIREAD) 300 mg tablet, Take 1 tablet (300 mg total) by mouth daily, Disp: 30 tablet, Rfl: 5    No Known Allergies    Vitals:    01/10/23 0851   BP: 120/100   Pulse: (!) 111   Resp: 16   Temp: 99 2 °F (37 3 °C)   SpO2: 97%     Physical Exam  Constitutional:       Appearance: He is well-developed  Comments: Middle-aged male, no respiratory distress, no signs of pain   HENT:      Head: Normocephalic and atraumatic  Right Ear: External ear normal       Left Ear: External ear normal    Eyes:      Conjunctiva/sclera: Conjunctivae normal       Pupils: Pupils are equal, round, and reactive to light  Cardiovascular:      Rate and Rhythm: Normal rate and regular rhythm  Heart sounds: Normal heart sounds  Pulmonary:      Effort: Pulmonary effort is normal       Breath sounds: Normal breath sounds  Comments: Good air entry bilaterally, few scattered rhonchi  Abdominal:      General: Bowel sounds are normal       Palpations: Abdomen is soft  Comments: Soft, nontender, slightly distended, +bowel sounds, no guarding, can not palpate liver or spleen   Musculoskeletal:         General: Normal range of motion  Cervical back: Normal range of motion and neck supple  Skin:     General: Skin is warm  Comments: Warm, moist, good color, no petechiae or ecchymoses   Neurological:      Mental Status: He is alert and oriented to person, place, and time  Deep Tendon Reflexes: Reflexes are normal and symmetric  Psychiatric:         Behavior: Behavior normal          Thought Content: Thought content normal          Judgment: Judgment normal      Extremities: Feet are 1+ swollen, swelling disappears above the ankles bilaterally, no cords, pulses are decreased  Lymphatics:  No adenopathy in the neck, supraclavicular region, axilla bilaterally    Labs    11/21/2022 WBC = 5 08 hemoglobin = 14 8 hematocrit = 43 platelet = 890 neutrophil = 42% BUN = 12 creatinine = 0 90 calcium = 9 5 LFTs WNL    Imaging    11/21/2022 CTA chest PE study     IMPRESSION:     1  No pulmonary embolism or acute intrathoracic process  2   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, better characterized on prior thyroid ultrasound 7/18/2022 8/12/2022 vascular lower limb venous duplex study bilateral    RIGHT LOWER LIMB:  No evidence of acute or chronic deep vein thrombosis  No evidence of superficial thrombophlebitis noted  Doppler evaluation shows a normal response to augmentation maneuvers  Popliteal, posterior tibial and anterior tibial arterial Doppler waveforms are  triphasic  LEFT LOWER LIMB:  No evidence of acute or chronic deep vein thrombosis  No evidence of superficial thrombophlebitis noted  Doppler evaluation shows a normal response to augmentation maneuvers    Popliteal, posterior tibial and anterior tibial arterial Doppler waveforms are  triphasic     07/19/2022 US thyroid    Impression: Dominant cystic and solid nodule in the lower pole the right thyroid lobe  on image 25 meets ACR criteria for recommendation of ultrasound-guided fine-needle aspiration biopsy based on TI-RADS Classification of TR 3 (3 points) and size >2 5 cm  Reference: ACR Thyroid Imaging, Reporting and Data System (TI-RADS): White Paper of the [employer]  J [employer] Radiol 0761;57:640-388  (additional recommendations based on American Thyroid Association 2015 guidelines ) This examination was marked "significant notification" in Epic in order to begin the standard process by which the radiology reading room liaison alerts the referring practitioner  06/22/2022 CTA chest PE study    PULMONARY ARTERIAL TREE:  Extensive acute pulmonary emboli involving the right greater than left main, all lobar, all right and multiple left segmental branches and multiple bilateral subsegmental branches      LUNGS:  Scattered small peripheral groundglass opacities in the right greater than left lungs, likely pulmonary infarcts  There is no tracheal or endobronchial lesion  IMPRESSION:    1  Extensive acute pulmonary emboli, as described  The calculated ratio of right ventricular to left ventricular diameter (RV/LV ratio) is 1 3  This is greater than 0 9, which is abnormal and indicates right heart strain  An abnormal RV/LV ratio has been shown to be associated with an increased risk of 30 day mortality in the setting of acute pulmonary embolism  Urgent consultation with the medical critical care team is recommended      2   Scattered small peripheral groundglass opacities in the right greater than left lungs, likely small pulmonary infarcts      3  Severely enlarged multinodular right thyroid lobe  Outpatient thyroid ultrasound is recommended for further evaluation

## 2023-01-10 NOTE — PROGRESS NOTES
Ct contacted  to inquire for Lyft services  Ct informed cm that appointment was finished and ct would need a ride home  Cm was receptive and contacted [facility] to schedule services for ct to be able to be transported home  Cm then gave ct a call back to confirm that Ly services have been scheduled

## 2023-01-16 ENCOUNTER — TELEPHONE (OUTPATIENT)
Dept: HEMATOLOGY ONCOLOGY | Facility: MEDICAL CENTER | Age: 63
End: 2023-01-16

## 2023-01-16 DIAGNOSIS — I26.09 OTHER ACUTE PULMONARY EMBOLISM WITH ACUTE COR PULMONALE (HCC): Primary | ICD-10-CM

## 2023-01-16 DIAGNOSIS — I26.09 ACUTE PULMONARY EMBOLISM WITH ACUTE COR PULMONALE, UNSPECIFIED PULMONARY EMBOLISM TYPE (HCC): ICD-10-CM

## 2023-01-16 NOTE — TELEPHONE ENCOUNTER
Left voicemail for patient informing him that I have the completed requisition for the thrombophilia assessment that Dr Perez ordered  Patient instructed to contact the office regarding instructions below and when he can  the requisition in office  ----- Message from Elsie Tanner MD sent at 1/10/2023  9:22 AM EST -----  This patient needs a complete thrombophilia evaluation  Patient is to stop the Eliquis now and do the evaluation in 1 to 2 weeks and return to the office in approximately 4 to 6 weeks      Thank you

## 2023-01-23 DIAGNOSIS — E11.8 TYPE 2 DIABETES MELLITUS WITH UNSPECIFIED COMPLICATIONS (HCC): ICD-10-CM

## 2023-01-23 DIAGNOSIS — G89.29 CHRONIC LOW BACK PAIN, UNSPECIFIED BACK PAIN LATERALITY, UNSPECIFIED WHETHER SCIATICA PRESENT: Primary | ICD-10-CM

## 2023-01-23 DIAGNOSIS — M54.50 CHRONIC LOW BACK PAIN, UNSPECIFIED BACK PAIN LATERALITY, UNSPECIFIED WHETHER SCIATICA PRESENT: Primary | ICD-10-CM

## 2023-01-24 ENCOUNTER — TELEPHONE (OUTPATIENT)
Dept: HEMATOLOGY ONCOLOGY | Facility: MEDICAL CENTER | Age: 63
End: 2023-01-24

## 2023-01-24 RX ORDER — METFORMIN HYDROCHLORIDE 500 MG/1
TABLET, EXTENDED RELEASE ORAL
Qty: 30 TABLET | Refills: 2 | Status: SHIPPED | OUTPATIENT
Start: 2023-01-24

## 2023-01-24 RX ORDER — CAMPHOR, MENTHOL, METHYL SALICYLATE 21.56; 41.73; 69.55 MG/1; MG/1; MG/1
PATCH PERCUTANEOUS; TOPICAL; TRANSDERMAL
Qty: 60 PATCH | Refills: 2 | Status: SHIPPED | OUTPATIENT
Start: 2023-01-24

## 2023-01-24 NOTE — TELEPHONE ENCOUNTER
LVM to the patient informing him that a requisition for the thrombophilia assessment was completed that was ordered by Dr Perez  Instructed patient to contact the office regarding instructions and when he can  the requisition in office

## 2023-01-25 ENCOUNTER — TELEPHONE (OUTPATIENT)
Dept: HEMATOLOGY ONCOLOGY | Facility: MEDICAL CENTER | Age: 63
End: 2023-01-25

## 2023-01-25 ENCOUNTER — TELEPHONE (OUTPATIENT)
Dept: HEMATOLOGY ONCOLOGY | Facility: CLINIC | Age: 63
End: 2023-01-25

## 2023-01-25 ENCOUNTER — PATIENT OUTREACH (OUTPATIENT)
Dept: SURGERY | Facility: CLINIC | Age: 63
End: 2023-01-25

## 2023-01-25 NOTE — TELEPHONE ENCOUNTER
I spoke with Naheed Boyle from [facility] in regards to mailing a Thrombophilia genpath form over for the patient to have completed on 1/30/23  Naheed Boyle confirms mailing address

## 2023-01-25 NOTE — TELEPHONE ENCOUNTER
I spoke with the patient in regards to his Thrombophilia assessment that Dr Saeed Mejia ordered  I informed the patient that Dr Saeed Mejia instructions were that he needs a complete thrombophilia evaluation and is to stop the Eliquis now and do the evaluation in 1 to 2 weeks and return to the office in approximately 4 to 6 weeks  Patient states understanding and states he does have a follow up with Dr Saeed Mejia on 2/21/23  I informed the patient that he will need to  the Thrombophilia Genpath form in the office  Patient states he is not able to  the form  Informed the patient that I can send it to the [facility] where he will need to have it completed  Patient states he will be going to get labs on Monday

## 2023-01-25 NOTE — PROGRESS NOTES
Ct got in contact with cm inquiring about Lyft services to get labs done  Ct reported to need labs done for hematology appointment  Cm was receptive and has scheduled Lyft services for ct  Cm scheduled Lyft services for 01/30/2023 at 10 am  Cm then got in contact with ct after scheduling Lyft services for ct  Cm has also completed Retention Questionnaire with ct  Ct also spent some time interacting with cm about how ct wants to return to [facility]  See Attached for Retention Questionnaire

## 2023-01-25 NOTE — TELEPHONE ENCOUNTER
CALL TRANSFER   Reason for patient call? Patient calling for Zaid Cota as he received a call about getting instructions for his next order   If someone other than patient is calling, are they listed on the communication consent? No   Patient's primary oncologist? Dr Juan C Christensen    Person/Department that the call was transferred to? Time that call was transferred? Zaid Cota  9:09am   Informed patient that the message will be forwarded to the team and someone will get back to them as soon as possible    Did you relay this information to the patient?  Yes

## 2023-01-30 ENCOUNTER — TELEPHONE (OUTPATIENT)
Dept: SURGERY | Facility: CLINIC | Age: 63
End: 2023-01-30

## 2023-01-30 ENCOUNTER — APPOINTMENT (OUTPATIENT)
Dept: LAB | Facility: CLINIC | Age: 63
End: 2023-01-30

## 2023-01-30 ENCOUNTER — PATIENT OUTREACH (OUTPATIENT)
Dept: SURGERY | Facility: CLINIC | Age: 63
End: 2023-01-30

## 2023-01-30 DIAGNOSIS — I26.09 ACUTE PULMONARY EMBOLISM WITH ACUTE COR PULMONALE, UNSPECIFIED PULMONARY EMBOLISM TYPE (HCC): ICD-10-CM

## 2023-01-30 DIAGNOSIS — I26.09 OTHER ACUTE PULMONARY EMBOLISM WITH ACUTE COR PULMONALE (HCC): ICD-10-CM

## 2023-01-30 NOTE — TELEPHONE ENCOUNTER
Pt called this morning and asked about seeing someone to help him take care of his diabetic feet  He has a hard time bending down to cut his nails

## 2023-01-30 NOTE — PROGRESS NOTES
Ct got in touch with cm to inform cm that he was going to be getting his labs done this morning  Cm acknowledged  Ct also interacted with cm for some time, alongside informing cm that he will be in touch when the lab work appointment was completed  Cm acknowledged  Ct then got in touch with cm when appointment was over inquiring for Lyft services for ct to be taken home  Cm was receptive and scheduled Lyft services for ct to be able to go home  Cm then got in contact with ct to confirm Lyft services  Ct showed gratitude

## 2023-02-07 ENCOUNTER — TELEPHONE (OUTPATIENT)
Dept: OBGYN CLINIC | Facility: CLINIC | Age: 63
End: 2023-02-07

## 2023-02-07 NOTE — TELEPHONE ENCOUNTER
Called on 2/7 and left message on machine to reschedule appointment for 2/13 due to Dr Allyson Saha going to surgery

## 2023-02-09 LAB — MISCELLANEOUS LAB TEST RESULT: NORMAL

## 2023-02-14 ENCOUNTER — PATIENT OUTREACH (OUTPATIENT)
Dept: SURGERY | Facility: CLINIC | Age: 63
End: 2023-02-14

## 2023-02-14 NOTE — PROGRESS NOTES
Ct got in contact with cm  Ct interacted with cm for some time  Ct has also informed that he had a podiatry appointment coming up on the 17th of February  Cm acknowledged and inquired if ct needed a Lyft to this appointment  Ct reported that he in fact did need Lyft services for this appointment  Javier acknowledged and scheduled Lyft services for ct  Cm then confirmed with ct about Lyft services being scheduled

## 2023-02-17 ENCOUNTER — PATIENT OUTREACH (OUTPATIENT)
Dept: SURGERY | Facility: CLINIC | Age: 63
End: 2023-02-17

## 2023-02-17 ENCOUNTER — OFFICE VISIT (OUTPATIENT)
Dept: PODIATRY | Facility: CLINIC | Age: 63
End: 2023-02-17

## 2023-02-17 VITALS
BODY MASS INDEX: 29.95 KG/M2 | DIASTOLIC BLOOD PRESSURE: 100 MMHG | SYSTOLIC BLOOD PRESSURE: 123 MMHG | HEART RATE: 79 BPM | HEIGHT: 73 IN | OXYGEN SATURATION: 95 %

## 2023-02-17 DIAGNOSIS — L84 CORNS: ICD-10-CM

## 2023-02-17 DIAGNOSIS — E11.8 TYPE 2 DIABETES MELLITUS WITH UNSPECIFIED COMPLICATIONS (HCC): ICD-10-CM

## 2023-02-17 DIAGNOSIS — B35.1 ONYCHOMYCOSIS: Primary | ICD-10-CM

## 2023-02-17 NOTE — PROGRESS NOTES
Assessment/Plan:    The patient's clinical examination today is consistent with onychomycosis of the pedal nail plates W96  Callus lesions were also noted to the medial aspect of the right hallux and right first MTPJ  The pedal nail plates were sharply debrided with a pair of sterile nail clippers x10 without incident  Nails were reduced in thickness and girth utilizing a rotary bur  Callus lesions were sharply debrided with a sterile 15 blade without complication  A comprehensive diabetic foot evaluation was performed and the patient is deemed to be in the high risk category  Recommend periodic diabetic foot evaluations and high risks nail care every 3 months  There are no other acute pedal issues noted at today's visit  Diagnoses and all orders for this visit:    Onychomycosis    Type 2 diabetes mellitus with unspecified complications ([facility] [address] )  -     Ambulatory referral to Podiatry    Kater[place]          Subjective:      Patient ID: Shelley Rice is a 63 y o  male  The patient presents today for his initial consultation with [facility] podiatry group with a chief complaint of painful elongated pedal nail plates  The patient does note a history of diabetes  He denies any tingling or numbness to his feet bilaterally  He does note significant swelling to the bilateral lower extremities that is chronic in nature        The following portions of the patient's history were reviewed and updated as appropriate: allergies, current medications, past family history, past medical history, past social history, past surgical history and problem list       PAST MEDICAL HISTORY:  Past Medical History:   Diagnosis Date   • DDD (degenerative disc disease), lumbar    • Facet joint disease    • Human immunodeficiency virus (HIV) infection ([address] )     resolved 11/5/15   • Hypertension     resolved 5/29/15   • Other specified disorders of white blood cells     resolved 5/12/16       PAST SURGICAL HISTORY:  Past Surgical History:   Procedure Laterality Date   • HERNIA REPAIR     • SHOULDER SURGERY Left    • US GUIDED THYROID BIOPSY  2023        ALLERGIES:  Patient has no known allergies  MEDICATIONS:  Current Outpatient Medications   Medication Sig Dispense Refill   • amLODIPine (NORVASC) 2 5 mg tablet Take 1 tablet (2 5 mg total) by mouth daily 30 tablet 5   • apixaban (Eliquis) 2 5 mg Take 1 tablet (2 5 mg total) by mouth 2 (two) times a day 60 tablet 2   • Darunavir-Cobicistat (Prezcobix) 800-150 MG TABS Take 1 tablet by mouth daily 30 tablet 3   • dolutegravir (Tivicay) 50 MG TABS Take 1 tablet (50 mg total) by mouth daily 30 tablet 5   • Lido-Capsaicin-Men-Methyl Sal 4-0 025-5-20 % PTCH Apply 2 patches topically in the morning 60 patch 2   • metFORMIN (GLUCOPHAGE-XR) 500 mg 24 hr tablet TAKE 1 TABLET BY MOUTH DAILY WITH DINNER 30 tablet 2   • PARoxetine (PAXIL) 20 mg tablet Take 1 tablet (20 mg total) by mouth daily 30 tablet 5   • Salonpas 3 1-6-10 % PTCH APPLY 2 PATCHES TOPICALLY IN THE MORNING, REMOVE AFTER 8 HOURS 60 patch 2   • tenofovir (VIREAD) 300 mg tablet Take 1 tablet (300 mg total) by mouth daily 30 tablet 5     No current facility-administered medications for this visit         SOCIAL HISTORY:  Social History     Socioeconomic History   • Marital status: Single     Spouse name: None   • Number of children: None   • Years of education: None   • Highest education level: None   Occupational History   • None   Tobacco Use   • Smoking status: Former     Packs/day: 0 50     Years: 43 00     Pack years: 21 50     Types: Cigarettes     Quit date: 6/15/2022     Years since quittin 6   • Smokeless tobacco: Never   • Tobacco comments:     10 cigs/day   Vaping Use   • Vaping Use: Never used   Substance and Sexual Activity   • Alcohol use: Not Currently   • Drug use: Never   • Sexual activity: Not Currently     Partners: Female   Other Topics Concern   • None   Social History Narrative   • None     Social Determinants of Health     Financial Resource Strain: Not on file   Food Insecurity: No Food Insecurity   • Worried About Running Out of Food in the Last Year: Never true   • Ran Out of Food in the Last Year: Never true   Transportation Needs: No Transportation Needs   • Lack of Transportation (Medical): No   • Lack of Transportation (Non-Medical): No   Physical Activity: Not on file   Stress: Not on file   Social Connections: Not on file   Intimate Partner Violence: Not on file   Housing Stability: Low Risk    • Unable to Pay for Housing in the Last Year: No   • Number of Places Lived in the Last Year: 1   • Unstable Housing in the Last Year: No        Review of Systems   Constitutional: Negative  HENT: Negative  Eyes: Negative  Respiratory: Negative  Cardiovascular: Negative  Endocrine: Negative  Musculoskeletal: Negative  Neurological: Negative  Hematological: Negative  Psychiatric/Behavioral: Negative  Objective:      /100 (BP Location: Left arm, Patient Position: Sitting, Cuff Size: Standard)   Pulse 79   Ht 6' 1" (1 854 m)   SpO2 95%   BMI 29 95 kg/m²          Physical Exam  Vitals reviewed  Constitutional:       Appearance: Normal appearance  HENT:      Head: Normocephalic and atraumatic  Nose: Nose normal    Eyes:      Conjunctiva/sclera: Conjunctivae normal       Pupils: Pupils are equal, round, and reactive to light  Cardiovascular:      Pulses: Pulses are weak  Dorsalis pedis pulses are 1+ on the right side and 1+ on the left side  Posterior tibial pulses are 0 on the right side and 0 on the left side  Pulmonary:      Effort: Pulmonary effort is normal    Musculoskeletal:      Right foot: Decreased range of motion  Left foot: Decreased range of motion  Feet:      Right foot:      Protective Sensation: 7 sites tested  2 sites sensed  Skin integrity: Dry skin present        Toenail Condition: Right toenails are abnormally thick and long  Fungal disease present  Left foot:      Protective Sensation: 7 sites tested  2 sites sensed  Skin integrity: Dry skin present  Toenail Condition: Left toenails are abnormally thick and long  Fungal disease present  Comments: The pedal nail plates are thickened and dystrophic and discolored and brittle with subungual debris consistent with onychomycosis x10; callus lesions noted to the medial aspect of the right great toe and right first metatarsophalangeal joint; the interdigital spaces are clear without maceration; there are no open wounds nor pretrophic changes noted to either lower extremity; there is +2 pitting edema of the bilateral lower extremities consistent with venous insufficiency  Skin:     General: Skin is warm  Capillary Refill: Capillary refill takes less than 2 seconds  Neurological:      General: No focal deficit present  Mental Status: He is alert and oriented to person, place, and time  Psychiatric:         Mood and Affect: Mood normal          Behavior: Behavior normal          Thought Content: Thought content normal          Diabetic Foot Exam    Patient's shoes and socks removed  Right Foot/Ankle   Right Foot Inspection  Skin Exam: dry skin  Toe Exam: swelling  No tenderness    Sensory   Vibration: absent  Proprioception: intact  Monofilament testing: diminished    Vascular  Capillary refills: elevated  The right DP pulse is 1+  The right PT pulse is 0  Left Foot/Ankle  Left Foot Inspection  Skin Exam: dry skin  Toe Exam: swelling  No tenderness  Sensory   Vibration: absent  Proprioception: intact  Monofilament testing: diminished    Vascular  Capillary refills: elevated  The left DP pulse is 1+  The left PT pulse is 0       Assign Risk Category  No deformity present  Loss of protective sensation  Weak pulses  Risk: 2

## 2023-02-17 NOTE — PROGRESS NOTES
Ct got in contact with cm inquiring for Lyft services 02/21/2023  Ct has informed that he has an appointment with oncology at 9:00 am  Cm was receptive with scheduling services for ct  Cm scheduled Lyft services for ct  Cm then got back in contact with ct confirming Lyft services  Ct acknowledged and showed gratitude  Ct then interacted with cm

## 2023-02-21 ENCOUNTER — PATIENT OUTREACH (OUTPATIENT)
Dept: SURGERY | Facility: CLINIC | Age: 63
End: 2023-02-21

## 2023-02-21 ENCOUNTER — OFFICE VISIT (OUTPATIENT)
Dept: HEMATOLOGY ONCOLOGY | Facility: CLINIC | Age: 63
End: 2023-02-21

## 2023-02-21 ENCOUNTER — DOCUMENTATION (OUTPATIENT)
Dept: HEMATOLOGY ONCOLOGY | Facility: CLINIC | Age: 63
End: 2023-02-21

## 2023-02-21 VITALS
OXYGEN SATURATION: 97 % | DIASTOLIC BLOOD PRESSURE: 85 MMHG | SYSTOLIC BLOOD PRESSURE: 125 MMHG | HEART RATE: 107 BPM | HEIGHT: 73 IN | BODY MASS INDEX: 29.29 KG/M2 | WEIGHT: 221 LBS | RESPIRATION RATE: 18 BRPM

## 2023-02-21 DIAGNOSIS — I26.09 OTHER ACUTE PULMONARY EMBOLISM WITH ACUTE COR PULMONALE (HCC): Primary | ICD-10-CM

## 2023-02-21 NOTE — PROGRESS NOTES
Patient seen/evaluated with Dr Bladimir Cavanaugh, family practice resident today, February 21, 2023

## 2023-02-21 NOTE — PROGRESS NOTES
Rachael Galloway  1960  [address] [facility]  [address]   [place] [address]    DISCUSSION/SUMMARY:    63-year-old male with multiple medical problems recently found to have extensive bilateral PE  Issues    Bilateral pulmonary emboli  Mr Isamar Keene seems to be back to baseline as far as respiratory issues  Denies any problems with the Eliquis, no problems with excessive bruising or bleeding  He previously completed 6 months of anticoagulation treatment and was told to discontinue, then wait 1-2 weeks, and go for a thrombophilia evaluation  Results were positive for 4 prothrombotic risk factors: Factors VIII, XI, Fibrinogen, and D-dimer  Exact values documented below see labs  Patient will likely need to be on anticoagulation for life  Patient denies any financial barriers to being compliant with the Eliquis  Additional modifiable risk factors were once again discussed - immobility and obesity  Patient was instructed to follow up with PCP for routine preventative screenings  Elevated RV/LV ratio  This is obviously associated/secondary to the recent PE  Patient may require evaluation by Cardiology if not already performed - hematology will defer to the PCP  Cystic/solid right-sided thyroid nodule  Workup/treatment as per PCP  Recent biopsy demonstrated benign pathology  Patient is to return in approximately 6 months  Mr Isamar Keene knows to call the hematology/oncology office if there are any other questions or concerns  Carefully review your medication list and verify that the list is accurate and up-to-date  Please call the hematology/oncology office if there are medications missing from the list, medications on the list that you are not currently taking or if there is a dosage or instruction that is different from how you're taking that medication      Patient goals and areas of care: Eliquis restarted after pos thrombophilia screen  Barriers to care:  None  Patient is able to self-care   ______________________________________________________________________________________    Chief Complaint   Patient presents with   • Follow-up     History of Present Illness:  63-year-old male with a number of medical problems recently developing progressive shortness of breath and dyspnea on exertion  Patient was seen in the emergency room and found to have a PE  Mr Derek Rincon was started on Eliquis  Patient reports he stopped Eliquis as instructed after completed 6 months of treatment prior to stopping it for thrombophilia evaluation  He states he is feeling okay from a respiratory standpoint  Denies shortness of breath or dyspnea on exertion  Denies excessive bleeding or bruising  Denies lower extremity swelling above the feet which is chronic  He states he still has the Eliquis at home and has no financial barriers to taking it  He states he has some generalized body aches at baseline, not worsening  Review of Systems   Constitutional: Positive for fatigue  HENT: Negative  Eyes: Negative  Respiratory: Negative for shortness of breath  Cardiovascular: Negative for chest pain  Gastrointestinal: Negative  Endocrine: Negative  Genitourinary: Negative  Musculoskeletal: Positive for arthralgias  Skin: Negative  Allergic/Immunologic: Negative  Neurological: Negative  Hematological: Negative  Psychiatric/Behavioral: Negative  All other systems reviewed and are negative      Patient Active Problem List   Diagnosis   • Anxiety   • Essential hypertension   • Heart murmur   • Hiatal hernia   • HIV disease (HCC)   • Hypertriglyceridemia   • Inguinal hernia   • Toxic effect of tobacco and nicotine   • Other male erectile dysfunction   • Thyroid nodule   • Goiter, nontoxic, multinodular   • Other pulmonary embolism with acute cor pulmonale (HCC)   • Primary osteoarthritis of both knees   • Type 2 diabetes mellitus with unspecified complications ([facility] )     Past Medical History:   Diagnosis Date   • DDD (degenerative disc disease), lumbar    • Facet joint disease    • Human immunodeficiency virus (HIV) infection ([facility] )     resolved 11/5/15   • Hypertension     resolved 5/29/15   • Other specified disorders of white blood cells     resolved 16     Past Surgical History:   Procedure Laterality Date   • HERNIA REPAIR     • SHOULDER SURGERY Left    • US GUIDED THYROID BIOPSY  2023     Family history:  No known familial or genetic diseases including any thrombophilia, children in good general health    Social History     Socioeconomic History   • Marital status: Single     Spouse name: Not on file   • Number of children: Not on file   • Years of education: Not on file   • Highest education level: Not on file   Occupational History   • Not on file   Tobacco Use   • Smoking status: Former     Packs/day: 0 50     Years: 43 00     Pack years: 21 50     Types: Cigarettes     Quit date: 6/15/2022     Years since quittin 6   • Smokeless tobacco: Never   • Tobacco comments:     10 cigs/day   Vaping Use   • Vaping Use: Never used   Substance and Sexual Activity   • Alcohol use: Not Currently   • Drug use: Never   • Sexual activity: Not Currently     Partners: Female   Other Topics Concern   • Not on file   Social History Narrative   • Not on file     Social Determinants of Health     Financial Resource Strain: Not on file   Food Insecurity: No Food Insecurity   • Worried About Running Out of Food in the Last Year: Never true   • Ran Out of Food in the Last Year: Never true   Transportation Needs: No Transportation Needs   • Lack of Transportation (Medical): No   • Lack of Transportation (Non-Medical):  No   Physical Activity: Not on file   Stress: Not on file   Social Connections: Not on file   Intimate Partner Violence: Not on file   Housing Stability: Low Risk    • Unable to Pay for Housing in the Last Year: No   • Number of Places Lived in the Last Year: 1   • Unstable Housing in the Last Year: No   Social history:  Discontinued tobacco use recently (40 pack-year history), no drug or alcohol abuse, no toxic exposure    Current Outpatient Medications:   •  amLODIPine (NORVASC) 2 5 mg tablet, Take 1 tablet (2 5 mg total) by mouth daily, Disp: 30 tablet, Rfl: 5  •  apixaban (Eliquis) 2 5 mg, Take 1 tablet (2 5 mg total) by mouth 2 (two) times a day, Disp: 60 tablet, Rfl: 2  •  Darunavir-Cobicistat (Prezcobix) 800-150 MG TABS, Take 1 tablet by mouth daily, Disp: 30 tablet, Rfl: 3  •  dolutegravir (Tivicay) 50 MG TABS, Take 1 tablet (50 mg total) by mouth daily, Disp: 30 tablet, Rfl: 5  •  Lido-Capsaicin-Men-Methyl Sal 4-0 025-5-20 % PTCH, Apply 2 patches topically in the morning, Disp: 60 patch, Rfl: 2  •  metFORMIN (GLUCOPHAGE-XR) 500 mg 24 hr tablet, TAKE 1 TABLET BY MOUTH DAILY WITH DINNER, Disp: 30 tablet, Rfl: 2  •  PARoxetine (PAXIL) 20 mg tablet, Take 1 tablet (20 mg total) by mouth daily, Disp: 30 tablet, Rfl: 5  •  Salonpas 3 1-6-10 % PTCH, APPLY 2 PATCHES TOPICALLY IN THE MORNING, REMOVE AFTER 8 HOURS, Disp: 60 patch, Rfl: 2  •  tenofovir (VIREAD) 300 mg tablet, Take 1 tablet (300 mg total) by mouth daily, Disp: 30 tablet, Rfl: 5    No Known Allergies    Vitals:    02/21/23 0841   BP: 125/85   Pulse: (!) 107   Resp: 18   SpO2: 97%     Physical Exam  Constitutional:       Appearance: He is well-developed  Comments: Middle-aged male, no respiratory distress, no signs of pain   HENT:      Head: Normocephalic and atraumatic  Eyes:      Extraocular Movements: Extraocular movements intact  Conjunctiva/sclera: Conjunctivae normal       Pupils: Pupils are equal, round, and reactive to light  Cardiovascular:      Rate and Rhythm: Normal rate and regular rhythm  Heart sounds: Normal heart sounds  Pulmonary:      Effort: Pulmonary effort is normal  No respiratory distress  Breath sounds: Normal breath sounds  Comments: Good air entry bilaterally  Abdominal:      General: Bowel sounds are normal       Palpations: Abdomen is soft  Tenderness: There is no guarding  Comments: Soft, nontender, mild distension, +bowel sounds, no guarding, can't palpate liver or spleen   Musculoskeletal:         General: Normal range of motion  Cervical back: Normal range of motion and neck supple  Skin:     General: Skin is warm  Comments: Warm, moist, good color, no petechiae or ecchymoses   Neurological:      Mental Status: He is alert and oriented to person, place, and time  Psychiatric:         Behavior: Behavior normal          Thought Content: Thought content normal          Judgment: Judgment normal      Extremities: 1+ edema in bilateral lower extremities from feet to the ankles, no cords, pulses are decreased  Lymphatics:  No adenopathy in the neck, supraclavicular region, axilla bilaterally    Labs    1/30/23 Thrombophilia screen: Factor VIII = 276, Factor XI = 185, Fibrinogen = 445, D-dimer = 1 52, AntiCardiolipin IgG and IgM = Low positive    11/21/2022 WBC = 5 08 hemoglobin = 14 8 hematocrit = 43 platelet = 473 neutrophil = 42% BUN = 12 creatinine = 0 90 calcium = 9 5 LFTs WNL    Imaging    11/21/2022 CTA chest PE study     IMPRESSION:     1  No pulmonary embolism or acute intrathoracic process  2   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, better characterized on prior thyroid ultrasound 7/18/2022 8/12/2022 vascular lower limb venous duplex study bilateral    RIGHT LOWER LIMB:  No evidence of acute or chronic deep vein thrombosis  No evidence of superficial thrombophlebitis noted  Doppler evaluation shows a normal response to augmentation maneuvers  Popliteal, posterior tibial and anterior tibial arterial Doppler waveforms are  triphasic  LEFT LOWER LIMB:  No evidence of acute or chronic deep vein thrombosis  No evidence of superficial thrombophlebitis noted    Doppler evaluation shows a normal response to augmentation maneuvers  Popliteal, posterior tibial and anterior tibial arterial Doppler waveforms are  triphasic     07/19/2022 US thyroid    Impression: Dominant cystic and solid nodule in the lower pole the right thyroid lobe  on image 25 meets ACR criteria for recommendation of ultrasound-guided fine-needle aspiration biopsy based on TI-RADS Classification of TR 3 (3 points) and size >2 5 cm  Reference: ACR Thyroid Imaging, Reporting and Data System (TI-RADS): White Paper of the Clinithink  J AM America Radiol 3526;91:107-370  (additional recommendations based on American Thyroid Association 2015 guidelines ) This examination was marked "significant notification" in Epic in order to begin the standard process by which the radiology reading room liaison alerts the referring practitioner  06/22/2022 CTA chest PE study    PULMONARY ARTERIAL TREE:  Extensive acute pulmonary emboli involving the right greater than left main, all lobar, all right and multiple left segmental branches and multiple bilateral subsegmental branches      LUNGS:  Scattered small peripheral groundglass opacities in the right greater than left lungs, likely pulmonary infarcts  There is no tracheal or endobronchial lesion  IMPRESSION:    1  Extensive acute pulmonary emboli, as described  The calculated ratio of right ventricular to left ventricular diameter (RV/LV ratio) is 1 3  This is greater than 0 9, which is abnormal and indicates right heart strain  An abnormal RV/LV ratio has been shown to be associated with an increased risk of 30 day mortality in the setting of acute pulmonary embolism  Urgent consultation with the medical critical care team is recommended      2   Scattered small peripheral groundglass opacities in the right greater than left lungs, likely small pulmonary infarcts      3  Severely enlarged multinodular right thyroid lobe    Outpatient thyroid ultrasound is recommended for further evaluation

## 2023-02-23 NOTE — PROGRESS NOTES
Cm got in contact with ct after seeing missed calls from ct  Ct informed that he was in need of Ly[employer] services to return home after medical appointment  Javier acknowledged and apologized for not being available when ct was attempting contact with cm  Javier has also informed to be in a meeting at the time ct was trying to get in contact with ct  Ct acknowledged and has informed that the  has assisted with scheduling Ly[employer] services for him to be able to return home  Javier acknowledged

## 2023-03-29 ENCOUNTER — TELEPHONE (OUTPATIENT)
Dept: SURGERY | Facility: CLINIC | Age: 63
End: 2023-03-29

## 2023-03-29 NOTE — TELEPHONE ENCOUNTER
Pt is interested in medication for ED as he is now in a relationship   Pt aware he will most likely have to be seen by [address] [place] [address], and will not have an rx called in for him without being seen first

## 2023-04-19 ENCOUNTER — APPOINTMENT (OUTPATIENT)
Dept: LAB | Facility: CLINIC | Age: 63
End: 2023-04-19

## 2023-04-24 DIAGNOSIS — E11.8 TYPE 2 DIABETES MELLITUS WITH UNSPECIFIED COMPLICATIONS (HCC): ICD-10-CM

## 2023-04-24 DIAGNOSIS — I26.99 PULMONARY EMBOLISM, OTHER, UNSPECIFIED CHRONICITY, UNSPECIFIED WHETHER ACUTE COR PULMONALE PRESENT (HCC): ICD-10-CM

## 2023-04-24 DIAGNOSIS — M54.50 CHRONIC LOW BACK PAIN, UNSPECIFIED BACK PAIN LATERALITY, UNSPECIFIED WHETHER SCIATICA PRESENT: ICD-10-CM

## 2023-04-24 DIAGNOSIS — G89.29 CHRONIC LOW BACK PAIN, UNSPECIFIED BACK PAIN LATERALITY, UNSPECIFIED WHETHER SCIATICA PRESENT: ICD-10-CM

## 2023-04-25 RX ORDER — APIXABAN 2.5 MG/1
TABLET, FILM COATED ORAL
Qty: 60 TABLET | Refills: 2 | Status: SHIPPED | OUTPATIENT
Start: 2023-04-25

## 2023-04-25 RX ORDER — CAMPHOR, MENTHOL, METHYL SALICYLATE 21.56; 41.73; 69.55 MG/1; MG/1; MG/1
PATCH PERCUTANEOUS; TOPICAL; TRANSDERMAL
Qty: 60 PATCH | Refills: 2 | Status: SHIPPED | OUTPATIENT
Start: 2023-04-25

## 2023-04-25 RX ORDER — METFORMIN HYDROCHLORIDE 500 MG/1
TABLET, EXTENDED RELEASE ORAL
Qty: 30 TABLET | Refills: 2 | Status: SHIPPED | OUTPATIENT
Start: 2023-04-25

## 2023-05-01 NOTE — ASSESSMENT & PLAN NOTE
Patient with a patient with a history of HIV followed with Infectious Disease, will continue home medications [facility] Pregnancy And Lactation Text: The risk during pregnancy and breastfeeding is uncertain with this medication.

## 2023-05-09 ENCOUNTER — DOCUMENTATION (OUTPATIENT)
Dept: SURGERY | Facility: CLINIC | Age: 63
End: 2023-05-09

## 2023-05-09 ENCOUNTER — OFFICE VISIT (OUTPATIENT)
Dept: SURGERY | Facility: CLINIC | Age: 63
End: 2023-05-09

## 2023-05-09 ENCOUNTER — PATIENT OUTREACH (OUTPATIENT)
Dept: SURGERY | Facility: CLINIC | Age: 63
End: 2023-05-09

## 2023-05-09 VITALS
BODY MASS INDEX: 27.32 KG/M2 | SYSTOLIC BLOOD PRESSURE: 146 MMHG | WEIGHT: 206.1 LBS | DIASTOLIC BLOOD PRESSURE: 83 MMHG | HEART RATE: 92 BPM | TEMPERATURE: 99.1 F | HEIGHT: 73 IN | OXYGEN SATURATION: 96 %

## 2023-05-09 VITALS
SYSTOLIC BLOOD PRESSURE: 135 MMHG | DIASTOLIC BLOOD PRESSURE: 79 MMHG | BODY MASS INDEX: 27.38 KG/M2 | WEIGHT: 206.6 LBS | HEIGHT: 73 IN | OXYGEN SATURATION: 97 % | HEART RATE: 85 BPM

## 2023-05-09 DIAGNOSIS — E11.8 TYPE 2 DIABETES MELLITUS WITH UNSPECIFIED COMPLICATIONS (HCC): ICD-10-CM

## 2023-05-09 DIAGNOSIS — Z11.3 ENCOUNTER FOR SCREENING FOR BACTERIAL SEXUALLY TRANSMITTED DISEASE: ICD-10-CM

## 2023-05-09 DIAGNOSIS — Z11.1 SCREENING-PULMONARY TB: ICD-10-CM

## 2023-05-09 DIAGNOSIS — N52.8 OTHER MALE ERECTILE DYSFUNCTION: ICD-10-CM

## 2023-05-09 DIAGNOSIS — F43.10 PTSD (POST-TRAUMATIC STRESS DISORDER): ICD-10-CM

## 2023-05-09 DIAGNOSIS — Z20.2 CONTACT WITH AND (SUSPECTED) EXPOSURE TO INFECTIONS WITH A PREDOMINANTLY SEXUAL MODE OF TRANSMISSION: ICD-10-CM

## 2023-05-09 DIAGNOSIS — R63.4 WEIGHT LOSS: ICD-10-CM

## 2023-05-09 DIAGNOSIS — Q20.8: ICD-10-CM

## 2023-05-09 DIAGNOSIS — B20 HIV DISEASE (HCC): Primary | ICD-10-CM

## 2023-05-09 DIAGNOSIS — Z13.1 SCREENING FOR DIABETES MELLITUS: ICD-10-CM

## 2023-05-09 DIAGNOSIS — E04.1 THYROID NODULE: ICD-10-CM

## 2023-05-09 DIAGNOSIS — T65.292D TOXIC EFFECT OF TOBACCO, INTENTIONAL SELF-HARM, SUBSEQUENT ENCOUNTER: ICD-10-CM

## 2023-05-09 DIAGNOSIS — Z79.899 OTHER LONG TERM (CURRENT) DRUG THERAPY: ICD-10-CM

## 2023-05-09 DIAGNOSIS — B20 HIV (HUMAN IMMUNODEFICIENCY VIRUS INFECTION) (HCC): ICD-10-CM

## 2023-05-09 DIAGNOSIS — Z13.220 ENCOUNTER FOR LIPID SCREENING FOR CARDIOVASCULAR DISEASE: ICD-10-CM

## 2023-05-09 DIAGNOSIS — Z13.6 ENCOUNTER FOR LIPID SCREENING FOR CARDIOVASCULAR DISEASE: ICD-10-CM

## 2023-05-09 DIAGNOSIS — D72.89 OTHER SPECIFIED DISORDERS OF WHITE BLOOD CELLS: ICD-10-CM

## 2023-05-09 DIAGNOSIS — I26.99 PULMONARY EMBOLISM, OTHER, UNSPECIFIED CHRONICITY, UNSPECIFIED WHETHER ACUTE COR PULMONALE PRESENT (HCC): ICD-10-CM

## 2023-05-09 DIAGNOSIS — B20 HIV INFECTION, UNSPECIFIED SYMPTOM STATUS (HCC): ICD-10-CM

## 2023-05-09 DIAGNOSIS — I10 ESSENTIAL HYPERTENSION: ICD-10-CM

## 2023-05-09 DIAGNOSIS — Z72.89 OTHER PROBLEMS RELATED TO LIFESTYLE: ICD-10-CM

## 2023-05-09 DIAGNOSIS — N52.9 ERECTILE DYSFUNCTION, UNSPECIFIED ERECTILE DYSFUNCTION TYPE: ICD-10-CM

## 2023-05-09 RX ORDER — PAROXETINE HYDROCHLORIDE 20 MG/1
20 TABLET, FILM COATED ORAL DAILY
Qty: 30 TABLET | Refills: 5 | Status: SHIPPED | OUTPATIENT
Start: 2023-05-09

## 2023-05-09 RX ORDER — DOLUTEGRAVIR SODIUM 50 MG/1
50 TABLET, FILM COATED ORAL DAILY
Qty: 30 TABLET | Refills: 5 | Status: SHIPPED | OUTPATIENT
Start: 2023-05-09

## 2023-05-09 RX ORDER — METFORMIN HYDROCHLORIDE 500 MG/1
500 TABLET, EXTENDED RELEASE ORAL
Qty: 30 TABLET | Refills: 2 | Status: SHIPPED | OUTPATIENT
Start: 2023-05-09 | End: 2023-05-09 | Stop reason: HOSPADM

## 2023-05-09 RX ORDER — TENOFOVIR DISOPROXIL FUMARATE 300 MG/1
300 TABLET, FILM COATED ORAL DAILY
Qty: 30 TABLET | Refills: 5 | Status: SHIPPED | OUTPATIENT
Start: 2023-05-09

## 2023-05-09 RX ORDER — SILDENAFIL 50 MG/1
50 TABLET, FILM COATED ORAL DAILY PRN
Qty: 30 TABLET | Refills: 0 | Status: SHIPPED | OUTPATIENT
Start: 2023-05-09

## 2023-05-09 RX ORDER — DARUNAVIR ETHANOLATE AND COBICISTAT 800; 150 MG/1; MG/1
1 TABLET, FILM COATED ORAL DAILY
Qty: 30 TABLET | Refills: 3 | Status: SHIPPED | OUTPATIENT
Start: 2023-05-09

## 2023-05-09 RX ORDER — METFORMIN HYDROCHLORIDE EXTENDED-RELEASE TABLETS 1000 MG/1
1000 TABLET, FILM COATED, EXTENDED RELEASE ORAL
Qty: 30 TABLET | Refills: 5 | Status: SHIPPED | OUTPATIENT
Start: 2023-05-09

## 2023-05-09 RX ORDER — AMLODIPINE BESYLATE 2.5 MG/1
2.5 TABLET ORAL DAILY
Qty: 30 TABLET | Refills: 5 | Status: SHIPPED | OUTPATIENT
Start: 2023-05-09

## 2023-05-09 NOTE — ASSESSMENT & PLAN NOTE
Lab Results   Component Value Date    HGBA1C 7 3 (H) 11/21/2022     Lab Results   Component Value Date/Time    HGBA1C 7 3 (H) 11/21/2022 10:01 AM    HGBA1C 5 9 08/12/2015 07:06 AM        Increase metformin to 1000 mg daily  Educated to follow diabetic diet, maintain a healthy weight, and exercise regularly  Referred to dietician for additional education          Refer to opthalmology for yearly retinal exam

## 2023-05-09 NOTE — PROGRESS NOTES
Ct came into the office to complete recert with cm  Ct identifies as a heterosexual  Ct reports to have a complete understanding of HIV disease process, transmission, and medications  Ct reports to be able to meet his own basic needs and is also able to access community assistance as needed  Ct reports to occasionally need transportation assistance to stay in medical care  Ct reports to be able to access medical care with his medical insurance  Ct reports to have [employer] for insurance coverage  Ct reports to need occasional assistance with follow up and completing forms  Ct reports to have stable housing with no need for assistance  Ct reports to understand the risks alongside practicing risk reduction as far as communicating with sexual partners about safer sex(condom use, PrEP, testing  Ct denies any difficulties with substance use  Ct reports to be going to [facility] for dental care  Ct reports to have mouth sores lately  Ct however does have dental insurance  Ct reports to have been suffering depression lately along with PTSD  Ct reports to have been missing many medication doses within the last month due to lack of motivation  Ct reports to have a steady source of income  Ct denies any language or cultural barriers  Cm has completed acuity scale with ct  Ct has scored 14 which leaves ct at a Level 1  Cm has also completed [employer] Fee Scale application  Application has been sent to the financial counselors

## 2023-05-09 NOTE — PROGRESS NOTES
Assessment/Plan:   [employer] [facility] met with pt during PCP appointment  Pt completed PHQ-9 and scored 1, no signs of depression or MH issues  Pt stated he began smoking again and is attempting to quit on his own  Pt declined smoking cessation at this time  Pt was pleasant and receptive   [facility]     Today patient present with   Chief Complaint   Patient presents with   • Follow-up     Pt offers no c/o at time  Patient would likely benefit from annual PHQ-9 and smoking cessation  Consider/focus/continue monitoring of pt's emotional , cognitive and behavioral stability   Stage of change: Contemplation  Plan/ Behavioral Recommendations: ongoing [address] interventions as per pt's coordinated care and/or pt's request of services  Diagnoses and all orders for this visit:    HIV disease ([address] )  -     Albumin / creatinine urine ratio  -     Glucose, fasting  -     Hemoglobin A1C    Type 2 diabetes mellitus with unspecified complications (HCC)  -     Albumin / creatinine urine ratio  -     Glucose, fasting  -     Hemoglobin A1C  -     Discontinue: metFORMIN (GLUCOPHAGE-XR) 500 mg 24 hr tablet; Take 1 tablet (500 mg total) by mouth daily with dinner  -     metFORMIN (FORTAMET) 1000 MG (OSM) 24 hr tablet; Take 1 tablet (1,000 mg total) by mouth daily with dinner  -     Ambulatory referral to Ophthalmology; Future    Other long term (current) drug therapy  -     Hemoglobin A1C    Screening for diabetes mellitus  -     Hemoglobin A1C    Toxic effect of tobacco, intentional self-harm, subsequent encounter    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  -     Ambulatory referral to Cardiology; Future    Erectile dysfunction, unspecified erectile dysfunction type  -     sildenafil (VIAGRA) 50 MG tablet; Take 1 tablet (50 mg total) by mouth daily as needed for erectile dysfunction    Essential hypertension  -     amLODIPine (NORVASC) 2 5 mg tablet;  Take 1 tablet (2 5 mg total) by mouth daily    HIV infection, unspecified symptom status (HCC)  -     Darunavir-Cobicistat (Prezcobix) 800-150 MG TABS; Take 1 tablet by mouth daily  -     dolutegravir (Tivicay) 50 MG TABS; Take 1 tablet (50 mg total) by mouth daily    Pulmonary embolism, other, unspecified chronicity, unspecified whether acute cor pulmonale present (HCC)  -     apixaban (Eliquis) 2 5 mg; Take 1 tablet (2 5 mg total) by mouth 2 (two) times a day    PTSD (post-traumatic stress disorder)  -     PARoxetine (PAXIL) 20 mg tablet; Take 1 tablet (20 mg total) by mouth daily    HIV (human immunodeficiency virus infection) ([facility] )  -     tenofovir (VIREAD) 300 mg tablet; Take 1 tablet (300 mg total) by mouth daily    Other male erectile dysfunction          Subjective:     Patient ID: Sabra Garza is a 66 y o  male  HPI    History of Present Illness:      Patient is being seen for annual behavioral health assessment  Patient denies current behavioral health concerns      [unfilled]       Review of Systems      Objective:     Physical Exam      [facility]    Orientation     Person: yes    Place: yes    Time: yes    Appearance    Well Developed: yes healthy    Uncomfortable: no    Normal Body Odor: yes    Smells of Feces: no    Smells of Urine: no    Disheveled: no    Well Nourished: yes weight WNL of ideal    Grooming Unkempt: no    Poor Eye Contact: no    Hirsute: no    Looks Tired: no    Acutely Exhausted: noappearance reflects stated age    Mood and Affect:     Appropriate: yes    Euthymicyes    Irritable: no    Angry: no    Anxious: no    Depressed:no    Blunted:no    Labile: no    Restricted: no    Harm to Self or Others: pt denies SI/HI no signs or symptoms noted     Substance Abuse: pt denies history

## 2023-05-09 NOTE — PATIENT INSTRUCTIONS
Sildenafil (By mouth)   Sildenafil ([id number]-[id number])  Treats erectile dysfunction  Also treats pulmonary arterial hypertension (high blood pressure in the lungs)  Brand Name(s): Revatio Viagra   There may be other brand names for this medicine  When This Medicine Should Not Be Used: This medicine is not right for everyone  Do not use it if you had an allergic reaction to sildenafil  How to Use This Medicine:   Tablet, Liquid  Your doctor will tell you how much medicine to use  Do not use more than directed  For erectile dysfunction: Take this medicine about 1 hour before you have sex  Do not take it more than once a day  Always allow at least 24 hours between doses  For pulmonary arterial hypertension:   Take this medicine 3 times a day, 4 to 6 hours apart  If you miss a dose, take it as soon as you remember  If it is almost time for your next dose, wait until then and take a regular dose  Do not take extra medicine to make up for a missed dose  Oral liquid: Shake the bottle well for at least 10 seconds  Use the oral syringe provided in the package to measure each dose  Wash the syringe after each use  Read and follow the patient instructions that come with this medicine  Talk to your doctor or pharmacist if you have any questions  Store the medicine in a closed container at room temperature, away from heat, moisture, and direct light  Throw away any unused mixed oral liquid after 60 days  Drugs and Foods to Avoid:   Ask your doctor or pharmacist before using any other medicine, including over-the-counter medicines, vitamins, and herbal products  Do not use this medicine if you also use riociguat or a nitrate medicine  Do not take other medicines that contain sildenafil or similar medicines, such as tadalafil or vardenafil  Some medicines can affect how sildenafil works   Tell your doctor if you are using any of the following:   Amlodipine, atazanavir, bosentan, cimetidine, erythromycin, indinavir, itraconazole, ketoconazole, rifampin, ritonavir, saquinavir  Medicine for prostate problems or high blood pressure (including alfuzosin, doxazosin, prazosin, silodosin, tamsulosin, terazosin)  Warnings While Using This Medicine:   Tell your doctor if you are pregnant or breastfeeding, or if you have kidney disease, liver disease, pulmonary veno-occlusive disease, diabetes, bleeding problems, leukemia, multiple myeloma, sickle cell anemia, a stomach ulcer, or eye problems  Tell your doctor if you have angina or chest pain during sex, heart disease, heart rhythm problems, high or low blood pressure, or a history of heart attack or stroke  Also tell your doctor if you smoke  Tell any doctor who treats you that you take sildenafil  This medicine may cause the following problems:   Low blood pressure (especially if taken with other medicines that lower blood pressure)  Heart problems  Painful or prolonged erection  Vision or hearing problems  Keep all medicine out of the reach of children  Never share your medicine with anyone  Possible Side Effects While Using This Medicine:   Call your doctor right away if you notice any of these side effects: Allergic reaction: Itching or hives, swelling in your face or hands, swelling or tingling in your mouth or throat, chest tightness, trouble breathing  Chest pain, trouble breathing, sudden or severe headache  Fast, slow, pounding, or uneven heartbeat  Lightheadedness, fainting  Painful erection or an erection that lasts longer than 4 hours  Sudden loss of vision  Sudden decrease in hearing or hearing loss, ringing in the ears, dizziness  If you notice these less serious side effects, talk with your doctor:   Headache  Nosebleeds  Stuffy or runny nose  Upset stomach  Warmth or redness in your face, neck, arms, or upper chest  If you notice other side effects that you think are caused by this medicine, tell your doctor  Call your doctor for medical advice about side effects  You may report side effects to FDA at 6-030-FDA-7051  © Copyright Vanda Gonzalez 2022 Information is for End User's use only and may not be sold, redistributed or otherwise used for commercial purposes  The above information is an  only  It is not intended as medical advice for individual conditions or treatments  Talk to your doctor, nurse or pharmacist before following any medical regimen to see if it is safe and effective for you

## 2023-05-09 NOTE — ASSESSMENT & PLAN NOTE
CD4 T CELL ABSOLUTE   Date/Time Value Ref Range Status   10/26/2015 07:58  359 - 1,519 /uL Final     CD4 ABS   Date/Time Value Ref Range Status   2023 10:08  359 - 1519 /uL Final     HIV-1 RNA by PCR, Qn   Date/Time Value Ref Range Status   2023 10:08  copies/mL Final     Comment:     The reportable range for this assay is 20 to 10,000,000  copies HIV-1 RNA/mL  HIV-1 RNA Viral Load Log   Date/Time Value Ref Range Status   2023 10:08 AM 2 322 [id number]/mL Final         ART: Prezcobix,Tivicay, and tenofovir  Pt missed multiple doses of medication  Suggested setting a cell phone alarm and placing medication in a spot easily visible next to an activity he utilizes every day  Scheduled for ID follow up today  Denies side effects  Stressed the importance of adherence  Continue follow up with ID clinic  Reviewed most recent labs, including Cd4 and viral load  Discussed the risks and benefits of treatment options, instructions for management, importance of treatment adherence, and reduction of risk factor  Educated on possible  medication side effects  Counseled on routes of HIV transmission, including the risk of  infection  Emphasized that viral suppression is the best method to prevent HIV transmission  At this time pt denies the need for HIV testing of anyone in their life  Total encounter time was 45 minutes  Greater then 20 minutes were spent on counseling and patient education  Pt voices understanding and agreement with treatment plan

## 2023-05-09 NOTE — PROGRESS NOTES
"Progress Note - Infectious Disease   Birtha Floridalma 66 y o  male MRN: [id number]  Unit/Bed#:  Encounter: [id number]      Impression/Plan:  1   HIV-nonadherent with ART resulting in a bump in the viral load to 210 copies   CD4 count 448   Recheck labs in 4 weeks and follow up in 6 weeks   Stressed adherence      2   Nicotine dependence-restarted smoking  Stressed the importance of complete tobacco cessation      3  Hypertension-asymptomatic   Suboptimal control   Discussed this issue with the primary    4  Erectile dysfunction  Started on Viagra      5   Weight loss  Has occurred since management of the hypothyroidism  Will monitor for now      6  Diabetes mellitus-newly diagnosed  Hemoglobin A1c 7 3 increased dose of metformin and following up with dietitian      7  Hypothyroidism- in the setting of thyroid nodule  Nodule removed        8  Pulmonary embolism-on Eliquis    Patient was provided medication, adherence and prevention education    Subjective:  Routine follow-up for HIV  Patient was recently nonadherent with Tivicay, Prezcobix, tenofovir  Patient denies any notable side effects  Overall the feeling well  The patient denies any fever chills or sweats, denies any nausea vomiting or diarrhea, denies any cough or shortness of breath  ROS:  A complete review of systems is negative other than that noted above in the subjective    Followup portions patient history reviewed and updated as: Allergies, current medications, past medical history, past social history, past surgical history, and the problem list    Objective:  Vitals:  Vitals:    05/09/23 1554   BP: 146/83   Pulse: 92   Temp: 99 1 °F (37 3 °C)   SpO2: 96%   Weight: 93 5 kg (206 lb 1 6 oz)   Height: 6' 1\" (1 854 m)       Physical Exam:   General Appearance:  Alert, interactive, appearing well,  nontoxic, no acute distress  Neck:   Supple without lymphadenopathy, no thyromegaly or masses   Throat: Oropharynx moist without lesions    " Lungs:   Clear to auscultation bilaterally; no wheezes, rhonchi or rales; respirations unlabored   Heart:  RRR; no murmur, rub or gallop   Abdomen:   Soft, non-tender, non-distended, positive bowel sounds  Extremities: No clubbing, cyanosis or edema   Skin: No new rashes or lesions  No draining wounds noted  Labs, Imaging, & Other studies:   All pertinent labs and imaging studies were personally reviewed    Lab Results   Component Value Date     10/26/2015    K 3 8 04/19/2023     04/19/2023    CO2 25 04/19/2023    ANIONGAP 9 10/26/2015    BUN 14 04/19/2023    CREATININE 0 88 04/19/2023    GLUCOSE 133 10/26/2015    GLUF 168 (H) 04/19/2023    CALCIUM 9 8 04/19/2023    CORRECTEDCA 9 7 06/23/2022    AST 15 04/19/2023    ALT 14 04/19/2023    ALKPHOS 77 04/19/2023    PROT 8 2 10/26/2015    BILITOT 0 23 10/26/2015    EGFR 92 04/19/2023     Lab Results   Component Value Date    WBC 5 97 04/19/2023    WBC 6 0 04/19/2023    HGB 15 1 04/19/2023    HGB 14 9 04/19/2023    HCT 45 5 04/19/2023    HCT 45 4 04/19/2023    MCV 85 04/19/2023    MCV 84 04/19/2023     04/19/2023     04/19/2023     Lab Results   Component Value Date    HEPCAB Non-reactive 11/21/2022     Lab Results   Component Value Date    HEPCAB Non-reactive 11/21/2022     Lab Results   Component Value Date    RPR Non-Reactive 11/21/2022     CD4 ABS   Date/Time Value Ref Range Status   04/19/2023 10:08  359 - 1519 /uL Final     HIV-1 RNA by PCR, Qn   Date/Time Value Ref Range Status   04/19/2023 10:08  copies/mL Final     Comment:     The reportable range for this assay is 20 to 10,000,000  copies HIV-1 RNA/mL             Current Outpatient Medications:   •  amLODIPine (NORVASC) 2 5 mg tablet, Take 1 tablet (2 5 mg total) by mouth daily, Disp: 30 tablet, Rfl: 5  •  apixaban (Eliquis) 2 5 mg, Take 1 tablet (2 5 mg total) by mouth 2 (two) times a day, Disp: 60 tablet, Rfl: 2  •  Darunavir-Cobicistat (Prezcobix) 800-150 MG TABS, Take 1 tablet by mouth daily, Disp: 30 tablet, Rfl: 3  •  dolutegravir (Tivicay) 50 MG TABS, Take 1 tablet (50 mg total) by mouth daily, Disp: 30 tablet, Rfl: 5  •  PARoxetine (PAXIL) 20 mg tablet, Take 1 tablet (20 mg total) by mouth daily, Disp: 30 tablet, Rfl: 5  •  sildenafil (VIAGRA) 50 MG tablet, Take 1 tablet (50 mg total) by mouth daily as needed for erectile dysfunction, Disp: 30 tablet, Rfl: 0  •  tenofovir (VIREAD) 300 mg tablet, Take 1 tablet (300 mg total) by mouth daily, Disp: 30 tablet, Rfl: 5  •  metFORMIN (FORTAMET) 1000 MG (OSM) 24 hr tablet, Take 1 tablet (1,000 mg total) by mouth daily with dinner, Disp: 30 tablet, Rfl: 5

## 2023-05-09 NOTE — PROGRESS NOTES
Assessment/Plan:    HIV disease ([address] )  CD4 T CELL ABSOLUTE   Date/Time Value Ref Range Status   10/26/2015 07:58  359 - 1,519 /uL Final     CD4 ABS   Date/Time Value Ref Range Status   2023 10:08  359 - 1519 /uL Final     HIV-1 RNA by PCR, Qn   Date/Time Value Ref Range Status   2023 10:08  copies/mL Final     Comment:     The reportable range for this assay is 20 to 10,000,000  copies HIV-1 RNA/mL  HIV-1 RNA Viral Load Log   Date/Time Value Ref Range Status   2023 10:08 AM 2 322 [id number]/mL Final         ART: Prezcobix,Tivicay, and tenofovir  Pt missed multiple doses of medication  Suggested setting a cell phone alarm and placing medication in a spot easily visible next to an activity he utilizes every day  Scheduled for ID follow up today  Denies side effects  Stressed the importance of adherence  Continue follow up with ID clinic  Reviewed most recent labs, including Cd4 and viral load  Discussed the risks and benefits of treatment options, instructions for management, importance of treatment adherence, and reduction of risk factor  Educated on possible  medication side effects  Counseled on routes of HIV transmission, including the risk of  infection  Emphasized that viral suppression is the best method to prevent HIV transmission  At this time pt denies the need for HIV testing of anyone in their life  Total encounter time was 45 minutes  Greater then 20 minutes were spent on counseling and patient education  Pt voices understanding and agreement with treatment plan  Other male erectile dysfunction  Start Viagra  Educated in detail on potential side effects      Type 2 diabetes mellitus with unspecified complications (HCC)    Lab Results   Component Value Date    HGBA1C 7 3 (H) 2022     Lab Results   Component Value Date/Time    HGBA1C 7 3 (H) 2022 10:01 AM    HGBA1C 5 9 2015 07:06 AM        Increase metformin to 1000 mg daily  Educated to follow diabetic diet, maintain a healthy weight, and exercise regularly  Referred to dietician for additional education  Refer to opthalmology for yearly retinal exam                     Diagnoses and all orders for this visit:    [facility] ([facility] )  -     Albumin / creatinine urine ratio  -     Glucose, fasting  -     Hemoglobin A1C    Type 2 diabetes mellitus with unspecified complications (HCC)  -     Albumin / creatinine urine ratio  -     Glucose, fasting  -     Hemoglobin A1C  -     Discontinue: metFORMIN (GLUCOPHAGE-XR) 500 mg 24 hr tablet; Take 1 tablet (500 mg total) by mouth daily with dinner  -     metFORMIN (FORTAMET) 1000 MG (OSM) 24 hr tablet; Take 1 tablet (1,000 mg total) by mouth daily with dinner  -     Ambulatory referral to Ophthalmology; Future    Other long term (current) drug therapy  -     Hemoglobin A1C    Screening for diabetes mellitus  -     Hemoglobin A1C    Toxic effect of tobacco, intentional self-harm, subsequent encounter    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  -     Ambulatory referral to Cardiology; Future    Erectile dysfunction, unspecified erectile dysfunction type  -     sildenafil (VIAGRA) 50 MG tablet; Take 1 tablet (50 mg total) by mouth daily as needed for erectile dysfunction    Essential hypertension  -     amLODIPine (NORVASC) 2 5 mg tablet; Take 1 tablet (2 5 mg total) by mouth daily    HIV infection, unspecified symptom status (HCC)  -     Darunavir-Cobicistat (Prezcobix) 800-150 MG TABS; Take 1 tablet by mouth daily  -     dolutegravir (Tivicay) 50 MG TABS; Take 1 tablet (50 mg total) by mouth daily    Pulmonary embolism, other, unspecified chronicity, unspecified whether acute cor pulmonale present (HCC)  -     apixaban (Eliquis) 2 5 mg; Take 1 tablet (2 5 mg total) by mouth 2 (two) times a day    PTSD (post-traumatic stress disorder)  -     PARoxetine (PAXIL) 20 mg tablet;  Take 1 tablet (20 mg total) by "mouth daily    [facility] virus [facility]  -     tenofovir (VIREAD) 300 mg tablet; Take 1 tablet (300 mg total) by mouth daily    Other male erectile dysfunction          Subjective:      Patient ID: Claudia Hedrick is a 66 y o  male  Jerson Mera Mandie Hall is contacted today for primary care follow-up  Lisa Monteiro PMHx significant for HIV, HTN, hyperlipidemia, polycythemia, and anxiety  Jerson Cantuin is reporting erectile dysfunction  He is not able to achieve an erection spontaneous in the morning  He is able to achieve an erection with physical stimulation  He is not able to sustain erection  He denies urinary symptoms, including nocturia or urinary retention  He is requesting erectile dysfunction medication  Colonoscopy: 1/27/17, repeat in 5 years  AAA screen: 5/2/2019      The following portions of the patient's history were reviewed and updated as appropriate: allergies, current medications, past family history, past medical history, past social history, past surgical history and problem list     Review of Systems   Constitutional: Negative for chills and fever  HENT: Negative for ear pain and sore throat  Eyes: Negative for pain and visual disturbance  Respiratory: Negative for cough and shortness of breath  Cardiovascular: Negative for chest pain and palpitations  Gastrointestinal: Negative for abdominal pain and vomiting  Genitourinary: Negative for dysuria and hematuria  Musculoskeletal: Negative for arthralgias and back pain  Skin: Negative for color change and rash  Neurological: Negative for seizures and syncope  All other systems reviewed and are negative  Objective:      /79   Pulse 85   Ht 6' 1\" (1 854 m)   Wt 93 7 kg (206 lb 9 6 oz)   SpO2 97%   BMI 27 26 kg/m²          Physical Exam  Constitutional:       General: He is not in acute distress  Appearance: He is well-developed  HENT:      Head: Normocephalic        Right Ear: External ear " normal       Left Ear: External ear normal       Nose: Nose normal       Mouth/Throat:      Pharynx: No oropharyngeal exudate  Eyes:      General:         Right eye: No discharge  Left eye: No discharge  Conjunctiva/sclera: Conjunctivae normal       Pupils: Pupils are equal, round, and reactive to light  Neck:      Thyroid: No thyromegaly  Cardiovascular:      Rate and Rhythm: Normal rate and regular rhythm  Heart sounds: Normal heart sounds  No murmur heard  Pulmonary:      Effort: Pulmonary effort is normal       Breath sounds: Normal breath sounds  No wheezing  Abdominal:      General: Bowel sounds are normal       Palpations: Abdomen is soft  There is no mass  Tenderness: There is no abdominal tenderness  Musculoskeletal:         General: No tenderness  Normal range of motion  Cervical back: Normal range of motion  Lymphadenopathy:      Cervical: No cervical adenopathy  Skin:     General: Skin is warm and dry  Findings: No rash  Neurological:      Mental Status: He is alert and oriented to person, place, and time     Psychiatric:         Behavior: Behavior normal

## 2023-05-09 NOTE — PROGRESS NOTES
" Pastoral Care Progress Note    2023  Patient: Zev Garcia : 1960  Encounter Date & Time: 2023   MRN: [id number]        The  followed up with Mr Nick Christine for continued care and support  Mr Nick Christine shared he was doing better but had a lot going on  Mr Nick Christine reported, \"I have two new blessings  \"  He shared proudly, that he has first grandson on his way in July  He expressed feeling overjoyed and not knowing what to do with himself  He advised having a grandson has given him a since of purpose and taking care of himself has become a priority  Mr Nick Christine reported that a few months ago he wrestled with his mortality and feared death/dying  His uncertainitity about death and what he is suppose to be doing with his life is the reason he felt depressed and overwhelmed  \"I have been and done everything in life, what am I suppose to do now  \" This led to him not taking care of himself and not taking his medicines  In addition, he shared he was in a relationship with a women he has \"history\" with  Both were addicts and he has been there for in her the past   He reported he needed relationship support because since his diagnosis he had given up on meaningful relationships  He reported opening up to new family members (in-laws) and a relationship and taking care of his health has him overwhelmed and he does not know what to do  The  fostered story telling and evaluated spiritual resources  Mr Nick Christine is exploring his gerri for the first time in a while  Mr Nick Christine processed anxieties he had around new relationships  He shared his fear around his mortality and death  Mr Nick Christine reported feeling relieved he talked to the  and wants to call her later this week  Mr Nick Christine expressed moderate anxieties which visibly decreased after the Chaplaincy intervention  Mr Nick Christine could benefit from further Chaplaincy support and possibly assistance from [facility]    The  " will wait from Mr Yanely Rose call by end of week                 Chaplaincy Interventions Utilized:   Empowerment: Encouraged self-care    Exploration: Explored hope, Explored relational needs & resources, Explored spiritual needs & resources and Facilitated story telling    Relationship Building: Cultivated a relationship of care and support and Listened empathically    Ritual: Provided prayer    Chaplaincy Outcomes Achieved:  Distress reduced, Expressed humor, Expressed intermediate hope and Identified meaningful connections    Spiritual Coping Strategies Utilized:   Connectedness, Spiritual meaning and Spiritual struggle

## 2023-05-17 ENCOUNTER — TELEPHONE (OUTPATIENT)
Dept: SURGERY | Facility: CLINIC | Age: 63
End: 2023-05-17

## 2023-05-17 NOTE — TELEPHONE ENCOUNTER
Please call patient back and refer him to cardiology as they are the specialist   Discussed with Eileen Nazario and already completed

## 2023-05-19 ENCOUNTER — TELEPHONE (OUTPATIENT)
Dept: SURGERY | Facility: CLINIC | Age: 63
End: 2023-05-19

## 2023-05-19 NOTE — TELEPHONE ENCOUNTER
Flora Like, the coupon was sent via text  Tell him to check his phone  Also he does not need me to send it, He can access [facility] on his own  Please instruct him to do that

## 2023-05-19 NOTE — TELEPHONE ENCOUNTER
Pt lost his good rx coupon and would like to have another one sent for the script that wont get covered by the insurance

## 2023-05-31 ENCOUNTER — OFFICE VISIT (OUTPATIENT)
Dept: PODIATRY | Facility: CLINIC | Age: 63
End: 2023-05-31

## 2023-05-31 VITALS
SYSTOLIC BLOOD PRESSURE: 120 MMHG | OXYGEN SATURATION: 100 % | BODY MASS INDEX: 27.19 KG/M2 | HEIGHT: 73 IN | HEART RATE: 94 BPM | DIASTOLIC BLOOD PRESSURE: 89 MMHG

## 2023-05-31 DIAGNOSIS — B35.1 ONYCHOMYCOSIS: ICD-10-CM

## 2023-05-31 DIAGNOSIS — E11.8 TYPE 2 DIABETES MELLITUS WITH UNSPECIFIED COMPLICATIONS (HCC): Primary | ICD-10-CM

## 2023-05-31 NOTE — PROGRESS NOTES
Assessment/Plan:     The patient's clinical examination today significant for thickened and dystrophic pedal nail plates with discoloration and subungual debris consistent with onychomycosis x10  There are callus lesions beneath the first MTPJ's bilaterally  The interdigital spaces are clear without maceration  Pedal pulses are diminished bilaterally  There is decreased hair growth with dry scaling skin that is thinned and with decreased turgor  The pedal nail plates were sharply debrided with a sterile nail clipper without complication W90  The nails of them became reduced in thickness and girth utilizing a rotary bur  The callus lesions beneath the great toe joints were debrided bilaterally with a sterile #15 blade without complication  There are no other acute pedal issues  Recommend daily use of a skin cream or lotion to hydrate the dry areas of skin  Recommend follow-up in 3 months  There are no diagnoses linked to this encounter  Subjective:     Patient ID: Maru Varghese is a 66 y o  male  The patient presents today for follow-up at risk diabetic foot care  He notes thickening of his nail plates that is causing discomfort in his close toed shoe gear  He also notes areas of dry skin and callus to this forefoot bilaterally  He denies any other acute pedal issues  PAST MEDICAL HISTORY:  Past Medical History:   Diagnosis Date   • DDD (degenerative disc disease), lumbar    • Facet joint disease    • Human immunodeficiency virus (HIV) infection ([facility] )     resolved 11/5/15   • Hypertension     resolved 5/29/15   • Other specified disorders of white blood cells     resolved 5/12/16       PAST SURGICAL HISTORY:  Past Surgical History:   Procedure Laterality Date   • HERNIA REPAIR     • SHOULDER SURGERY Left    • US GUIDED THYROID BIOPSY  1/5/2023        ALLERGIES:  Patient has no known allergies      MEDICATIONS:  Current Outpatient Medications   Medication Sig Dispense Refill   • amLODIPine (NORVASC) 2 5 mg tablet Take 1 tablet (2 5 mg total) by mouth daily 30 tablet 5   • apixaban (Eliquis) 2 5 mg Take 1 tablet (2 5 mg total) by mouth 2 (two) times a day 60 tablet 2   • Darunavir-Cobicistat (Prezcobix) 800-150 MG TABS Take 1 tablet by mouth daily 30 tablet 3   • dolutegravir (Tivicay) 50 MG TABS Take 1 tablet (50 mg total) by mouth daily 30 tablet 5   • metFORMIN (FORTAMET) 1000 MG (OSM) 24 hr tablet Take 1 tablet (1,000 mg total) by mouth daily with dinner 30 tablet 5   • PARoxetine (PAXIL) 20 mg tablet Take 1 tablet (20 mg total) by mouth daily 30 tablet 5   • sildenafil (VIAGRA) 50 MG tablet Take 1 tablet (50 mg total) by mouth daily as needed for erectile dysfunction 30 tablet 0   • tenofovir (VIREAD) 300 mg tablet Take 1 tablet (300 mg total) by mouth daily 30 tablet 5     No current facility-administered medications for this visit         SOCIAL HISTORY:  Social History     Socioeconomic History   • Marital status: Single     Spouse name: None   • Number of children: None   • Years of education: None   • Highest education level: None   Occupational History   • None   Tobacco Use   • Smoking status: Former     Packs/day: 0 50     Years: 43 00     Total pack years: 21 50     Types: Cigarettes     Quit date: 6/15/2022     Years since quittin 9   • Smokeless tobacco: Never   • Tobacco comments:     10 cigs/day   Vaping Use   • Vaping Use: Never used   Substance and Sexual Activity   • Alcohol use: Not Currently   • Drug use: Never   • Sexual activity: Not Currently     Partners: Female   Other Topics Concern   • None   Social History Narrative   • None     Social Determinants of Health     Financial Resource Strain: Not on file   Food Insecurity: No Food Insecurity (2022)    Hunger Vital Sign    • Worried About Running Out of Food in the Last Year: Never true    • Ran Out of Food in the Last Year: Never true   Transportation Needs: No Transportation Needs (2022)    [address] - Transportation    • Lack of Transportation (Medical): No    • Lack of Transportation (Non-Medical): No   Physical Activity: Not on file   Stress: Not on file   Social Connections: Not on file   Intimate Partner Violence: Not on file   Housing Stability: Low Risk  (6/24/2022)    Housing Stability Vital Sign    • Unable to Pay for Housing in the Last Year: No    • Number of Places Lived in the Last Year: 1    • Unstable Housing in the Last Year: No        Review of Systems   Constitutional: Negative  HENT: Negative  Eyes: Negative  Respiratory: Negative  Cardiovascular: Negative  Endocrine: Negative  Musculoskeletal: Negative  Skin: Negative  Neurological: Negative  Hematological: Negative  Psychiatric/Behavioral: Negative  Objective:     Physical Exam  Vitals reviewed  Constitutional:       Appearance: Normal appearance  HENT:      Head: Normocephalic and atraumatic  Nose: Nose normal    Eyes:      Conjunctiva/sclera: Conjunctivae normal       Pupils: Pupils are equal, round, and reactive to light  Cardiovascular:      Pulses:           Dorsalis pedis pulses are 1+ on the right side and 1+ on the left side  Posterior tibial pulses are 1+ on the right side and 0 on the left side  Pulmonary:      Effort: Pulmonary effort is normal    Feet:      Right foot:      Skin integrity: Dry skin present  Toenail Condition: Right toenails are abnormally thick and long  Fungal disease present  Left foot:      Skin integrity: Dry skin present  Toenail Condition: Left toenails are abnormally thick and long  Fungal disease present  Comments: The patient's clinical examination today significant for thickened and dystrophic pedal nail plates with discoloration and subungual debris consistent with onychomycosis x10  There are callus lesions beneath the first MTPJ's bilaterally  The interdigital spaces are clear without maceration    Pedal pulses are diminished bilaterally  There is decreased hair growth with dry scaling skin that is thinned and with decreased turgor  Skin:     General: Skin is warm  Capillary Refill: Capillary refill takes 2 to 3 seconds  Neurological:      General: No focal deficit present  Mental Status: He is alert and oriented to person, place, and time  Psychiatric:         Mood and Affect: Mood normal          Behavior: Behavior normal          Thought Content:  Thought content normal

## 2023-06-13 ENCOUNTER — APPOINTMENT (OUTPATIENT)
Dept: LAB | Facility: CLINIC | Age: 63
End: 2023-06-13
Payer: MEDICARE

## 2023-06-13 LAB
ALBUMIN SERPL BCP-MCNC: 4.3 G/DL (ref 3.5–5)
ALP SERPL-CCNC: 86 U/L (ref 34–104)
ALT SERPL W P-5'-P-CCNC: 17 U/L (ref 7–52)
ANION GAP SERPL CALCULATED.3IONS-SCNC: 9 MMOL/L (ref 4–13)
AST SERPL W P-5'-P-CCNC: 17 U/L (ref 13–39)
BACTERIA UR QL AUTO: ABNORMAL /HPF
BASOPHILS # BLD AUTO: 0.06 THOUSANDS/ÂΜL (ref 0–0.1)
BASOPHILS NFR BLD AUTO: 1 % (ref 0–1)
BILIRUB SERPL-MCNC: 0.63 MG/DL (ref 0.2–1)
BILIRUB UR QL STRIP: NEGATIVE
BUN SERPL-MCNC: 14 MG/DL (ref 5–25)
CALCIUM SERPL-MCNC: 9.9 MG/DL (ref 8.4–10.2)
CHLORIDE SERPL-SCNC: 98 MMOL/L (ref 96–108)
CHOLEST SERPL-MCNC: 147 MG/DL
CLARITY UR: CLEAR
CO2 SERPL-SCNC: 26 MMOL/L (ref 21–32)
COLOR UR: YELLOW
CREAT SERPL-MCNC: 1.18 MG/DL (ref 0.6–1.3)
EOSINOPHIL # BLD AUTO: 0.18 THOUSAND/ÂΜL (ref 0–0.61)
EOSINOPHIL NFR BLD AUTO: 3 % (ref 0–6)
ERYTHROCYTE [DISTWIDTH] IN BLOOD BY AUTOMATED COUNT: 13.9 % (ref 11.6–15.1)
EST. AVERAGE GLUCOSE BLD GHB EST-MCNC: 146 MG/DL
GFR SERPL CREATININE-BSD FRML MDRD: 66 ML/MIN/1.73SQ M
GLUCOSE P FAST SERPL-MCNC: 148 MG/DL (ref 65–99)
GLUCOSE UR STRIP-MCNC: ABNORMAL MG/DL
HBA1C MFR BLD: 6.7 %
HCT VFR BLD AUTO: 46.1 % (ref 36.5–49.3)
HCV AB SER QL: NORMAL
HDLC SERPL-MCNC: 29 MG/DL
HGB BLD-MCNC: 15.7 G/DL (ref 12–17)
HGB UR QL STRIP.AUTO: NEGATIVE
IMM GRANULOCYTES # BLD AUTO: 0.01 THOUSAND/UL (ref 0–0.2)
IMM GRANULOCYTES NFR BLD AUTO: 0 % (ref 0–2)
KETONES UR STRIP-MCNC: NEGATIVE MG/DL
LDLC SERPL CALC-MCNC: 76 MG/DL (ref 0–100)
LEUKOCYTE ESTERASE UR QL STRIP: NEGATIVE
LYMPHOCYTES # BLD AUTO: 1.67 THOUSANDS/ÂΜL (ref 0.6–4.47)
LYMPHOCYTES NFR BLD AUTO: 31 % (ref 14–44)
MCH RBC QN AUTO: 28.5 PG (ref 26.8–34.3)
MCHC RBC AUTO-ENTMCNC: 34.1 G/DL (ref 31.4–37.4)
MCV RBC AUTO: 84 FL (ref 82–98)
MONOCYTES # BLD AUTO: 0.55 THOUSAND/ÂΜL (ref 0.17–1.22)
MONOCYTES NFR BLD AUTO: 10 % (ref 4–12)
MUCOUS THREADS UR QL AUTO: ABNORMAL
NEUTROPHILS # BLD AUTO: 3 THOUSANDS/ÂΜL (ref 1.85–7.62)
NEUTS SEG NFR BLD AUTO: 55 % (ref 43–75)
NITRITE UR QL STRIP: NEGATIVE
NON-SQ EPI CELLS URNS QL MICRO: ABNORMAL /HPF
NRBC BLD AUTO-RTO: 0 /100 WBCS
PH UR STRIP.AUTO: 5.5 [PH]
PLATELET # BLD AUTO: 271 THOUSANDS/UL (ref 149–390)
PMV BLD AUTO: 10 FL (ref 8.9–12.7)
POTASSIUM SERPL-SCNC: 4 MMOL/L (ref 3.5–5.3)
PROT SERPL-MCNC: 9.5 G/DL (ref 6.4–8.4)
PROT UR STRIP-MCNC: ABNORMAL MG/DL
RBC # BLD AUTO: 5.51 MILLION/UL (ref 3.88–5.62)
RBC #/AREA URNS AUTO: ABNORMAL /HPF
SODIUM SERPL-SCNC: 133 MMOL/L (ref 135–147)
SP GR UR STRIP.AUTO: 1.02 (ref 1–1.03)
TREPONEMA PALLIDUM IGG+IGM AB [PRESENCE] IN SERUM OR PLASMA BY IMMUNOASSAY: NORMAL
TRIGL SERPL-MCNC: 212 MG/DL
UROBILINOGEN UR STRIP-ACNC: <2 MG/DL
WBC # BLD AUTO: 5.47 THOUSAND/UL (ref 4.31–10.16)
WBC #/AREA URNS AUTO: ABNORMAL /HPF

## 2023-06-14 LAB
BASOPHILS # BLD AUTO: 0.1 X10E3/UL (ref 0–0.2)
BASOPHILS NFR BLD AUTO: 1 %
C TRACH DNA SPEC QL NAA+PROBE: NEGATIVE
CD3+CD4+ CELLS # BLD: 551 /UL (ref 359–1519)
CD3+CD4+ CELLS NFR BLD: 30.6 % (ref 30.8–58.5)
CD3+CD4+ CELLS/CD3+CD8+ CLL BLD: 0.6 % (ref 0.92–3.72)
CD3+CD8+ CELLS # BLD: 922 /UL (ref 109–897)
CD3+CD8+ CELLS NFR BLD: 51.2 % (ref 12–35.5)
EOSINOPHIL # BLD AUTO: 0.2 X10E3/UL (ref 0–0.4)
EOSINOPHIL NFR BLD AUTO: 3 %
ERYTHROCYTE [DISTWIDTH] IN BLOOD BY AUTOMATED COUNT: 13.9 % (ref 11.6–15.4)
GAMMA INTERFERON BACKGROUND BLD IA-ACNC: 0.01 IU/ML
HCT VFR BLD AUTO: 40.8 % (ref 37.5–51)
HGB BLD-MCNC: 13.7 G/DL (ref 13–17.7)
IMM GRANULOCYTES # BLD: 0 X10E3/UL (ref 0–0.1)
IMM GRANULOCYTES NFR BLD: 0 %
LYMPHOCYTES # BLD AUTO: 1.8 X10E3/UL (ref 0.7–3.1)
LYMPHOCYTES NFR BLD AUTO: 30 %
M TB IFN-G BLD-IMP: NEGATIVE
M TB IFN-G CD4+ BCKGRND COR BLD-ACNC: 0.01 IU/ML
M TB IFN-G CD4+ BCKGRND COR BLD-ACNC: 0.23 IU/ML
MCH RBC QN AUTO: 28.4 PG (ref 26.6–33)
MCHC RBC AUTO-ENTMCNC: 33.6 G/DL (ref 31.5–35.7)
MCV RBC AUTO: 85 FL (ref 79–97)
MITOGEN IGNF BCKGRD COR BLD-ACNC: >10 IU/ML
MONOCYTES # BLD AUTO: 0.6 X10E3/UL (ref 0.1–0.9)
MONOCYTES NFR BLD AUTO: 10 %
N GONORRHOEA DNA SPEC QL NAA+PROBE: NEGATIVE
NEUTROPHILS # BLD AUTO: 3.3 X10E3/UL (ref 1.4–7)
NEUTROPHILS NFR BLD AUTO: 56 %
PLATELET # BLD AUTO: 322 X10E3/UL (ref 150–450)
RBC # BLD AUTO: 4.82 X10E6/UL (ref 4.14–5.8)
WBC # BLD AUTO: 5.9 X10E3/UL (ref 3.4–10.8)

## 2023-06-15 LAB
HIV1 RNA # SERPL NAA+PROBE: <20 COPIES/ML
HIV1 RNA SERPL NAA+PROBE-LOG#: NORMAL LOG10COPY/ML

## 2023-06-27 ENCOUNTER — OFFICE VISIT (OUTPATIENT)
Dept: SURGERY | Facility: CLINIC | Age: 63
End: 2023-06-27
Payer: MEDICARE

## 2023-06-27 ENCOUNTER — DOCUMENTATION (OUTPATIENT)
Dept: SURGERY | Facility: CLINIC | Age: 63
End: 2023-06-27

## 2023-06-27 VITALS
TEMPERATURE: 99 F | WEIGHT: 196.8 LBS | BODY MASS INDEX: 26.08 KG/M2 | SYSTOLIC BLOOD PRESSURE: 127 MMHG | HEART RATE: 100 BPM | OXYGEN SATURATION: 97 % | HEIGHT: 73 IN | DIASTOLIC BLOOD PRESSURE: 74 MMHG

## 2023-06-27 DIAGNOSIS — Z86.711 HISTORY OF PULMONARY EMBOLUS (PE): ICD-10-CM

## 2023-06-27 DIAGNOSIS — N52.8 OTHER MALE ERECTILE DYSFUNCTION: ICD-10-CM

## 2023-06-27 DIAGNOSIS — E11.8 TYPE 2 DIABETES MELLITUS WITH UNSPECIFIED COMPLICATIONS (HCC): ICD-10-CM

## 2023-06-27 DIAGNOSIS — R63.4 WEIGHT LOSS: ICD-10-CM

## 2023-06-27 DIAGNOSIS — R79.89 ELEVATED SERUM CREATININE: ICD-10-CM

## 2023-06-27 DIAGNOSIS — T65.291D TOXIC EFFECT OF TOBACCO, ACCIDENTAL OR UNINTENTIONAL, SUBSEQUENT ENCOUNTER: ICD-10-CM

## 2023-06-27 DIAGNOSIS — B20 HIV DISEASE (HCC): Primary | ICD-10-CM

## 2023-06-27 DIAGNOSIS — I10 ESSENTIAL HYPERTENSION: ICD-10-CM

## 2023-06-27 PROCEDURE — 99215 OFFICE O/P EST HI 40 MIN: CPT | Performed by: INTERNAL MEDICINE

## 2023-06-27 NOTE — PROGRESS NOTES
"Progress Note - Infectious Disease   Cassi Salter 66 y o  male MRN: [id number]  Unit/Bed#:  Encounter: [id number]      Impression/Plan:  1   HIV-doing well with improved adherence with a now undetectable viral load and CD4 count greater than 500  Continue ART, recheck labs in 2 months, and follow-up in 3 months  Stressed adherence     2   Nicotine dependence-restarted smoking  Stressed the importance of complete tobacco cessation      3  Hypertension-asymptomatic   S improved control      4  Erectile dysfunction  On Viagra     5   Weight loss  He is back to a stable weight      6  Diabetes mellitus-newly diagnosed   Decrease hemoglobin A1c down to 6 7 with increased dose of metformin     7  Hypothyroidism- in the setting of thyroid nodule    Nodule removed        8  Pulmonary embolism-on Eliquis    9  Elevated serum creatinine  Mild bump in the creatinine but the GFR is still above 60  We will need to monitor closely because of the GFR with decreased below 60 will need to discontinue the tenofovir  Monitor closely for ongoing renal toxicity      Patient was provided medication, adherence and prevention education    Subjective:  Routine follow-up for HIV  Patient claims 100% adherence with Tivicay and Prezcobix and tenofovir  Patient denies any notable side effects  Overall the feeling well  The patient denies any fever chills or sweats, denies any nausea vomiting or diarrhea, denies any cough or shortness of breath  ROS:  A complete review of systems is negative other than that noted above in the subjective    Followup portions patient history reviewed and updated as:   Allergies, current medications, past medical history, past social history, past surgical history, and the problem list    Objective:  Vitals:  Vitals:    06/27/23 1607   BP: 127/74   Pulse: 100   Temp: 99 °F (37 2 °C)   SpO2: 97%   Weight: 89 3 kg (196 lb 12 8 oz)   Height: 6' 1\" (1 854 m)       Physical Exam:   General Appearance:  " "Alert, interactive, appearing well,  nontoxic, no acute distress  Neck:   Supple without lymphadenopathy, no thyromegaly or masses   Throat: Oropharynx moist without lesions  Lungs:   Clear to auscultation bilaterally; no wheezes, rhonchi or rales; respirations unlabored   Heart:  RRR; no murmur, rub or gallop   Abdomen:   Soft, non-tender, non-distended, positive bowel sounds  Extremities: No clubbing, cyanosis or edema   Skin: No new rashes or lesions  No draining wounds noted  Labs, Imaging, & Other studies:   All pertinent labs and imaging studies were personally reviewed    Lab Results   Component Value Date     10/26/2015    K 4 0 06/13/2023    CL 98 06/13/2023    CO2 26 06/13/2023    ANIONGAP 9 10/26/2015    BUN 14 06/13/2023    CREATININE 1 18 06/13/2023    GLUCOSE 133 10/26/2015    GLUF 148 (H) 06/13/2023    CALCIUM 9 9 06/13/2023    CORRECTEDCA 9 7 06/23/2022    AST 17 06/13/2023    ALT 17 06/13/2023    ALKPHOS 86 06/13/2023    PROT 8 2 10/26/2015    BILITOT 0 23 10/26/2015    EGFR 66 06/13/2023     Lab Results   Component Value Date    WBC 5 47 06/13/2023    WBC 5 9 06/13/2023    HGB 15 7 06/13/2023    HGB 13 7 06/13/2023    HCT 46 1 06/13/2023    HCT 40 8 06/13/2023    MCV 84 06/13/2023    MCV 85 06/13/2023     06/13/2023     06/13/2023     Lab Results   Component Value Date    HEPCAB Non-reactive 06/13/2023     Lab Results   Component Value Date    HEPCAB Non-reactive 06/13/2023     Lab Results   Component Value Date    RPR Non-Reactive 11/21/2022     CD4 ABS   Date/Time Value Ref Range Status   06/13/2023 09:22  359 - 1519 /uL Final     HIV-1 RNA by PCR, Qn   Date/Time Value Ref Range Status   06/13/2023 09:22 AM <20 copies/mL Final     Comment:     HIV-1 RNA not detected  The reportable range for this assay is 20 to 10,000,000  copies HIV-1 RNA/mL       No results found for: \"MOQ0FUGLST\"        Current Outpatient Medications:   •  amLODIPine (NORVASC) 2 5 mg " tablet, Take 1 tablet (2 5 mg total) by mouth daily, Disp: 30 tablet, Rfl: 5  •  apixaban (Eliquis) 2 5 mg, Take 1 tablet (2 5 mg total) by mouth 2 (two) times a day, Disp: 60 tablet, Rfl: 2  •  Darunavir-Cobicistat (Prezcobix) 800-150 MG TABS, Take 1 tablet by mouth daily, Disp: 30 tablet, Rfl: 3  •  dolutegravir (Tivicay) 50 MG TABS, Take 1 tablet (50 mg total) by mouth daily, Disp: 30 tablet, Rfl: 5  •  metFORMIN (FORTAMET) 1000 MG (OSM) 24 hr tablet, Take 1 tablet (1,000 mg total) by mouth daily with dinner, Disp: 30 tablet, Rfl: 5  •  PARoxetine (PAXIL) 20 mg tablet, Take 1 tablet (20 mg total) by mouth daily, Disp: 30 tablet, Rfl: 5  •  sildenafil (VIAGRA) 50 MG tablet, Take 1 tablet (50 mg total) by mouth daily as needed for erectile dysfunction, Disp: 30 tablet, Rfl: 0  •  tenofovir (VIREAD) 300 mg tablet, Take 1 tablet (300 mg total) by mouth daily, Disp: 30 tablet, Rfl: 5

## 2023-06-28 NOTE — PROGRESS NOTES
Pastoral Care Progress Note    2023  Patient: Jorge Leigh : 1960  Encounter Date & Time: 2023   MRN: [id number]        The patient requested to talk to the [address] [place] [address] Road  Mr Tiffanie Davidson appeared joyful and was smiling as  greeted him  He immediately shared the impending date of his first grandson in July  He expressed his hope and expectation of meeting him  Mr Tiffanie Davidson reported feeling well and was grateful for the status of his relationship with his children and grandchildren  The visit was interrupted due to ID visit  The  listened empathically and encouraged story telling  Mr Tiffanie Davidson verbally expressed his wood and expectations for the future with his grandchildren  Mr Tiffanie Davidson described a connection with his family, especially his adult children that appeared to create excitement within him  Per the conversation with the patient, the  will call Mr Tiffanie Davidson on Wednesday for follow-up

## 2023-06-28 NOTE — PROGRESS NOTES
"Assessment    Jacinto Morgan seen by RD in conjunction with ID f/u  Pt  is established patient (last annual was 05/10/2022)    PMHx:  HIV, DMT2, hernia, HTN, anxiety      Clinical Data/Client History    CD4 count:  551  Viral load:  <20  ART:   Tivicay    , Patient Navigator: [id number] assistance: N/A    Living situation: House or apartment     Psychosocial factors: anxiety     Mobility:  uses cane/walker    Physical activity: walks and has a lot of steps in his house       Oral health concerns: denied oral health concerns       Typical food/beverage intake:    · Breakfast Skips   · Lunch sometimes eats out   · [employer] & vegetables  · Snacks cereal   · Beverages OJ, water, milk     Appetite: Good    OTC vitamin, mineral, herbal supplements: N/A    Oral/enteral nutrition supplements:  N/A    GI problems: diarrhea     Food allergies/intolerances:  Lactose     Weight history: Wt Readings from Last 3 Encounters:   06/27/23 89 3 kg (196 lb 12 8 oz)   05/09/23 93 5 kg (206 lb 1 6 oz)   05/09/23 93 7 kg (206 lb 9 6 oz)       Current body weight:  196  Height:  73\"  BMI:  25  IBW:  184  %IBW  106%    Weight change: -10#(4 8%) x 30 days    Weight loss desirable  Time period of weight change: 1 month      Nutrition-related labs:    Lab Results   Component Value Date    HGBA1C 6 7 (H) 06/13/2023     Last A1C 7 3  Trending down     Lab Results   Component Value Date    SODIUM 133 (L) 06/13/2023    K 4 0 06/13/2023    CL 98 06/13/2023    CO2 26 06/13/2023    BUN 14 06/13/2023    CREATININE 1 18 06/13/2023    GLUC 167 (H) 10/21/2022    CALCIUM 9 9 06/13/2023     Lab Results   Component Value Date    CHOLESTEROL 147 06/13/2023    CHOLESTEROL 161 11/21/2022    CHOLESTEROL 155 04/22/2022     Lab Results   Component Value Date    HDL 29 (L) 06/13/2023    HDL 31 (L) 11/21/2022    HDL 34 (L) 04/22/2022     Lab Results   Component Value Date    TRIG 212 (H) 06/13/2023    TRIG 227 (H) 11/21/2022    TRIG 154 (H) " "04/22/2022     No results found for: \"NONHDLC\"      Nutrition-related medications:   Norvasc, prezcobix, eliquis, metformin, paxil, viread     Physical findings/skin integrity:  Skin intact       Nutrition Diagnosis    Problem: altered nutrition-related lab values (A1C & elevated Lipids)    Related to: energy intake > energy output over time     As Evidenced By: patient interview       Estimated Nutritional Needs    Jade Tavarez REE: CBW 89kg    · ~2087 kcal (based on: 1 2 stress factor)  · ~89g/day protein (based on: 1g/kg)  · ~2230 fluid (based on: 25ml/kg/day)      Current intake estimation: exceeds needs       Nutrition Intervention/Recommendations    Nutrition education intervention: provided    Nutrition recommendations: Myplate for meals, increase produce     Teaching Method: verbal  printed material (Myplate & label reading )    Person Educated: Person     Comprehension: excellent    Receptivity: excellent    Expected compliance: good    Collaboration/referral of nutrition care:   [employer] programs       Goals:  Stable nutrition related labs  Continue to decrease A1C level  Follow Myplate & reduce high fat foods     Monitoring/Evaluation:  Will continue to monitor labs, weight trend, appetite and need for nutrition education  Visit Summary  RD reviewed labs & made recommendations  Pt reports weight loss r/t decreasing sugar intake  Current weight with in normal trend  RD invited pt to clinic events & provided verbal & written education to help improve labs     Elizabeth Reyes RDN,LDN       "

## 2023-06-30 ENCOUNTER — HOSPITAL ENCOUNTER (OUTPATIENT)
Dept: ULTRASOUND IMAGING | Facility: HOSPITAL | Age: 63
Discharge: HOME | End: 2023-06-30
Attending: INTERNAL MEDICINE
Payer: MEDICARE

## 2023-06-30 DIAGNOSIS — E04.1 RIGHT THYROID NODULE: ICD-10-CM

## 2023-06-30 PROCEDURE — 76536 US EXAM OF HEAD AND NECK: CPT

## 2023-07-03 ENCOUNTER — DOCUMENTATION (OUTPATIENT)
Dept: SURGERY | Facility: CLINIC | Age: 63
End: 2023-07-03

## 2023-07-03 ENCOUNTER — PATIENT OUTREACH (OUTPATIENT)
Dept: SURGERY | Facility: CLINIC | Age: 63
End: 2023-07-03

## 2023-07-03 NOTE — PROGRESS NOTES
Pastoral Care Progress Note    7/3/2023  Patient: María Michaud : 1960  Encounter Date & Time: 7/3/2023   MRN: [id number]        The  returned Mr. Isa Melendez call for continued care and support. Mr. Isa Melendez shared things were not the best.  He expressed feeling overwhelmed and wanted to meet and talk. "I can't talk about it right now."  The  encouraged Mr. Isa Melendez to schedule a time to come in and talk. He will get back to the . Mr. Isa Melendez anxiety appeared to be much higher than when the  spoke with him a week ago. The  will follow-up with Mr. Isa Melendez on Wednesday.

## 2023-07-05 ENCOUNTER — DOCUMENTATION (OUTPATIENT)
Dept: SURGERY | Facility: CLINIC | Age: 63
End: 2023-07-05

## 2023-07-05 NOTE — PROGRESS NOTES
[facility] Care Progress Note    2023  Patient: Miller Cummins : 1960  Encounter Date & Time: 2023   MRN: [id number]        The  followed up with patient with a call after patient expressed feeling emotionally distressed and overwhelmed at the end of Monday's call. Mr. Katya Lazo shared he is feeling the same as Monday, perhaps a little better. He advised he is dealing with the his relationship conflict by being quite, not saying anything or not arguing. He shared, "I just leave and drive around to calm down."  He advised he finds time for himself by browsing the Internet. The patient appeared to struggle to express what it is like not being able to communicate or voice how he feels at home. He advised that sometimes he holds in his feelings until he explodes with anger. Mr. Katya Lazo verbally and openly processed his frustrations and inability to work through his formal [employer] service training in order to communicate openly with his partner. In addition to his relationship concerns, Mr. Katya Lazo expressed anxiety about his upcoming cardiology consult and mentioned he needed to update his advanced directive. The  reminded Mr. Katya Lazo that the upcoming appointment is a "consult" and encouraged him to take one day at a time. Mr. Katya Lazo thanked the  for her prayers and expressed gratitude that he is able to talk to her. The  will follow-up with him regarding his living will when he comes in for his PCP visit on . Mr. Katya Lazo expressed moderately elevated anxiety which appeared to decrease after chaplaincy intervention. Mr. Katya Lazo could benefit from further follow-up with .  and Mr. Katya Lazo agreed on a plan for him to attend an AA/NA meeting for additional support. The  reinforced good coping skills mentioned by the patient to step away when feeling overwhelmed and to engage in self-care practices for himself.  The  will follow-up with patient in a week to check in on progress.               Chaplaincy Interventions Utilized:   Empowerment: Encouraged assertiveness, Encouraged focus on present, Encouraged self-care and Provided anxiety containment    Exploration: Explored emotional needs & resources, Explored relational needs & resources, Explored spiritual needs & resources and Identified, evaluated & reinforced appropriate coping strategies    Collaboration: Consulted with interdisciplinary team    Relationship Building: Cultivated a relationship of care and support, Listened empathically and Provided relationship counseling    Ritual: Provided prayer      Chaplaincy Outcomes Achieved:  Developed chaplaincy care plan, Distress reduced and Expressed gratitude    Spiritual Coping Strategies Utilized:   Connectedness, Spiritual empowerment, Spiritual gratitude, Spiritual meaning, Spiritual community and Spiritual struggle

## 2023-07-06 ENCOUNTER — OFFICE VISIT (OUTPATIENT)
Dept: CARDIOLOGY CLINIC | Facility: CLINIC | Age: 63
End: 2023-07-06
Payer: MEDICARE

## 2023-07-06 VITALS
BODY MASS INDEX: 26.11 KG/M2 | HEART RATE: 102 BPM | HEIGHT: 73 IN | SYSTOLIC BLOOD PRESSURE: 120 MMHG | DIASTOLIC BLOOD PRESSURE: 64 MMHG | WEIGHT: 197 LBS

## 2023-07-06 DIAGNOSIS — I26.99 PULMONARY EMBOLISM (HCC): ICD-10-CM

## 2023-07-06 PROCEDURE — 93000 ELECTROCARDIOGRAM COMPLETE: CPT | Performed by: INTERNAL MEDICINE

## 2023-07-06 PROCEDURE — 99204 OFFICE O/P NEW MOD 45 MIN: CPT | Performed by: INTERNAL MEDICINE

## 2023-07-06 NOTE — PROGRESS NOTES
Cardiology     Clinic Visit Note  Manolo Bermudez 66 y.o. male   MRN: [id number]    Assessment and Plan      Diagnoses and all orders for this visit:    1. Pulmonary embolism ([address]) -the patient presents today for concerns for elevated RV to LV ratio that was noted during his CTA PE study done last year. Subsequent PE study did not show this done November last year. Additionally, he had an echocardiogram performed at that time which did not demonstrate any abnormal RV systolic function, or RV dilation. This is likely the natural progression of pulmonary embolism. He has no cardiovascular complaints today. Specifically no chest pain, shortness breath, dizziness, no peripheral edema. He does have Q waves in his inferior leads which is longstanding does not desire any work-up of this. He reports that he is attempting to quit smoking. He otherwise has no cardiovascular complaints. He should continue to follow-up with his hematologist, and primary care physician. -     Ambulatory referral to Cardiology  -     POCT ECG            Chief Complaint: No cardiovascular complaints. Subjective     History of Present Illness:  this is a 51-year-old male with a past medical history significant for type 2 diabetes, hypertension, osteoarthritis, HIV, previous history of pulmonary embolism. Who presents today for concern for an elevated RV to LV ratio noted June 2022. The patient had a pulmonary embolism with elevated RV to LV ratio. At that time the patient was started on Eliquis at a reduced dose due to his HIV disease interaction between medications per internal medicine and pulmonology. Subsequently the patient had an echocardiogram performed at the same time which did not demonstrate the same dilated RV. There was normal size normal TAPSE and normal systolic function.   His most recent EKG prior to this office visit was notable for sinus tachycardia with a possible inferior infarct -however this was in the setting of his acute pulmonary embolism - additionally he had a normal echocardiogram at that time and reports he wants no invasive workup. Subsequently the patient was seen hematology oncology service he had a repeat CTA chest performed 11/2022 that was notable for no evidence of acute pulmonary embolism. He also had a multinodular goiter at that time. Today the patient reports he feels well. He denies any chest pain, shortness of breath, dizziness, palpitations, or any other associated signs or symptoms. An EKG was performed in office which does show Q waves in inferior leads, however he reports he would not like any work-up at this time. He otherwise reports he is asymptomatic. He has no evidence of heart failure. No orthopnea. He otherwise reports that he is taking his medications as he was prescribed. His most recent cholesterol level was reviewed with a LDL in the desired range. Previous Cardiology Workup:  TREADMILL STRESS  No results found for this or any previous visit.     ----------------------------------------------------------------------------------------------  NUCLEAR STRESS TEST: No results found for this or any previous visit. No results found for this or any previous visit.      --------------------------------------------------------------------------------  CATH:  No results found for this or any previous visit.    --------------------------------------------------------------------------------  ECHO:   No results found for this or any previous visit.     No results found for this or any previous visit.    --------------------------------------------------------------------------------  HOLTER  No results found for this or any previous visit.    --------------------------------------------------------------------------------  CAROTIDS  No results found for this or any previous visit.       ------------------------------------------------------------------  Review of Systems Constitutional: Negative for fever. Respiratory: Negative for chest tightness and shortness of breath. Cardiovascular: Negative for chest pain, palpitations and leg swelling. Gastrointestinal: Negative for abdominal distention. Neurological: Negative for dizziness and numbness. Psychiatric/Behavioral: Negative for agitation.          Current Outpatient Medications:   •  amLODIPine (NORVASC) 2.5 mg tablet, Take 1 tablet (2.5 mg total) by mouth daily, Disp: 30 tablet, Rfl: 5  •  apixaban (Eliquis) 2.5 mg, Take 1 tablet (2.5 mg total) by mouth 2 (two) times a day, Disp: 60 tablet, Rfl: 2  •  Darunavir-Cobicistat (Prezcobix) 800-150 MG TABS, Take 1 tablet by mouth daily, Disp: 30 tablet, Rfl: 3  •  dolutegravir (Tivicay) 50 MG TABS, Take 1 tablet (50 mg total) by mouth daily, Disp: 30 tablet, Rfl: 5  •  metFORMIN (FORTAMET) 1000 MG (OSM) 24 hr tablet, Take 1 tablet (1,000 mg total) by mouth daily with dinner, Disp: 30 tablet, Rfl: 5  •  PARoxetine (PAXIL) 20 mg tablet, Take 1 tablet (20 mg total) by mouth daily, Disp: 30 tablet, Rfl: 5  •  sildenafil (VIAGRA) 50 MG tablet, Take 1 tablet (50 mg total) by mouth daily as needed for erectile dysfunction, Disp: 30 tablet, Rfl: 0  •  tenofovir (VIREAD) 300 mg tablet, Take 1 tablet (300 mg total) by mouth daily, Disp: 30 tablet, Rfl: 5  Past Medical History:   Diagnosis Date   • DDD (degenerative disc disease), lumbar    • Facet joint disease    • Human immunodeficiency virus (HIV) infection ([address])     resolved 11/5/15   • Hypertension     resolved 5/29/15   • Other specified disorders of white blood cells     resolved 5/12/16     Past Surgical History:   Procedure Laterality Date   • HERNIA REPAIR     • SHOULDER SURGERY Left    • US GUIDED THYROID BIOPSY  1/5/2023     Social History     Socioeconomic History   • Marital status: Single     Spouse name: Not on file   • Number of children: Not on file   • Years of education: Not on file   • Highest education level: Not on file   Occupational History   • Not on file   Tobacco Use   • Smoking status: Former     Packs/day: 0.50     Years: 43.00     Total pack years: 21.50     Types: Cigarettes     Quit date: 6/15/2022     Years since quittin.0   • Smokeless tobacco: Never   • Tobacco comments:     10 cigs/day   Vaping Use   • Vaping Use: Never used   Substance and Sexual Activity   • Alcohol use: Not Currently   • Drug use: Never   • Sexual activity: Not Currently     Partners: Female   Other Topics Concern   • Not on file   Social History Narrative   • Not on file     Social Determinants of Health     Financial Resource Strain: Not on file   Food Insecurity: No Food Insecurity (2022)    Hunger Vital Sign    • Worried About Running Out of Food in the Last Year: Never true    • Ran Out of Food in the Last Year: Never true   Transportation Needs: No Transportation Needs (2022)    PRAPARE - Transportation    • Lack of Transportation (Medical): No    • Lack of Transportation (Non-Medical): No   Physical Activity: Not on file   Stress: Not on file   Social Connections: Not on file   Intimate Partner Violence: Not on file   Housing Stability: Low Risk  (2022)    Housing Stability Vital Sign    • Unable to Pay for Housing in the Last Year: No    • Number of Places Lived in the Last Year: 1    • Unstable Housing in the Last Year: No     No family history on file. No Known Allergies    Objective     Vitals:    23 1448   BP: 120/64   BP Location: Right arm   Patient Position: Sitting   Cuff Size: Standard   Pulse: 102   Weight: 89.4 kg (197 lb)   Height: 6' 1" (1.854 m)       Physical exam:     Physical Exam  Constitutional:       Appearance: Normal appearance. Cardiovascular:      Rate and Rhythm: Normal rate and regular rhythm. Pulmonary:      Effort: Pulmonary effort is normal. No respiratory distress. Breath sounds: Normal breath sounds. Abdominal:      General: There is no distension. Musculoskeletal:      Right lower leg: No edema. Left lower leg: No edema. Neurological:      Mental Status: He is alert.           ==  PLEASE NOTE:  This encounter was completed utilizing the [employer] Direct Speech Voice Recognition Software. Grammatical errors, random word insertions, pronoun errors and incomplete sentences are occasional consequences of the system due to software limitations, ambient noise and hardware issues. These may be missed by proof reading prior to affixing electronic signature. Any questions or concerns about the content, text or information contained within the body of this dictation should be directly addressed to the physician for clarification. Please do not hesitate to call me directly if you have any any questions or concerns.

## 2023-07-07 ENCOUNTER — DOCUMENTATION (OUTPATIENT)
Dept: SURGERY | Facility: CLINIC | Age: 63
End: 2023-07-07

## 2023-07-07 NOTE — PROGRESS NOTES
Pastoral Care Progress Note    2023  Patient: Eulalio Angulo : 1960  Encounter Date & Time: 2023   MRN: [id number]        The  returned patient call for pastoral support. Mr. Carmina Darling reported he received good news and not so good news from his cardiological consult. He shared he does not need further treatment but has to be mindful not to overexert himself. He felt relieved by the prognosis for now. He expressed concern related to the pending birth of his first grandson "Kilo Gann" who has an impending birth of . He shared he is cheering from the sidelines but knows that many things can happen. He shared concerns about his future relationship with the baby's mother and her family. All of these things he expressed as exciting and concerning. Mr. Carmina Darling advised he was processing internally if he will attend an [address] [place] [address] meeting for more support and wants to work on one thing at a time. He advised, "I am not sure if I need it now, but I know how to reach out when I do."  Mr. Carmina Darling was not able to talk about his current living environment and how it may cause overexertion and stress but advised he could talk to me about it in person in the future. He did not want to schedule an appointment at this time. It appeared Mr. Carmina Darling coping strategy is to ignore conflict in his relationship in order to keep the peace. The  facilitated identification of emotions regarding his cardiology treatment and diagnosis. Mr. Carmina Darling expressed having some release of stress knowing where he stands. The  encouraged appropriate coping strategies. Mr. Carmina Darling appeared resistant to moving forward with getting additional pastoral and peer support. The  encouraged Mr. Carmina Darling to follow-up with the  about meeting face to face sometime this month or in August when he comes for his PCP visit.  Mr. Carmina Darling expressed gratitude and enjoying talking to the  and advised he will follow-up. Chaplaincy Interventions Utilized:   Empowerment: Encouraged focus on present and Encouraged self-care    Exploration: Explored emotional needs & resources, Explored relational needs & resources and Explored spiritual needs & resources    Relationship Building: Cultivated a relationship of care and support and Listened empathically    Ritual: Read sacred text     Shared Scripture regarding self-care, wood and laughter.      Chaplaincy Outcomes Achieved:  Emotional resources utilized and Expressed gratitude      Spiritual Coping Strategies Utilized:   Connectedness, Spiritual empowerment and Spiritual gratitude

## 2023-07-10 ENCOUNTER — OFFICE VISIT (OUTPATIENT)
Dept: ENDOCRINOLOGY | Facility: CLINIC | Age: 63
End: 2023-07-10
Payer: MEDICARE

## 2023-07-10 VITALS
WEIGHT: 195 LBS | BODY MASS INDEX: 25.84 KG/M2 | HEIGHT: 73 IN | HEART RATE: 80 BPM | DIASTOLIC BLOOD PRESSURE: 82 MMHG | SYSTOLIC BLOOD PRESSURE: 126 MMHG

## 2023-07-10 DIAGNOSIS — E04.1 THYROID NODULE: Primary | ICD-10-CM

## 2023-07-10 DIAGNOSIS — R79.89 ELEVATED TSH: ICD-10-CM

## 2023-07-10 DIAGNOSIS — E11.8 TYPE 2 DIABETES MELLITUS WITH UNSPECIFIED COMPLICATIONS (HCC): ICD-10-CM

## 2023-07-10 PROCEDURE — 99214 OFFICE O/P EST MOD 30 MIN: CPT | Performed by: PHYSICIAN ASSISTANT

## 2023-07-10 NOTE — PROGRESS NOTES
Patient Progress Note    CC: thyroid nodule     Referring Provider  Kaya Bernabe Md  [address] [place] [address],  [address]     History of Present Illness:     Patient is a 66 here for follow-up for right thyroid nodule this was incidentally found on a CTA of chest.  Ultrasound done in July 2022 showed a dominant cystic and solid nodule in the lower pole of the right thyroid lobe which met criteria for biopsy. FNA done in January 2023 was benign. Thyroid ultrasound was repeated last month but results are pending. Has a history of slightly elevated TSH but free T4 was normal and thyroid antibodies were negative. No history of external radiation to head/neck/chest.  No family history of thyroid cancer. No recent Iodine loading in form of medication, biotin or kelp supplements or radiological diagnostic studies. He also has a history of type 2 diabetes which is being managed by his PCP. Last A1c was controlled at 6.7%. Patient Active Problem List   Diagnosis   • Anxiety   • Essential hypertension   • Heart murmur   • Hiatal hernia   • HIV disease ([address])   • Hypertriglyceridemia   • Inguinal hernia   • Toxic effect of tobacco and nicotine   • Other male erectile dysfunction   • Thyroid nodule   • Goiter, nontoxic, multinodular   • Other pulmonary embolism with acute cor pulmonale (HCC)   • Primary osteoarthritis of both knees   • Type 2 diabetes mellitus with unspecified complications (HCC)   • Elevated TSH     Past Medical History:   Diagnosis Date   • DDD (degenerative disc disease), lumbar    • Facet joint disease    • Human immunodeficiency virus (HIV) infection ([address])     resolved 11/5/15   • Hypertension     resolved 5/29/15   • Other specified disorders of white blood cells     resolved 5/12/16      Past Surgical History:   Procedure Laterality Date   • HERNIA REPAIR     • SHOULDER SURGERY Left    • US GUIDED THYROID BIOPSY  1/5/2023      History reviewed.  No pertinent family history. Social History     Tobacco Use   • Smoking status: Former     Packs/day: 0.50     Years: 43.00     Total pack years: 21.50     Types: Cigarettes     Quit date: 6/15/2022     Years since quittin.0   • Smokeless tobacco: Never   • Tobacco comments:     10 cigs/day   Substance Use Topics   • Alcohol use: Not Currently     No Known Allergies  Current Outpatient Medications   Medication Sig Dispense Refill   • amLODIPine (NORVASC) 2.5 mg tablet Take 1 tablet (2.5 mg total) by mouth daily 30 tablet 5   • apixaban (Eliquis) 2.5 mg Take 1 tablet (2.5 mg total) by mouth 2 (two) times a day 60 tablet 2   • Darunavir-Cobicistat (Prezcobix) 800-150 MG TABS Take 1 tablet by mouth daily 30 tablet 3   • dolutegravir (Tivicay) 50 MG TABS Take 1 tablet (50 mg total) by mouth daily 30 tablet 5   • metFORMIN (FORTAMET) 1000 MG (OSM) 24 hr tablet Take 1 tablet (1,000 mg total) by mouth daily with dinner 30 tablet 5   • PARoxetine (PAXIL) 20 mg tablet Take 1 tablet (20 mg total) by mouth daily 30 tablet 5   • sildenafil (VIAGRA) 50 MG tablet Take 1 tablet (50 mg total) by mouth daily as needed for erectile dysfunction 30 tablet 0   • tenofovir (VIREAD) 300 mg tablet Take 1 tablet (300 mg total) by mouth daily 30 tablet 5     No current facility-administered medications for this visit. Review of Systems   Constitutional: Negative for activity change, appetite change, fatigue and unexpected weight change. HENT: Negative for trouble swallowing. Eyes: Negative for visual disturbance. Respiratory: Negative for shortness of breath. Cardiovascular: Negative for chest pain and palpitations. Gastrointestinal: Negative for constipation and diarrhea. Endocrine: Negative for cold intolerance and heat intolerance. Musculoskeletal: Positive for arthralgias (knees, hips). Skin: Negative. Neurological: Positive for tremors (mild, sporadic). Psychiatric/Behavioral: Negative.         Physical Exam:  Body mass index is 25.73 kg/m². /82 (BP Location: Left arm, Patient Position: Sitting, Cuff Size: Adult)   Pulse 80   Ht 6' 1" (1.854 m)   Wt 88.5 kg (195 lb)   BMI 25.73 kg/m²    Wt Readings from Last 3 Encounters:   07/10/23 88.5 kg (195 lb)   07/06/23 89.4 kg (197 lb)   06/27/23 89.3 kg (196 lb 12.8 oz)       Physical Exam  Vitals and nursing note reviewed. Constitutional:       Appearance: He is well-developed. HENT:      Head: Normocephalic. Eyes:      General: No scleral icterus. Pupils: Pupils are equal, round, and reactive to light. Neck:      Thyroid: Thyromegaly (right) present. Cardiovascular:      Rate and Rhythm: Normal rate and regular rhythm. Pulses:           Radial pulses are 2+ on the right side and 2+ on the left side. Heart sounds: No murmur heard. Pulmonary:      Effort: Pulmonary effort is normal. No respiratory distress. Breath sounds: Normal breath sounds. No wheezing. Musculoskeletal:      Cervical back: Neck supple. Skin:     General: Skin is warm and dry. Neurological:      Mental Status: He is alert. Patient's shoes and socks were not removed. Labs:   Lab Results   Component Value Date    HGBA1C 6.7 (H) 06/13/2023       Lab Results   Component Value Date    CHOL 149 10/26/2015    HDL 29 (L) 06/13/2023    TRIG 212 (H) 06/13/2023       Lab Results   Component Value Date    GLUCOSE 133 10/26/2015    CALCIUM 9.9 06/13/2023     10/26/2015    K 4.0 06/13/2023    CO2 26 06/13/2023    CL 98 06/13/2023    BUN 14 06/13/2023    CREATININE 1.18 06/13/2023        eGFR   Date Value Ref Range Status   06/13/2023 66 ml/min/1.73sq m Final       Lab Results   Component Value Date    ALT 17 06/13/2023    AST 17 06/13/2023    ALKPHOS 86 06/13/2023    BILITOT 0.23 10/26/2015       Lab Results   Component Value Date    NYJ4ZDZBUCII 4.540 (H) 11/21/2022       Plan:    Sena De La Rosa was seen today for follow-up.     Diagnoses and all orders for this visit:    Thyroid nodule  Most recent thyroid US result is pending   Prior FNA was benign   Continue to monitor yearly unless otherwise indicated on recent US result   -     TSH, 3rd generation with Free T4 reflex; Future  -     US thyroid; Future    Elevated TSH  TSH 4.540 but free T4 is normal  Thyroid antibodies previously negative   Not on thyroid medication  Monitor labs  -     TSH, 3rd generation with Free T4 reflex; Future    Type 2 diabetes mellitus with unspecified complications (HCC)  E0Z 6.7%  Managed by PCP         Discussed with the patient and all questions fully answered. He will call me if any problems arise.     Counseled patient on diagnostic results, prognosis, risk and benefit of treatment options, instruction for management, importance of treatment compliance, risk  factor reduction and impressions      Angela Grossman PA-C

## 2023-07-14 ENCOUNTER — DOCUMENTATION (OUTPATIENT)
Dept: SURGERY | Facility: CLINIC | Age: 63
End: 2023-07-14

## 2023-07-14 NOTE — PROGRESS NOTES
Pastoral Care Progress Note    2023  Patient: Harinder Giles : 1960  Encounter Date & Time: 2023   MRN: [id number]    Patient called  for support.         23 Stacey Affiliation None   Spiritual Beliefs/Perceptions   Concept of God Accepting   God's Role in Disease Natural   Relationship with God Ambivalent   Unmet Spiritual Needs Belonging and Acceptance   Psychosocial   Psychosocial (WDL) WDL   Length of Time/Family Visitation 16-30 min   Stress Factors   Patient Stress Factors Resistant to behavior health; Other (Comment)  (strained romantic relationship)   Coping Responses   Patient Coping Denial;Fearful;Open/discussion   Patient Spiritual Assessment   Feelings of Disconnected/Cut-off shutsdown to avoid conflict   Feelings of Being Abandoned Fears rejections   Feelings of Loneliness Fears being alone   Plan of Care   Care Plan Initiated Yes  (Weekly calls offer comfort and affirmation)   Provide Spiritual Support (Visits) per week;1 time          23 1300   Clinical Encounter Type   Visited With Patient   Routine Visit Follow-up   Continue Visiting Yes   Patient Spiritual Encounters   Spiritual Assessment 3   Suffering Severity 4   Fear Level 3   Feelings of Loneliness Fears being alone   Coping 3   Grief Resolution 3   Spiritual Encounter Notes See Spiritual Assess. in Progress Note.

## 2023-07-18 ENCOUNTER — TELEPHONE (OUTPATIENT)
Dept: HEMATOLOGY ONCOLOGY | Facility: CLINIC | Age: 63
End: 2023-07-18

## 2023-07-18 NOTE — TELEPHONE ENCOUNTER
Confirmed with patient appt. On 9/13/23 with Dr. Nidia Hutchison which has been rescheduled from 8/23/23 due to a soto in the provider's schedule.

## 2023-07-19 NOTE — PROGRESS NOTES
Mr. Ashley Rollins called and texted the  several times. The  spoke to Mr. Liua Ria and set up a time to talk on Thursday.

## 2023-07-24 ENCOUNTER — DOCUMENTATION (OUTPATIENT)
Dept: SURGERY | Facility: CLINIC | Age: 63
End: 2023-07-24

## 2023-07-24 ENCOUNTER — TELEPHONE (OUTPATIENT)
Dept: SURGERY | Facility: CLINIC | Age: 63
End: 2023-07-24

## 2023-07-24 NOTE — TELEPHONE ENCOUNTER
Pt called to see if he should have any labs done before his apt with [facility] on 8/8. In May 3 labs were put in hemoglobin A1c,Glucose, and albumin. Should he do them now?

## 2023-07-26 ENCOUNTER — APPOINTMENT (OUTPATIENT)
Dept: LAB | Facility: CLINIC | Age: 63
End: 2023-07-26
Payer: MEDICARE

## 2023-07-26 LAB
ALBUMIN SERPL BCP-MCNC: 4.3 G/DL (ref 3.5–5)
ALP SERPL-CCNC: 87 U/L (ref 34–104)
ALT SERPL W P-5'-P-CCNC: 17 U/L (ref 7–52)
ANION GAP SERPL CALCULATED.3IONS-SCNC: 6 MMOL/L
AST SERPL W P-5'-P-CCNC: 17 U/L (ref 13–39)
BASOPHILS # BLD AUTO: 0.05 THOUSANDS/ÂΜL (ref 0–0.1)
BASOPHILS NFR BLD AUTO: 1 % (ref 0–1)
BILIRUB SERPL-MCNC: 0.35 MG/DL (ref 0.2–1)
BUN SERPL-MCNC: 11 MG/DL (ref 5–25)
CALCIUM SERPL-MCNC: 9.9 MG/DL (ref 8.4–10.2)
CHLORIDE SERPL-SCNC: 100 MMOL/L (ref 96–108)
CO2 SERPL-SCNC: 28 MMOL/L (ref 21–32)
CREAT SERPL-MCNC: 0.98 MG/DL (ref 0.6–1.3)
CREAT UR-MCNC: 133 MG/DL
EOSINOPHIL # BLD AUTO: 0.17 THOUSAND/ÂΜL (ref 0–0.61)
EOSINOPHIL NFR BLD AUTO: 2 % (ref 0–6)
ERYTHROCYTE [DISTWIDTH] IN BLOOD BY AUTOMATED COUNT: 14.6 % (ref 11.6–15.1)
EST. AVERAGE GLUCOSE BLD GHB EST-MCNC: 134 MG/DL
GFR SERPL CREATININE-BSD FRML MDRD: 82 ML/MIN/1.73SQ M
GLUCOSE P FAST SERPL-MCNC: 160 MG/DL (ref 65–99)
HBA1C MFR BLD: 6.3 %
HCT VFR BLD AUTO: 45.6 % (ref 36.5–49.3)
HGB BLD-MCNC: 14.9 G/DL (ref 12–17)
IMM GRANULOCYTES # BLD AUTO: 0.03 THOUSAND/UL (ref 0–0.2)
IMM GRANULOCYTES NFR BLD AUTO: 0 % (ref 0–2)
LYMPHOCYTES # BLD AUTO: 1.6 THOUSANDS/ÂΜL (ref 0.6–4.47)
LYMPHOCYTES NFR BLD AUTO: 23 % (ref 14–44)
MCH RBC QN AUTO: 28.1 PG (ref 26.8–34.3)
MCHC RBC AUTO-ENTMCNC: 32.7 G/DL (ref 31.4–37.4)
MCV RBC AUTO: 86 FL (ref 82–98)
MICROALBUMIN UR-MCNC: 89.7 MG/L (ref 0–20)
MICROALBUMIN/CREAT 24H UR: 67 MG/G CREATININE (ref 0–30)
MONOCYTES # BLD AUTO: 0.68 THOUSAND/ÂΜL (ref 0.17–1.22)
MONOCYTES NFR BLD AUTO: 10 % (ref 4–12)
NEUTROPHILS # BLD AUTO: 4.41 THOUSANDS/ÂΜL (ref 1.85–7.62)
NEUTS SEG NFR BLD AUTO: 64 % (ref 43–75)
NRBC BLD AUTO-RTO: 0 /100 WBCS
PLATELET # BLD AUTO: 238 THOUSANDS/UL (ref 149–390)
PMV BLD AUTO: 10.7 FL (ref 8.9–12.7)
POTASSIUM SERPL-SCNC: 4.2 MMOL/L (ref 3.5–5.3)
PROT SERPL-MCNC: 8.9 G/DL (ref 6.4–8.4)
RBC # BLD AUTO: 5.31 MILLION/UL (ref 3.88–5.62)
SODIUM SERPL-SCNC: 134 MMOL/L (ref 135–147)
WBC # BLD AUTO: 6.94 THOUSAND/UL (ref 4.31–10.16)

## 2023-07-27 ENCOUNTER — TELEPHONE (OUTPATIENT)
Dept: HEMATOLOGY ONCOLOGY | Facility: CLINIC | Age: 63
End: 2023-07-27

## 2023-07-27 LAB
BASOPHILS # BLD AUTO: 0.1 X10E3/UL (ref 0–0.2)
BASOPHILS NFR BLD AUTO: 1 %
CD3+CD4+ CELLS # BLD: 456 /UL (ref 359–1519)
CD3+CD4+ CELLS NFR BLD: 26.8 % (ref 30.8–58.5)
CD3+CD4+ CELLS/CD3+CD8+ CLL BLD: 0.55 % (ref 0.92–3.72)
CD3+CD8+ CELLS # BLD: 828 /UL (ref 109–897)
CD3+CD8+ CELLS NFR BLD: 48.7 % (ref 12–35.5)
EOSINOPHIL # BLD AUTO: 0.1 X10E3/UL (ref 0–0.4)
EOSINOPHIL NFR BLD AUTO: 2 %
ERYTHROCYTE [DISTWIDTH] IN BLOOD BY AUTOMATED COUNT: 15.3 % (ref 11.6–15.4)
HCT VFR BLD AUTO: 44.7 % (ref 37.5–51)
HGB BLD-MCNC: 14.9 G/DL (ref 13–17.7)
IMM GRANULOCYTES # BLD: 0.1 X10E3/UL (ref 0–0.1)
IMM GRANULOCYTES NFR BLD: 1 %
LYMPHOCYTES # BLD AUTO: 1.7 X10E3/UL (ref 0.7–3.1)
LYMPHOCYTES NFR BLD AUTO: 24 %
MCH RBC QN AUTO: 28.5 PG (ref 26.6–33)
MCHC RBC AUTO-ENTMCNC: 33.3 G/DL (ref 31.5–35.7)
MCV RBC AUTO: 86 FL (ref 79–97)
MONOCYTES # BLD AUTO: 0.7 X10E3/UL (ref 0.1–0.9)
MONOCYTES NFR BLD AUTO: 9 %
NEUTROPHILS # BLD AUTO: 4.6 X10E3/UL (ref 1.4–7)
NEUTROPHILS NFR BLD AUTO: 63 %
PLATELET # BLD AUTO: 226 X10E3/UL (ref 150–450)
RBC # BLD AUTO: 5.22 X10E6/UL (ref 4.14–5.8)
WBC # BLD AUTO: 7.2 X10E3/UL (ref 3.4–10.8)

## 2023-07-27 NOTE — TELEPHONE ENCOUNTER
Appointment Change  Cancel, Reschedule, Change to Virtual      Who are you speaking with? self   If it is not the patient, are they listed on an active communication consent form? yes   Which provider is the appointment scheduled with? [email or web address]   When is the appointment scheduled? Please list date and time 9/21   At which location is the appointment scheduled to take place? [facility]   Was the appointment rescheduled or changed from an in person visit to a virtual visit? If so, please list the details of the change. Yes, 9/21 3pm   What is the reason for the appointment change? Provider away   Was [facility] transport scheduled for this visit? no   Does [facility] transport need to be scheduled for the new visit (if applicable) no   Does the patient need an infusion appointment rescheduled? no   Does the patient have an infusion appointment scheduled? If so, when? no   Is the patient undergoing chemotherapy? no   Was the no-show policy reviewed for appointments being changed with less then 24 hours of notice?  yes

## 2023-07-27 NOTE — TELEPHONE ENCOUNTER
I called [facility] regarding an appointment that they have scheduled with Dr. Venkat De Souza scheduled on 9/13/23     I left a voicemail explaining to patient that this appointment will need to be rescheduled due to a change in the providers schedule. Patient was advised to call [facility] to reschedule. A i3 membrane message (if applicable) has been sent to patient relaying the above information and advising patient to call [facility] and reschedule their appointment.      Patient can reschedule to Dr Venkat De Souza next available or MICHELLE to Dr Brian Varela

## 2023-07-28 ENCOUNTER — DOCUMENTATION (OUTPATIENT)
Dept: SURGERY | Facility: CLINIC | Age: 63
End: 2023-07-28

## 2023-07-28 LAB
HIV1 RNA # PLAS NAA DL=20: 21.4 CP/ML
HIV1 RNA # PLAS NAA DL=20: DETECTED {COPIES}/ML

## 2023-07-28 NOTE — PROGRESS NOTES
Pastoral Care Progress Note    2023  Patient: Sasha Silva : 1960  Encounter Date & Time: 2023   MRN: [id number]      The patient called the  for spiritual and emotional support. Mr. Karissa Bain reported doing well. He shared regarding his relationship with his SO. Mr. Karissa Bain shared how his SO words appreciation were affirming. The  affirmed Mr. Karissa Bain experience and identified feel, care and concern. The  encouraged Mr. Karissa Bain to communicate with his partner how he feels. Mr. Karissa Bain reported he was in the office on  and asked to see the . The  will get back to the patient regarding a visit. Further support as needed.                  Chaplaincy Interventions Utilized:   Empowerment: Encouraged assertiveness    Exploration: Explored emotional needs & resources, Explored relational needs & resources and Facilitated story telling    Relationship Building: Cultivated a relationship of care and support and Listened empathically     Chaplaincy Outcomes Achieved:  Emotional resources utilized, Expressed gratitude and Expressed humor    Spiritual Coping Strategies Utilized:   Connectedness and Spiritual empowerment

## 2023-08-03 ENCOUNTER — TELEPHONE (OUTPATIENT)
Dept: SURGERY | Facility: CLINIC | Age: 63
End: 2023-08-03

## 2023-08-03 ENCOUNTER — DOCUMENTATION (OUTPATIENT)
Dept: SURGERY | Facility: CLINIC | Age: 63
End: 2023-08-03

## 2023-08-03 NOTE — TELEPHONE ENCOUNTER
Received call from pt with questions about fitness class. RD encouraged pt to join class and will help set up transportation. Pt stated he will try to come next week.    Lucy Jimenez RDN,LDN

## 2023-08-03 NOTE — PROGRESS NOTES
Pastoral Care Progress Note    8/3/2023  Patient: Pema Crooks : 1960  Encounter Date & Time: 8/3/2023   MRN: [id number]        The  called Mr. Nicole Hernandez for continued care and support. Mr. Nicole Hernandez reported he was doing well. He advised he had made some changes in his life and expressed excitement in sharing them. He advised he made a decision to attend [employer]. "Kiah Maki shared it will will help me with my walking."  In addition, he advised he improved communications with his SO and that his new grandson had gained a pound. The  celebrated with Mr. Nicole Hernandez on process in his life. Mr. Nicole Hernandez expressed gratitude to the  for talking and he look forward to sharing in the future. The  offered empathic listening and celebrated with patient. The  impressions are Mr. Nicole Hernandez is trying to be vunerable and adjust to living with his SO. Improved communications is helping him feel stronger and more confident. The  will follow-up with patient in a week on his process with communications and new fitness goal.  Further support as needed.

## 2023-08-07 ENCOUNTER — TELEPHONE (OUTPATIENT)
Dept: SURGERY | Facility: CLINIC | Age: 63
End: 2023-08-07

## 2023-08-07 NOTE — TELEPHONE ENCOUNTER
RD received another phone call with exercise class questions. PT plans on joining the class this week. He is unsure if he will need a ride or not.  He knows to call/text if transportation is needed for class.   -Deloris Mccloud RDN,LDN

## 2023-08-17 ENCOUNTER — TELEPHONE (OUTPATIENT)
Dept: SURGERY | Facility: CLINIC | Age: 63
End: 2023-08-17

## 2023-08-17 NOTE — TELEPHONE ENCOUNTER
Pt called to say he would not be making it to exercise class today. He is feeling under the weather.  RD reminded pt he if does not start to feel better to call the office.   -Clemente Frankel RDN,LDN

## 2023-08-24 ENCOUNTER — DOCUMENTATION (OUTPATIENT)
Dept: SURGERY | Facility: CLINIC | Age: 63
End: 2023-08-24

## 2023-08-24 NOTE — PROGRESS NOTES
As a part of the patient's care plan the  schedules a weekly call with patient for continued spiritual care and support. Mr. Caitlyn Mccray called and spoke to [facility] on 8/16, 8/17, 8/22. Mr. Caitlyn Mccray sounded to be in good spirits and expressed appreciating regular conversations with the . The  reminded Mr. Caitlyn Mccray of [address] and reinforced self-care and coping skills. Further support as needed.

## 2023-09-12 ENCOUNTER — DOCUMENTATION (OUTPATIENT)
Dept: SURGERY | Facility: CLINIC | Age: 63
End: 2023-09-12

## 2023-09-12 ENCOUNTER — OFFICE VISIT (OUTPATIENT)
Dept: SURGERY | Facility: CLINIC | Age: 63
End: 2023-09-12
Payer: MEDICARE

## 2023-09-12 VITALS
HEIGHT: 73 IN | WEIGHT: 199.6 LBS | DIASTOLIC BLOOD PRESSURE: 82 MMHG | BODY MASS INDEX: 26.45 KG/M2 | SYSTOLIC BLOOD PRESSURE: 112 MMHG | HEART RATE: 97 BPM | TEMPERATURE: 97.9 F

## 2023-09-12 DIAGNOSIS — T65.291D TOXIC EFFECT OF TOBACCO, ACCIDENTAL OR UNINTENTIONAL, SUBSEQUENT ENCOUNTER: ICD-10-CM

## 2023-09-12 DIAGNOSIS — I26.99 PULMONARY EMBOLISM, OTHER, UNSPECIFIED CHRONICITY, UNSPECIFIED WHETHER ACUTE COR PULMONALE PRESENT (HCC): ICD-10-CM

## 2023-09-12 DIAGNOSIS — Z79.624 LONG TERM (CURRENT) USE OF INHIBITORS OF NUCLEOTIDE SYNTHESIS: ICD-10-CM

## 2023-09-12 DIAGNOSIS — E11.8 TYPE 2 DIABETES MELLITUS WITH UNSPECIFIED COMPLICATIONS (HCC): ICD-10-CM

## 2023-09-12 DIAGNOSIS — N52.8 OTHER MALE ERECTILE DYSFUNCTION: ICD-10-CM

## 2023-09-12 DIAGNOSIS — N52.9 ERECTILE DYSFUNCTION, UNSPECIFIED ERECTILE DYSFUNCTION TYPE: ICD-10-CM

## 2023-09-12 DIAGNOSIS — I10 ESSENTIAL HYPERTENSION: ICD-10-CM

## 2023-09-12 DIAGNOSIS — B20 HIV DISEASE (HCC): ICD-10-CM

## 2023-09-12 DIAGNOSIS — B20 HIV INFECTION, UNSPECIFIED SYMPTOM STATUS (HCC): ICD-10-CM

## 2023-09-12 DIAGNOSIS — E04.1 THYROID NODULE: ICD-10-CM

## 2023-09-12 DIAGNOSIS — F43.10 PTSD (POST-TRAUMATIC STRESS DISORDER): ICD-10-CM

## 2023-09-12 DIAGNOSIS — B20 HIV (HUMAN IMMUNODEFICIENCY VIRUS INFECTION) (HCC): ICD-10-CM

## 2023-09-12 DIAGNOSIS — Z92.29 HISTORY OF LONG-TERM USE OF MULTIPLE PRESCRIPTION DRUGS: Primary | ICD-10-CM

## 2023-09-12 PROCEDURE — G0439 PPPS, SUBSEQ VISIT: HCPCS | Performed by: NURSE PRACTITIONER

## 2023-09-12 RX ORDER — PAROXETINE HYDROCHLORIDE 20 MG/1
20 TABLET, FILM COATED ORAL DAILY
Qty: 30 TABLET | Refills: 5 | Status: SHIPPED | OUTPATIENT
Start: 2023-09-12

## 2023-09-12 RX ORDER — DARUNAVIR ETHANOLATE AND COBICISTAT 800; 150 MG/1; MG/1
1 TABLET, FILM COATED ORAL DAILY
Qty: 30 TABLET | Refills: 3 | Status: SHIPPED | OUTPATIENT
Start: 2023-09-12

## 2023-09-12 RX ORDER — METFORMIN HYDROCHLORIDE EXTENDED-RELEASE TABLETS 1000 MG/1
1000 TABLET, FILM COATED, EXTENDED RELEASE ORAL
Qty: 30 TABLET | Refills: 5 | Status: SHIPPED | OUTPATIENT
Start: 2023-09-12

## 2023-09-12 RX ORDER — TENOFOVIR DISOPROXIL FUMARATE 300 MG/1
300 TABLET, FILM COATED ORAL DAILY
Qty: 30 TABLET | Refills: 5 | Status: SHIPPED | OUTPATIENT
Start: 2023-09-12

## 2023-09-12 RX ORDER — DOLUTEGRAVIR SODIUM 50 MG/1
50 TABLET, FILM COATED ORAL DAILY
Qty: 30 TABLET | Refills: 5 | Status: SHIPPED | OUTPATIENT
Start: 2023-09-12

## 2023-09-12 RX ORDER — AMLODIPINE BESYLATE 2.5 MG/1
2.5 TABLET ORAL DAILY
Qty: 30 TABLET | Refills: 5 | Status: SHIPPED | OUTPATIENT
Start: 2023-09-12

## 2023-09-12 RX ORDER — SILDENAFIL 50 MG/1
50 TABLET, FILM COATED ORAL DAILY PRN
Qty: 30 TABLET | Refills: 0 | Status: SHIPPED | OUTPATIENT
Start: 2023-09-12

## 2023-09-12 NOTE — PROGRESS NOTES
Assessment and Plan:     Problem List Items Addressed This Visit        Endocrine    Thyroid nodule    Relevant Orders    TSH, 3rd generation with Free T4 reflex    Type 2 diabetes mellitus with unspecified complications (HCC)       Lab Results   Component Value Date    HGBA1C 6.3 (H) 07/26/2023     Lab Results   Component Value Date/Time    HGBA1C 6.3 (H) 07/26/2023 09:54 AM    HGBA1C 5.9 08/12/2015 07:06 AM        Continue current medication. Educated to follow diabetic diet, maintain a healthy weight, and exercise regularly. Referred to dietician for additional education. Relevant Medications    metFORMIN (FORTAMET) 1000 MG (OSM) 24 hr tablet    Other Relevant Orders    CBC and differential    Comprehensive metabolic panel       Cardiovascular and Mediastinum    Essential hypertension    Relevant Medications    amLODIPine (NORVASC) 2.5 mg tablet    Other Relevant Orders    CBC and differential    Comprehensive metabolic panel       Other    HIV disease ([address])     CD4 T CELL ABSOLUTE   Date/Time Value Ref Range Status   10/26/2015 07:58  359 - 1,519 /uL Final     CD4 ABS   Date/Time Value Ref Range Status   07/26/2023 09:54  359 - 1519 /uL Final     HIV-1 RNA by PCR, Qn   Date/Time Value Ref Range Status   06/13/2023 09:22 AM <20 copies/mL Final     Comment:     HIV-1 RNA not detected  The reportable range for this assay is 20 to 10,000,000  copies HIV-1 RNA/mL. HIV-1 RNA Viral Load Log   Date/Time Value Ref Range Status   06/13/2023 09:22 AM COMMENT [id number]/mL Final     Comment:     Unable to calculate result since non-numeric result obtained for  component test.         ART: Tivicay Prezcobix Viread        Denies side effects. Stressed the importance of adherence. Continue follow up with ID clinic. Reviewed most recent labs, including Cd4 and viral load.  Discussed the risks and benefits of treatment options, instructions for management, importance of treatment adherence, and reduction of risk factor. Educated on possible  medication side effects. Counseled on routes of HIV transmission, including the risk of  infection. Emphasized that viral suppression is the best method to prevent HIV transmission. At this time pt.denies the need for HIV testing of anyone in their life. Total encounter time was 45 minutes. Greater then 20 minutes were spent on counseling and patient education. Pt voices understanding and agreement with treatment plan. Relevant Medications    Darunavir-Cobicistat (Prezcobix) 800-150 MG TABS    dolutegravir (Tivicay) 50 MG TABS    tenofovir (VIREAD) 300 mg tablet    Toxic effect of tobacco and nicotine     Smoking 2 cigarettes a day. Counseled for greater than 15 minutes on the importance of smoking cessation. Advised to quit. Educated on the increased risk of heart and lung disease associated with smoking.            Other male erectile dysfunction   Other Visit Diagnoses     History of long-term use of multiple prescription drugs    -  Primary    Relevant Orders    DXA bone density spine hip and pelvis    Erectile dysfunction, unspecified erectile dysfunction type        Relevant Medications    sildenafil (VIAGRA) 50 MG tablet    Pulmonary embolism, other, unspecified chronicity, unspecified whether acute cor pulmonale present (HCC)        Relevant Medications    apixaban (Eliquis) 2.5 mg    HIV infection, unspecified symptom status (HCC)        Relevant Medications    Darunavir-Cobicistat (Prezcobix) 800-150 MG TABS    dolutegravir (Tivicay) 50 MG TABS    tenofovir (VIREAD) 300 mg tablet    Other Relevant Orders    CBC and differential    Comprehensive metabolic panel    HIV-1 RNA, quantitative, PCR    T-helper cells (CD4) count    PTSD (post-traumatic stress disorder)        Relevant Medications    PARoxetine (PAXIL) 20 mg tablet    HIV (human immunodeficiency virus infection) ([address])        Relevant Medications Darunavir-Cobicistat (Prezcobix) 800-150 MG TABS    dolutegravir (Tivicay) 50 MG TABS    tenofovir (VIREAD) 300 mg tablet    Other Relevant Orders    CBC and differential    Comprehensive metabolic panel    HIV-1 RNA, quantitative, PCR    T-helper cells (CD4) count    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 The BMI is above normal. Nutrition recommendations include encouraging healthy choices of fruits and vegetables. Rationale for BMI follow-up plan is due to patient being overweight or obese. Preventive health issues were discussed with patient, and age appropriate screening tests were ordered as noted in patient's After Visit Summary. Personalized health advice and appropriate referrals for health education or preventive services given if needed, as noted in patient's After Visit Summary. History of Present Illness:     Patient presents for a Medicare Wellness Visit    Lam Kinsey Ulloa is contacted today for primary care follow-up. Lucia Roberts PMHx significant for HIV, HTN, hyperlipidemia, polycythemia, and anxiety. He is c/o right foot pain. He follows with podiatry and has an appointment next week. .Colonoscopy: 1/27/17, repeat in 5 years  AAA screen: 5/2/2019     Patient Care Team:  Nicole Benedict as PCP - CATHIE Martinez MD Budd Done as  (Care Coordination)     Review of Systems:     Review of Systems   Constitutional: Negative for chills and fever. HENT: Negative for ear pain and sore throat. Eyes: Negative for pain and visual disturbance. Respiratory: Negative for cough and shortness of breath. Cardiovascular: Negative for chest pain and palpitations. Gastrointestinal: Negative for abdominal pain and vomiting. Genitourinary: Negative for dysuria and hematuria.    Musculoskeletal: Negative for arthralgias and back pain. Skin: Negative for color change and rash. Neurological: Negative for seizures and syncope. All other systems reviewed and are negative. Problem List:     Patient Active Problem List   Diagnosis   • Anxiety   • Essential hypertension   • Heart murmur   • Hiatal hernia   • HIV disease ([address])   • Hypertriglyceridemia   • Inguinal hernia   • Toxic effect of tobacco and nicotine   • Other male erectile dysfunction   • Thyroid nodule   • Goiter, nontoxic, multinodular   • Other pulmonary embolism with acute cor pulmonale (HCC)   • Primary osteoarthritis of both knees   • Type 2 diabetes mellitus with unspecified complications (HCC)   • Elevated TSH      Past Medical and Surgical History:     Past Medical History:   Diagnosis Date   • DDD (degenerative disc disease), lumbar    • Facet joint disease    • Human immunodeficiency virus (HIV) infection ([address])     resolved 11/5/15   • Hypertension     resolved 5/29/15   • Other specified disorders of white blood cells     resolved 16     Past Surgical History:   Procedure Laterality Date   • HERNIA REPAIR     • SHOULDER SURGERY Left    • US GUIDED THYROID BIOPSY  2023      Family History:     No family history on file.    Social History:     Social History     Socioeconomic History   • Marital status: Single     Spouse name: None   • Number of children: None   • Years of education: None   • Highest education level: None   Occupational History   • None   Tobacco Use   • Smoking status: Former     Packs/day: 0.50     Years: 43.00     Total pack years: 21.50     Types: Cigarettes     Quit date: 6/15/2022     Years since quittin.2   • Smokeless tobacco: Never   • Tobacco comments:     10 cigs/day   Vaping Use   • Vaping Use: Never used   Substance and Sexual Activity   • Alcohol use: Not Currently   • Drug use: Never   • Sexual activity: Not Currently     Partners: Female   Other Topics Concern   • None   Social History Narrative • None     Social Determinants of Health     Financial Resource Strain: Not on file   Food Insecurity: No Food Insecurity (6/29/2022)    Hunger Vital Sign    • Worried About Running Out of Food in the Last Year: Never true    • Ran Out of Food in the Last Year: Never true   Transportation Needs: No Transportation Needs (6/24/2022)    PRAPARE - Transportation    • Lack of Transportation (Medical): No    • Lack of Transportation (Non-Medical): No   Physical Activity: Not on file   Stress: Not on file   Social Connections: Not on file   Intimate Partner Violence: Not on file   Housing Stability: Low Risk  (6/24/2022)    Housing Stability Vital Sign    • Unable to Pay for Housing in the Last Year: No    • Number of Places Lived in the Last Year: 1    • Unstable Housing in the Last Year: No      Medications and Allergies:     Current Outpatient Medications   Medication Sig Dispense Refill   • amLODIPine (NORVASC) 2.5 mg tablet Take 1 tablet (2.5 mg total) by mouth daily 30 tablet 5   • apixaban (Eliquis) 2.5 mg Take 1 tablet (2.5 mg total) by mouth 2 (two) times a day 60 tablet 2   • Darunavir-Cobicistat (Prezcobix) 800-150 MG TABS Take 1 tablet by mouth daily 30 tablet 3   • dolutegravir (Tivicay) 50 MG TABS Take 1 tablet (50 mg total) by mouth daily 30 tablet 5   • metFORMIN (FORTAMET) 1000 MG (OSM) 24 hr tablet Take 1 tablet (1,000 mg total) by mouth daily with dinner 30 tablet 5   • PARoxetine (PAXIL) 20 mg tablet Take 1 tablet (20 mg total) by mouth daily 30 tablet 5   • sildenafil (VIAGRA) 50 MG tablet Take 1 tablet (50 mg total) by mouth daily as needed for erectile dysfunction 30 tablet 0   • tenofovir (VIREAD) 300 mg tablet Take 1 tablet (300 mg total) by mouth daily 30 tablet 5     No current facility-administered medications for this visit.      No Known Allergies   Immunizations:     Immunization History   Administered Date(s) Administered   • Hep A, adult 04/25/2017, 04/24/2018   • Hep B, adult 09/18/2008, 01/26/2009, 05/07/2009, 05/28/2019, 05/28/2019, 11/26/2019, 11/26/2019, 11/03/2020, 11/03/2020   • Influenza Quadrivalent Preservative Free 3 years and older IM 11/05/2015   • Influenza Quadrivalent, 6-35 Months IM 12/13/2016, 10/16/2017   • Influenza, recombinant, quadrivalent,injectable, preservative free 10/08/2018, 11/26/2019, 11/03/2020, 11/09/2021, 11/08/2022   • Influenza, seasonal, injectable 12/15/2011, 10/04/2012, 10/10/2012, 10/30/2014   • Meningococcal MCV4P 05/10/2022   • Pneumococcal Conjugate 13-Valent 09/27/2016   • Pneumococcal Polysaccharide PPV23 09/18/2008, 04/25/2017   • Tdap 05/07/2009, 05/28/2019      Health Maintenance:         Topic Date Due   • Lung Cancer Screening  11/21/2023   • Colorectal Cancer Screening  01/27/2027   • Hepatitis C Screening  Completed         Topic Date Due   • COVID-19 Vaccine (1) Never done   • Meningococcal ACWY Vaccine (2 - Risk 2-dose series) 07/05/2022   • Influenza Vaccine (1) 09/01/2023      Medicare Screening Tests and Risk Assessments:     Last Medicare Wellness visit information reviewed, patient interviewed and updates made to the record today. Health Risk Assessment:   Patient rates overall health as good. Patient feels that their physical health rating is same. Patient is satisfied with their life. Eyesight was rated as same. Hearing was rated as same. Patient feels that their emotional and mental health rating is slightly better. Patients states they are sometimes angry. Patient states they are sometimes unusually tired/fatigued. Pain experienced in the last 7 days has been some. Patient's pain rating has been 6/10. Patient states that he has experienced no weight loss or gain in last 6 months. Fall Risk Screening:    In the past year, patient has experienced: history of falling in past year    Number of falls: 2 or more  Injured during fall?: No    Feels unsteady when standing or walking?: Yes    Worried about falling?: Yes      Home Safety:  Patient does not have trouble with stairs inside or outside of their home. Patient has working smoke alarms and has working carbon monoxide detector. Home safety hazards include: none. Working with functional fitness to improve balance and mobility. Nutrition:   Current diet is Limited junk food and No Added Salt. Medications:   Patient is not currently taking any over-the-counter supplements. Patient is able to manage medications. Activities of Daily Living (ADLs)/Instrumental Activities of Daily Living (IADLs):   Walk and transfer into and out of bed and chair?: Yes  Dress and groom yourself?: Yes    Bathe or shower yourself?: Yes    Feed yourself? Yes  Do your laundry/housekeeping?: Yes  Manage your money, pay your bills and track your expenses?: Yes  Make your own meals?: Yes    Do your own shopping?: Yes    Previous Hospitalizations:   Any hospitalizations or ED visits within the last 12 months?: Yes    How many hospitalizations have you had in the last year?: 1-2    Advance Care Planning:   Living will: No    Durable POA for healthcare:  Yes    Advanced directive: Yes      PREVENTIVE SCREENINGS      Cardiovascular Screening:    General: Screening Not Indicated and History Lipid Disorder      Diabetes Screening:     General: Screening Not Indicated and History Diabetes      Colorectal Cancer Screening:     General: Screening Current      Prostate Cancer Screening:    General: Screening Not Indicated      Abdominal Aortic Aneurysm (AAA) Screening:    Risk factors include: tobacco use        Lung Cancer Screening:     General: Screening Current      Hepatitis C Screening:    General: Screening Current    Screening, Brief Intervention, and Referral to Treatment (SBIRT)    Screening  Typical number of drinks in a day: 0    Single Item Drug Screening:  How often have you used an illegal drug (including marijuana) or a prescription medication for non-medical reasons in the past year? less than monthly    Single Item Drug Screen Score: 1  Interpretation: POSITIVE screen for possible drug use disorder    Drug Abuse Screening Test (DAST-10):  1) Have you used drugs other than those required for medical reasons? No  2) Do you abuse more than one drug at a time? No  3) Are you always able to stop using drugs when you want to? Yes  4) Have you had "blackouts" or "flashbacks" as a result of drug use? No  5) Do you ever feel bad or guilty about your drug use? Yes  7) Have you neglected your family because of your use of drugs? No  8) Have you engaged in illegal activities in order to obtain drugs? No  9) Have you ever experienced withdrawal symptoms (felt sick) when you stopped taking drugs? No  10) Have you had medical problems as a result of your drug use (e.g., memory loss, hepatitis, convulsions, bleeding, etc.)? No    Other Counseling Topics:   Car/seat belt/driving safety and calcium and vitamin D intake. No results found.      Physical Exam:     /82   Pulse 97   Temp 97.9 °F (36.6 °C)   Ht 6' 1" (1.854 m)   Wt 90.5 kg (199 lb 9.6 oz)   BMI 26.33 kg/m²       Lab Results   Component Value Date     10/26/2015    K 4.2 07/26/2023     07/26/2023    CO2 28 07/26/2023    ANIONGAP 9 10/26/2015    BUN 11 07/26/2023    CREATININE 0.98 07/26/2023    GLUCOSE 133 10/26/2015    GLUF 160 (H) 07/26/2023    CALCIUM 9.9 07/26/2023    CORRECTEDCA 9.7 06/23/2022    AST 17 07/26/2023    ALT 17 07/26/2023    ALKPHOS 87 07/26/2023    PROT 8.2 10/26/2015    BILITOT 0.23 10/26/2015    EGFR 82 07/26/2023     Lab Results   Component Value Date    WBC 6.94 07/26/2023    WBC 7.2 07/26/2023    HGB 14.9 07/26/2023    HGB 14.9 07/26/2023    HCT 45.6 07/26/2023    HCT 44.7 07/26/2023    MCV 86 07/26/2023    MCV 86 07/26/2023     07/26/2023     07/26/2023     Lab Results   Component Value Date    HEPCAB Non-reactive 06/13/2023     Lab Results   Component Value Date    HEPCAB Non-reactive 06/13/2023 Lab Results   Component Value Date    RPR Non-Reactive 11/21/2022         Physical Exam  Vitals and nursing note reviewed. Constitutional:       General: He is not in acute distress. Appearance: He is well-developed. HENT:      Head: Normocephalic and atraumatic. Eyes:      Conjunctiva/sclera: Conjunctivae normal.   Cardiovascular:      Rate and Rhythm: Normal rate and regular rhythm. Heart sounds: No murmur heard. Pulmonary:      Effort: Pulmonary effort is normal. No respiratory distress. Breath sounds: Normal breath sounds. Abdominal:      Palpations: Abdomen is soft. Tenderness: There is no abdominal tenderness. Musculoskeletal:         General: No swelling. Cervical back: Neck supple. Skin:     General: Skin is warm and dry. Capillary Refill: Capillary refill takes less than 2 seconds. Neurological:      Mental Status: He is alert. Psychiatric:         Mood and Affect: Mood normal.          CATHIE Roblero  BMI Counseling: Body mass index is 26.33 kg/m². The BMI is above normal. Nutrition recommendations include 3-5 servings of fruits/vegetables daily. Exercise recommendations include exercising 3-5 times per week.

## 2023-09-12 NOTE — ASSESSMENT & PLAN NOTE
Lab Results   Component Value Date    HGBA1C 6.3 (H) 07/26/2023     Lab Results   Component Value Date/Time    HGBA1C 6.3 (H) 07/26/2023 09:54 AM    HGBA1C 5.9 08/12/2015 07:06 AM        Continue current medication. Educated to follow diabetic diet, maintain a healthy weight, and exercise regularly. Referred to dietician for additional education.

## 2023-09-12 NOTE — PATIENT INSTRUCTIONS
Low Fat Diet   AMBULATORY CARE:   A low-fat diet  is an eating plan that is low in total fat, unhealthy fat, and cholesterol. You may need to follow a low-fat diet if you have trouble digesting or absorbing fat. You may also need to follow this diet if you have high cholesterol. You can also lower your cholesterol by increasing the amount of fiber in your diet. Soluble fiber is a type of fiber that helps to decrease cholesterol levels. Different types of fat in food:   • Limit unhealthy fats. A diet that is high in cholesterol, saturated fat, and trans fat may cause unhealthy cholesterol levels. Unhealthy cholesterol levels increase your risk of heart disease. ? Cholesterol:  Limit intake of cholesterol to less than 200 mg per day. Cholesterol is found in meat, eggs, and dairy. ? Saturated fat:  Limit saturated fat to less than 7% of your total daily calories. Ask your dietitian how many calories you need each day. Saturated fat is found in butter, cheese, ice cream, whole milk, and palm oil. Saturated fat is also found in meat, such as beef, pork, chicken skin, and processed meats. Processed meats include sausage, hot dogs, and bologna. ? Trans fat:  Avoid trans fat as much as possible. Trans fat is used in fried and baked foods. Foods that say trans fat free on the label may still have up to 0.5 grams of trans fat per serving. • Include healthy fats. Replace foods that are high in saturated and trans fat with foods high in healthy fats. This may help to decrease high cholesterol levels. ? Monounsaturated fats: These are found in avocados, nuts, and vegetable oils, such as olive, canola, and sunflower oil. ? Polyunsaturated fats: These can be found in vegetable oils, such as soybean or corn oil. Omega-3 fats can help to decrease the risk of heart disease. Omega-3 fats are found in fish, such as salmon, herring, trout, and tuna.  Omega-3 fats can also be found in plant foods, such as walnuts, flaxseed, soybeans, and canola oil. Foods to limit or avoid:   • Grains:      ? Snacks that are made with partially hydrogenated oils, such as chips, regular crackers, and butter-flavored popcorn    ? High-fat baked goods, such as biscuits, croissants, doughnuts, pies, cookies, and pastries    • Dairy:      ? Whole milk, 2% milk, and yogurt and ice cream made with whole milk    ? Half and half creamer, heavy cream, and whipping cream    ? Cheese, cream cheese, and sour cream    • Meats and proteins:      ? High-fat cuts of meat (T-bone steak, regular hamburger, and ribs)    ? Fried meat, poultry (turkey and chicken), and fish    ? Poultry (chicken and turkey) with skin    ? Cold cuts (salami or bologna), hot dogs, henley, and sausage    ? Whole eggs and egg yolks    • Vegetables and fruits with added fat:      ? Fried vegetables or vegetables in butter or high-fat sauces, such as cream or cheese sauces    ? Fried fruit or fruit served with butter or cream    • Fats:      ? Butter, stick margarine, and shortening    ? Coconut, palm oil, and palm kernel oil    Foods to include:       • Grains:      ? Whole-grain breads, cereals, pasta, and brown rice    ? Low-fat crackers and pretzels    • Vegetables and fruits:      ? Fresh, frozen, or canned vegetables (no salt or low-sodium)    ? Fresh, frozen, dried, or canned fruit (canned in light syrup or fruit juice)    ? Avocado    • Low-fat dairy products:      ? Nonfat (skim) or 1% milk    ? Nonfat or low-fat cheese, yogurt, and cottage cheese    • Meats and proteins:      ? Chicken or turkey with no skin    ? Baked or broiled fish    ? Lean beef and pork (loin, round, extra lean hamburger)    ? Beans and peas, unsalted nuts, soy products    ? Egg whites and substitutes    ? Seeds and nuts    • Fats:      ? Unsaturated oil, such as canola, olive, peanut, soybean, or sunflower oil    ? Soft or liquid margarine and vegetable oil spread    ?  Low-fat salad dressing  Other ways to decrease fat:   • Read food labels before you buy foods. Choose foods that have less than 30% of calories from fat. Choose low-fat or fat-free dairy products. Remember that fat free does not mean calorie free. These foods still contain calories, and too many calories can lead to weight gain. • Trim fat from meat and avoid fried food. Trim all visible fat from meat before you cook it. Remove the skin from poultry. Do not waters meat, fish, or poultry. Bake, roast, boil, or broil these foods instead. Avoid fried foods. Eat a baked potato instead of [place] fries. Steam vegetables instead of sautéing them in butter. • Add less fat to foods. Use imitation henley bits on salads and baked potatoes instead of regular henley bits. Use fat-free or low-fat salad dressings instead of regular dressings. Use low-fat or nonfat butter-flavored topping instead of regular butter or margarine on popcorn and other foods. Ways to decrease fat in recipes:  Replace high-fat ingredients with low-fat or nonfat ones. This may cause baked goods to be drier than usual. You may need to use nonfat cooking spray on pans to prevent food from sticking. You also may need to change the amount of other ingredients, such as water, in the recipe. Try the following:  • Use low-fat or light margarine instead of regular margarine or shortening. • Use lean ground turkey breast or chicken, or lean ground beef (less than 5% fat) instead of hamburger. • Add 1 teaspoon of canola oil to 8 ounces of skim milk instead of using cream or half and half. • Use grated zucchini, carrots, or apples in breads instead of coconut. • Use blenderized, low-fat cottage cheese, plain tofu, or low-fat ricotta cheese instead of cream cheese. • Use 1 egg white and 1 teaspoon of canola oil, or use ¼ cup (2 ounces) of fat-free egg substitute instead of a whole egg.      • Replace half of the oil that is called for in a recipe with applesauce when you bake. Use 3 tablespoons of cocoa powder and 1 tablespoon of canola oil instead of a square of baking chocolate. How to increase fiber:  Eat enough high-fiber foods to get 20 to 30 grams of fiber every day. Slowly increase your fiber intake to avoid stomach cramps, gas, and other problems. • Eat 3 ounces of whole-grain foods each day. An ounce is about 1 slice of bread. Eat whole-grain breads, such as whole-wheat bread. Whole wheat, whole-wheat flour, or other whole grains should be listed as the first ingredient on the food label. Replace white flour with whole-grain flour or use half of each in recipes. Whole-grain flour is heavier than white flour, so you may have to add more yeast or baking powder. • Eat a high-fiber cereal for breakfast.  Oatmeal is a good source of soluble fiber. Look for cereals that have bran or fiber in the name. Choose whole-grain products, such as brown rice, barley, and whole-wheat pasta. • Eat more beans, peas, and lentils. For example, add beans to soups or salads. Eat at least 5 cups of fruits and vegetables each day. Eat fruits and vegetables with the peel because the peel is high in fiber. © Copyright [employer] 2022 Information is for End User's use only and may not be sold, redistributed or otherwise used for commercial purposes. The above information is an  only. It is not intended as medical advice for individual conditions or treatments. Talk to your doctor, nurse or pharmacist before following any medical regimen to see if it is safe and effective for you. Heart Healthy Diet   AMBULATORY CARE:   A heart healthy diet  is an eating plan low in unhealthy fats and sodium (salt). The plan is high in healthy fats and fiber. A heart healthy diet helps improve your cholesterol levels and lowers your risk for heart disease and stroke. A dietitian will teach you how to read and understand food labels.   Heart healthy diet guidelines to follow:   • Choose foods that contain healthy fats:      ? Unsaturated fats  include monounsaturated and polyunsaturated fats. Unsaturated fat is found in foods such as soybean, canola, olive, corn, and safflower oils. It is also found in soft tub margarine that is made with liquid vegetable oil. ? Omega-3 fat  is found in certain fish, such as salmon, tuna, and trout, and in walnuts and flaxseed. Eat fish high in omega-3 fats at least 2 times a week. • Limit or do not have unhealthy fats:      ? Cholesterol  is found in animal foods, such as eggs and lobster, and in dairy products made from whole milk. Limit cholesterol to less than 200 mg each day. ? Saturated fat  is found in meats, such as henley and hamburger. It is also found in chicken or turkey skin, whole milk, and butter. Limit saturated fat to less than 7% of your total daily calories. ? Trans fat  is found in packaged foods, such as potato chips and cookies. It is also in hard margarine, some fried foods, and shortening. Do not eat foods that contain trans fats. • Get 20 to 30 grams of fiber each day. Fruits, vegetables, whole-grain foods, and legumes (cooked beans) are good sources of fiber. • Limit sodium as directed. You may be told to limit sodium, such as to 2,000 mg or less each day. Choose low-sodium or no-salt-added foods. Add little or no salt to food you prepare. Use herbs and spices in place of salt. Include the following in your heart healthy plan:  Ask your dietitian or healthcare provider how many servings to have each day from the following food groups:  • Grains:      ? Whole-wheat breads, cereals, and pastas, and brown rice    ? Low-fat, low-sodium crackers and chips    • Vegetables:      ? Broccoli, green beans, green peas, and spinach    ? Collards, kale, and lima beans    ? Carrots, sweet potatoes, tomatoes, and peppers    ?  Canned vegetables with no salt added    • Fruits:      ? Bananas, peaches, pears, and pineapple    ? Grapes, raisins, and dates    ? Oranges, tangerines, grapefruit, orange juice, and grapefruit juice    ? Apricots, mangoes, melons, and papaya    ? Raspberries and strawberries    ? Canned fruit with no added sugar    • Low-fat dairy:      ? Nonfat (skim) milk, 1% milk, and low-fat almond, cashew, or soy milks fortified with calcium    ? Low-fat cheese, regular or frozen yogurt, and cottage cheese    • Meats and proteins:      ? Lean cuts of beef and pork (loin, leg, round), skinless chicken and turkey    ? Legumes, soy products, egg whites, or nuts    Limit or do not include the following in your heart healthy plan:   • Foods and liquids that contain unhealthy fats and oils:      ? Whole or 2% milk, cream cheese, sour cream, or cheese    ? High-fat cuts of beef (T-bone steaks, ribs), chicken or turkey with skin, and organ meats such as liver    ? Butter, stick margarine, shortening, and cooking oils such as coconut or palm oil    • Foods and liquids high in sodium:      ? Packaged foods, such as frozen dinners, cookies, macaroni and cheese, and cereals with more than 300 mg of sodium per serving    ? Vegetables with added sodium, such as instant potatoes, vegetables with added sauces, or regular canned vegetables    ? Cured or smoked meats, such as hot dogs, henley, and sausage    ? High-sodium ketchup, barbecue sauce, salad dressing, pickles, olives, soy sauce, or miso    • Foods and liquids high in sugar:      ? Candy, cake, cookies, pies, or doughnuts    ? Soft drinks (soda), sports drinks, or sweetened tea    ? Canned or dry mixes for cakes, soups, sauces, or gravies    Other healthy heart guidelines:   • Do not smoke. Nicotine and other chemicals in cigarettes and cigars can cause lung and heart damage. Ask your healthcare provider for information if you currently smoke and need help to quit. E-cigarettes or smokeless tobacco still contain nicotine.  Talk to your provider before you use these products. • Limit or do not drink alcohol as directed. Alcohol can damage your heart and raise your blood pressure. Your healthcare provider may give you specific daily and weekly limits. The general recommended limit is 1 drink a day for women 21 or older and for men 72 or older. Do not have more than 3 drinks within 24 hours or 7 within a week. The recommended limit is 2 drinks a day for men 24to 59years of age. Do not have more than 4 drinks within 24 hours or 14 within a week. A drink of alcohol is 12 ounces of beer, 5 ounces of wine, or 1½ ounces of liquor. • Maintain a healthy weight. Extra body weight makes your heart work harder. Ask your provider what a healthy weight is for you. He or she can help you create a safe weight loss plan, if needed. • Exercise regularly. Exercise can help you maintain a healthy weight and improve your blood pressure and cholesterol levels. Regular exercise can also decrease your risk for heart problems. Ask your provider about the best exercise plan for you. Do not start an exercise program without asking your provider. Follow up with your doctor or cardiologist as directed:  Write down your questions so you remember to ask them during your visits. © Copyright [employer] 2022 Information is for End User's use only and may not be sold, redistributed or otherwise used for commercial purposes. The above information is an  only. It is not intended as medical advice for individual conditions or treatments. Talk to your doctor, nurse or pharmacist before following any medical regimen to see if it is safe and effective for you.

## 2023-09-12 NOTE — ASSESSMENT & PLAN NOTE
Smoking 2 cigarettes a day. Counseled for greater than 15 minutes on the importance of smoking cessation. Advised to quit. Educated on the increased risk of heart and lung disease associated with smoking.

## 2023-09-13 ENCOUNTER — DOCUMENTATION (OUTPATIENT)
Dept: SURGERY | Facility: CLINIC | Age: 63
End: 2023-09-13

## 2023-09-13 NOTE — PROGRESS NOTES
Nayan Tolleson Mj attempted to contact pt to schedule session, no answer. [facility] left message and requested a call back.

## 2023-09-13 NOTE — PROGRESS NOTES
Pastoral Care Progress Note    2023  Patient: Laureen Lino : 1960  Encounter Date & Time: 2023   MRN: [id number]        The  followed-up with Mr. Koko Chan for continued pastoral support. Mr. Koko Chan reported doing better today. He advised he feels better but had some concern about his foot. Mr. Koko Chan shared he would like to continue to work on himself and is beginning to think about the "M Word" (marriage). He advised marriage was not something he would have thought he would be considering ever doing again. 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 The  fostered story telling,identified ways in which Mr. Koko Chan can offer self-care and celebrated his recent steps to socialize outside of his home. .  Mr. Koko Chan agreed to return to [employer] classes but could not commit to this Thursday because of how his foot was feeling. The  will follow-up with Mr. Koko Chan on future attendance and encourage Mr. Koko Chan to not isolate.               Chaplaincy Interventions Utilized:   Empowerment: Encouraged focus on present and Encouraged self-care    Exploration: Explored alternatives, Facilitated story telling and Identified, evaluated & reinforced appropriate coping strategies    Collaboration: Consulted with interdisciplinary team    Relationship Building: Cultivated a relationship of care and support, Listened empathically and Provided silent and supportive presence    Chaplaincy Outcomes Achieved:  Developed chaplaincy care plan, Expressed gratitude, Expressed humor, Identified meaningful connections and Identified priorities    Spiritual Coping Strategies Utilized:   Connectedness, Spiritual empowerment, Spiritual meaning and Spiritual community

## 2023-09-15 ENCOUNTER — TELEPHONE (OUTPATIENT)
Dept: SURGERY | Facility: CLINIC | Age: 63
End: 2023-09-15

## 2023-09-15 NOTE — TELEPHONE ENCOUNTER
Patient texted . Mr. Analia Flores shared he want to see the podiatrist first before returning to [facility]. The  affirmed Mr. Analia Flores decision and invited him to return when he was ready. Further support as needed.

## 2023-09-19 ENCOUNTER — HOSPITAL ENCOUNTER (OUTPATIENT)
Dept: RADIOLOGY | Facility: HOSPITAL | Age: 63
Discharge: HOME | End: 2023-09-19
Attending: PODIATRIST
Payer: MEDICARE

## 2023-09-19 ENCOUNTER — OFFICE VISIT (OUTPATIENT)
Dept: PODIATRY | Facility: CLINIC | Age: 63
End: 2023-09-19
Payer: MEDICARE

## 2023-09-19 VITALS
BODY MASS INDEX: 26.37 KG/M2 | HEART RATE: 98 BPM | DIASTOLIC BLOOD PRESSURE: 90 MMHG | SYSTOLIC BLOOD PRESSURE: 135 MMHG | OXYGEN SATURATION: 100 % | HEIGHT: 73 IN | WEIGHT: 199 LBS

## 2023-09-19 DIAGNOSIS — M19.071 PRIMARY OSTEOARTHRITIS OF RIGHT FOOT: ICD-10-CM

## 2023-09-19 DIAGNOSIS — B35.1 ONYCHOMYCOSIS: ICD-10-CM

## 2023-09-19 DIAGNOSIS — M79.671 RIGHT FOOT PAIN: ICD-10-CM

## 2023-09-19 DIAGNOSIS — M21.41 PES PLANUS OF BOTH FEET: ICD-10-CM

## 2023-09-19 DIAGNOSIS — M79.671 RIGHT FOOT PAIN: Primary | ICD-10-CM

## 2023-09-19 DIAGNOSIS — M21.42 PES PLANUS OF BOTH FEET: ICD-10-CM

## 2023-09-19 DIAGNOSIS — E11.8 TYPE 2 DIABETES MELLITUS WITH UNSPECIFIED COMPLICATIONS (HCC): ICD-10-CM

## 2023-09-19 PROCEDURE — 11721 DEBRIDE NAIL 6 OR MORE: CPT | Performed by: PODIATRIST

## 2023-09-19 PROCEDURE — 73630 X-RAY EXAM OF FOOT: CPT

## 2023-09-19 PROCEDURE — 99213 OFFICE O/P EST LOW 20 MIN: CPT | Performed by: PODIATRIST

## 2023-09-19 NOTE — PROGRESS NOTES
Assessment/Plan:     The patient's clinical examination today significant for thickened and dystrophic pedal nail plates with discoloration and subungual debris consistent with onychomycosis x10. There are callus lesions beneath the first MTPJ's bilaterally. The interdigital spaces are clear without maceration. Pedal pulses are diminished bilaterally. There is decreased hair growth with dry scaling skin that is thinned and with decreased turgor. X-rays of the patient's right foot taken today were personally reviewed and interpreted. There is some scattered arthritic changes of the midfoot. There is a pes planovalgus deformity noted with abduction of the left forefoot.     The pedal nail plates were sharply debrided with a sterile nail clipper without complication H01. The nails of them became reduced in thickness and girth utilizing a rotary bur. The callus lesions beneath the great toe joints were debrided bilaterally with a sterile #15 blade without complication. There are no other acute pedal issues. Recommend daily use of a skin cream or lotion to hydrate the dry areas of skin. X-rays were reviewed with the patient in detail. I saw no signs of any prior fracture or evidence of any Charcot arthropathy of the right midfoot. Findings are most likely attributable to arthritic changes of the midfoot hindfoot stemming from a longstanding flatfoot deformity. The patient was inquiring as to what can be done about his flatfoot, flatfoot reconstructive surgery was discussed.      Recommend follow-up in 3 months. Diagnoses and all orders for this visit:    Right foot pain  -     X-ray foot right 3+ views; Future    Pes planus of both feet    Primary osteoarthritis of right foot    Type 2 diabetes mellitus with unspecified complications (HCC)    Onychomycosis          Subjective:     Patient ID: Neena Dahl is a 66 y.o. male.     The patient presents today for follow-up at risk diabetic foot care.  He notes thickening of his nail plates that is causing discomfort in his close toed shoe gear. He is also concerned about the flat-footed nature of his right lower extremity as it appears worse than his left. He would like x-rays of his right foot to assess for any possible prior undiagnosed fractures of his right foot. PAST MEDICAL HISTORY:  Past Medical History:   Diagnosis Date   • DDD (degenerative disc disease), lumbar    • Facet joint disease    • Human immunodeficiency virus (HIV) infection ([address])     resolved 11/5/15   • Hypertension     resolved 5/29/15   • Other specified disorders of white blood cells     resolved 5/12/16       PAST SURGICAL HISTORY:  Past Surgical History:   Procedure Laterality Date   • HERNIA REPAIR     • SHOULDER SURGERY Left    • US GUIDED THYROID BIOPSY  1/5/2023        ALLERGIES:  Patient has no known allergies. MEDICATIONS:  Current Outpatient Medications   Medication Sig Dispense Refill   • amLODIPine (NORVASC) 2.5 mg tablet Take 1 tablet (2.5 mg total) by mouth daily 30 tablet 5   • apixaban (Eliquis) 2.5 mg Take 1 tablet (2.5 mg total) by mouth 2 (two) times a day 60 tablet 2   • Darunavir-Cobicistat (Prezcobix) 800-150 MG TABS Take 1 tablet by mouth daily 30 tablet 3   • dolutegravir (Tivicay) 50 MG TABS Take 1 tablet (50 mg total) by mouth daily 30 tablet 5   • metFORMIN (FORTAMET) 1000 MG (OSM) 24 hr tablet Take 1 tablet (1,000 mg total) by mouth daily with dinner 30 tablet 5   • PARoxetine (PAXIL) 20 mg tablet Take 1 tablet (20 mg total) by mouth daily 30 tablet 5   • sildenafil (VIAGRA) 50 MG tablet Take 1 tablet (50 mg total) by mouth daily as needed for erectile dysfunction 30 tablet 0   • tenofovir (VIREAD) 300 mg tablet Take 1 tablet (300 mg total) by mouth daily 30 tablet 5     No current facility-administered medications for this visit.        SOCIAL HISTORY:  Social History     Socioeconomic History   • Marital status: Single     Spouse name: None   • Number of children: None   • Years of education: None   • Highest education level: None   Occupational History   • None   Tobacco Use   • Smoking status: Former     Packs/day: 0.50     Years: 43.00     Total pack years: 21.50     Types: Cigarettes     Quit date: 6/15/2022     Years since quittin.2   • Smokeless tobacco: Never   • Tobacco comments:     10 cigs/day   Vaping Use   • Vaping Use: Never used   Substance and Sexual Activity   • Alcohol use: Not Currently   • Drug use: Never   • Sexual activity: Not Currently     Partners: Female   Other Topics Concern   • None   Social History Narrative   • None     Social Determinants of Health     Financial Resource Strain: Not on file   Food Insecurity: No Food Insecurity (2022)    Hunger Vital Sign    • Worried About Running Out of Food in the Last Year: Never true    • Ran Out of Food in the Last Year: Never true   Transportation Needs: No Transportation Needs (2022)    PRAPARE - Transportation    • Lack of Transportation (Medical): No    • Lack of Transportation (Non-Medical): No   Physical Activity: Not on file   Stress: Not on file   Social Connections: Not on file   Intimate Partner Violence: Not on file   Housing Stability: Low Risk  (2022)    Housing Stability Vital Sign    • Unable to Pay for Housing in the Last Year: No    • Number of Places Lived in the Last Year: 1    • Unstable Housing in the Last Year: No        Review of Systems   Constitutional: Negative. HENT: Negative. Eyes: Negative. Respiratory: Negative. Cardiovascular: Negative. Endocrine: Negative. Musculoskeletal: Negative. Neurological: Negative. Hematological: Negative. Psychiatric/Behavioral: Negative. Objective:     Physical Exam  Vitals reviewed. Constitutional:       Appearance: Normal appearance. HENT:      Head: Normocephalic and atraumatic.       Nose: Nose normal.   Eyes:      Conjunctiva/sclera: Conjunctivae normal.      Pupils: Pupils are equal, round, and reactive to light. Cardiovascular:      Pulses:           Dorsalis pedis pulses are 1+ on the right side and 1+ on the left side. Posterior tibial pulses are 1+ on the right side and 1+ on the left side. Pulmonary:      Effort: Pulmonary effort is normal.   Feet:      Right foot:      Skin integrity: Dry skin present. Toenail Condition: Right toenails are abnormally thick and long. Fungal disease present. Left foot:      Skin integrity: Dry skin present. Toenail Condition: Left toenails are abnormally thick and long. Fungal disease present. Comments: The patient's clinical examination today significant for thickened and dystrophic pedal nail plates with discoloration and subungual debris consistent with onychomycosis x10. There are callus lesions beneath the first MTPJ's bilaterally. The interdigital spaces are clear without maceration. Pedal pulses are diminished bilaterally. There is decreased hair growth with dry scaling skin that is thinned and with decreased turgor. Skin:     General: Skin is warm. Capillary Refill: Capillary refill takes 2 to 3 seconds. Neurological:      General: No focal deficit present. Mental Status: He is alert and oriented to person, place, and time. Psychiatric:         Mood and Affect: Mood normal.         Behavior: Behavior normal.         Thought Content:  Thought content normal.

## 2023-09-20 ENCOUNTER — DOCUMENTATION (OUTPATIENT)
Dept: SURGERY | Facility: CLINIC | Age: 63
End: 2023-09-20

## 2023-09-21 ENCOUNTER — APPOINTMENT (OUTPATIENT)
Dept: LAB | Facility: CLINIC | Age: 63
End: 2023-09-21
Payer: MEDICARE

## 2023-09-21 ENCOUNTER — OFFICE VISIT (OUTPATIENT)
Dept: HEMATOLOGY ONCOLOGY | Facility: CLINIC | Age: 63
End: 2023-09-21
Payer: MEDICARE

## 2023-09-21 VITALS
RESPIRATION RATE: 16 BRPM | DIASTOLIC BLOOD PRESSURE: 86 MMHG | HEIGHT: 73 IN | SYSTOLIC BLOOD PRESSURE: 128 MMHG | OXYGEN SATURATION: 97 % | TEMPERATURE: 97.6 F | HEART RATE: 84 BPM | BODY MASS INDEX: 26.25 KG/M2

## 2023-09-21 DIAGNOSIS — T65.291D TOXIC EFFECT OF TOBACCO, ACCIDENTAL OR UNINTENTIONAL, SUBSEQUENT ENCOUNTER: ICD-10-CM

## 2023-09-21 DIAGNOSIS — I26.09 OTHER ACUTE PULMONARY EMBOLISM WITH ACUTE COR PULMONALE (HCC): Primary | ICD-10-CM

## 2023-09-21 DIAGNOSIS — E04.1 THYROID NODULE: ICD-10-CM

## 2023-09-21 DIAGNOSIS — E11.8 TYPE 2 DIABETES MELLITUS WITH UNSPECIFIED COMPLICATIONS (HCC): ICD-10-CM

## 2023-09-21 DIAGNOSIS — I10 ESSENTIAL HYPERTENSION: ICD-10-CM

## 2023-09-21 DIAGNOSIS — B20 HIV INFECTION, UNSPECIFIED SYMPTOM STATUS (HCC): ICD-10-CM

## 2023-09-21 LAB
ALBUMIN SERPL BCP-MCNC: 4.3 G/DL (ref 3.5–5)
ALP SERPL-CCNC: 78 U/L (ref 34–104)
ALT SERPL W P-5'-P-CCNC: 11 U/L (ref 7–52)
ANION GAP SERPL CALCULATED.3IONS-SCNC: 6 MMOL/L
AST SERPL W P-5'-P-CCNC: 12 U/L (ref 13–39)
BASOPHILS # BLD AUTO: 0.05 THOUSANDS/ÂΜL (ref 0–0.1)
BASOPHILS NFR BLD AUTO: 1 % (ref 0–1)
BILIRUB SERPL-MCNC: 0.45 MG/DL (ref 0.2–1)
BUN SERPL-MCNC: 15 MG/DL (ref 5–25)
CALCIUM SERPL-MCNC: 9.9 MG/DL (ref 8.4–10.2)
CHLORIDE SERPL-SCNC: 101 MMOL/L (ref 96–108)
CO2 SERPL-SCNC: 28 MMOL/L (ref 21–32)
CREAT SERPL-MCNC: 0.9 MG/DL (ref 0.6–1.3)
EOSINOPHIL # BLD AUTO: 0.19 THOUSAND/ÂΜL (ref 0–0.61)
EOSINOPHIL NFR BLD AUTO: 3 % (ref 0–6)
ERYTHROCYTE [DISTWIDTH] IN BLOOD BY AUTOMATED COUNT: 13.4 % (ref 11.6–15.1)
GFR SERPL CREATININE-BSD FRML MDRD: 91 ML/MIN/1.73SQ M
GLUCOSE P FAST SERPL-MCNC: 130 MG/DL (ref 65–99)
HCT VFR BLD AUTO: 44 % (ref 36.5–49.3)
HGB BLD-MCNC: 15.1 G/DL (ref 12–17)
IMM GRANULOCYTES # BLD AUTO: 0.02 THOUSAND/UL (ref 0–0.2)
IMM GRANULOCYTES NFR BLD AUTO: 0 % (ref 0–2)
LYMPHOCYTES # BLD AUTO: 1.76 THOUSANDS/ÂΜL (ref 0.6–4.47)
LYMPHOCYTES NFR BLD AUTO: 29 % (ref 14–44)
MCH RBC QN AUTO: 29.7 PG (ref 26.8–34.3)
MCHC RBC AUTO-ENTMCNC: 34.3 G/DL (ref 31.4–37.4)
MCV RBC AUTO: 86 FL (ref 82–98)
MONOCYTES # BLD AUTO: 0.66 THOUSAND/ÂΜL (ref 0.17–1.22)
MONOCYTES NFR BLD AUTO: 11 % (ref 4–12)
NEUTROPHILS # BLD AUTO: 3.38 THOUSANDS/ÂΜL (ref 1.85–7.62)
NEUTS SEG NFR BLD AUTO: 56 % (ref 43–75)
NRBC BLD AUTO-RTO: 0 /100 WBCS
PLATELET # BLD AUTO: 223 THOUSANDS/UL (ref 149–390)
PMV BLD AUTO: 10.7 FL (ref 8.9–12.7)
POTASSIUM SERPL-SCNC: 3.9 MMOL/L (ref 3.5–5.3)
PROT SERPL-MCNC: 8.8 G/DL (ref 6.4–8.4)
RBC # BLD AUTO: 5.09 MILLION/UL (ref 3.88–5.62)
SODIUM SERPL-SCNC: 135 MMOL/L (ref 135–147)
TSH SERPL DL<=0.05 MIU/L-ACNC: 3.57 UIU/ML (ref 0.45–4.5)
WBC # BLD AUTO: 6.06 THOUSAND/UL (ref 4.31–10.16)

## 2023-09-21 PROCEDURE — 87536 HIV-1 QUANT&REVRSE TRNSCRPJ: CPT

## 2023-09-21 PROCEDURE — 86361 T CELL ABSOLUTE COUNT: CPT

## 2023-09-21 PROCEDURE — 99214 OFFICE O/P EST MOD 30 MIN: CPT | Performed by: INTERNAL MEDICINE

## 2023-09-21 PROCEDURE — 36415 COLL VENOUS BLD VENIPUNCTURE: CPT

## 2023-09-21 PROCEDURE — 85025 COMPLETE CBC W/AUTO DIFF WBC: CPT

## 2023-09-21 PROCEDURE — 80053 COMPREHEN METABOLIC PANEL: CPT

## 2023-09-21 PROCEDURE — 84443 ASSAY THYROID STIM HORMONE: CPT

## 2023-09-21 NOTE — PROGRESS NOTES
Suzanne Caceres  1960  [address] [facility]  [address] [facility] [place] [address]    DISCUSSION/SUMMARY:    28-year-old male with multiple medical problems recently found to have extensive bilateral PE. Issues    Bilateral pulmonary emboli. Mr. Johann Ulloa feels well and clinically there are no concerning findings. Respiratory status is stable. Patient will continue with the Eliquis 5 mg twice a day monitoring for excessive bruising or bleeding. Patient knows to call the office if he is scheduled for any invasive procedure or surgery. Thrombophilia results are listed below. Patient was found to have elevated factor VIII, elevated factor XI, elevated fibrinogen and abnormal D-dimer level. Additionally patient is extremely immobile, uses a wheelchair to get about. Patient is at a significant increased risk for another thrombotic complication without anticoagulation. We rediscussed what to monitor for as far as any lower extremity swelling, cords, pain or any acute respiratory symptoms. Patient is to return to the office in 6 months. Cystic/solid right-sided thyroid nodule. Workup/treatment as per PCP. Recent biopsy demonstrated benign pathology. Elevated total protein level. The recent total protein was elevated, has been in the past.  CBC parameters, renal function, liver function and calcium level are all within normal limits. Patient will be sent for an SPEP on the next office visit. Mr. Johann Ulloa knows to call the hematology/oncology office if there are any other questions or concerns. Carefully review your medication list and verify that the list is accurate and up-to-date.  Please call the hematology/oncology office if there are medications missing from the list, medications on the list that you are not currently taking or if there is a dosage or instruction that is different from how you're taking that medication. Patient goals and areas of care: Eliquis   Barriers to care:  None  Patient is able to self-care.  ______________________________________________________________________________________    Chief Complaint   Patient presents with   • Follow-up   • History of PE on Eliquis     History of Present Illness:  65-year-old male with a number of medical problems recently developing progressive shortness of breath and dyspnea on exertion. Patient was seen in the emergency room and found to have a PE. Mr. Elle Meier was started on Eliquis. Patient completed 6 months of treatment and then discontinued. Mr. Elle Meier subsequently went for thrombophilia evaluation - results are listed below. Presently patient states feeling okay, baseline. No shortness of breath or dyspnea on exertion, no chest pain or pressure. No problems with excessive bruising or bleeding. No other problems with the Eliquis. No pending procedures. Appetite is good, weight is stable. Activities are extremely limited, same as before. Review of Systems   Constitutional: Positive for fatigue. HENT: Negative. Eyes: Negative. Respiratory: Negative for shortness of breath. Cardiovascular: Negative for chest pain. Gastrointestinal: Negative. Endocrine: Negative. Genitourinary: Negative. Musculoskeletal: Positive for arthralgias. Skin: Negative. Allergic/Immunologic: Negative. Neurological: Negative. Hematological: Negative. Psychiatric/Behavioral: Negative. All other systems reviewed and are negative.     Patient Active Problem List   Diagnosis   • Anxiety   • Essential hypertension   • Heart murmur   • Hiatal hernia   • HIV disease ([address])   • Hypertriglyceridemia   • Inguinal hernia   • Toxic effect of tobacco and nicotine   • Other male erectile dysfunction   • Thyroid nodule   • Goiter, nontoxic, multinodular   • Other pulmonary embolism with acute cor pulmonale (HCC)   • Primary osteoarthritis of both knees   • Type 2 diabetes mellitus with unspecified complications (HCC)   • Elevated TSH     Past Medical History:   Diagnosis Date   • DDD (degenerative disc disease), lumbar    • Facet joint disease    • Human immunodeficiency virus (HIV) infection ([address])     resolved 11/5/15   • Hypertension     resolved 5/29/15   • Other specified disorders of white blood cells     resolved 16     Past Surgical History:   Procedure Laterality Date   • HERNIA REPAIR     • SHOULDER SURGERY Left    • US GUIDED THYROID BIOPSY  2023     Family history:  No known familial or genetic diseases including any thrombophilia, children in good general health    Social History     Socioeconomic History   • Marital status: Single     Spouse name: Not on file   • Number of children: Not on file   • Years of education: Not on file   • Highest education level: Not on file   Occupational History   • Not on file   Tobacco Use   • Smoking status: Former     Packs/day: 0.50     Years: 43.00     Total pack years: 21.50     Types: Cigarettes     Quit date: 6/15/2022     Years since quittin.2   • Smokeless tobacco: Never   • Tobacco comments:     10 cigs/day   Vaping Use   • Vaping Use: Never used   Substance and Sexual Activity   • Alcohol use: Not Currently   • Drug use: Never   • Sexual activity: Not Currently     Partners: Female   Other Topics Concern   • Not on file   Social History Narrative   • Not on file     Social Determinants of Health     Financial Resource Strain: Not on file   Food Insecurity: No Food Insecurity (2022)    Hunger Vital Sign    • Worried About Running Out of Food in the Last Year: Never true    • Ran Out of Food in the Last Year: Never true   Transportation Needs: No Transportation Needs (2022)    PRAPARE - Transportation    • Lack of Transportation (Medical): No    • Lack of Transportation (Non-Medical):  No   Physical Activity: Not on file   Stress: Not on file   Social Connections: Not on file Intimate Partner Violence: Not on file   Housing Stability: Low Risk  (6/24/2022)    Housing Stability Vital Sign    • Unable to Pay for Housing in the Last Year: No    • Number of Places Lived in the Last Year: 1    • Unstable Housing in the Last Year: No   Social history:  Discontinued tobacco use recently (40 pack-year history), no drug or alcohol abuse, no toxic exposure    Current Outpatient Medications:   •  amLODIPine (NORVASC) 2.5 mg tablet, Take 1 tablet (2.5 mg total) by mouth daily, Disp: 30 tablet, Rfl: 5  •  apixaban (Eliquis) 2.5 mg, Take 1 tablet (2.5 mg total) by mouth 2 (two) times a day, Disp: 60 tablet, Rfl: 2  •  Darunavir-Cobicistat (Prezcobix) 800-150 MG TABS, Take 1 tablet by mouth daily, Disp: 30 tablet, Rfl: 3  •  dolutegravir (Tivicay) 50 MG TABS, Take 1 tablet (50 mg total) by mouth daily, Disp: 30 tablet, Rfl: 5  •  metFORMIN (FORTAMET) 1000 MG (OSM) 24 hr tablet, Take 1 tablet (1,000 mg total) by mouth daily with dinner, Disp: 30 tablet, Rfl: 5  •  PARoxetine (PAXIL) 20 mg tablet, Take 1 tablet (20 mg total) by mouth daily, Disp: 30 tablet, Rfl: 5  •  sildenafil (VIAGRA) 50 MG tablet, Take 1 tablet (50 mg total) by mouth daily as needed for erectile dysfunction, Disp: 30 tablet, Rfl: 0  •  tenofovir (VIREAD) 300 mg tablet, Take 1 tablet (300 mg total) by mouth daily, Disp: 30 tablet, Rfl: 5    No Known Allergies    Vitals:    09/21/23 1458   BP: 128/86   Pulse: 84   Resp: 16   Temp: 97.6 °F (36.4 °C)   SpO2: 97%     Physical Exam  Constitutional:       Appearance: He is well-developed. Comments: Middle-aged male, no respiratory distress, no signs of pain   HENT:      Head: Normocephalic and atraumatic. Eyes:      Extraocular Movements: Extraocular movements intact. Conjunctiva/sclera: Conjunctivae normal.      Pupils: Pupils are equal, round, and reactive to light. Cardiovascular:      Rate and Rhythm: Normal rate and regular rhythm.       Heart sounds: Normal heart sounds. Pulmonary:      Effort: Pulmonary effort is normal. No respiratory distress. Breath sounds: Normal breath sounds. Comments: Good air entry bilaterally  Abdominal:      General: Bowel sounds are normal.      Palpations: Abdomen is soft. Tenderness: There is no guarding. Comments: Soft, nontender, mild distension, +bowel sounds, no guarding, can't palpate liver or spleen   Musculoskeletal:         General: Normal range of motion. Cervical back: Normal range of motion and neck supple. Skin:     General: Skin is warm. Comments: Warm, moist, good color, no petechiae or ecchymoses   Neurological:      Mental Status: He is alert and oriented to person, place, and time. Psychiatric:         Behavior: Behavior normal.         Thought Content: Thought content normal.         Judgment: Judgment normal.     Extremities: 1+ edema in bilateral lower extremities from feet to the ankles, no cords, pulses are decreased  Lymphatics:  No adenopathy in the neck, supraclavicular region, axilla bilaterally    Labs    9/21/2023 WBC = 6.06 hemoglobin = 15.1 hematocrit = 44 platelet = 490 neutrophil = 56% BUN = 15 creatinine = 0.90 LFTs WNL calcium = 9.9 total protein = 8.8    Imaging    11/21/2022 CTA chest PE study     IMPRESSION:     1. No pulmonary embolism or acute intrathoracic process. 2.  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    8/12/2022 vascular lower limb venous duplex study bilateral    RIGHT LOWER LIMB:  No evidence of acute or chronic deep vein thrombosis. No evidence of superficial thrombophlebitis noted. Doppler evaluation shows a normal response to augmentation maneuvers. Popliteal, posterior tibial and anterior tibial arterial Doppler waveforms are  triphasic. LEFT LOWER LIMB:  No evidence of acute or chronic deep vein thrombosis.   No evidence of superficial thrombophlebitis noted. Doppler evaluation shows a normal response to augmentation maneuvers. Popliteal, posterior tibial and anterior tibial arterial Doppler waveforms are  triphasic.    07/19/2022 US thyroid    Impression: Dominant cystic and solid nodule in the lower pole the right thyroid lobe  on image 25 meets ACR criteria for recommendation of ultrasound-guided fine-needle aspiration biopsy based on TI-RADS Classification of TR 3 (3 points) and size >2.5 cm. Reference: ACR Thyroid Imaging, Reporting and Data System (TI-RADS): White Paper of the CIBDO. J AM America Radiol 3206;11:100-089. (additional recommendations based on American Thyroid Association 2015 guidelines.) This examination was marked "significant notification" in Epic in order to begin the standard process by which the radiology reading room liaison alerts the referring practitioner. 06/22/2022 CTA chest PE study    PULMONARY ARTERIAL TREE:  Extensive acute pulmonary emboli involving the right greater than left main, all lobar, all right and multiple left segmental branches and multiple bilateral subsegmental branches.     LUNGS:  Scattered small peripheral groundglass opacities in the right greater than left lungs, likely pulmonary infarcts. There is no tracheal or endobronchial lesion. IMPRESSION:    1. Extensive acute pulmonary emboli, as described. The calculated ratio of right ventricular to left ventricular diameter (RV/LV ratio) is 1.3. This is greater than 0.9, which is abnormal and indicates right heart strain. An abnormal RV/LV ratio has been shown to be associated with an increased risk of 30 day mortality in the setting of acute pulmonary embolism. Urgent consultation with the medical critical care team is recommended.     2.  Scattered small peripheral groundglass opacities in the right greater than left lungs, likely small pulmonary infarcts.     3. Severely enlarged multinodular right thyroid lobe.   Outpatient thyroid ultrasound is recommended for further evaluation. Pathology    1/30/2023 GP hemostasis evaluation. PT, INR, PTT, lupus anticoagulant profile and antibeta-2 glycoprotein 1 IgM, IgA, IgG all within normal limits. Anticardiolipin IgG and IgM had low positive titers. Factor VIII activity and factor XI activity were elevated. Factor XII and factor IX activities were within normal limits. Fibrinogen and D-dimer were elevated. APCR was within normal limits. Protein S and protein C level/activity were within normal limits. Homocystine, thrombin time and Antithrombin III levels were within normal limits.

## 2023-09-21 NOTE — PROGRESS NOTES
[facility] Nutrition Encounter   RD received phone call from pt regarding exercise update. Pt reported he might need surgery on his feet & has to discontinue exercise classes. He is struggling with arthritis as well. RD discussed foods high in antioxidants to help with inflammation. Pt demonstrated understanding. All questions and concerns addressed.    Abraham Banks RDN,LDN

## 2023-09-22 LAB
BASOPHILS # BLD AUTO: 0.1 X10E3/UL (ref 0–0.2)
BASOPHILS NFR BLD AUTO: 1 %
CD3+CD4+ CELLS # BLD: 558 /UL (ref 359–1519)
CD3+CD4+ CELLS NFR BLD: 34.9 % (ref 30.8–58.5)
EOSINOPHIL # BLD AUTO: 0.2 X10E3/UL (ref 0–0.4)
EOSINOPHIL NFR BLD AUTO: 3 %
ERYTHROCYTE [DISTWIDTH] IN BLOOD BY AUTOMATED COUNT: 13.8 % (ref 11.6–15.4)
HCT VFR BLD AUTO: 32.9 % (ref 37.5–51)
HGB BLD-MCNC: 11.3 G/DL (ref 13–17.7)
IMM GRANULOCYTES # BLD: 0 X10E3/UL (ref 0–0.1)
IMM GRANULOCYTES NFR BLD: 0 %
LYMPHOCYTES # BLD AUTO: 1.6 X10E3/UL (ref 0.7–3.1)
LYMPHOCYTES NFR BLD AUTO: 25 %
MCH RBC QN AUTO: 29.6 PG (ref 26.6–33)
MCHC RBC AUTO-ENTMCNC: 34.3 G/DL (ref 31.5–35.7)
MCV RBC AUTO: 86 FL (ref 79–97)
MONOCYTES # BLD AUTO: 0.8 X10E3/UL (ref 0.1–0.9)
MONOCYTES NFR BLD AUTO: 13 %
NEUTROPHILS # BLD AUTO: 3.8 X10E3/UL (ref 1.4–7)
NEUTROPHILS NFR BLD AUTO: 58 %
PLATELET # BLD AUTO: 289 X10E3/UL (ref 150–450)
RBC # BLD AUTO: 3.82 X10E6/UL (ref 4.14–5.8)
WBC # BLD AUTO: 6.5 X10E3/UL (ref 3.4–10.8)

## 2023-09-25 LAB
HIV1 RNA # SERPL NAA+PROBE: <20 COPIES/ML
HIV1 RNA SERPL NAA+PROBE-LOG#: NORMAL LOG10COPY/ML

## 2023-10-03 ENCOUNTER — OFFICE VISIT (OUTPATIENT)
Dept: SURGERY | Facility: CLINIC | Age: 63
End: 2023-10-03
Payer: MEDICARE

## 2023-10-03 ENCOUNTER — DOCUMENTATION (OUTPATIENT)
Dept: SURGERY | Facility: CLINIC | Age: 63
End: 2023-10-03

## 2023-10-03 VITALS
DIASTOLIC BLOOD PRESSURE: 70 MMHG | OXYGEN SATURATION: 95 % | SYSTOLIC BLOOD PRESSURE: 118 MMHG | WEIGHT: 196.8 LBS | HEIGHT: 72 IN | BODY MASS INDEX: 26.66 KG/M2 | TEMPERATURE: 99.2 F | HEART RATE: 102 BPM

## 2023-10-03 DIAGNOSIS — T65.294D TOXIC EFFECT OF TOBACCO, UNDETERMINED INTENT, SUBSEQUENT ENCOUNTER: ICD-10-CM

## 2023-10-03 DIAGNOSIS — Z23 NEED FOR INFLUENZA VACCINATION: ICD-10-CM

## 2023-10-03 DIAGNOSIS — I10 ESSENTIAL HYPERTENSION: ICD-10-CM

## 2023-10-03 DIAGNOSIS — E04.1 THYROID NODULE: ICD-10-CM

## 2023-10-03 DIAGNOSIS — B20 HIV DISEASE (HCC): Primary | ICD-10-CM

## 2023-10-03 DIAGNOSIS — I26.09 OTHER PULMONARY EMBOLISM WITH ACUTE COR PULMONALE, UNSPECIFIED CHRONICITY (HCC): ICD-10-CM

## 2023-10-03 DIAGNOSIS — E11.8 TYPE 2 DIABETES MELLITUS WITH UNSPECIFIED COMPLICATIONS (HCC): ICD-10-CM

## 2023-10-03 PROCEDURE — 90686 IIV4 VACC NO PRSV 0.5 ML IM: CPT

## 2023-10-03 PROCEDURE — 99214 OFFICE O/P EST MOD 30 MIN: CPT | Performed by: INTERNAL MEDICINE

## 2023-10-03 PROCEDURE — G0008 ADMIN INFLUENZA VIRUS VAC: HCPCS

## 2023-10-03 NOTE — PROGRESS NOTES
Progress Note - Infectious Disease   Kee Renee 66 y.o. male MRN: [id number]  Unit/Bed#:  Encounter: [id number]      Impression/Plan:  1.  HIV-doing well with improved adherence with a now undetectable viral load and CD4 count greater than 500. Continue ART, recheck labs in 5 months, and follow-up in 6 months. Stressed adherence     2.  Nicotine dependence-restarted smoking.  Stressed the importance of complete tobacco cessation.     3. Hypertension-asymptomatic. Well-controlled. Discussed still the primary     4. Erectile dysfunction. On Viagra     5.  Weight loss. He is back to a stable weight.     6. Diabetes mellitus-newly diagnosed.  Decrease hemoglobin A1c down to 6.3 with increased dose of metformin     7. Hypothyroidism- in the setting of thyroid nodule.   Nodule removed.  TSH normal.     8. Pulmonary embolism-on Eliquis     9.  Elevated serum creatinine. Normalized      Patient was provided medication, adherence and prevention education    Subjective:  Routine follow-up for HIV. Patient claims 100% adherence with Prezcobix Tivicay Viread. Patient denies any notable side effects. Overall the feeling well. The patient denies any fever chills or sweats, denies any nausea vomiting or diarrhea, denies any cough or shortness of breath. ROS:  A complete review of systems is negative other than that noted above in the subjective    Followup portions patient history reviewed and updated as: Allergies, current medications, past medical history, past social history, past surgical history, and the problem list    Objective:  Vitals:  Vitals:    10/03/23 1603   BP: 118/70   BP Location: Right arm   Pulse: 102   Temp: 99.2 °F (37.3 °C)   SpO2: 95%   Weight: 89.3 kg (196 lb 12.8 oz)   Height: 6' (1.829 m)       Physical Exam:   General Appearance:  Alert, interactive, appearing well,  nontoxic, no acute distress.    Neck:   Supple without lymphadenopathy, no thyromegaly or masses   Throat: Oropharynx moist without lesions. Lungs:   Clear to auscultation bilaterally; no wheezes, rhonchi or rales; respirations unlabored   Heart:  RRR; no murmur, rub or gallop   Abdomen:   Soft, non-tender, non-distended, positive bowel sounds. Extremities: No clubbing, cyanosis or edema   Skin: No new rashes or lesions. No draining wounds noted. Labs, Imaging, & Other studies:   All pertinent labs and imaging studies were personally reviewed    Lab Results   Component Value Date     10/26/2015    K 3.9 09/21/2023     09/21/2023    CO2 28 09/21/2023    ANIONGAP 9 10/26/2015    BUN 15 09/21/2023    CREATININE 0.90 09/21/2023    GLUCOSE 133 10/26/2015    GLUF 130 (H) 09/21/2023    CALCIUM 9.9 09/21/2023    CORRECTEDCA 9.7 06/23/2022    AST 12 (L) 09/21/2023    ALT 11 09/21/2023    ALKPHOS 78 09/21/2023    PROT 8.2 10/26/2015    BILITOT 0.23 10/26/2015    EGFR 91 09/21/2023     Lab Results   Component Value Date    WBC 6.06 09/21/2023    WBC 6.5 09/21/2023    HGB 15.1 09/21/2023    HGB 11.3 (L) 09/21/2023    HCT 44.0 09/21/2023    HCT 32.9 (L) 09/21/2023    MCV 86 09/21/2023    MCV 86 09/21/2023     09/21/2023     09/21/2023     Lab Results   Component Value Date    HEPCAB Non-reactive 06/13/2023     Lab Results   Component Value Date    HEPCAB Non-reactive 06/13/2023     Lab Results   Component Value Date    RPR Non-Reactive 11/21/2022     CD4 ABS   Date/Time Value Ref Range Status   09/21/2023 02:23  359 - 1519 /uL Final     HIV-1 RNA by PCR, Qn   Date/Time Value Ref Range Status   09/21/2023 02:23 PM <20 copies/mL Final     Comment:     HIV-1 RNA not detected  The reportable range for this assay is 20 to 10,000,000  copies HIV-1 RNA/mL.          Current Outpatient Medications:   •  amLODIPine (NORVASC) 2.5 mg tablet, Take 1 tablet (2.5 mg total) by mouth daily, Disp: 30 tablet, Rfl: 5  •  apixaban (Eliquis) 2.5 mg, Take 1 tablet (2.5 mg total) by mouth 2 (two) times a day, Disp: 60 tablet, Rfl: 2  •  Darunavir-Cobicistat (Prezcobix) 800-150 MG TABS, Take 1 tablet by mouth daily, Disp: 30 tablet, Rfl: 3  •  dolutegravir (Tivicay) 50 MG TABS, Take 1 tablet (50 mg total) by mouth daily, Disp: 30 tablet, Rfl: 5  •  metFORMIN (FORTAMET) 1000 MG (OSM) 24 hr tablet, Take 1 tablet (1,000 mg total) by mouth daily with dinner, Disp: 30 tablet, Rfl: 5  •  PARoxetine (PAXIL) 20 mg tablet, Take 1 tablet (20 mg total) by mouth daily, Disp: 30 tablet, Rfl: 5  •  sildenafil (VIAGRA) 50 MG tablet, Take 1 tablet (50 mg total) by mouth daily as needed for erectile dysfunction, Disp: 30 tablet, Rfl: 0  •  tenofovir (VIREAD) 300 mg tablet, Take 1 tablet (300 mg total) by mouth daily, Disp: 30 tablet, Rfl: 5

## 2023-10-05 NOTE — PROGRESS NOTES
Pastoral Care Progress Note    10/5/2023  Patient: Marcos Montaño : 1960  Encounter Date & Time: 10/3/2023   MRN: [id number]        Mr. Angle Hamilton requested to met with  when he comes in for his visit. Mr. Angle Hamilton shared he was doing okay. 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 Mr. Angle Hamilton reported having two fears, one of women and the second the  was not able to discuss with patient due to time. He reported he is afraid of women because they have broken his heart. Mr. Angle Hamilton reported he and his current partner recently broke up. The  follow-up with patient on how Mr. Angle Hamilton is processing his loss. The  will follow-up with Mr. Angle Hamilton on their weekly call.
fall

## 2023-11-13 ENCOUNTER — PATIENT OUTREACH (OUTPATIENT)
Dept: SURGERY | Facility: CLINIC | Age: 63
End: 2023-11-13

## 2023-11-13 NOTE — PROGRESS NOTES
Ct got in contact with cm. Cm performed a wellness check alongside informing ct was due for a six month follow up.

## 2023-11-24 ENCOUNTER — DOCUMENTATION (OUTPATIENT)
Dept: SURGERY | Facility: CLINIC | Age: 63
End: 2023-11-24

## 2023-11-24 NOTE — PROGRESS NOTES
Pastoral Care Progress Note    2023  Patient: Alisa Michaels : 1960  Encounter Date & Time: 2023   MRN: [id number]          The  called Mr. Thomas Ramos for continued care and support. Mr. Thomas Ramos is not Spiritism but is open to ongoing spiritual care support. Mr. Thomas Ramos advised he was feeling much better and shared he has "turned the corner" in his mental health. He advised he had a low key holiday. He advised he is back to setting some goals and looks forward to sharing about it. The  spoke to Mr. Thomas Ramos for 50 minutes. Mr. Thomas Ramos agreed to ongoing calls with the  in the future  . Chaplaincy Interventions Utilized:   Empowerment: Clarified, confirmed, or reviewed information from treatment team , Encouraged self-care, and Reframed experience of patient/family  The  verified patients future PCP and ID appointments.      Exploration: Explored emotional needs & resources and Facilitated story telling    Relationship Building: Cultivated a relationship of care and support and Listened empathically      Chaplaincy Outcomes Achieved:  Developed chaplaincy care plan and Expressed humor      Spiritual Coping Strategies Utilized:   Connectedness

## 2023-12-14 ENCOUNTER — PATIENT OUTREACH (OUTPATIENT)
Dept: SURGERY | Facility: CLINIC | Age: 63
End: 2023-12-14

## 2023-12-14 DIAGNOSIS — I26.99 PULMONARY EMBOLISM, OTHER, UNSPECIFIED CHRONICITY, UNSPECIFIED WHETHER ACUTE COR PULMONALE PRESENT (HCC): ICD-10-CM

## 2023-12-14 NOTE — PROGRESS NOTES
Ct came into the office to meet with cm. CM has completed [employer] Sliding Fee Scale application with ct. Cm has sent application to the financial counselors. Cm has also completed Medicaid renewal with ct. Cm [place] also [place] [facility]. Ct has expressed gratitude.

## 2023-12-15 RX ORDER — APIXABAN 2.5 MG/1
2.5 TABLET, FILM COATED ORAL 2 TIMES DAILY
Qty: 60 TABLET | Refills: 2 | Status: SHIPPED | OUTPATIENT
Start: 2023-12-15

## 2023-12-18 ENCOUNTER — PATIENT OUTREACH (OUTPATIENT)
Dept: SURGERY | Facility: CLINIC | Age: 63
End: 2023-12-18

## 2023-12-22 ENCOUNTER — DOCUMENTATION (OUTPATIENT)
Dept: SURGERY | Facility: CLINIC | Age: 63
End: 2023-12-22

## 2023-12-22 NOTE — PROGRESS NOTES
" Pastoral Care Progress Note    2023  Patient: Marko Daniel : 1960  Encounter Date & Time: 2023   MRN: [id number]        The  returned call from patient. Mr. Daniel advised he was feeling anxiety reporting he was being an \"agoraphobic\". He advised his SO put up a [place] tree and seeing the tree triggered past feelings and he felt overwhelmed.  He remembered the negative memories from his family and how his family now is back together. He was overwhelmed with wood and sadness.  He reported it was difficult for him to describe. Mr. Daniel advised he has not seen his grandson because he has difficulty moving around but plans to see him him. He expressed concerns about the new year and how it triggers his PTSD. He shared he croy by wearing earbuds and being with his dog.  The  fostered storytelling and encouraged Mr. Daniel to make seeing his grandson a priority.  In addition,  encouraged and affirmed positive coping mechanisms.  The  will follow-up with patient in the new .  Further support as needed.              Chaplaincy Interventions Utilized:   Empowerment: Encouraged self-care, Normalized experience of patient/family, and Reframed experience of patient/family    Exploration: Explored emotional needs & resources, Explored relational needs & resources, and Identified, evaluated & reinforced appropriate coping strategies    Relationship Building: Cultivated a relationship of care and support and Listened empathically    Chaplaincy Outcomes Achieved:  Developed chaplaincy care plan    Spiritual Coping Strategies Utilized:   No spiritual coping            "

## 2024-01-08 ENCOUNTER — PATIENT OUTREACH (OUTPATIENT)
Dept: SURGERY | Facility: CLINIC | Age: 64
End: 2024-01-08

## 2024-01-11 NOTE — PROGRESS NOTES
Javier has discussed receiving text message from ct about his Medical Assistance Renewal being denied. Javier has discussed this with Manager. Javier plans to follow up with Ct and [facility] to inquire reason.

## 2024-01-12 ENCOUNTER — PATIENT OUTREACH (OUTPATIENT)
Dept: SURGERY | Facility: CLINIC | Age: 64
End: 2024-01-12

## 2024-01-12 NOTE — PROGRESS NOTES
Cm got in contact with ct to follow up with ct being denied Medical Assistance. Ct has reported to not have been able to find the letter. Ct has also reported to not understand why he was denied benefits. Cm has then offered to get in contact with the [facility] alongside ct to inquire why his Medical Assistance was denied. Ct has reported to not be able to get in contact with [facility] alongside cm at this time. Ct has inquired if there was a possibility cm can inquire with ct and DHS next week. Cm has inquired if 01/16/2024 would be good for ct. Ct was receptive to 01/15/2024. Cm has then scheduled to meet with ct 10:30 am 01/16. Ct acknowledged.

## 2024-01-16 ENCOUNTER — PATIENT OUTREACH (OUTPATIENT)
Dept: SURGERY | Facility: CLINIC | Age: 64
End: 2024-01-16

## 2024-01-16 NOTE — PROGRESS NOTES
Ct got in contact with cm concerning appointment cm and ct had scheduled. Cm has then discussed postponing scheduled appointment with ct due to inclement weather. Ct agreed. Cm and ct have then agreed to 9:00 am 01/19. Ct has then gotten in contact with ct again. Ct has reported to have found letter from [facility] indicating why ct has lost Medicaid benefits. Ct has reported letter indicating that ct makes too much of an income. Cm acknowledged and has discussed potentially discussing MAWD with [facility] when ct comes into office. Ct acknowledged.

## 2024-01-19 ENCOUNTER — PATIENT OUTREACH (OUTPATIENT)
Dept: SURGERY | Facility: CLINIC | Age: 64
End: 2024-01-19

## 2024-01-19 ENCOUNTER — DOCUMENTATION (OUTPATIENT)
Dept: SURGERY | Facility: CLINIC | Age: 64
End: 2024-01-19

## 2024-01-19 NOTE — PROGRESS NOTES
Cm got in contact with ct to postpone scheduled appointment. Cm and ct have agreed for ct to come into the office January 23, 2024 at 10:00 am. Cm has also performed a wellness check on ct.

## 2024-01-19 NOTE — PROGRESS NOTES
Pastoral Care Progress Note    2024  Patient: Marko Daniel : 1960  Encounter Date & Time: 2024   MRN: [id number]        The  called Mr. Daniel for continued pastoral care and support. Mr. Daniel reported on a scale of 1 through 10 he felt like a 7 or 8.  He advised things were going well.  He has been focused on rescheduling his appointments due to the weather and reached out to his 's medical team and .  He shared stories regarding his travels in the past and shared what was going on with his family.  Mr. Daniel expressed satisfaction on where he is today. Mr. Daniel's moods appeared to improve based on previous calls. He expressed connection with his sons. The  listened empathically and fostered storytelling. The  encouraged Mr. Daniel to take care of himself. The  and Mr. Daniel agreed to check in next week.               Chaplaincy Interventions Utilized:   Empowerment: Encouraged self-care    Exploration: Explored emotional needs & resources, Facilitated story telling, and Identified, evaluated & reinforced appropriate coping strategies    Relationship Building: Cultivated a relationship of care and support and Listened empathically    Chaplaincy Outcomes Achieved:  Expressed humor and Identified priorities      Spiritual Coping Strategies Utilized:   Connectedness

## 2024-01-23 ENCOUNTER — PATIENT OUTREACH (OUTPATIENT)
Dept: SURGERY | Facility: CLINIC | Age: 64
End: 2024-01-23

## 2024-01-23 NOTE — PROGRESS NOTES
Ct has came into the office to meet with cm. Ct has presented letters from DHS alongside social security to cm. Cm has acknowledged. Cm has then offered to call [facility] to see if ct may be eligible for MAWD. Ct was receptive. Cm has then gotten in contact with the [facility] and was able to get in contact with Miss Luong. Cm has inquired with Miss Luong about ct being eligible for MAWD. Miss Luong has informed ct can retrieve letter from someone indicating ct is walking their dog for ct to then apply for MAWD. Cm and ct acknowledged and have expressed gratitude. Miss Luong has then given  confirmation number for phone call: [phone]. Ct has reported he will try retrieving a letter. Cm has informed ct has Medicare as well to lean on. Ct acknowledged. Cm has inquired if ct has completed Placard application. Ct has reported to not have completed Placard application. Cm acknowledged.

## 2024-02-02 DIAGNOSIS — B20 HIV INFECTION, UNSPECIFIED SYMPTOM STATUS (HCC): ICD-10-CM

## 2024-02-05 RX ORDER — DARUNAVIR ETHANOLATE AND COBICISTAT 800; 150 MG/1; MG/1
1 TABLET, FILM COATED ORAL DAILY
Qty: 30 TABLET | Refills: 3 | Status: SHIPPED | OUTPATIENT
Start: 2024-02-05

## 2024-02-16 ENCOUNTER — OFFICE VISIT (OUTPATIENT)
Dept: PODIATRY | Facility: CLINIC | Age: 64
End: 2024-02-16
Payer: MEDICARE

## 2024-02-16 VITALS
DIASTOLIC BLOOD PRESSURE: 78 MMHG | OXYGEN SATURATION: 100 % | SYSTOLIC BLOOD PRESSURE: 120 MMHG | HEART RATE: 92 BPM | BODY MASS INDEX: 25.73 KG/M2 | WEIGHT: 190 LBS | HEIGHT: 72 IN

## 2024-02-16 DIAGNOSIS — B35.1 ONYCHOMYCOSIS: Primary | ICD-10-CM

## 2024-02-16 DIAGNOSIS — E11.8 TYPE 2 DIABETES MELLITUS WITH UNSPECIFIED COMPLICATIONS (HCC): ICD-10-CM

## 2024-02-16 DIAGNOSIS — L84 CORNS: ICD-10-CM

## 2024-02-16 PROCEDURE — 11056 PARNG/CUTG B9 HYPRKR LES 2-4: CPT | Performed by: PODIATRIST

## 2024-02-16 PROCEDURE — 11721 DEBRIDE NAIL 6 OR MORE: CPT | Performed by: PODIATRIST

## 2024-02-16 NOTE — PROGRESS NOTES
Assessment/Plan:     The patient's clinical examination today significant for thickened,brittle and dystrophic pedal nail plates with discoloration and subungual debris consistent with onychomycosis x10.  There are callus lesions beneath the first MTPJ's bilaterally.  The interdigital spaces are clear without maceration.  Pedal pulses are diminished bilaterally.  There is decreased hair growth with dry scaling skin that is thinned and with decreased turgor.     The pedal nail plates were sharply debrided with a sterile nail clipper without complication x10.  The nails of them became reduced in thickness and girth utilizing a rotary bur.  The callus lesions beneath the great toe joints were debrided bilaterally with a sterile #15 blade without complication.  There are no other acute pedal issues.  Recommend daily use of a skin cream or lotion to hydrate the dry areas of skin.     Recommend follow-up in 3 months.        Diagnoses and all orders for this visit:    Onychomycosis    Corns    Type 2 diabetes mellitus with unspecified complications (HCC)          Subjective:     Patient ID: Marko Daniel is a 63 y.o. male.    The patient presents today for follow-up at risk diabetic foot care with class findings.  He notes thickening of his nail plates that is causing discomfort in his close toed shoe gear.  He also notes areas of dry skin and callus to this forefoot bilaterally.  He denies any other acute pedal issues.      PAST MEDICAL HISTORY:  Past Medical History:   Diagnosis Date    DDD (degenerative disc disease), lumbar     Facet joint disease     Human immunodeficiency virus (HIV) infection (HCC)     resolved 11/5/15    Hypertension     resolved 5/29/15    Other specified disorders of white blood cells     resolved 5/12/16       PAST SURGICAL HISTORY:  Past Surgical History:   Procedure Laterality Date    HERNIA REPAIR      SHOULDER SURGERY Left     US GUIDED THYROID BIOPSY  1/5/2023        ALLERGIES:  Patient  "has no known allergies.    MEDICATIONS:  Current Outpatient Medications   Medication Sig Dispense Refill    amLODIPine (NORVASC) 2.5 mg tablet Take 1 tablet (2.5 mg total) by mouth daily 30 tablet 5    dolutegravir (Tivicay) 50 MG TABS Take 1 tablet (50 mg total) by mouth daily 30 tablet 5    Eliquis 2.5 MG TAKE 1 TABLET BY MOUTH TWICE A DAY 60 tablet 2    metFORMIN (FORTAMET) 1000 MG (OSM) 24 hr tablet Take 1 tablet (1,000 mg total) by mouth daily with dinner 30 tablet 5    PARoxetine (PAXIL) 20 mg tablet Take 1 tablet (20 mg total) by mouth daily 30 tablet 5    Prezcobix 800-150 MG TABS TAKE 1 TABLET BY MOUTH DAILY 30 tablet 3    sildenafil (VIAGRA) 50 MG tablet Take 1 tablet (50 mg total) by mouth daily as needed for erectile dysfunction 30 tablet 0    tenofovir (VIREAD) 300 mg tablet Take 1 tablet (300 mg total) by mouth daily 30 tablet 5     No current facility-administered medications for this visit.       SOCIAL HISTORY:  Social History     Socioeconomic History    Marital status: Single     Spouse name: None    Number of children: None    Years of education: None    Highest education level: None   Occupational History    None   Tobacco Use    Smoking status: Every Day     Current packs/day: 0.25     Average packs/day: 0.3 packs/day for 43.0 years (10.8 ttl pk-yrs)     Types: Cigarettes    Smokeless tobacco: Never    Tobacco comments:     6-7 cigs/day   Vaping Use    Vaping status: Never Used   Substance and Sexual Activity    Alcohol use: Yes     Comment: Occassionally, one beer a month    Drug use: Yes     Types: Marijuana     Comment: Occassionally \"as needed for depression\"    Sexual activity: Not Currently     Partners: Female   Other Topics Concern    None   Social History Narrative    None     Social Determinants of Health     Financial Resource Strain: Not on file   Food Insecurity: No Food Insecurity (6/29/2022)    Hunger Vital Sign     Worried About Running Out of Food in the Last Year: Never true "     Ran Out of Food in the Last Year: Never true   Transportation Needs: No Transportation Needs (6/24/2022)    PRAPARE - Transportation     Lack of Transportation (Medical): No     Lack of Transportation (Non-Medical): No   Physical Activity: Not on file   Stress: Not on file   Social Connections: Not on file   Intimate Partner Violence: Not on file   Housing Stability: Low Risk  (6/24/2022)    Housing Stability Vital Sign     Unable to Pay for Housing in the Last Year: No     Number of Places Lived in the Last Year: 1     Unstable Housing in the Last Year: No        Review of Systems   Constitutional: Negative.    HENT: Negative.     Eyes: Negative.    Respiratory: Negative.     Cardiovascular: Negative.    Endocrine: Negative.    Musculoskeletal: Negative.    Neurological: Negative.    Hematological: Negative.    Psychiatric/Behavioral: Negative.           Objective:     Physical Exam  Vitals reviewed.   Constitutional:       Appearance: Normal appearance.   HENT:      Head: Normocephalic and atraumatic.      Nose: Nose normal.   Eyes:      Conjunctiva/sclera: Conjunctivae normal.      Pupils: Pupils are equal, round, and reactive to light.   Cardiovascular:      Pulses: Pulses are weak.           Dorsalis pedis pulses are 0 on the right side and 0 on the left side.        Posterior tibial pulses are 0 on the right side and 0 on the left side.   Pulmonary:      Effort: Pulmonary effort is normal.   Feet:      Right foot:      Protective Sensation: 7 sites tested.  4 sites sensed.      Skin integrity: Callus and dry skin present.      Left foot:      Protective Sensation: 7 sites tested.  5 sites sensed.      Skin integrity: Callus and dry skin present.      Comments: The patient's clinical examination today significant for thickened,brittle and dystrophic pedal nail plates with discoloration and subungual debris consistent with onychomycosis x10.  There are callus lesions beneath the first MTPJ's bilaterally.   "The interdigital spaces are clear without maceration.  Pedal pulses are diminished bilaterally.  There is decreased hair growth with dry scaling skin that is thinned and with decreased turgor.  Skin:     General: Skin is warm.      Capillary Refill: Capillary refill takes 2 to 3 seconds.   Neurological:      General: No focal deficit present.      Mental Status: He is alert and oriented to person, place, and time.   Psychiatric:         Mood and Affect: Mood normal.         Behavior: Behavior normal.         Thought Content: Thought content normal.     Diabetic Foot Exam    Patient's shoes and socks removed.    Right Foot/Ankle   Right Foot Inspection  Skin Exam: dry skin, callus and callus.     Sensory   Vibration: absent  Monofilament testing: diminished    Vascular  The right DP pulse is 0. The right PT pulse is 0.     Left Foot/Ankle  Left Foot Inspection  Skin Exam: dry skin and callus.     Sensory   Vibration: absent  Monofilament testing: diminished    Vascular  The left DP pulse is 0. The left PT pulse is 0.     Assign Risk Category  No deformity present  Loss of protective sensation  Weak pulses  Risk: 2      Lesion Destruction    Date/Time: 2/16/2024 9:45 AM    Performed by: Brant Whatley DPM  Authorized by: Brant Whatley DPM  Universal Protocol:  Consent: Verbal consent obtained.  Risks and benefits: risks, benefits and alternatives were discussed  Consent given by: patient  Time out: Immediately prior to procedure a \"time out\" was called to verify the correct patient, procedure, equipment, support staff and site/side marked as required.  Timeout called at: 2/16/2024 9:45 AM.  Patient understanding: patient states understanding of the procedure being performed  Patient consent: the patient's understanding of the procedure matches consent given  Patient identity confirmed: verbally with patient and provided demographic data    Procedure Details - Lesion Destruction:     Number of Lesions:  " 2  Lesion 1:     Body area:  Lower extremity    Lower extremity location:  R foot    Malignancy: benign hyperkeratotic lesion      Destruction method: scissors used for extraction    Lesion 2:     Body area:  Lower extremity    Lower extremity location:  L foot    Malignancy: benign hyperkeratotic lesion      Destruction method: scissors used for extraction

## 2024-02-28 ENCOUNTER — PATIENT OUTREACH (OUTPATIENT)
Dept: SURGERY | Facility: CLINIC | Age: 64
End: 2024-02-28

## 2024-02-28 NOTE — PROGRESS NOTES
Cm has mailed ct March calendar alongside letter informing ct of no longer being ct's assigned cm beginning 3/2. Cm has also sent ct a text informing cm being assigned to ct ending soon.

## 2024-03-05 ENCOUNTER — DOCUMENTATION (OUTPATIENT)
Dept: SURGERY | Facility: CLINIC | Age: 64
End: 2024-03-05

## 2024-03-14 ENCOUNTER — TELEPHONE (OUTPATIENT)
Dept: HEMATOLOGY ONCOLOGY | Facility: MEDICAL CENTER | Age: 64
End: 2024-03-14

## 2024-03-14 DIAGNOSIS — E04.1 THYROID NODULE: ICD-10-CM

## 2024-03-14 DIAGNOSIS — T65.291D TOXIC EFFECT OF TOBACCO, ACCIDENTAL OR UNINTENTIONAL, SUBSEQUENT ENCOUNTER: ICD-10-CM

## 2024-03-14 DIAGNOSIS — I26.09 OTHER ACUTE PULMONARY EMBOLISM WITH ACUTE COR PULMONALE (HCC): Primary | ICD-10-CM

## 2024-03-14 NOTE — TELEPHONE ENCOUNTER
Spoke with patient regarding labs needed to be drawn prior to appointment.  Indicated the scripts are in the system, they are non-fasting and patient can go to any [facility] facility to have the labs drawn.  Patient verbalized understanding, going Friday or on weekend.

## 2024-03-20 ENCOUNTER — APPOINTMENT (OUTPATIENT)
Dept: LAB | Facility: CLINIC | Age: 64
End: 2024-03-20
Payer: MEDICARE

## 2024-03-20 DIAGNOSIS — I26.09 OTHER ACUTE PULMONARY EMBOLISM WITH ACUTE COR PULMONALE (HCC): ICD-10-CM

## 2024-03-20 LAB
ALBUMIN SERPL BCP-MCNC: 4.5 G/DL (ref 3.5–5)
ALP SERPL-CCNC: 79 U/L (ref 34–104)
ALT SERPL W P-5'-P-CCNC: 19 U/L (ref 7–52)
ANION GAP SERPL CALCULATED.3IONS-SCNC: 7 MMOL/L (ref 4–13)
AST SERPL W P-5'-P-CCNC: 18 U/L (ref 13–39)
BASOPHILS # BLD AUTO: 0.04 THOUSANDS/ÂΜL (ref 0–0.1)
BASOPHILS NFR BLD AUTO: 1 % (ref 0–1)
BILIRUB SERPL-MCNC: 0.45 MG/DL (ref 0.2–1)
BUN SERPL-MCNC: 14 MG/DL (ref 5–25)
CALCIUM SERPL-MCNC: 9.9 MG/DL (ref 8.4–10.2)
CHLORIDE SERPL-SCNC: 100 MMOL/L (ref 96–108)
CO2 SERPL-SCNC: 28 MMOL/L (ref 21–32)
CREAT SERPL-MCNC: 1.1 MG/DL (ref 0.6–1.3)
EOSINOPHIL # BLD AUTO: 0.13 THOUSAND/ÂΜL (ref 0–0.61)
EOSINOPHIL NFR BLD AUTO: 2 % (ref 0–6)
ERYTHROCYTE [DISTWIDTH] IN BLOOD BY AUTOMATED COUNT: 13.3 % (ref 11.6–15.1)
EST. AVERAGE GLUCOSE BLD GHB EST-MCNC: 143 MG/DL
GFR SERPL CREATININE-BSD FRML MDRD: 72 ML/MIN/1.73SQ M
GLUCOSE SERPL-MCNC: 102 MG/DL (ref 65–140)
HBA1C MFR BLD: 6.6 %
HCT VFR BLD AUTO: 47.7 % (ref 36.5–49.3)
HGB BLD-MCNC: 15.9 G/DL (ref 12–17)
IMM GRANULOCYTES # BLD AUTO: 0.02 THOUSAND/UL (ref 0–0.2)
IMM GRANULOCYTES NFR BLD AUTO: 0 % (ref 0–2)
LYMPHOCYTES # BLD AUTO: 1.49 THOUSANDS/ÂΜL (ref 0.6–4.47)
LYMPHOCYTES NFR BLD AUTO: 26 % (ref 14–44)
MCH RBC QN AUTO: 29.2 PG (ref 26.8–34.3)
MCHC RBC AUTO-ENTMCNC: 33.3 G/DL (ref 31.4–37.4)
MCV RBC AUTO: 88 FL (ref 82–98)
MONOCYTES # BLD AUTO: 0.58 THOUSAND/ÂΜL (ref 0.17–1.22)
MONOCYTES NFR BLD AUTO: 10 % (ref 4–12)
NEUTROPHILS # BLD AUTO: 3.39 THOUSANDS/ÂΜL (ref 1.85–7.62)
NEUTS SEG NFR BLD AUTO: 61 % (ref 43–75)
NRBC BLD AUTO-RTO: 0 /100 WBCS
PLATELET # BLD AUTO: 238 THOUSANDS/UL (ref 149–390)
PMV BLD AUTO: 10.7 FL (ref 8.9–12.7)
POTASSIUM SERPL-SCNC: 4.1 MMOL/L (ref 3.5–5.3)
PROT SERPL-MCNC: 8.4 G/DL (ref 6.4–8.4)
RBC # BLD AUTO: 5.45 MILLION/UL (ref 3.88–5.62)
SODIUM SERPL-SCNC: 135 MMOL/L (ref 135–147)
WBC # BLD AUTO: 5.65 THOUSAND/UL (ref 4.31–10.16)

## 2024-03-20 PROCEDURE — 36415 COLL VENOUS BLD VENIPUNCTURE: CPT

## 2024-03-20 PROCEDURE — 84165 PROTEIN E-PHORESIS SERUM: CPT

## 2024-03-21 LAB
BASOPHILS # BLD AUTO: 0.1 X10E3/UL (ref 0–0.2)
BASOPHILS NFR BLD AUTO: 1 %
CD3+CD4+ CELLS # BLD: 563 /UL (ref 359–1519)
CD3+CD4+ CELLS NFR BLD: 37.5 % (ref 30.8–58.5)
CD3+CD4+ CELLS/CD3+CD8+ CLL BLD: 1.07 % (ref 0.92–3.72)
CD3+CD8+ CELLS # BLD: 528 /UL (ref 109–897)
CD3+CD8+ CELLS NFR BLD: 35.2 % (ref 12–35.5)
EOSINOPHIL # BLD AUTO: 0.1 X10E3/UL (ref 0–0.4)
EOSINOPHIL NFR BLD AUTO: 2 %
ERYTHROCYTE [DISTWIDTH] IN BLOOD BY AUTOMATED COUNT: 13.7 % (ref 11.6–15.4)
HCT VFR BLD AUTO: 47.4 % (ref 37.5–51)
HGB BLD-MCNC: 16 G/DL (ref 13–17.7)
IMM GRANULOCYTES # BLD: 0 X10E3/UL (ref 0–0.1)
IMM GRANULOCYTES NFR BLD: 0 %
LYMPHOCYTES # BLD AUTO: 1.5 X10E3/UL (ref 0.7–3.1)
LYMPHOCYTES NFR BLD AUTO: 27 %
MCH RBC QN AUTO: 30 PG (ref 26.6–33)
MCHC RBC AUTO-ENTMCNC: 33.8 G/DL (ref 31.5–35.7)
MCV RBC AUTO: 89 FL (ref 79–97)
MONOCYTES # BLD AUTO: 0.6 X10E3/UL (ref 0.1–0.9)
MONOCYTES NFR BLD AUTO: 10 %
NEUTROPHILS # BLD AUTO: 3.2 X10E3/UL (ref 1.4–7)
NEUTROPHILS NFR BLD AUTO: 60 %
PLATELET # BLD AUTO: 230 X10E3/UL (ref 150–450)
RBC # BLD AUTO: 5.33 X10E6/UL (ref 4.14–5.8)
WBC # BLD AUTO: 5.5 X10E3/UL (ref 3.4–10.8)

## 2024-03-22 LAB
ALBUMIN SERPL ELPH-MCNC: 4.31 G/DL (ref 3.2–5.1)
ALBUMIN SERPL ELPH-MCNC: 53.9 % (ref 48–70)
ALPHA1 GLOB SERPL ELPH-MCNC: 0.33 G/DL (ref 0.15–0.47)
ALPHA1 GLOB SERPL ELPH-MCNC: 4.1 % (ref 1.8–7)
ALPHA2 GLOB SERPL ELPH-MCNC: 0.86 G/DL (ref 0.42–1.04)
ALPHA2 GLOB SERPL ELPH-MCNC: 10.7 % (ref 5.9–14.9)
BETA GLOB ABNORMAL SERPL ELPH-MCNC: 0.45 G/DL (ref 0.31–0.57)
BETA1 GLOB SERPL ELPH-MCNC: 5.6 % (ref 4.7–7.7)
BETA2 GLOB SERPL ELPH-MCNC: 6.7 % (ref 3.1–7.9)
BETA2+GAMMA GLOB SERPL ELPH-MCNC: 0.54 G/DL (ref 0.2–0.58)
GAMMA GLOB ABNORMAL SERPL ELPH-MCNC: 1.52 G/DL (ref 0.4–1.66)
GAMMA GLOB SERPL ELPH-MCNC: 19 % (ref 6.9–22.3)
HIV1 RNA # PLAS NAA DL=20: NOT DETECTED {COPIES}/ML
IGG/ALB SER: 1.17 {RATIO} (ref 1.1–1.8)
PROT PATTERN SERPL ELPH-IMP: NORMAL
PROT SERPL-MCNC: 8 G/DL (ref 6.4–8.2)

## 2024-03-22 PROCEDURE — 84165 PROTEIN E-PHORESIS SERUM: CPT | Performed by: STUDENT IN AN ORGANIZED HEALTH CARE EDUCATION/TRAINING PROGRAM

## 2024-03-25 DIAGNOSIS — I26.99 PULMONARY EMBOLISM, OTHER, UNSPECIFIED CHRONICITY, UNSPECIFIED WHETHER ACUTE COR PULMONALE PRESENT (HCC): ICD-10-CM

## 2024-03-25 DIAGNOSIS — I10 ESSENTIAL HYPERTENSION: ICD-10-CM

## 2024-03-25 DIAGNOSIS — B20 HIV (HUMAN IMMUNODEFICIENCY VIRUS INFECTION) (HCC): ICD-10-CM

## 2024-03-25 DIAGNOSIS — B20 HIV INFECTION, UNSPECIFIED SYMPTOM STATUS (HCC): ICD-10-CM

## 2024-03-25 DIAGNOSIS — E11.8 TYPE 2 DIABETES MELLITUS WITH UNSPECIFIED COMPLICATIONS (HCC): ICD-10-CM

## 2024-03-25 DIAGNOSIS — F43.10 PTSD (POST-TRAUMATIC STRESS DISORDER): ICD-10-CM

## 2024-03-26 RX ORDER — METFORMIN HYDROCHLORIDE EXTENDED-RELEASE TABLETS 1000 MG/1
1000 TABLET, FILM COATED, EXTENDED RELEASE ORAL
Qty: 30 TABLET | Refills: 5 | Status: SHIPPED | OUTPATIENT
Start: 2024-03-26

## 2024-03-26 RX ORDER — DOLUTEGRAVIR SODIUM 50 MG/1
50 TABLET, FILM COATED ORAL DAILY
Qty: 30 TABLET | Refills: 5 | Status: SHIPPED | OUTPATIENT
Start: 2024-03-26

## 2024-03-26 RX ORDER — PAROXETINE HYDROCHLORIDE 20 MG/1
20 TABLET, FILM COATED ORAL DAILY
Qty: 30 TABLET | Refills: 5 | Status: SHIPPED | OUTPATIENT
Start: 2024-03-26

## 2024-03-26 RX ORDER — TENOFOVIR DISOPROXIL FUMARATE 300 MG/1
300 TABLET, FILM COATED ORAL DAILY
Qty: 30 TABLET | Refills: 5 | Status: SHIPPED | OUTPATIENT
Start: 2024-03-26

## 2024-03-26 RX ORDER — APIXABAN 2.5 MG/1
2.5 TABLET, FILM COATED ORAL 2 TIMES DAILY
Qty: 60 TABLET | Refills: 2 | Status: SHIPPED | OUTPATIENT
Start: 2024-03-26

## 2024-03-26 RX ORDER — AMLODIPINE BESYLATE 2.5 MG/1
2.5 TABLET ORAL DAILY
Qty: 30 TABLET | Refills: 5 | Status: SHIPPED | OUTPATIENT
Start: 2024-03-26

## 2024-03-28 ENCOUNTER — OFFICE VISIT (OUTPATIENT)
Dept: HEMATOLOGY ONCOLOGY | Facility: CLINIC | Age: 64
End: 2024-03-28
Payer: MEDICARE

## 2024-03-28 VITALS
OXYGEN SATURATION: 99 % | RESPIRATION RATE: 17 BRPM | TEMPERATURE: 98.2 F | SYSTOLIC BLOOD PRESSURE: 138 MMHG | BODY MASS INDEX: 25.33 KG/M2 | HEART RATE: 83 BPM | WEIGHT: 187 LBS | DIASTOLIC BLOOD PRESSURE: 72 MMHG | HEIGHT: 72 IN

## 2024-03-28 DIAGNOSIS — I26.09 OTHER ACUTE PULMONARY EMBOLISM WITH ACUTE COR PULMONALE (HCC): Primary | ICD-10-CM

## 2024-03-28 DIAGNOSIS — I26.09 ACUTE PULMONARY EMBOLISM WITH ACUTE COR PULMONALE, UNSPECIFIED PULMONARY EMBOLISM TYPE (HCC): ICD-10-CM

## 2024-03-28 DIAGNOSIS — F41.9 ANXIETY: ICD-10-CM

## 2024-03-28 PROCEDURE — 99214 OFFICE O/P EST MOD 30 MIN: CPT | Performed by: PHYSICIAN ASSISTANT

## 2024-03-28 NOTE — PROGRESS NOTES
[address] [facility]  [address] [facility] [address]  Hematology Ambulatory Follow-Up  Marko Daniel, 1960, [id number]  3/28/2024      Assessment and Plan   63-year-old male with multiple medical problems recently found to have extensive bilateral PE.  Issues     Bilateral pulmonary emboli.  Mr. Daniel feels well and clinically there are no concerning findings.  Respiratory status is stable.  Patient will continue with the Eliquis twice a day monitoring for excessive bruising or bleeding. He is taking Eliquis 2.5mg BID.    Patient knows to call the office if he is scheduled for any invasive procedure or surgery.     Thrombophilia results are listed below.  Patient was found to have elevated factor VIII, elevated factor XI, elevated fibrinogen and abnormal D-dimer level.  Additionally patient is extremely immobile, uses a wheelchair to get about.  Patient is at a significant increased risk for another thrombotic complication without anticoagulation.     We rediscussed what to monitor for as far as any lower extremity swelling, cords, pain or any acute respiratory symptoms.  Patient is to return to the office in 6 months.     Cystic/solid right-sided thyroid nodule.  Workup/treatment as per PCP.  Biopsy demonstrated benign pathology.     Elevated total protein level.  The recent total protein was elevated, has been in the past.  CBC parameters, renal function, liver function and calcium level are all within normal limits.  He had SPEP drawn with his 3/20/2024 lab work.  No monoclonal protein. Repeat total protein level is normal.      Mr. Daniel knows to call the hematology/oncology office if there are any other questions or concerns.     Carefully review your medication list and verify that the list is accurate and up-to-date. Please call the hematology/oncology office if there are medications missing from the list, medications on the list that  you are not currently taking or if there is a dosage or instruction that is different from how you're taking that medication.     Patient goals and areas of care: Continue Eliquis   Barriers to care:  None  Patient is able to self-care.    Subjective     Chief Complaint   Patient presents with    Follow-up    History of PE on Eliquis       History of present illness:  63-year-old male with a number of medical problems recently developing progressive shortness of breath and dyspnea on exertion.  Patient was seen in the emergency room and found to have a PE. Mr. Daniel was started on Eliquis.     Patient completed 6 months of treatment and then discontinued.  Mr. Daniel subsequently went for thrombophilia evaluation - results are listed below.    Interval history:  Presently patient states feeling okay, baseline.  He is trying to quit smoking. He says he is trying to decrease his stress to help with this. No shortness of breath or dyspnea on exertion, no chest pain or pressure.  No problems with excessive bruising or bleeding.  No other problems with the Eliquis.  No pending procedures.  Appetite is good, weight is stable.  Activities are extremely limited, same as before.    Review of Systems   Constitutional:  Positive for fatigue. Negative for activity change, appetite change and fever.   HENT:  Negative for nosebleeds.    Respiratory:  Negative for cough, choking and shortness of breath.    Cardiovascular:  Negative for chest pain, palpitations and leg swelling.   Gastrointestinal:  Negative for abdominal distention, abdominal pain, anal bleeding, blood in stool, constipation, diarrhea, nausea and vomiting.   Endocrine: Negative for cold intolerance.   Genitourinary:  Negative for hematuria.   Musculoskeletal:  Positive for arthralgias. Negative for myalgias.   Skin:  Negative for color change, pallor and rash.   Allergic/Immunologic: Negative for immunocompromised state.   Neurological:  Negative for headaches.  "  Hematological:  Negative for adenopathy. Does not bruise/bleed easily.   All other systems reviewed and are negative.      Patient Active Problem List   Diagnosis    Anxiety    Essential hypertension    Heart murmur    Hiatal hernia    HIV disease (HCC)    Hypertriglyceridemia    Inguinal hernia    Toxic effect of tobacco and nicotine    Other male erectile dysfunction    Thyroid nodule    Goiter, nontoxic, multinodular    Other pulmonary embolism with acute cor pulmonale (HCC)    Primary osteoarthritis of both knees    Type 2 diabetes mellitus with unspecified complications (HCC)    Elevated TSH     Past Medical History:   Diagnosis Date    DDD (degenerative disc disease), lumbar     Facet joint disease     Human immunodeficiency virus (HIV) infection (HCC)     resolved 11/5/15    Hypertension     resolved 5/29/15    Other specified disorders of white blood cells     resolved 5/12/16     Past Surgical History:   Procedure Laterality Date    HERNIA REPAIR      SHOULDER SURGERY Left     US GUIDED THYROID BIOPSY  1/5/2023     No family history on file.  Social History     Socioeconomic History    Marital status: Single     Spouse name: Not on file    Number of children: Not on file    Years of education: Not on file    Highest education level: Not on file   Occupational History    Not on file   Tobacco Use    Smoking status: Every Day     Current packs/day: 0.25     Average packs/day: 0.3 packs/day for 43.0 years (10.8 ttl pk-yrs)     Types: Cigarettes    Smokeless tobacco: Never    Tobacco comments:     6-7 cigs/day   Vaping Use    Vaping status: Never Used   Substance and Sexual Activity    Alcohol use: Yes     Comment: Occassionally, one beer a month    Drug use: Yes     Types: Marijuana     Comment: Occassionally \"as needed for depression\"    Sexual activity: Not Currently     Partners: Female   Other Topics Concern    Not on file   Social History Narrative    Not on file     Social Determinants of Health "     Financial Resource Strain: Not on file   Food Insecurity: No Food Insecurity (6/29/2022)    Hunger Vital Sign     Worried About Running Out of Food in the Last Year: Never true     Ran Out of Food in the Last Year: Never true   Transportation Needs: No Transportation Needs (6/24/2022)    PRAPARE - Transportation     Lack of Transportation (Medical): No     Lack of Transportation (Non-Medical): No   Physical Activity: Not on file   Stress: Not on file   Social Connections: Not on file   Intimate Partner Violence: Not on file   Housing Stability: Low Risk  (6/24/2022)    Housing Stability Vital Sign     Unable to Pay for Housing in the Last Year: No     Number of Places Lived in the Last Year: 1     Unstable Housing in the Last Year: No       Current Outpatient Medications:     amLODIPine (NORVASC) 2.5 mg tablet, TAKE 1 TABLET BY MOUTH DAILY, Disp: 30 tablet, Rfl: 5    Eliquis 2.5 MG, TAKE 1 TABLET BY MOUTH TWICE A DAY, Disp: 60 tablet, Rfl: 2    metFORMIN (FORTAMET) 1000 MG (OSM) 24 hr tablet, TAKE 1 TABLET BY MOUTH DAILY WITH DINNER, Disp: 30 tablet, Rfl: 5    PARoxetine (PAXIL) 20 mg tablet, TAKE 1 TABLET BY MOUTH DAILY, Disp: 30 tablet, Rfl: 5    Prezcobix 800-150 MG TABS, TAKE 1 TABLET BY MOUTH DAILY, Disp: 30 tablet, Rfl: 3    sildenafil (VIAGRA) 50 MG tablet, Take 1 tablet (50 mg total) by mouth daily as needed for erectile dysfunction, Disp: 30 tablet, Rfl: 0    tenofovir (VIREAD) 300 mg tablet, TAKE 1 TABLET BY MOUTH DAILY, Disp: 30 tablet, Rfl: 5    Tivicay 50 MG TABS, TAKE 1 TABLET BY MOUTH DAILY, Disp: 30 tablet, Rfl: 5  No Known Allergies    Objective     Vitals:    03/28/24 1224   BP: 138/72   Pulse: 83   Resp: 17   Temp: 98.2 °F (36.8 °C)   SpO2: 99%       Physical Exam  Constitutional:       General: He is not in acute distress.     Appearance: He is well-developed and normal weight.   HENT:      Head: Normocephalic and atraumatic.      Right Ear: External ear normal.      Left Ear: External ear  normal.      Nose: Nose normal.   Eyes:      General: No scleral icterus.     Conjunctiva/sclera: Conjunctivae normal.   Cardiovascular:      Rate and Rhythm: Normal rate and regular rhythm.   Pulmonary:      Effort: Pulmonary effort is normal. No respiratory distress.      Breath sounds: Normal breath sounds.   Abdominal:      General: Abdomen is flat. There is no distension.      Palpations: Abdomen is soft.   Musculoskeletal:      Comments: In wheelchair.   Skin:     Findings: No rash (on exposed skin.).   Neurological:      Mental Status: He is alert and oriented to person, place, and time.   Psychiatric:         Mood and Affect: Mood normal.         Thought Content: Thought content normal.         Judgment: Judgment normal.     Extremities: No lower extremity edema bilaterally.    Result Review  Labs:  3/20/2024 WBC 5.65, hemoglobin 15.9, hematocrit 47.7, platelets 238, BUN 14, creatinine 1.1, calcium 9.9, LFTs within normal limits, total protein 8.0. Serum protein electrophoresis showed no monoclonal bands.    9/21/2023 WBC = 6.06 hemoglobin = 15.1 hematocrit = 44 platelet = 223 neutrophil = 56% BUN = 15 creatinine = 0.90 LFTs WNL calcium = 9.9 total protein = 8.8     Imaging     11/21/2022 CTA chest PE study     IMPRESSION:     1.  No pulmonary embolism or acute intrathoracic process.  2.  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     8/12/2022 vascular lower limb venous duplex study bilateral     RIGHT LOWER LIMB:  No evidence of acute or chronic deep vein thrombosis.  No evidence of superficial thrombophlebitis noted.  Doppler evaluation shows a normal response to augmentation maneuvers.  Popliteal, posterior tibial and anterior tibial arterial Doppler waveforms are  triphasic.     LEFT LOWER LIMB:  No evidence of acute or chronic deep vein thrombosis.  No evidence of superficial thrombophlebitis noted.  Doppler evaluation shows  "a normal response to augmentation maneuvers.  Popliteal, posterior tibial and anterior tibial arterial Doppler waveforms are  triphasic.     07/19/2022 US thyroid     Impression: Dominant cystic and solid nodule in the lower pole the right thyroid lobe  on image 25 meets ACR criteria for recommendation of ultrasound-guided fine-needle aspiration biopsy based on TI-RADS Classification of TR 3 (3 points) and size >2.5 cm.  Reference: ACR Thyroid Imaging, Reporting and Data System (TI-RADS): White Paper of the ACR TI-RADS Committee. J AM America Radiol 2017;14:587-595. (additional recommendations based on American Thyroid Association 2015 guidelines.) This examination was marked \"significant notification\" in Epic in order to begin the standard process by which the radiology reading room liaison alerts the referring practitioner.        06/22/2022 CTA chest PE study     PULMONARY ARTERIAL TREE:  Extensive acute pulmonary emboli involving the right greater than left main, all lobar, all right and multiple left segmental branches and multiple bilateral subsegmental branches.     LUNGS:  Scattered small peripheral groundglass opacities in the right greater than left lungs, likely pulmonary infarcts.  There is no tracheal or endobronchial lesion.     IMPRESSION:     1.  Extensive acute pulmonary emboli, as described.  The calculated ratio of right ventricular to left ventricular diameter (RV/LV ratio) is 1.3.  This is greater than 0.9, which is abnormal and indicates right heart strain. An abnormal RV/LV ratio has been shown to be associated with an increased risk of 30 day mortality in the setting of acute pulmonary embolism.  Urgent consultation with the medical critical care team is recommended.     2.  Scattered small peripheral groundglass opacities in the right greater than left lungs, likely small pulmonary infarcts.     3.  Severely enlarged multinodular right thyroid lobe.  Outpatient thyroid ultrasound is recommended " for further evaluation.     Pathology     1/30/2023 GP hemostasis evaluation.  PT, INR, PTT, lupus anticoagulant profile and antibeta-2 glycoprotein 1 IgM, IgA, IgG all within normal limits.  Anticardiolipin IgG and IgM had low positive titers.  Factor VIII activity and factor XI activity were elevated.  Factor XII and factor IX activities were within normal limits.  Fibrinogen and D-dimer were elevated.  APCR was within normal limits.  Protein S and protein C level/activity were within normal limits.  Homocystine, thrombin time and Antithrombin III levels were within normal limits.     Please note:  This report has been generated by a voice recognition software system. Therefore there may be syntax, spelling, and/or grammatical errors. Please call if you have any questions.

## 2024-04-04 DIAGNOSIS — Z11.1 SCREENING-PULMONARY TB: ICD-10-CM

## 2024-04-04 DIAGNOSIS — B20 HIV DISEASE (HCC): Primary | ICD-10-CM

## 2024-04-04 DIAGNOSIS — Z13.1 SCREENING FOR DIABETES MELLITUS: ICD-10-CM

## 2024-04-04 DIAGNOSIS — D72.89 OTHER SPECIFIED DISORDERS OF WHITE BLOOD CELLS: ICD-10-CM

## 2024-04-04 DIAGNOSIS — Z20.2 CONTACT WITH AND (SUSPECTED) EXPOSURE TO INFECTIONS WITH A PREDOMINANTLY SEXUAL MODE OF TRANSMISSION: ICD-10-CM

## 2024-04-04 DIAGNOSIS — Z11.3 ENCOUNTER FOR SCREENING FOR BACTERIAL SEXUALLY TRANSMITTED DISEASE: ICD-10-CM

## 2024-04-04 DIAGNOSIS — Z13.6 ENCOUNTER FOR LIPID SCREENING FOR CARDIOVASCULAR DISEASE: ICD-10-CM

## 2024-04-04 DIAGNOSIS — Z13.220 ENCOUNTER FOR LIPID SCREENING FOR CARDIOVASCULAR DISEASE: ICD-10-CM

## 2024-04-04 DIAGNOSIS — Z79.899 OTHER LONG TERM (CURRENT) DRUG THERAPY: ICD-10-CM

## 2024-04-04 DIAGNOSIS — Z72.89 OTHER PROBLEMS RELATED TO LIFESTYLE: ICD-10-CM

## 2024-04-26 ENCOUNTER — DOCUMENTATION (OUTPATIENT)
Dept: SURGERY | Facility: CLINIC | Age: 64
End: 2024-04-26

## 2024-04-26 NOTE — PROGRESS NOTES
RD received call from patient to discuss office changes & his current life status. RD encouraged pt to stay as active as tolerated. Pt reports weight has been stable.   -Jossie Allen RDN,LDN

## 2024-05-08 ENCOUNTER — OFFICE VISIT (OUTPATIENT)
Dept: SURGERY | Facility: CLINIC | Age: 64
End: 2024-05-08
Payer: MEDICARE

## 2024-05-08 ENCOUNTER — DOCUMENTATION (OUTPATIENT)
Dept: SURGERY | Facility: CLINIC | Age: 64
End: 2024-05-08

## 2024-05-08 ENCOUNTER — PATIENT OUTREACH (OUTPATIENT)
Dept: SURGERY | Facility: CLINIC | Age: 64
End: 2024-05-08

## 2024-05-08 VITALS
DIASTOLIC BLOOD PRESSURE: 85 MMHG | TEMPERATURE: 97 F | BODY MASS INDEX: 25.35 KG/M2 | HEART RATE: 82 BPM | SYSTOLIC BLOOD PRESSURE: 125 MMHG | OXYGEN SATURATION: 96 % | WEIGHT: 187.2 LBS | HEIGHT: 72 IN

## 2024-05-08 DIAGNOSIS — E04.1 THYROID NODULE: ICD-10-CM

## 2024-05-08 DIAGNOSIS — B20 NEUROPATHY DUE TO HIV (HCC): ICD-10-CM

## 2024-05-08 DIAGNOSIS — R26.2 AMBULATORY DYSFUNCTION: ICD-10-CM

## 2024-05-08 DIAGNOSIS — I10 ESSENTIAL HYPERTENSION: ICD-10-CM

## 2024-05-08 DIAGNOSIS — M17.0 PRIMARY OSTEOARTHRITIS OF BOTH KNEES: ICD-10-CM

## 2024-05-08 DIAGNOSIS — G63 NEUROPATHY DUE TO HIV (HCC): ICD-10-CM

## 2024-05-08 DIAGNOSIS — G62.9 NEUROPATHY: ICD-10-CM

## 2024-05-08 DIAGNOSIS — B20 CURRENTLY ASYMPTOMATIC HIV INFECTION, WITH HISTORY OF HIV-RELATED ILLNESS (HCC): ICD-10-CM

## 2024-05-08 DIAGNOSIS — T65.294D TOXIC EFFECT OF TOBACCO, UNDETERMINED INTENT, SUBSEQUENT ENCOUNTER: ICD-10-CM

## 2024-05-08 DIAGNOSIS — E11.8 TYPE 2 DIABETES MELLITUS WITH UNSPECIFIED COMPLICATIONS (HCC): ICD-10-CM

## 2024-05-08 DIAGNOSIS — R77.9 ELEVATED BLOOD PROTEIN: ICD-10-CM

## 2024-05-08 DIAGNOSIS — B20 HIV DISEASE (HCC): Primary | ICD-10-CM

## 2024-05-08 LAB
DME PARACHUTE DELIVERY DATE REQUESTED: NORMAL
DME PARACHUTE ITEM DESCRIPTION: NORMAL
DME PARACHUTE ORDER STATUS: NORMAL
DME PARACHUTE SUPPLIER NAME: NORMAL
DME PARACHUTE SUPPLIER PHONE: NORMAL

## 2024-05-08 PROCEDURE — G2211 COMPLEX E/M VISIT ADD ON: HCPCS | Performed by: NURSE PRACTITIONER

## 2024-05-08 PROCEDURE — 99214 OFFICE O/P EST MOD 30 MIN: CPT | Performed by: NURSE PRACTITIONER

## 2024-05-08 RX ORDER — GABAPENTIN 300 MG/1
300 CAPSULE ORAL 3 TIMES DAILY
Qty: 90 CAPSULE | Refills: 2 | Status: SHIPPED | OUTPATIENT
Start: 2024-05-08

## 2024-05-08 NOTE — PROGRESS NOTES
Name: Marko Daniel      : 1960      MRN: [id number]  Encounter Provider: CATHIE Roblero  Encounter Date: 2024   Encounter department: ASC AT [facility]    Assessment & Plan     1. HIV disease (HCC)  Assessment & Plan:  CD4 T CELL ABSOLUTE   Date/Time Value Ref Range Status   10/26/2015 07:58  359 - 1,519 /uL Final     CD4 ABS   Date/Time Value Ref Range Status   2024 12:30  359 - 1519 /uL Final     HIV-1 RNA by PCR, Qn   Date/Time Value Ref Range Status   2023 02:23 PM <20 copies/mL Final     Comment:     HIV-1 RNA not detected  The reportable range for this assay is 20 to 10,000,000  copies HIV-1 RNA/mL.     HIV-1 RNA Viral Load Log   Date/Time Value Ref Range Status   2023 02:23 PM COMMENT [id number]/mL Final     Comment:     Unable to calculate result since non-numeric result obtained for  component test.         ART: Tivicay tenofovir and Prezcobix        Denies side effects. Stressed the importance of adherence.  Continue follow up with ID clinic.       Reviewed most recent labs, including Cd4 and viral load. Discussed the risks and benefits of treatment options, instructions for management, importance of treatment adherence, and reduction of risk factor.Educated on possible  medication side effects.     Counseled on routes of HIV transmission, including the risk of  infection. Emphasized that viral suppression is the best method to prevent HIV transmission.  At this time pt.denies the need for HIV testing of anyone in their life.     Total encounter time was 45 minutes. Greater then 20 minutes were spent on counseling and patient education. Pt voices understanding and agreement with treatment plan.          2. Elevated blood protein    3. Ambulatory dysfunction  Assessment & Plan:  Patient is walking with a shuffling stooped gait.  Adjusted height of single-point cane.  Advised that he use a roller walker but patient refuses.  Will order a  tripod cane due to high risk of fall.  Sent to physical therapy for additional treatment.    Orders:  -     Ambulatory Referral to Physical Therapy; Future    4. 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     HIV-1 RNA Viral Load Log   Date/Time Value Ref Range Status   2023 02:23 PM COMMENT [id number]/mL Final     Comment:     Unable to calculate result since non-numeric result obtained for  component test.         ART: Tivicay tenofovir and Prezcobix        Denies side effects. Stressed the importance of adherence.  Continue follow up with ID clinic.       Reviewed most recent labs, including Cd4 and viral load. Discussed the risks and benefits of treatment options, instructions for management, importance of treatment adherence, and reduction of risk factor.Educated on possible  medication side effects.     Counseled on routes of HIV transmission, including the risk of  infection. Emphasized that viral suppression is the best method to prevent HIV transmission.  At this time pt.denies the need for HIV testing of anyone in their life.     Total encounter time was 45 minutes. Greater then 20 minutes were spent on counseling and patient education. Pt voices understanding and agreement with treatment plan.        Orders:  -     gabapentin (Neurontin) 300 mg capsule; Take 1 capsule (300 mg total) by mouth 3 (three) times a day    5. Thyroid nodule  -     TSH, 3rd generation with Free T4 reflex; Future    6. Type 2 diabetes mellitus with unspecified complications (HCC)  -     Ambulatory  referral to Ophthalmology; Future    7. Essential hypertension  Assessment & Plan:  Blood pressure: Blood pressure is well-controlled.  BP Readings from Last 3 Encounters:   24 125/85   24 138/72   24 120/78       Continue current antihypertensive.    Educated on the following lifestyle modifications to lower BP and decrease cardiovascular risk factors.  limit alcohol intake, reduce salt in diet, maintain a healthy weight, engage in 30 minutes of cardiovascular exercise daily, and not smoke.         8. Primary osteoarthritis of both knees    9. Currently asymptomatic HIV infection, with history of HIV-related illness (HCC)  Assessment & Plan:  CD4 T CELL ABSOLUTE   Date/Time Value Ref Range Status   10/26/2015 07:58  359 - 1,519 /uL Final     CD4 ABS   Date/Time Value Ref Range Status   2024 12:30  359 - 1519 /uL Final     HIV-1 RNA by PCR, Qn   Date/Time Value Ref Range Status   2023 02:23 PM <20 copies/mL Final     Comment:     HIV-1 RNA not detected  The reportable range for this assay is 20 to 10,000,000  copies HIV-1 RNA/mL.     HIV-1 RNA Viral Load Log   Date/Time Value Ref Range Status   2023 02:23 PM COMMENT [id number]/mL Final     Comment:     Unable to calculate result since non-numeric result obtained for  component test.         ART: Tivicay tenofovir and Prezcobix        Denies side effects. Stressed the importance of adherence.  Continue follow up with ID clinic.       Reviewed most recent labs, including Cd4 and viral load. Discussed the risks and benefits of treatment options, instructions for management, importance of treatment adherence, and reduction of risk factor.Educated on possible  medication side effects.     Counseled on routes of HIV transmission, including the risk of  infection. Emphasized that viral suppression is the best method to prevent HIV transmission.  At this time pt.denies the need for HIV testing of anyone in their life.      Total encounter time was 45 minutes. Greater then 20 minutes were spent on counseling and patient education. Pt voices understanding and agreement with treatment plan.          10. Toxic effect of tobacco, undetermined intent, subsequent encounter  Assessment & Plan:  Counseled for greater than 15 minutes on the importance of smoking cessation.  Advised to quit.  Educated on the increased risk of heart and lung disease associated with smoking.  Referred to [facility] for enrollment in smoking cessation program.         11. Neuropathy  Assessment & Plan:  Start gabapentin.  Instructed to taper dose up to 3 times a day as tolerated.  Follow-up in 1 month.             Danyelle Zhu (Jim) Daniel is contacted today for primary care follow-up.. PMHx significant for HIV, HTN, hyperlipidemia, polycythemia, and anxiety. He has been  c/o right foot pain. He follows with podiatry but has not mentioned his neuropathy as previously instructed.  He has however told past oral care about his increasing symptoms and difficulty walking.  Pastoral care did relay the message to the medical team.  And advised him to follow-up with the clinic.  .Colonoscopy: 1/27/17, repeat in 5 years  AAA screen: 5/2/2019      Review of Systems    Past Medical History:   Diagnosis Date    DDD (degenerative disc disease), lumbar     Facet joint disease     Human immunodeficiency virus (HIV) infection (HCC)     resolved 11/5/15    Hypertension     resolved 5/29/15    Other specified disorders of white blood cells     resolved 5/12/16     Past Surgical History:   Procedure Laterality Date    HERNIA REPAIR      SHOULDER SURGERY Left     US GUIDED THYROID BIOPSY  1/5/2023     History reviewed. No pertinent family history.  Social History     Socioeconomic History    Marital status: Single     Spouse name: None    Number of children: None    Years of education: None    Highest education level: None   Occupational History    None   Tobacco Use     "Smoking status: Every Day     Current packs/day: 0.25     Average packs/day: 0.3 packs/day for 43.0 years (10.8 ttl pk-yrs)     Types: Cigarettes    Smokeless tobacco: Never    Tobacco comments:     6-7 cigs/day   Vaping Use    Vaping status: Never Used   Substance and Sexual Activity    Alcohol use: Yes     Comment: Occassionally, one beer a month    Drug use: Yes     Types: Marijuana     Comment: Occassionally \"as needed for depression\"    Sexual activity: Not Currently     Partners: Female   Other Topics Concern    None   Social History Narrative    None     Social Determinants of Health     Financial Resource Strain: Not on file   Food Insecurity: No Food Insecurity (6/29/2022)    Hunger Vital Sign     Worried About Running Out of Food in the Last Year: Never true     Ran Out of Food in the Last Year: Never true   Transportation Needs: No Transportation Needs (6/24/2022)    PRAPARE - Transportation     Lack of Transportation (Medical): No     Lack of Transportation (Non-Medical): No   Physical Activity: Not on file   Stress: Not on file   Social Connections: Not on file   Intimate Partner Violence: Not on file   Housing Stability: Low Risk  (6/24/2022)    Housing Stability Vital Sign     Unable to Pay for Housing in the Last Year: No     Number of Places Lived in the Last Year: 1     Unstable Housing in the Last Year: No     Current Outpatient Medications on File Prior to Visit   Medication Sig    amLODIPine (NORVASC) 2.5 mg tablet TAKE 1 TABLET BY MOUTH DAILY    Eliquis 2.5 MG TAKE 1 TABLET BY MOUTH TWICE A DAY    metFORMIN (FORTAMET) 1000 MG (OSM) 24 hr tablet TAKE 1 TABLET BY MOUTH DAILY WITH DINNER    PARoxetine (PAXIL) 20 mg tablet TAKE 1 TABLET BY MOUTH DAILY    Prezcobix 800-150 MG TABS TAKE 1 TABLET BY MOUTH DAILY    sildenafil (VIAGRA) 50 MG tablet Take 1 tablet (50 mg total) by mouth daily as needed for erectile dysfunction    tenofovir (VIREAD) 300 mg tablet TAKE 1 TABLET BY MOUTH DAILY    Tivicay 50 " MG TABS TAKE 1 TABLET BY MOUTH DAILY     No Known Allergies  Immunization History   Administered Date(s) Administered    Hep A, adult 04/25/2017, 04/24/2018    Hep B, adult 09/18/2008, 01/26/2009, 05/07/2009, 05/28/2019, 05/28/2019, 11/26/2019, 11/26/2019, 11/03/2020, 11/03/2020    Influenza Quadrivalent Preservative Free 3 years and older IM 11/05/2015    Influenza Quadrivalent, 6-35 Months IM 12/13/2016, 10/16/2017    Influenza, injectable, quadrivalent, preservative free 0.5 mL 10/03/2023    Influenza, recombinant, quadrivalent,injectable, preservative free 10/08/2018, 11/26/2019, 11/03/2020, 11/09/2021, 11/08/2022    Influenza, seasonal, injectable 12/15/2011, 10/04/2012, 10/10/2012, 10/30/2014    Meningococcal MCV4P 05/10/2022    Pneumococcal Conjugate 13-Valent 09/27/2016    Pneumococcal Polysaccharide PPV23 09/18/2008, 04/25/2017    Tdap 05/07/2009, 05/28/2019       Objective     /85   Pulse 82   Temp (!) 97 °F (36.1 °C)   Ht 6' (1.829 m)   Wt 84.9 kg (187 lb 3.2 oz)   SpO2 96%   BMI 25.39 kg/m²     Physical Exam  Vitals and nursing note reviewed.   Constitutional:       Appearance: Normal appearance.   HENT:      Head: Normocephalic.      Nose: Nose normal. No congestion or rhinorrhea.      Mouth/Throat:      Mouth: Mucous membranes are moist.      Pharynx: Oropharynx is clear.   Eyes:      Pupils: Pupils are equal, round, and reactive to light.   Cardiovascular:      Rate and Rhythm: Normal rate and regular rhythm.      Pulses: Normal pulses.      Heart sounds: Normal heart sounds.   Pulmonary:      Effort: Pulmonary effort is normal.      Breath sounds: Normal breath sounds.   Abdominal:      General: Abdomen is flat. Bowel sounds are normal.      Palpations: Abdomen is soft.   Musculoskeletal:         General: No signs of injury. Normal range of motion.      Right knee: Normal range of motion. Tenderness present.      Left knee: Normal range of motion. Tenderness present.      Right lower leg:  Normal. No edema.      Left lower leg: Normal. No edema.      Right ankle: No swelling or deformity. No tenderness.      Left ankle: No swelling or deformity. No tenderness.   Skin:     General: Skin is warm and dry.   Neurological:      Mental Status: He is alert and oriented to person, place, and time.      Gait: Gait abnormal.      Comments: Shuffling bent gait.  Using cane to assist with ambulation.       CATHIE Roblero

## 2024-05-08 NOTE — ASSESSMENT & PLAN NOTE
Start gabapentin.  Instructed to taper dose up to 3 times a day as tolerated.  Follow-up in 1 month.

## 2024-05-08 NOTE — PROGRESS NOTES
CM met with pt to complete the recert. Recert was completed (see media). Pt reported having neuropathy and not wanting to use the cane anymore. Pt reported that he continues to take his medication. Pt reported basic needs met, no transportation needs, stable housing, and no substance use. CM to assist pt in scheduling a dental appointment.

## 2024-05-08 NOTE — ASSESSMENT & PLAN NOTE
Blood pressure: Blood pressure is well-controlled.  BP Readings from Last 3 Encounters:   05/08/24 125/85   03/28/24 138/72   02/16/24 120/78       Continue current antihypertensive.    Educated on the following lifestyle modifications to lower BP and decrease cardiovascular risk factors.  limit alcohol intake, reduce salt in diet, maintain a healthy weight, engage in 30 minutes of cardiovascular exercise daily, and not smoke.

## 2024-05-08 NOTE — PROGRESS NOTES
"[facility] Annual Nutrition Assessment    Marko seen by RD in conjunction with PCP f/u     Pt is established patient (last annual was 06/27/2023)    PMHx:  DMT2, hernia, HTN, anxiety      Clinical Data/Client History    CD4 count:  563  Viral load:  <20  ART:   Prezcobix and Tivicay    , Patient Navigator: Whitney    Food assistance:  N/A    Living situation: House or apartment     Psychosocial factors: anxiety     Mobility:  uses cane/walker    Physical activity: Walks    Oral health concerns:  Needs to make an appointment, denied oral health concerns       Typical food/beverage intake:    Breakfast cereal with whole milk  Lunch cheese steak, onion rings  Dinner chicken fingers, mash potato   Snacks granola bars  Beverages coffee, milk, water     Appetite: Unchanged from normal    OTC vitamin, mineral, herbal supplements: N/A    Oral/enteral nutrition supplements:  N/A    GI problems: denied n/v/d/c    Food allergies/intolerances:  N/A    Weight history:  Wt Readings from Last 3 Encounters:   05/08/24 84.9 kg (187 lb 3.2 oz)   03/28/24 84.8 kg (187 lb)   02/16/24 86.2 kg (190 lb)    Last annual weight 196(06/27/23)       Current body weight:  187  Height:  73\"  BMI:  25  IBW:  184  %IBW  101%    Weight change: -9# in the last year. Not significant. BMI normal for age group.     Nutrition-related labs:    Lab Results   Component Value Date    HGBA1C 6.6 (H) 03/20/2024      Lab Results   Component Value Date    SODIUM 135 03/20/2024    K 4.1 03/20/2024     03/20/2024    CO2 28 03/20/2024    BUN 14 03/20/2024    CREATININE 1.10 03/20/2024    GLUC 102 03/20/2024    CALCIUM 9.9 03/20/2024      Lab Results   Component Value Date    CHOLESTEROL 147 06/13/2023    CHOLESTEROL 161 11/21/2022    CHOLESTEROL 155 04/22/2022     Lab Results   Component Value Date    HDL 29 (L) 06/13/2023    HDL 31 (L) 11/21/2022    HDL 34 (L) 04/22/2022     Lab Results   Component Value Date    TRIG 212 (H) 06/13/2023    TRIG 227 (H) " "11/21/2022    TRIG 154 (H) 04/22/2022     No results found for: \"NONHDLC\"     Current medications:   Metformin, amlodipine, paxil, eliquis  Physical findings/skin integrity:  Skin intact       Nutrition Diagnosis    Problem: poor nutrition quality of life     Related to: denial of need for change    As Evidenced By: abnormal labs (A1C of 6.6)      Estimated Nutritional Needs    Jade Tavarez REE: CBW    ~2000 kcal (based on: 1.2 stress level)  ~85 protein (based on: 1g/kg)  ~2125 fluid (based on: 25ml/kg/day)      Current intake estimation: exceeds needs       Nutrition Intervention/Recommendations    Nutrition education intervention: provided and reinforced previous education     Nutrition recommendations: Myplate & reducing sugar in diet     Teaching Method: verbal     Person Educated: patient    Comprehension: excellent    Receptivity: good    Expected compliance: poor    Collaboration/referral of nutrition care:   [email or web address]  [facility] programs       Goals:  Increase produce  Decrease added sugar  No significant weight changes  Stable nutrition related labs     Monitoring/Evaluation:  Will continue to monitor labs, appetite, weight trend and need for nutrition education.     Visit Summary  RD reviewed trending up A1C( 6.3(07/26/2023)- now 6.6(03/20/2024). Pt is willing to increase vegetable intake and reduce sugar. RD provided pt with mobile market coupon #[id number] to encourage increase in vegetable intake. All questions and concerns addressed.   -Jossie Allen RDN,LDN   "

## 2024-05-08 NOTE — ASSESSMENT & PLAN NOTE
CD4 T CELL ABSOLUTE   Date/Time Value Ref Range Status   10/26/2015 07:58  359 - 1,519 /uL Final     CD4 ABS   Date/Time Value Ref Range Status   2024 12:30  359 - 1519 /uL Final     HIV-1 RNA by PCR, Qn   Date/Time Value Ref Range Status   2023 02:23 PM <20 copies/mL Final     Comment:     HIV-1 RNA not detected  The reportable range for this assay is 20 to 10,000,000  copies HIV-1 RNA/mL.     HIV-1 RNA Viral Load Log   Date/Time Value Ref Range Status   2023 02:23 PM COMMENT [id number]/mL Final     Comment:     Unable to calculate result since non-numeric result obtained for  component test.         ART: Tivicay tenofovir and Prezcobix        Denies side effects. Stressed the importance of adherence.  Continue follow up with ID clinic.       Reviewed most recent labs, including Cd4 and viral load. Discussed the risks and benefits of treatment options, instructions for management, importance of treatment adherence, and reduction of risk factor.Educated on possible  medication side effects.     Counseled on routes of HIV transmission, including the risk of  infection. Emphasized that viral suppression is the best method to prevent HIV transmission.  At this time pt.denies the need for HIV testing of anyone in their life.     Total encounter time was 45 minutes. Greater then 20 minutes were spent on counseling and patient education. Pt voices understanding and agreement with treatment plan.

## 2024-05-08 NOTE — ASSESSMENT & PLAN NOTE
Counseled for greater than 15 minutes on the importance of smoking cessation.  Advised to quit.  Educated on the increased risk of heart and lung disease associated with smoking.  Referred to [facility] for enrollment in smoking cessation program.

## 2024-05-08 NOTE — ASSESSMENT & PLAN NOTE
Patient is walking with a shuffling stooped gait.  Adjusted height of single-point cane.  Advised that he use a roller walker but patient refuses.  Will order a tripod cane due to high risk of fall.  Sent to physical therapy for additional treatment.

## 2024-05-08 NOTE — PATIENT INSTRUCTIONS
Gabapentin (By mouth)   Gabapentin (queenie-a-PEN-tin)  Treats seizures and pain caused by shingles.   Brand Name(s): FusePaq Fanatrex, Neurontin   There may be other brand names for this medicine.  When This Medicine Should Not Be Used:   This medicine is not right for everyone. Do not use it if you had an allergic reaction to gabapentin.  How to Use This Medicine:   Capsule, Liquid, Tablet  Take your medicine as directed. Your dose may need to be changed several times to find what works best for you. If you have epilepsy, do not allow more than 12 hours to pass between doses.  Capsule: Swallow the capsule whole with plenty of water. Do not open, crush, or chew it.  Gralise® tablet: Swallow the tablet whole . Do not crush, break, or chew it.  Neurontin® tablet: If you break a tablet into 2 pieces, use the second half as your next dose. Do not use the half-tablet if the whole tablet has been cut or broken after 28 days.  Oral liquid: Measure the oral liquid medicine with a marked measuring spoon, oral syringe, or medicine cup.  This medicine should come with a Medication Guide. Ask your pharmacist for a copy if you do not have one.  Missed dose: Take a dose as soon as you remember. If it is almost time for your next dose, wait until then and take a regular dose. Do not take extra medicine to make up for a missed dose.  Store the medicine in a closed container at room temperature, away from heat, moisture, and direct light. Store the Neurontin® oral liquid in the refrigerator. Do not freeze.  Drugs and Foods to Avoid:   Ask your doctor or pharmacist before using any other medicine, including over-the-counter medicines, vitamins, and herbal products.  Some medicines can affect how gabapentin works. Tell your doctor if you also using hydrocodone or morphine.  If you take an antacid, wait at least 2 hours before you take gabapentin.  Do not drink alcohol while you are using this medicine.  Tell your doctor if you use  anything else that makes you sleepy. Some examples are allergy medicine, narcotic pain medicine, and alcohol. Tell your doctor if you are also using lorazepam, oxycodone, or zolpidem.  Warnings While Using This Medicine:   Tell your doctor if you are pregnant or breastfeeding, or if you have kidney problems (including patients receiving dialysis) or lung problems. Tell your doctor if you have a history of depression or mental health problems.  This medicine may cause the following problems:  Drug reaction with eosinophilia and systemic symptoms (DRESS) or multiorgan hypersensitivity, which may damage the liver, kidney, blood, heart, or muscles  Changes in mood or behavior, including suicidal thoughts or behavior  Respiratory depression (serious breathing problem that can be life-threatening), when used with narcotic pain medicines  Do not stop using this medicine suddenly. Your doctor will need to slowly decrease your dose before you stop it completely.  This medicine may make you dizzy or drowsy. Do not drive or do anything else that could be dangerous until you know how this medicine affects you.  Tell any doctor or dentist who treats you that you are using this medicine. This medicine may affect certain medical test results.  Your doctor will check your progress and the effects of this medicine at regular visits. Keep all appointments.  Keep all medicine out of the reach of children. Never share your medicine with anyone.  Possible Side Effects While Using This Medicine:   Call your doctor right away if you notice any of these side effects:  Allergic reaction: Itching or hives, swelling in your face or hands, swelling or tingling in your mouth or throat, chest tightness, trouble breathing  Behavior problems, aggression, restlessness, trouble concentrating, moodiness (especially in children)  Blistering, peeling, red skin rash  Blue lips, fingernails, or skin, chest pain, fast heartbeat, trouble breathing  Change  in how much or how often you urinate, bloody or cloudy urine  Dark urine or pale stools, nausea, vomiting, loss of appetite, stomach pain, yellow skin or eyes  Fever, chills, cough, sore throat, body aches  Problems with coordination, shakiness, unsteadiness, unusual eye movement  Rapid weight gain, swelling in your hands, ankles, or feet  Rash, swollen or tender glands in the neck, armpit, or groin  Unusual moods or behaviors, thoughts of hurting yourself, feeling depressed  If you notice these less serious side effects, talk with your doctor:   Dizziness, drowsiness, sleepiness, tiredness  If you notice other side effects that you think are caused by this medicine, tell your doctor.   Call your doctor for medical advice about side effects. You may report side effects to FDA at 9-522-FDA-1523  © Copyright [employer] 2023 Information is for End User's use only and may not be sold, redistributed or otherwise used for commercial purposes.  The above information is an  only. It is not intended as medical advice for individual conditions or treatments. Talk to your doctor, nurse or pharmacist before following any medical regimen to see if it is safe and effective for you.

## 2024-05-17 DIAGNOSIS — B20 HIV INFECTION, UNSPECIFIED SYMPTOM STATUS (HCC): ICD-10-CM

## 2024-05-20 ENCOUNTER — APPOINTMENT (OUTPATIENT)
Dept: LAB | Facility: HOSPITAL | Age: 64
End: 2024-05-20
Payer: MEDICARE

## 2024-05-20 ENCOUNTER — TELEPHONE (OUTPATIENT)
Dept: SURGERY | Facility: CLINIC | Age: 64
End: 2024-05-20

## 2024-05-20 DIAGNOSIS — E04.1 THYROID NODULE: ICD-10-CM

## 2024-05-20 LAB
ALBUMIN SERPL BCP-MCNC: 4.7 G/DL (ref 3.5–5)
ALP SERPL-CCNC: 78 U/L (ref 34–104)
ALT SERPL W P-5'-P-CCNC: 17 U/L (ref 7–52)
ANION GAP SERPL CALCULATED.3IONS-SCNC: 8 MMOL/L (ref 4–13)
AST SERPL W P-5'-P-CCNC: 17 U/L (ref 13–39)
BASOPHILS # BLD AUTO: 0.06 THOUSANDS/ÂΜL (ref 0–0.1)
BASOPHILS NFR BLD AUTO: 1 % (ref 0–1)
BILIRUB SERPL-MCNC: 0.55 MG/DL (ref 0.2–1)
BILIRUB UR QL STRIP: NEGATIVE
BUN SERPL-MCNC: 14 MG/DL (ref 5–25)
CALCIUM SERPL-MCNC: 10.6 MG/DL (ref 8.4–10.2)
CHLORIDE SERPL-SCNC: 99 MMOL/L (ref 96–108)
CHOLEST SERPL-MCNC: 181 MG/DL
CLARITY UR: CLEAR
CO2 SERPL-SCNC: 32 MMOL/L (ref 21–32)
COLOR UR: YELLOW
CREAT SERPL-MCNC: 1.01 MG/DL (ref 0.6–1.3)
EOSINOPHIL # BLD AUTO: 0.15 THOUSAND/ÂΜL (ref 0–0.61)
EOSINOPHIL NFR BLD AUTO: 3 % (ref 0–6)
ERYTHROCYTE [DISTWIDTH] IN BLOOD BY AUTOMATED COUNT: 13.2 % (ref 11.6–15.1)
EST. AVERAGE GLUCOSE BLD GHB EST-MCNC: 131 MG/DL
GFR SERPL CREATININE-BSD FRML MDRD: 79 ML/MIN/1.73SQ M
GLUCOSE P FAST SERPL-MCNC: 97 MG/DL (ref 65–99)
GLUCOSE UR STRIP-MCNC: NEGATIVE MG/DL
HBA1C MFR BLD: 6.2 %
HCT VFR BLD AUTO: 47.3 % (ref 36.5–49.3)
HCV AB SER QL: NORMAL
HDLC SERPL-MCNC: 35 MG/DL
HGB BLD-MCNC: 16 G/DL (ref 12–17)
HGB UR QL STRIP.AUTO: NEGATIVE
IMM GRANULOCYTES # BLD AUTO: 0.01 THOUSAND/UL (ref 0–0.2)
IMM GRANULOCYTES NFR BLD AUTO: 0 % (ref 0–2)
KETONES UR STRIP-MCNC: NEGATIVE MG/DL
LDLC SERPL CALC-MCNC: 99 MG/DL (ref 0–100)
LEUKOCYTE ESTERASE UR QL STRIP: NEGATIVE
LYMPHOCYTES # BLD AUTO: 1.54 THOUSANDS/ÂΜL (ref 0.6–4.47)
LYMPHOCYTES NFR BLD AUTO: 30 % (ref 14–44)
MCH RBC QN AUTO: 29.9 PG (ref 26.8–34.3)
MCHC RBC AUTO-ENTMCNC: 33.8 G/DL (ref 31.4–37.4)
MCV RBC AUTO: 88 FL (ref 82–98)
MONOCYTES # BLD AUTO: 0.52 THOUSAND/ÂΜL (ref 0.17–1.22)
MONOCYTES NFR BLD AUTO: 10 % (ref 4–12)
NEUTROPHILS # BLD AUTO: 2.93 THOUSANDS/ÂΜL (ref 1.85–7.62)
NEUTS SEG NFR BLD AUTO: 56 % (ref 43–75)
NITRITE UR QL STRIP: NEGATIVE
NRBC BLD AUTO-RTO: 0 /100 WBCS
PH UR STRIP.AUTO: 6 [PH]
PLATELET # BLD AUTO: 243 THOUSANDS/UL (ref 149–390)
PMV BLD AUTO: 11.4 FL (ref 8.9–12.7)
POTASSIUM SERPL-SCNC: 3.8 MMOL/L (ref 3.5–5.3)
PROT SERPL-MCNC: 8.9 G/DL (ref 6.4–8.4)
PROT UR STRIP-MCNC: NEGATIVE MG/DL
RBC # BLD AUTO: 5.35 MILLION/UL (ref 3.88–5.62)
SODIUM SERPL-SCNC: 139 MMOL/L (ref 135–147)
SP GR UR STRIP.AUTO: 1.02 (ref 1–1.03)
TREPONEMA PALLIDUM IGG+IGM AB [PRESENCE] IN SERUM OR PLASMA BY IMMUNOASSAY: NORMAL
TRIGL SERPL-MCNC: 233 MG/DL
TSH SERPL DL<=0.05 MIU/L-ACNC: 2.94 UIU/ML (ref 0.45–4.5)
UROBILINOGEN UR QL STRIP.AUTO: 0.2 E.U./DL
WBC # BLD AUTO: 5.21 THOUSAND/UL (ref 4.31–10.16)

## 2024-05-20 PROCEDURE — 84443 ASSAY THYROID STIM HORMONE: CPT

## 2024-05-20 NOTE — TELEPHONE ENCOUNTER
Pt called and stated that he was having blurred vision and dizziness at times with the new medication prescribed gabapentin. Pt also wanted a [employer] ride to the lab because he did not want to drive with these side effects. I called fili and set up a ride to the [facility] [facility] lab for his blood work. I told pt that the provider isn't here today so he might not her anything until tm. Pt stated his understanding.

## 2024-05-21 LAB
BASOPHILS # BLD AUTO: 0.1 X10E3/UL (ref 0–0.2)
BASOPHILS NFR BLD AUTO: 1 %
C TRACH DNA SPEC QL NAA+PROBE: NEGATIVE
CD3+CD4+ CELLS # BLD: 542 /UL (ref 359–1519)
CD3+CD4+ CELLS NFR BLD: 36.1 % (ref 30.8–58.5)
CD3+CD4+ CELLS/CD3+CD8+ CLL BLD: 1 % (ref 0.92–3.72)
CD3+CD8+ CELLS # BLD: 540 /UL (ref 109–897)
CD3+CD8+ CELLS NFR BLD: 36 % (ref 12–35.5)
EOSINOPHIL # BLD AUTO: 0.2 X10E3/UL (ref 0–0.4)
EOSINOPHIL NFR BLD AUTO: 3 %
ERYTHROCYTE [DISTWIDTH] IN BLOOD BY AUTOMATED COUNT: 13.4 % (ref 11.6–15.4)
GAMMA INTERFERON BACKGROUND BLD IA-ACNC: 0.04 IU/ML
HCT VFR BLD AUTO: 48.5 % (ref 37.5–51)
HGB BLD-MCNC: 16.3 G/DL (ref 13–17.7)
HIV1 RNA # SERPL NAA+PROBE: <20 COPIES/ML
HIV1 RNA SERPL NAA+PROBE-LOG#: NORMAL LOG10COPY/ML
IMM GRANULOCYTES # BLD: 0 X10E3/UL (ref 0–0.1)
IMM GRANULOCYTES NFR BLD: 1 %
LYMPHOCYTES # BLD AUTO: 1.5 X10E3/UL (ref 0.7–3.1)
LYMPHOCYTES NFR BLD AUTO: 29 %
M TB IFN-G BLD-IMP: NEGATIVE
M TB IFN-G CD4+ BCKGRND COR BLD-ACNC: 0.03 IU/ML
M TB IFN-G CD4+ BCKGRND COR BLD-ACNC: 0.03 IU/ML
MCH RBC QN AUTO: 29.7 PG (ref 26.6–33)
MCHC RBC AUTO-ENTMCNC: 33.6 G/DL (ref 31.5–35.7)
MCV RBC AUTO: 89 FL (ref 79–97)
MITOGEN IGNF BCKGRD COR BLD-ACNC: 9.96 IU/ML
MONOCYTES # BLD AUTO: 0.5 X10E3/UL (ref 0.1–0.9)
MONOCYTES NFR BLD AUTO: 10 %
N GONORRHOEA DNA SPEC QL NAA+PROBE: NEGATIVE
NEUTROPHILS # BLD AUTO: 3 X10E3/UL (ref 1.4–7)
NEUTROPHILS NFR BLD AUTO: 56 %
PLATELET # BLD AUTO: 227 X10E3/UL (ref 150–450)
RBC # BLD AUTO: 5.48 X10E6/UL (ref 4.14–5.8)
WBC # BLD AUTO: 5.3 X10E3/UL (ref 3.4–10.8)

## 2024-05-21 RX ORDER — DARUNAVIR ETHANOLATE AND COBICISTAT 800; 150 MG/1; MG/1
1 TABLET, FILM COATED ORAL DAILY
Qty: 30 TABLET | Refills: 3 | Status: SHIPPED | OUTPATIENT
Start: 2024-05-21

## 2024-05-23 DIAGNOSIS — B20 CURRENTLY ASYMPTOMATIC HIV INFECTION, WITH HISTORY OF HIV-RELATED ILLNESS (HCC): Primary | ICD-10-CM

## 2024-05-28 ENCOUNTER — PATIENT OUTREACH (OUTPATIENT)
Dept: SURGERY | Facility: CLINIC | Age: 64
End: 2024-05-28
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
## 2024-05-29 LAB
DME PARACHUTE DELIVERY DATE ACTUAL: NORMAL
DME PARACHUTE DELIVERY DATE REQUESTED: NORMAL
DME PARACHUTE ITEM DESCRIPTION: NORMAL
DME PARACHUTE ORDER STATUS: NORMAL
DME PARACHUTE SUPPLIER NAME: NORMAL
DME PARACHUTE SUPPLIER PHONE: NORMAL

## 2024-06-04 ENCOUNTER — OFFICE VISIT (OUTPATIENT)
Dept: SURGERY | Facility: CLINIC | Age: 64
End: 2024-06-04
Payer: MEDICARE

## 2024-06-04 ENCOUNTER — DOCUMENTATION (OUTPATIENT)
Dept: SURGERY | Facility: CLINIC | Age: 64
End: 2024-06-04

## 2024-06-04 VITALS
SYSTOLIC BLOOD PRESSURE: 116 MMHG | DIASTOLIC BLOOD PRESSURE: 68 MMHG | TEMPERATURE: 99 F | BODY MASS INDEX: 25.25 KG/M2 | HEIGHT: 72 IN | OXYGEN SATURATION: 98 % | WEIGHT: 186.4 LBS | HEART RATE: 99 BPM

## 2024-06-04 VITALS
OXYGEN SATURATION: 98 % | HEART RATE: 75 BPM | DIASTOLIC BLOOD PRESSURE: 81 MMHG | HEIGHT: 72 IN | TEMPERATURE: 96.1 F | WEIGHT: 186 LBS | SYSTOLIC BLOOD PRESSURE: 145 MMHG | BODY MASS INDEX: 25.19 KG/M2

## 2024-06-04 DIAGNOSIS — I26.09 OTHER PULMONARY EMBOLISM WITH ACUTE COR PULMONALE (HCC): ICD-10-CM

## 2024-06-04 DIAGNOSIS — R63.4 WEIGHT LOSS, UNINTENTIONAL: ICD-10-CM

## 2024-06-04 DIAGNOSIS — G62.9 NEUROPATHY: ICD-10-CM

## 2024-06-04 DIAGNOSIS — I26.99 PULMONARY EMBOLISM, OTHER, UNSPECIFIED CHRONICITY, UNSPECIFIED WHETHER ACUTE COR PULMONALE PRESENT (HCC): ICD-10-CM

## 2024-06-04 DIAGNOSIS — T65.292D TOXIC EFFECT OF TOBACCO, INTENTIONAL SELF-HARM, SUBSEQUENT ENCOUNTER: ICD-10-CM

## 2024-06-04 DIAGNOSIS — E04.2 GOITER, NONTOXIC, MULTINODULAR: ICD-10-CM

## 2024-06-04 DIAGNOSIS — R79.89 ELEVATED TSH: ICD-10-CM

## 2024-06-04 DIAGNOSIS — T65.294D TOXIC EFFECT OF TOBACCO, UNDETERMINED INTENT, SUBSEQUENT ENCOUNTER: ICD-10-CM

## 2024-06-04 DIAGNOSIS — Z23 NEED FOR MENACTRA VACCINATION: ICD-10-CM

## 2024-06-04 DIAGNOSIS — B20 CURRENTLY ASYMPTOMATIC HIV INFECTION, WITH HISTORY OF HIV-RELATED ILLNESS (HCC): ICD-10-CM

## 2024-06-04 DIAGNOSIS — B20 HIV INFECTION, UNSPECIFIED SYMPTOM STATUS (HCC): Primary | ICD-10-CM

## 2024-06-04 DIAGNOSIS — E11.8 TYPE 2 DIABETES MELLITUS WITH UNSPECIFIED COMPLICATIONS (HCC): ICD-10-CM

## 2024-06-04 DIAGNOSIS — R26.2 AMBULATORY DYSFUNCTION: ICD-10-CM

## 2024-06-04 DIAGNOSIS — F43.10 PTSD (POST-TRAUMATIC STRESS DISORDER): ICD-10-CM

## 2024-06-04 DIAGNOSIS — I10 ESSENTIAL HYPERTENSION: ICD-10-CM

## 2024-06-04 DIAGNOSIS — B20 CURRENTLY ASYMPTOMATIC HIV INFECTION, WITH HISTORY OF HIV-RELATED ILLNESS (HCC): Primary | ICD-10-CM

## 2024-06-04 DIAGNOSIS — B20 HIV (HUMAN IMMUNODEFICIENCY VIRUS INFECTION) (HCC): ICD-10-CM

## 2024-06-04 PROCEDURE — 99215 OFFICE O/P EST HI 40 MIN: CPT | Performed by: INTERNAL MEDICINE

## 2024-06-04 PROCEDURE — 99214 OFFICE O/P EST MOD 30 MIN: CPT | Performed by: NURSE PRACTITIONER

## 2024-06-04 PROCEDURE — G2211 COMPLEX E/M VISIT ADD ON: HCPCS | Performed by: INTERNAL MEDICINE

## 2024-06-04 PROCEDURE — 90619 MENACWY-TT VACCINE IM: CPT

## 2024-06-04 PROCEDURE — 90471 IMMUNIZATION ADMIN: CPT

## 2024-06-04 RX ORDER — PAROXETINE HYDROCHLORIDE 20 MG/1
20 TABLET, FILM COATED ORAL DAILY
Qty: 30 TABLET | Refills: 5 | Status: SHIPPED | OUTPATIENT
Start: 2024-06-04

## 2024-06-04 RX ORDER — TENOFOVIR DISOPROXIL FUMARATE 300 MG/1
300 TABLET, FILM COATED ORAL DAILY
Qty: 30 TABLET | Refills: 5 | Status: SHIPPED | OUTPATIENT
Start: 2024-06-04

## 2024-06-04 RX ORDER — DOLUTEGRAVIR SODIUM 50 MG/1
50 TABLET, FILM COATED ORAL DAILY
Qty: 30 TABLET | Refills: 5 | Status: SHIPPED | OUTPATIENT
Start: 2024-06-04

## 2024-06-04 RX ORDER — METFORMIN HYDROCHLORIDE EXTENDED-RELEASE TABLETS 1000 MG/1
1000 TABLET, FILM COATED, EXTENDED RELEASE ORAL
Qty: 30 TABLET | Refills: 5 | Status: SHIPPED | OUTPATIENT
Start: 2024-06-04

## 2024-06-04 RX ORDER — DARUNAVIR ETHANOLATE AND COBICISTAT 800; 150 MG/1; MG/1
1 TABLET, FILM COATED ORAL DAILY
Qty: 30 TABLET | Refills: 3 | Status: SHIPPED | OUTPATIENT
Start: 2024-06-04

## 2024-06-04 RX ORDER — AMLODIPINE BESYLATE 2.5 MG/1
2.5 TABLET ORAL DAILY
Qty: 30 TABLET | Refills: 5 | Status: SHIPPED | OUTPATIENT
Start: 2024-06-04

## 2024-06-04 NOTE — ASSESSMENT & PLAN NOTE
On metformin with reasonably good control.    Lab Results   Component Value Date    HGBA1C 6.2 (H) 05/20/2024

## 2024-06-04 NOTE — ASSESSMENT & PLAN NOTE
Treating symptomatically after patient did not tolerate gabapentin.  He is not interested in additional treatment other than functional fitness

## 2024-06-04 NOTE — ASSESSMENT & PLAN NOTE
Counseled for greater than 15 minutes on the importance of smoking cessation.  Advised to quit.  Educated on the increased risk of heart and lung disease associated with smoking.

## 2024-06-04 NOTE — PROGRESS NOTES
Assessment/Plan: [facility] met with pt during PCP visit to complete PHQ-9 assessment.  Pt scored 0 no MH or depresseioin noted.  Pt stated he has been doing well and has been busy working on home improvement projects.   Pt is a current smoker and is not interested in smoking cessation at this time.   Pt stated he has been decreasing his smoking due to being busy.     [facility]     Today patient present with   Chief Complaint   Patient presents with    Follow-up     Pt offers no c/o at this time.     Patient would likely benefit from annual PHQ-9 and smoking cessation   Consider/focus/continue monitoring of pts emotional , cognitive and behavioral display of stabilty  Stage of change: Pre-contemplation  Plan/ Behavioral Recommendations: ongoing  interentions as per pts coordinated care and/or pts request for services.       Diagnoses and all orders for this visit:    HIV infection, unspecified symptom status (HCC)  -     dolutegravir (Tivicay) 50 MG TABS; Take 1 tablet (50 mg total) by mouth daily  -     Darunavir-Cobicistat (Prezcobix) 800-150 MG TABS; Take 1 tablet by mouth daily    Need for Menactra vaccination  -     MENINGOCOCCAL ACYW-135 TT CONJUGATE    HIV (human immunodeficiency virus infection) ([facility])  -     tenofovir (VIREAD) 300 mg tablet; Take 1 tablet (300 mg total) by mouth daily    PTSD (post-traumatic stress disorder)  -     PARoxetine (PAXIL) 20 mg tablet; Take 1 tablet (20 mg total) by mouth daily    Type 2 diabetes mellitus with unspecified complications ([facility])  -     metFORMIN (FORTAMET) 1000 MG (OSM) 24 hr tablet; Take 1 tablet (1,000 mg total) by mouth daily with dinner    Pulmonary embolism, other, unspecified chronicity, unspecified whether acute cor pulmonale present (HCC)  -     apixaban (Eliquis) 2.5 mg; Take 1 tablet (2.5 mg total) by mouth 2 (two) times a day    Essential hypertension  -     amLODIPine (NORVASC) 2.5 mg tablet; Take 1 tablet (2.5 mg total) by mouth daily    Currently  asymptomatic HIV infection, with history of HIV-related illness (HCC)    Toxic effect of tobacco, undetermined intent, subsequent encounter    Elevated TSH    Ambulatory dysfunction          Subjective:     Patient ID: Marko Daniel is a 64 y.o. male.    HPI    History of Present Illness:      Patient is being seen for annual behavioral health assessment.   Patient denies current behavioral health concerns.    [unfilled]       Review of Systems      Objective:     Physical Exam      [facility]    Orientation     Person: yes    Place: yes    Time: yes    Appearance    Well Developed: yes healthy    Uncomfortable: no    Normal Body Odor: yes    Smells of Feces: no    Smells of Urine: no    Disheveled: no    Well Nourished: yes weight WNL of ideal    Grooming Unkempt: no    Poor Eye Contact: no    Hirsute: no    Looks Tired: no    Acutely Exhausted: noappearance reflects stated age    Mood and Affect:     Appropriate: yes    Euthymicyes    Irritable: no    Angry: no    Anxious: no    Depressed:no    Blunted:no    Labile: no    Restricted: no    Harm to Self or Others: pt denies SI/HI    Substance Abuse: pt denies

## 2024-06-04 NOTE — ASSESSMENT & PLAN NOTE
Lab Results   Component Value Date    HGBA1C 6.2 (H) 05/20/2024     Lab Results   Component Value Date/Time    HGBA1C 6.2 (H) 05/20/2024 10:07 AM    HGBA1C 5.9 08/12/2015 07:06 AM        Continue current medication.    Educated to follow diabetic diet, maintain a healthy weight, and exercise regularly. Referred to dietician for additional education.

## 2024-06-04 NOTE — ASSESSMENT & PLAN NOTE
Doing well with 4 point cane. Using it at home. Advised to use when outside the home as well. Educated to attend functional fitness to increase mobility. Did tolerate gabapentin. Refuses PT and neurology follow up as well. Continue to follow and readdress with patient at follow up visits.

## 2024-06-04 NOTE — PROGRESS NOTES
Ambulatory Visit  Name: Marko Daniel      : 1960      MRN: [id number]  Encounter Provider: CATHIE Roblero  Encounter Date: 2024   Encounter department: [facility] AT [facility]    Assessment & Plan   1. Need for Menactra vaccination  -     MENINGOCOCCAL ACYW-135 TT CONJUGATE  2. HIV infection, unspecified symptom status (HCC)  -     dolutegravir (Tivicay) 50 MG TABS; Take 1 tablet (50 mg total) by mouth daily  -     Darunavir-Cobicistat (Prezcobix) 800-150 MG TABS; Take 1 tablet by mouth daily  3. HIV (human immunodeficiency virus infection) ([facility])  -     tenofovir (VIREAD) 300 mg tablet; Take 1 tablet (300 mg total) by mouth daily  4. PTSD (post-traumatic stress disorder)  -     PARoxetine (PAXIL) 20 mg tablet; Take 1 tablet (20 mg total) by mouth daily  5. Type 2 diabetes mellitus with unspecified complications ([facility])  Assessment & Plan:    Lab Results   Component Value Date    HGBA1C 6.2 (H) 2024     Lab Results   Component Value Date/Time    HGBA1C 6.2 (H) 2024 10:07 AM    HGBA1C 5.9 2015 07:06 AM        Continue current medication.    Educated to follow diabetic diet, maintain a healthy weight, and exercise regularly. Referred to dietician for additional education.                Orders:  -     metFORMIN (FORTAMET) 1000 MG (OSM) 24 hr tablet; Take 1 tablet (1,000 mg total) by mouth daily with dinner  6. Pulmonary embolism, other, unspecified chronicity, unspecified whether acute cor pulmonale present (HCC)  -     apixaban (Eliquis) 2.5 mg; Take 1 tablet (2.5 mg total) by mouth 2 (two) times a day  7. Essential hypertension  Assessment & Plan:  Blood pressure:   BP Readings from Last 3 Encounters:   24 116/68   24 125/85   24 138/72       Continue current antihypertensive.    Educated on the following lifestyle modifications to lower BP and decrease cardiovascular risk factors.  limit alcohol intake, reduce salt in diet, maintain a healthy weight, engage in  30 minutes of cardiovascular exercise daily, and not smoke.     Orders:  -     amLODIPine (NORVASC) 2.5 mg tablet; Take 1 tablet (2.5 mg total) by mouth daily  8. Currently asymptomatic HIV infection, with history of HIV-related illness (HCC)  9. Toxic effect of tobacco, undetermined intent, subsequent encounter  Assessment & Plan:  Counseled for greater than 15 minutes on the importance of smoking cessation.  Advised to quit.  Educated on the increased risk of heart and lung disease associated with smoking.     10. Elevated TSH  Assessment & Plan:  TSH has normalized. Continue f/u with endo.  11. Ambulatory dysfunction  Assessment & Plan:  Doing well with 4 point cane. Using it at home. Advised to use when outside the home as well. Educated to attend functional fitness to increase mobility. Did tolerate gabapentin. Refuses PT and neurology follow up as well. Continue to follow and readdress with patient at follow up visits.       History of Present Illness     Marko Daniel is a 64 y.o. male who presents for one month PCP f/u of neuropathy. He used gabapentin for two days but did not tolerate side effects. He feels he is at his best and does not wish any additional follow up at this time.     Review of Systems   Constitutional:  Negative for chills and fever.   HENT:  Negative for ear pain and sore throat.    Eyes:  Negative for pain and visual disturbance.   Respiratory:  Negative for cough and shortness of breath.    Cardiovascular:  Negative for chest pain and palpitations.   Gastrointestinal:  Negative for abdominal pain and vomiting.   Genitourinary:  Negative for dysuria and hematuria.   Musculoskeletal:  Positive for gait problem. Negative for arthralgias and back pain.        Neuropathy in b/l lower limbs   Skin:  Negative for color change and rash.   Neurological:  Negative for seizures and syncope.   All other systems reviewed and are negative.    Medical History Reviewed by provider this encounter:   Tobacco  Allergies  Meds  Problems  Med Hx  Surg Hx  Fam Hx       Past Medical History   Past Medical History:   Diagnosis Date    DDD (degenerative disc disease), lumbar     Facet joint disease     Human immunodeficiency virus (HIV) infection (HCC)     resolved 11/5/15    Hypertension     resolved 5/29/15    Other specified disorders of white blood cells     resolved 5/12/16     Past Surgical History:   Procedure Laterality Date    HERNIA REPAIR      SHOULDER SURGERY Left     US GUIDED THYROID BIOPSY  1/5/2023     History reviewed. No pertinent family history.  Current Outpatient Medications on File Prior to Visit   Medication Sig Dispense Refill    [DISCONTINUED] amLODIPine (NORVASC) 2.5 mg tablet TAKE 1 TABLET BY MOUTH DAILY 30 tablet 5    [DISCONTINUED] Eliquis 2.5 MG TAKE 1 TABLET BY MOUTH TWICE A DAY 60 tablet 2    [DISCONTINUED] metFORMIN (FORTAMET) 1000 MG (OSM) 24 hr tablet TAKE 1 TABLET BY MOUTH DAILY WITH DINNER 30 tablet 5    [DISCONTINUED] PARoxetine (PAXIL) 20 mg tablet TAKE 1 TABLET BY MOUTH DAILY 30 tablet 5    [DISCONTINUED] Prezcobix 800-150 MG TABS TAKE 1 TABLET BY MOUTH DAILY 30 tablet 3    [DISCONTINUED] sildenafil (VIAGRA) 50 MG tablet Take 1 tablet (50 mg total) by mouth daily as needed for erectile dysfunction 30 tablet 0    [DISCONTINUED] tenofovir (VIREAD) 300 mg tablet TAKE 1 TABLET BY MOUTH DAILY 30 tablet 5    [DISCONTINUED] Tivicay 50 MG TABS TAKE 1 TABLET BY MOUTH DAILY 30 tablet 5    [DISCONTINUED] gabapentin (Neurontin) 300 mg capsule Take 1 capsule (300 mg total) by mouth 3 (three) times a day (Patient not taking: Reported on 6/4/2024) 90 capsule 2     No current facility-administered medications on file prior to visit.   No Known Allergies   Current Outpatient Medications on File Prior to Visit   Medication Sig Dispense Refill    [DISCONTINUED] amLODIPine (NORVASC) 2.5 mg tablet TAKE 1 TABLET BY MOUTH DAILY 30 tablet 5    [DISCONTINUED] Eliquis 2.5 MG TAKE 1 TABLET BY MOUTH  TWICE A DAY 60 tablet 2    [DISCONTINUED] metFORMIN (FORTAMET) 1000 MG (OSM) 24 hr tablet TAKE 1 TABLET BY MOUTH DAILY WITH DINNER 30 tablet 5    [DISCONTINUED] PARoxetine (PAXIL) 20 mg tablet TAKE 1 TABLET BY MOUTH DAILY 30 tablet 5    [DISCONTINUED] Prezcobix 800-150 MG TABS TAKE 1 TABLET BY MOUTH DAILY 30 tablet 3    [DISCONTINUED] sildenafil (VIAGRA) 50 MG tablet Take 1 tablet (50 mg total) by mouth daily as needed for erectile dysfunction 30 tablet 0    [DISCONTINUED] tenofovir (VIREAD) 300 mg tablet TAKE 1 TABLET BY MOUTH DAILY 30 tablet 5    [DISCONTINUED] Tivicay 50 MG TABS TAKE 1 TABLET BY MOUTH DAILY 30 tablet 5    [DISCONTINUED] gabapentin (Neurontin) 300 mg capsule Take 1 capsule (300 mg total) by mouth 3 (three) times a day (Patient not taking: Reported on 6/4/2024) 90 capsule 2     No current facility-administered medications on file prior to visit.      Objective     /68   Pulse 99   Temp 99 °F (37.2 °C)   Ht 6' (1.829 m)   Wt 84.6 kg (186 lb 6.4 oz)   SpO2 98%   BMI 25.28 kg/m²     Physical Exam  Constitutional:       General: He is not in acute distress.     Appearance: He is well-developed.   HENT:      Head: Normocephalic.      Right Ear: External ear normal.      Left Ear: External ear normal.      Nose: Nose normal.      Mouth/Throat:      Pharynx: No oropharyngeal exudate.   Eyes:      General:         Right eye: No discharge.         Left eye: No discharge.      Conjunctiva/sclera: Conjunctivae normal.      Pupils: Pupils are equal, round, and reactive to light.   Neck:      Thyroid: No thyromegaly.   Cardiovascular:      Rate and Rhythm: Normal rate and regular rhythm.      Heart sounds: Normal heart sounds. No murmur heard.  Pulmonary:      Effort: Pulmonary effort is normal.      Breath sounds: Normal breath sounds. No wheezing.   Abdominal:      General: Bowel sounds are normal.      Palpations: Abdomen is soft. There is no mass.      Tenderness: There is no abdominal  tenderness.   Musculoskeletal:         General: No tenderness. Normal range of motion.      Cervical back: Normal range of motion.   Lymphadenopathy:      Cervical: No cervical adenopathy.   Skin:     General: Skin is warm and dry.      Findings: No rash.   Neurological:      Mental Status: He is alert and oriented to person, place, and time.      Motor: Weakness present.      Gait: Gait abnormal.   Psychiatric:         Behavior: Behavior normal.       Administrative Statements   I have spent a total time of 45 minutes on 06/04/24 In caring for this patient including Diagnostic results, Prognosis, Risks and benefits of tx options, Instructions for management, Patient and family education, Impressions, and Counseling / Coordination of care.

## 2024-06-04 NOTE — ASSESSMENT & PLAN NOTE
Doing well on ART with an undetectable viral load and a CD4 count of 542.  Continue ART, recheck CBC with differential, CMP, HIV RNA, and CD4 in 5 months to make sure no developing toxicities or treatment failure and follow-up in 6 months.  Stressed adherence

## 2024-06-04 NOTE — ASSESSMENT & PLAN NOTE
Continues to smoke and not interested in quitting.  Will continue to stressed the importance of complete tobacco cessation

## 2024-06-04 NOTE — ASSESSMENT & PLAN NOTE
Weight is dropped another 10 pounds but the patient has waxing and waning.  Previous workup nondiagnostic for any source.  Will continue to monitor for now.  Doubt HIV related.

## 2024-06-04 NOTE — ASSESSMENT & PLAN NOTE
Blood pressure:   BP Readings from Last 3 Encounters:   06/04/24 116/68   05/08/24 125/85   03/28/24 138/72       Continue current antihypertensive.    Educated on the following lifestyle modifications to lower BP and decrease cardiovascular risk factors.  limit alcohol intake, reduce salt in diet, maintain a healthy weight, engage in 30 minutes of cardiovascular exercise daily, and not smoke.

## 2024-06-04 NOTE — PROGRESS NOTES
Progress Note - Infectious Disease   Marko Daniel 64 y.o. male MRN: [id number]  Unit/Bed#:  Encounter: [id number]      Impression/Plan:  Currently asymptomatic HIV infection, with history of HIV-related illness (HCC)  Doing well on ART with an undetectable viral load and a CD4 count of 542.  Continue ART, recheck CBC with differential, CMP, HIV RNA, and CD4 in 5 months to make sure no developing toxicities or treatment failure and follow-up in 6 months.  Stressed adherence    Toxic effect of tobacco and nicotine  Continues to smoke and not interested in quitting.  Will continue to stressed the importance of complete tobacco cessation    Essential hypertension  Suboptimal control on amlodipine.  Discussed with the primary who is continuing to address    Weight loss, unintentional  Weight is dropped another 10 pounds but the patient has waxing and waning.  Previous workup nondiagnostic for any source.  Will continue to monitor for now.  Doubt HIV related.    Type 2 diabetes mellitus with unspecified complications (HCC)  On metformin with reasonably good control.    Lab Results   Component Value Date    HGBA1C 6.2 (H) 05/20/2024       Goiter, nontoxic, multinodular  Now with normalized TSH status post removal of nodule.    Other pulmonary embolism with acute cor pulmonale (HCC)  Remains on anticoagulation with Eliquis    Neuropathy  Treating symptomatically after patient did not tolerate gabapentin.  He is not interested in additional treatment other than functional fitness    Patient was provided medication, adherence and prevention education    Subjective:  Routine follow-up for HIV.  Patient claims 100% adherence with Prezcobix, Tivicay, Viread. Patient denies any notable side effects.  Overall the feeling well.  The patient denies any fever chills or sweats, denies any nausea vomiting or diarrhea, denies any cough or shortness of breath.    ROS:  A complete review of systems is negative other than that noted  above in the subjective    Followup portions patient history reviewed and updated as:  Allergies, current medications, past medical history, past social history, past surgical history, and the problem list    Objective:  Vitals:  Vitals:    06/04/24 1607   BP: 145/81   Pulse: 75   Temp: (!) 96.1 °F (35.6 °C)   SpO2: 98%   Weight: 84.4 kg (186 lb)   Height: 6' (1.829 m)       Physical Exam:   General Appearance:  Alert, interactive, appearing well,  nontoxic, no acute distress.   Neck:   Supple without lymphadenopathy, no thyromegaly or masses   Throat: Oropharynx moist without lesions.    Lungs:   Clear to auscultation bilaterally; no wheezes, rhonchi or rales; respirations unlabored   Heart:  RRR; no murmur, rub or gallop   Abdomen:   Soft, non-tender, non-distended, positive bowel sounds.     Extremities: No clubbing, cyanosis or edema   Skin: No new rashes or lesions. No draining wounds noted.       Labs, Imaging, & Other studies:   All pertinent labs and imaging studies were personally reviewed    Lab Results   Component Value Date     10/26/2015    K 3.8 05/20/2024    CL 99 05/20/2024    CO2 32 05/20/2024    ANIONGAP 9 10/26/2015    BUN 14 05/20/2024    CREATININE 1.01 05/20/2024    GLUCOSE 133 10/26/2015    GLUF 97 05/20/2024    CALCIUM 10.6 (H) 05/20/2024    CORRECTEDCA 9.7 06/23/2022    AST 17 05/20/2024    ALT 17 05/20/2024    ALKPHOS 78 05/20/2024    PROT 8.2 10/26/2015    BILITOT 0.23 10/26/2015    EGFR 79 05/20/2024     Lab Results   Component Value Date    WBC 5.21 05/20/2024    WBC 5.3 05/20/2024    HGB 16.0 05/20/2024    HGB 16.3 05/20/2024    HCT 47.3 05/20/2024    HCT 48.5 05/20/2024    MCV 88 05/20/2024    MCV 89 05/20/2024     05/20/2024     05/20/2024     Lab Results   Component Value Date    HEPCAB Non-reactive 05/20/2024     Lab Results   Component Value Date    HEPCAB Non-reactive 05/20/2024     Lab Results   Component Value Date    RPR Non-Reactive 11/21/2022     CD4 ABS    Date/Time Value Ref Range Status   05/20/2024 10:07  359 - 1519 /uL Final     HIV-1 RNA by PCR, Qn   Date/Time Value Ref Range Status   05/20/2024 10:07 AM <20 copies/mL Final     Comment:     HIV-1 RNA detected  The reportable range for this assay is 20 to 10,000,000  copies HIV-1 RNA/mL.     HIV-1 TARGET   Date/Time Value Ref Range Status   03/20/2024 12:30 PM Not Detected Not Detected Final           Current Outpatient Medications:     amLODIPine (NORVASC) 2.5 mg tablet, Take 1 tablet (2.5 mg total) by mouth daily, Disp: 30 tablet, Rfl: 5    apixaban (Eliquis) 2.5 mg, Take 1 tablet (2.5 mg total) by mouth 2 (two) times a day, Disp: 60 tablet, Rfl: 2    Darunavir-Cobicistat (Prezcobix) 800-150 MG TABS, Take 1 tablet by mouth daily, Disp: 30 tablet, Rfl: 3    dolutegravir (Tivicay) 50 MG TABS, Take 1 tablet (50 mg total) by mouth daily, Disp: 30 tablet, Rfl: 5    metFORMIN (FORTAMET) 1000 MG (OSM) 24 hr tablet, Take 1 tablet (1,000 mg total) by mouth daily with dinner, Disp: 30 tablet, Rfl: 5    PARoxetine (PAXIL) 20 mg tablet, Take 1 tablet (20 mg total) by mouth daily, Disp: 30 tablet, Rfl: 5    tenofovir (VIREAD) 300 mg tablet, Take 1 tablet (300 mg total) by mouth daily, Disp: 30 tablet, Rfl: 5

## 2024-06-04 NOTE — PATIENT INSTRUCTIONS
Peripheral Neuropathy   AMBULATORY CARE:   Peripheral neuropathy (PN)  is a type of nerve damage that can develop when your peripheral nerves are damaged. Peripheral nerves are located outside of the brain and spinal cord. These nerves send information from your brain and spinal cord to the rest of your body. Damage to these nerves can slow or stop their ability to send signals. PN is most common in the hands and feet. It can also affect body functions, such as urination or digestion.  Common signs and symptoms:  Your symptoms depend on the types of nerves damaged and where they are located. The following are some of the most common signs and symptoms:  Pain or tingling in your legs, feet, arms, or hands that may feel sharp, stabbing, or burning    Trouble walking or keeping your balance    Weakness or trouble holding things    Loss of your sense of touch or numbness    Bruising easily    Trouble controlling your bladder or bowels or having sex    Seek care immediately if:   You are injured from a fall.    Your legs or feet start to turn blue or black.    You have severe trouble walking.    You have a wound that does not heal or is red, swollen, or draining fluid.    Call your doctor or neurologist if:   Your pain is severe.    You cannot control your bladder.    You have questions or concerns about your condition or care.    Treatment:   Medicines:      NSAIDs , such as ibuprofen, help decrease swelling, pain, and fever. This medicine is available with or without a doctor's order. NSAIDs can cause stomach bleeding or kidney problems in certain people. If you take blood thinner medicine, always ask your healthcare provider if NSAIDs are safe for you. Always read the medicine label and follow directions.    Seizure medicines and antidepressants  may help relieve nerve pain. These medicines change how your nerves and brain communicate pain.    Topical treatments,  such as creams or patches may also help decrease  pain.    A physical therapist  teaches you movements and exercises to help improve movement and strength, and to decrease nerve pain.    Transcutaneous electrical nerve stimulation (TENS)  stimulates your nerves and may decrease your pain. Pads are attached to your skin and a mild gentle current is given.    Manage PN:   Treatment of underlying conditions is the best way to manage your PN.  This may include treating conditions such as rheumatoid arthritis or an infection. This may also include healing of an injury or trauma.    Check your skin daily.  Look for redness and swelling, and feel for warmth. Sores can form where your skin makes contact with objects or other body parts. They also can form under splints.    Be physically active at least 30 minutes, 5 days a week.  Ask your healthcare provider about the best activity plan for you. Use caution when you exercise if you have decreased feeling in your feet.         Limit alcohol as directed.  Alcohol can cause high blood sugar levels and weight gain if you drink too much. A drink of alcohol is 12 ounces of beer, 5 ounces of wine, or 1½ ounces of liquor. Your healthcare provider can tell you how many drinks are okay to have within 24 hours and within 1 week.    Prevent falls.  Move with care, and stand up slowly. Wear shoes that support your feet, and do not go barefoot. Ask about walking aids, such as a cane or walker. You may want to install railings or nonslip pads in your home, especially in the bathroom. Ask for more information on how to prevent falls.       Follow up with your doctor or neurologist as directed:  Write down your questions so you remember to ask them during your visits.  © Copyright [employer] 2023 Information is for End User's use only and may not be sold, redistributed or otherwise used for commercial purposes.  The above information is an  only. It is not intended as medical advice for individual conditions or treatments.  Talk to your doctor, nurse or pharmacist before following any medical regimen to see if it is safe and effective for you.

## 2024-06-05 NOTE — PROGRESS NOTES
Pastoral Care Progress Note    2024  Patient: Marko Daniel : 1960  Encounter Date & Time: 2024   MRN: [id number]        Mr. Daniel requested to meet with .  met with Mr. Daniel in 's office.  Mr. Daniel advised he was doing okay. Mr. Daniel is not Jain but has expressed to the  in the past he wanted to be kept in prayer. He reported keeping himself busy. His goal is to do his own physical therapy at home rather than going to see a therapist.  He advised he want's to give himself until the end of the month to see it there is any improvements.  Mr. Daniel expressed disappointment and distrust in his previous girlfriend. Mr. Daniel is working on his home so that it can be appraised.  He advised he wants to sell it and move to trail for ease and cost. It appeared Mr. Daniel was trying to cope with is recent relationship disappointment and move on in his life.  Keeping busy and considering moving appeared to be a part of his coping.  The  listened to Mr. Daniel's concerns and encouraged him to set goals and time limits on his DIY physical therapy. Both the CM and  encouraged self-care and safety as he is making repairs to his home. The  offered guidance and to affirmed she would keep Mr. Daniel in her prayers.              Chaplaincy Interventions Utilized:   Empowerment: Clarified, confirmed, or reviewed information from treatment team , Encouraged self-care, and Reframed experience of patient/family    Exploration: Explored alternatives, Explored emotional needs & resources, Facilitated story telling, and Identified, evaluated & reinforced appropriate coping strategies    Collaboration: Consulted with interdisciplinary team    Relationship Building: Cultivated a relationship of care and support and Listened empathically      Chaplaincy Outcomes Achieved:  Developed chaplaincy care plan, Distress reduced, and Identified  priorities      Spiritual Coping Strategies Utilized:   Connectedness and Positive spiritual reframing

## 2024-06-13 ENCOUNTER — DOCUMENTATION (OUTPATIENT)
Dept: SURGERY | Facility: CLINIC | Age: 64
End: 2024-06-13

## 2024-06-13 NOTE — PROGRESS NOTES
" Pastoral Care Progress Note    2024  Patient: Marko Daniel : 1960  Encounter Date & Time: 2024   MRN: [id number]        The  follows up with patient weekly for morale and encouragement.  Mr. Daniel reporting doing okay. He shared some good news about his housing refinancing.  \"This is a game changer.\" He shared the  refinancing covered his debts and the significance this opportunity to his future. He reported now being able to refocus and move forward with his life without worrying.  Mr. Daniel expressed his gratitude toward the  being able to talk and share freely.  He shared exploring who he wants to become in the future and being able to talk to someone helps him feel safe to take risk.  Mr. Daniel advised he cut himself off from people and was not sure why it is so difficult for him to accept support from others.  The  explored meaning and being with the patient.  Encouraged Mr. Daniel to feel safe to explore who he wants to become.  In addition, the  encouraged Mr. Daniel to accept assistance in helping him reach his goals in becoming.  The  pointed out his mobility progress is directly tied to his resistance for assistance and is holding him back from reaching his goals of being with family, traveling and hobbies his likes.  The  reported she will follow-up with patient about coming to [facility] in [place] next week.  Mr. Daniel agree it was okay to touch bases with him next with regarding the class.  Further support as needed.              Chaplaincy Interventions Utilized:   Empowerment: Encouraged assertiveness, Encouraged self-care, and Reframed experience of patient/family    Exploration: Explored hope, Explored emotional needs & resources, Explored relational needs & resources, Explored spiritual needs & resources, Facilitated story telling, and Identified, evaluated & reinforced appropriate coping " strategies    Relationship Building: Cultivated a relationship of care and support and Listened empathically    Chaplaincy Outcomes Achieved:  Expressed gratitude, Expressed humor, Expressed ultimate hope, and Identified meaningful connections      Spiritual Coping Strategies Utilized:   Connectedness, Spiritual empowerment, Spiritual growth or transformation, Spiritual gratitude, Spiritual meaning, Spiritual community, Positive spiritual reframing, Spiritual struggle, and Respect for own body

## 2024-06-17 ENCOUNTER — OFFICE VISIT (OUTPATIENT)
Dept: PODIATRY | Facility: CLINIC | Age: 64
End: 2024-06-17
Payer: MEDICARE

## 2024-06-17 VITALS
OXYGEN SATURATION: 99 % | BODY MASS INDEX: 25.19 KG/M2 | WEIGHT: 186 LBS | SYSTOLIC BLOOD PRESSURE: 131 MMHG | HEIGHT: 72 IN | HEART RATE: 82 BPM | DIASTOLIC BLOOD PRESSURE: 90 MMHG

## 2024-06-17 DIAGNOSIS — E11.8 TYPE 2 DIABETES MELLITUS WITH UNSPECIFIED COMPLICATIONS (HCC): ICD-10-CM

## 2024-06-17 DIAGNOSIS — L84 CORNS: Primary | ICD-10-CM

## 2024-06-17 DIAGNOSIS — B35.1 ONYCHOMYCOSIS: ICD-10-CM

## 2024-06-17 DIAGNOSIS — G62.9 NEUROPATHY: ICD-10-CM

## 2024-06-17 PROCEDURE — 11056 PARNG/CUTG B9 HYPRKR LES 2-4: CPT | Performed by: PODIATRIST

## 2024-06-17 PROCEDURE — 11721 DEBRIDE NAIL 6 OR MORE: CPT | Performed by: PODIATRIST

## 2024-06-17 NOTE — PROGRESS NOTES
Assessment/Plan:     The patient's clinical examination today significant for thickened,brittle and dystrophic pedal nail plates with discoloration and subungual debris consistent with onychomycosis x10.  There are callus lesions beneath the first MTPJ's bilaterally.  The interdigital spaces are clear without maceration.  Pedal pulses are diminished bilaterally.  There is decreased hair growth with dry scaling skin that is thinned and with decreased turgor.     The pedal nail plates were sharply debrided with a sterile nail clipper without complication x10.  The nails of them became reduced in thickness and girth utilizing a rotary bur.  The callus lesions beneath the great toe joints were debrided bilaterally with a sterile #15 blade without complication.  There are no other acute pedal issues.  Recommend daily use of a skin cream or lotion to hydrate the dry areas of skin.     Recommend follow-up in 3 months.        Diagnoses and all orders for this visit:    Onychomycosis    Corns    Type 2 diabetes mellitus with unspecified complications (HCC)    Neuropathy    Other orders  -     Lesion Destruction          Subjective:     Patient ID: Marko Daniel is a 64 y.o. male.    The patient presents today for follow-up at risk diabetic foot care with class findings.  He notes thickening of his nail plates that is causing discomfort in his close toed shoe gear.  He also notes areas of dry skin and callus to this forefoot bilaterally.  Marko has a known history of diabetic peripheral neuropathy for which she is on gabapentin.  He denies any other acute pedal issues.           PAST MEDICAL HISTORY:  Past Medical History:   Diagnosis Date    DDD (degenerative disc disease), lumbar     Facet joint disease     Human immunodeficiency virus (HIV) infection (HCC)     resolved 11/5/15    Hypertension     resolved 5/29/15    Other specified disorders of white blood cells     resolved 5/12/16       PAST SURGICAL HISTORY:  Past  "Surgical History:   Procedure Laterality Date    HERNIA REPAIR      SHOULDER SURGERY Left     US GUIDED THYROID BIOPSY  1/5/2023        ALLERGIES:  Patient has no known allergies.    MEDICATIONS:  Current Outpatient Medications   Medication Sig Dispense Refill    amLODIPine (NORVASC) 2.5 mg tablet Take 1 tablet (2.5 mg total) by mouth daily 30 tablet 5    apixaban (Eliquis) 2.5 mg Take 1 tablet (2.5 mg total) by mouth 2 (two) times a day 60 tablet 2    Darunavir-Cobicistat (Prezcobix) 800-150 MG TABS Take 1 tablet by mouth daily 30 tablet 3    dolutegravir (Tivicay) 50 MG TABS Take 1 tablet (50 mg total) by mouth daily 30 tablet 5    metFORMIN (FORTAMET) 1000 MG (OSM) 24 hr tablet Take 1 tablet (1,000 mg total) by mouth daily with dinner 30 tablet 5    PARoxetine (PAXIL) 20 mg tablet Take 1 tablet (20 mg total) by mouth daily 30 tablet 5    tenofovir (VIREAD) 300 mg tablet Take 1 tablet (300 mg total) by mouth daily 30 tablet 5     No current facility-administered medications for this visit.       SOCIAL HISTORY:  Social History     Socioeconomic History    Marital status: Single     Spouse name: None    Number of children: None    Years of education: None    Highest education level: None   Occupational History    None   Tobacco Use    Smoking status: Every Day     Current packs/day: 0.25     Average packs/day: 0.3 packs/day for 43.0 years (10.8 ttl pk-yrs)     Types: Cigarettes    Smokeless tobacco: Never    Tobacco comments:     6-7 cigs/day   Vaping Use    Vaping status: Never Used   Substance and Sexual Activity    Alcohol use: Yes     Comment: Occassionally, one beer a month    Drug use: Not Currently     Types: Marijuana     Comment: Occassionally \"as needed for depression\"    Sexual activity: Not Currently     Partners: Female   Other Topics Concern    None   Social History Narrative    None     Social Determinants of Health     Financial Resource Strain: Not on file   Food Insecurity: No Food Insecurity " (6/29/2022)    Hunger Vital Sign     Worried About Running Out of Food in the Last Year: Never true     Ran Out of Food in the Last Year: Never true   Transportation Needs: No Transportation Needs (6/24/2022)    PRAPARE - Transportation     Lack of Transportation (Medical): No     Lack of Transportation (Non-Medical): No   Physical Activity: Not on file   Stress: Not on file   Social Connections: Not on file   Intimate Partner Violence: Not on file   Housing Stability: Low Risk  (6/24/2022)    Housing Stability Vital Sign     Unable to Pay for Housing in the Last Year: No     Number of Places Lived in the Last Year: 1     Unstable Housing in the Last Year: No        Review of Systems   Constitutional: Negative.    HENT: Negative.     Eyes: Negative.    Respiratory: Negative.     Cardiovascular: Negative.    Endocrine: Negative.    Musculoskeletal: Negative.    Neurological: Negative.    Hematological: Negative.    Psychiatric/Behavioral: Negative.           Objective:     Physical Exam  Vitals reviewed.   Constitutional:       Appearance: Normal appearance.   HENT:      Head: Normocephalic and atraumatic.      Nose: Nose normal.   Eyes:      Conjunctiva/sclera: Conjunctivae normal.      Pupils: Pupils are equal, round, and reactive to light.   Cardiovascular:      Pulses:           Dorsalis pedis pulses are 1+ on the right side and 1+ on the left side.        Posterior tibial pulses are 1+ on the right side and 1+ on the left side.   Pulmonary:      Effort: Pulmonary effort is normal.   Musculoskeletal:        Feet:    Feet:      Right foot:      Skin integrity: Callus present.      Toenail Condition: Right toenails are abnormally thick and long. Fungal disease present.     Left foot:      Skin integrity: Callus present.      Toenail Condition: Left toenails are abnormally thick and long. Fungal disease present.     Comments: The patient's clinical examination today significant for thickened,brittle and dystrophic  "pedal nail plates with discoloration and subungual debris consistent with onychomycosis x10.  There are callus lesions beneath the first MTPJ's bilaterally.  The interdigital spaces are clear without maceration.  Pedal pulses are diminished bilaterally.  There is decreased hair growth with dry scaling skin that is thinned and with decreased turgor.  Skin:     General: Skin is warm.      Capillary Refill: Capillary refill takes less than 2 seconds.   Neurological:      General: No focal deficit present.      Mental Status: He is alert and oriented to person, place, and time.   Psychiatric:         Mood and Affect: Mood normal.         Behavior: Behavior normal.         Thought Content: Thought content normal.         Lesion Destruction    Date/Time: 6/17/2024 9:30 AM    Performed by: Brant Whatley DPM  Authorized by: Brant Whatley DPM  Universal Protocol:  Consent: Verbal consent obtained.  Risks and benefits: risks, benefits and alternatives were discussed  Consent given by: patient  Time out: Immediately prior to procedure a \"time out\" was called to verify the correct patient, procedure, equipment, support staff and site/side marked as required.  Timeout called at: 6/17/2024 9:30 AM.  Patient understanding: patient states understanding of the procedure being performed  Patient consent: the patient's understanding of the procedure matches consent given  Patient identity confirmed: verbally with patient and provided demographic data    Procedure Details - Lesion Destruction:     Number of Lesions:  2  Lesion 1:     Body area:  Lower extremity    Lower extremity location:  R foot    Malignancy: benign hyperkeratotic lesion      Destruction method: scissors used for extraction    Lesion 2:     Body area:  Lower extremity    Lower extremity location:  L foot    Malignancy: benign hyperkeratotic lesion      Destruction method: scissors used for extraction        "

## 2024-06-27 ENCOUNTER — TELEPHONE (OUTPATIENT)
Dept: SURGERY | Facility: CLINIC | Age: 64
End: 2024-06-27

## 2024-06-27 NOTE — TELEPHONE ENCOUNTER
Pt called and asked that the script for sildenafil be renewed, and refilled at the [facility] on [facility].

## 2024-07-01 ENCOUNTER — TELEPHONE (OUTPATIENT)
Dept: SURGERY | Facility: CLINIC | Age: 64
End: 2024-07-01

## 2024-07-01 NOTE — TELEPHONE ENCOUNTER
Lucy please call pt and let him know that he needs to be evaluated by urology for a evaluation prior to restarting viagra. TY

## 2024-07-02 ENCOUNTER — TELEPHONE (OUTPATIENT)
Dept: SURGERY | Facility: CLINIC | Age: 64
End: 2024-07-02

## 2024-07-02 DIAGNOSIS — N52.9 ERECTILE DYSFUNCTION, UNSPECIFIED ERECTILE DYSFUNCTION TYPE: ICD-10-CM

## 2024-07-02 DIAGNOSIS — T65.294D TOXIC EFFECT OF TOBACCO, UNDETERMINED INTENT, SUBSEQUENT ENCOUNTER: Primary | ICD-10-CM

## 2024-07-02 NOTE — TELEPHONE ENCOUNTER
Pt called and asked for a referral and a phone number for urology. I gave him the number, so he just needs a referral placed.

## 2024-07-10 ENCOUNTER — HOSPITAL ENCOUNTER (OUTPATIENT)
Dept: ULTRASOUND IMAGING | Facility: HOSPITAL | Age: 64
Discharge: HOME | End: 2024-07-10
Payer: MEDICARE

## 2024-07-10 ENCOUNTER — LAB (OUTPATIENT)
Dept: LAB | Facility: HOSPITAL | Age: 64
End: 2024-07-10
Payer: MEDICARE

## 2024-07-10 DIAGNOSIS — E04.1 THYROID NODULE: ICD-10-CM

## 2024-07-10 DIAGNOSIS — R79.89 ELEVATED TSH: ICD-10-CM

## 2024-07-10 LAB
ALBUMIN SERPL BCG-MCNC: 4.4 G/DL (ref 3.5–5)
ALP SERPL-CCNC: 73 U/L (ref 34–104)
ALT SERPL W P-5'-P-CCNC: 12 U/L (ref 7–52)
ANION GAP SERPL CALCULATED.3IONS-SCNC: 9 MMOL/L (ref 4–13)
AST SERPL W P-5'-P-CCNC: 14 U/L (ref 13–39)
BASOPHILS # BLD AUTO: 0.06 THOUSANDS/ÂΜL (ref 0–0.1)
BASOPHILS NFR BLD AUTO: 1 % (ref 0–1)
BILIRUB SERPL-MCNC: 0.45 MG/DL (ref 0.2–1)
BUN SERPL-MCNC: 12 MG/DL (ref 5–25)
CALCIUM SERPL-MCNC: 10 MG/DL (ref 8.4–10.2)
CHLORIDE SERPL-SCNC: 100 MMOL/L (ref 96–108)
CO2 SERPL-SCNC: 29 MMOL/L (ref 21–32)
CREAT SERPL-MCNC: 0.91 MG/DL (ref 0.6–1.3)
EOSINOPHIL # BLD AUTO: 0.24 THOUSAND/ÂΜL (ref 0–0.61)
EOSINOPHIL NFR BLD AUTO: 4 % (ref 0–6)
ERYTHROCYTE [DISTWIDTH] IN BLOOD BY AUTOMATED COUNT: 12.6 % (ref 11.6–15.1)
GFR SERPL CREATININE-BSD FRML MDRD: 90 ML/MIN/1.73SQ M
GLUCOSE P FAST SERPL-MCNC: 93 MG/DL (ref 65–99)
HCT VFR BLD AUTO: 45 % (ref 36.5–49.3)
HGB BLD-MCNC: 15.1 G/DL (ref 12–17)
IMM GRANULOCYTES # BLD AUTO: 0 THOUSAND/UL (ref 0–0.2)
IMM GRANULOCYTES NFR BLD AUTO: 0 % (ref 0–2)
LYMPHOCYTES # BLD AUTO: 1.85 THOUSANDS/ÂΜL (ref 0.6–4.47)
LYMPHOCYTES NFR BLD AUTO: 32 % (ref 14–44)
MCH RBC QN AUTO: 29.4 PG (ref 26.8–34.3)
MCHC RBC AUTO-ENTMCNC: 33.6 G/DL (ref 31.4–37.4)
MCV RBC AUTO: 88 FL (ref 82–98)
MONOCYTES # BLD AUTO: 0.58 THOUSAND/ÂΜL (ref 0.17–1.22)
MONOCYTES NFR BLD AUTO: 10 % (ref 4–12)
NEUTROPHILS # BLD AUTO: 3.06 THOUSANDS/ÂΜL (ref 1.85–7.62)
NEUTS SEG NFR BLD AUTO: 53 % (ref 43–75)
NRBC BLD AUTO-RTO: 0 /100 WBCS
PLATELET # BLD AUTO: 220 THOUSANDS/UL (ref 149–390)
PMV BLD AUTO: 10.8 FL (ref 8.9–12.7)
POTASSIUM SERPL-SCNC: 3.7 MMOL/L (ref 3.5–5.3)
PROT SERPL-MCNC: 8.3 G/DL (ref 6.4–8.4)
RBC # BLD AUTO: 5.14 MILLION/UL (ref 3.88–5.62)
SODIUM SERPL-SCNC: 138 MMOL/L (ref 135–147)
TSH SERPL DL<=0.05 MIU/L-ACNC: 2.83 UIU/ML (ref 0.45–4.5)
WBC # BLD AUTO: 5.79 THOUSAND/UL (ref 4.31–10.16)

## 2024-07-10 PROCEDURE — 76536 US EXAM OF HEAD AND NECK: CPT

## 2024-07-10 PROCEDURE — 84443 ASSAY THYROID STIM HORMONE: CPT

## 2024-07-11 LAB
BASOPHILS # BLD AUTO: 0.1 X10E3/UL (ref 0–0.2)
BASOPHILS NFR BLD AUTO: 1 %
CD3+CD4+ CELLS # BLD: 699 /UL (ref 359–1519)
CD3+CD4+ CELLS NFR BLD: 41.1 % (ref 30.8–58.5)
CD3+CD4+ CELLS/CD3+CD8+ CLL BLD: 1.18 % (ref 0.92–3.72)
CD3+CD8+ CELLS # BLD: 592 /UL (ref 109–897)
CD3+CD8+ CELLS NFR BLD: 34.8 % (ref 12–35.5)
EOSINOPHIL # BLD AUTO: 0.2 X10E3/UL (ref 0–0.4)
EOSINOPHIL NFR BLD AUTO: 4 %
ERYTHROCYTE [DISTWIDTH] IN BLOOD BY AUTOMATED COUNT: 13.1 % (ref 11.6–15.4)
HCT VFR BLD AUTO: 42.9 % (ref 37.5–51)
HGB BLD-MCNC: 14.1 G/DL (ref 13–17.7)
IMM GRANULOCYTES # BLD: 0 X10E3/UL (ref 0–0.1)
IMM GRANULOCYTES NFR BLD: 0 %
LYMPHOCYTES # BLD AUTO: 1.7 X10E3/UL (ref 0.7–3.1)
LYMPHOCYTES NFR BLD AUTO: 30 %
MCH RBC QN AUTO: 29.5 PG (ref 26.6–33)
MCHC RBC AUTO-ENTMCNC: 32.9 G/DL (ref 31.5–35.7)
MCV RBC AUTO: 90 FL (ref 79–97)
MONOCYTES # BLD AUTO: 0.6 X10E3/UL (ref 0.1–0.9)
MONOCYTES NFR BLD AUTO: 10 %
NEUTROPHILS # BLD AUTO: 3.1 X10E3/UL (ref 1.4–7)
NEUTROPHILS NFR BLD AUTO: 55 %
PLATELET # BLD AUTO: 249 X10E3/UL (ref 150–450)
RBC # BLD AUTO: 4.78 X10E6/UL (ref 4.14–5.8)
WBC # BLD AUTO: 5.7 X10E3/UL (ref 3.4–10.8)

## 2024-07-12 LAB — HIV1 RNA # PLAS NAA DL=20: NOT DETECTED {COPIES}/ML

## 2024-07-15 ENCOUNTER — PATIENT OUTREACH (OUTPATIENT)
Dept: SURGERY | Facility: CLINIC | Age: 64
End: 2024-07-15

## 2024-07-15 NOTE — PROGRESS NOTES
Cm spoke with ct about securing an eye appointment. Ct requested assistance with this, cm informed ct that she has called a couple of places but they did not take his insurance. Cm continued to look for new locations for ct. Cm called the [facility] in [place] who confirmed that they took the ct's insurance. Cm reached out to the ct to inform him that [facility] takes his insurance and ask if he would like to move forward with [facility]. Ct stated that he will let cm know.

## 2024-07-16 ENCOUNTER — DOCUMENTATION (OUTPATIENT)
Dept: SURGERY | Facility: CLINIC | Age: 64
End: 2024-07-16

## 2024-07-16 NOTE — PROGRESS NOTES
Patient Progress Note    CC: thyroid nodule     Referring Provider  No referring provider defined for this encounter.     History of Present Illness:     Patient is a 63 yo male here for follow-up for right thyroid nodule this was incidentally found on a CTA of chest. Ultrasound done in July 2022 showed a dominant cystic and solid nodule in the lower pole of the right thyroid lobe which met criteria for biopsy. FNA done in January 2023 was benign. Thyroid ultrasound done in July 2024 showed multiple nodules of which do not meet criteria for biopsy but a follow-up was recommended in 1 year. Has a history of slightly elevated TSH but free T4 was normal and thyroid antibodies were negative.  Most recent TSH was normal at 2.827.  No history of external radiation to head/neck/chest. No family history of thyroid cancer. No recent Iodine loading in form of medication, biotin or kelp supplements or radiological diagnostic studies.  Thyroid US: July 2024  FINDINGS:  Thyroid texture: Thyroid parenchyma is diffusely heterogeneous in echotexture with focal nodule(s) as described below.     Right lobe: 8.0 x 3.9 x 6.0 cm. Volume 89.6 mL  Left lobe: 4.2 x 1.8 x 1.2 cm. Volume 4.2 mL  Isthmus: 0.3 cm.     Nodule #1. Image 40.  RIGHT nodule measuring 7.6 x 5.7 x 3.6 cm. Given differences in measuring technique, no significant change from the most immediate prior and study on 7/18/2022.  COMPOSITION: 1 point, mixed cystic and solid.  ECHOGENICITY: 1 point, hyperechoic or isoechoic.  SHAPE: 0 points, wider-than-tall.  MARGIN: 0 points, smooth.  ECHOGENIC FOCI: 1 point, macrocalcifications.  TI-RADS Classification: TR 3 (3 points), FNA if > 2.5 cm. Follow if > 1.5 cm. This nodule has had a previous benign biopsy. As it is stable, no further sampling recommended at this time. Further surveillance at 2 year intervals could be considered to   confirm continued stability for a period of greater than 5 years.     Nodule #2. Image  66.  LEFT lower pole nodule measuring 1.1 x 0.5 x 1.0 cm.  Given differences in measuring technique, no significant change from prior.  COMPOSITION: 2 points, solid or almost completely solid.  ECHOGENICITY: 2 points, hypoechoic.  SHAPE: 0 points, wider-than-tall.  MARGIN: 0 points, smooth.  ECHOGENIC FOCI: 0 points, none or large comet-tail artifacts.  TI-RADS Classification: TR 4 (4-6 points), FNA if > 1.5 cm. Follow if > 1cm.     There are additional nodules of lesser size and/or TI-RADS score. These do not necessitate additional evaluation based on ACR criteria.        IMPRESSION:     No nodule meets current ACR criteria for requiring biopsy but follow-up ultrasound is recommended in 1 year.    He also has a history of type 2 diabetes which is being managed by his PCP. Last A1c was controlled at 6.2%. He takes metformin 1000 mg at dinner.         Patient Active Problem List   Diagnosis    Anxiety    Essential hypertension    Heart murmur    Hiatal hernia    Currently asymptomatic HIV infection, with history of HIV-related illness (HCC)    Hypertriglyceridemia    Inguinal hernia    Toxic effect of tobacco and nicotine    Other male erectile dysfunction    Thyroid nodule    Goiter, nontoxic, multinodular    Other pulmonary embolism with acute cor pulmonale (HCC)    Primary osteoarthritis of both knees    Type 2 diabetes mellitus with unspecified complications (HCC)    Elevated TSH    Ambulatory dysfunction    Neuropathy    Weight loss, unintentional     Past Medical History:   Diagnosis Date    DDD (degenerative disc disease), lumbar     Facet joint disease     Human immunodeficiency virus (HIV) infection (HCC)     resolved 11/5/15    Hypertension     resolved 5/29/15    Other specified disorders of white blood cells     resolved 5/12/16      Past Surgical History:   Procedure Laterality Date    HERNIA REPAIR      SHOULDER SURGERY Left     US GUIDED THYROID BIOPSY  1/5/2023      History reviewed. No pertinent  family history.  Social History     Tobacco Use    Smoking status: Every Day     Current packs/day: 0.25     Average packs/day: 0.3 packs/day for 43.0 years (10.8 ttl pk-yrs)     Types: Cigarettes    Smokeless tobacco: Never    Tobacco comments:     6-7 cigs/day   Substance Use Topics    Alcohol use: Yes     Comment: Occassionally, one beer a month     No Known Allergies  Current Outpatient Medications   Medication Sig Dispense Refill    amLODIPine (NORVASC) 2.5 mg tablet Take 1 tablet (2.5 mg total) by mouth daily 30 tablet 5    apixaban (Eliquis) 2.5 mg Take 1 tablet (2.5 mg total) by mouth 2 (two) times a day 60 tablet 2    Darunavir-Cobicistat (Prezcobix) 800-150 MG TABS Take 1 tablet by mouth daily 30 tablet 3    dolutegravir (Tivicay) 50 MG TABS Take 1 tablet (50 mg total) by mouth daily 30 tablet 5    metFORMIN (FORTAMET) 1000 MG (OSM) 24 hr tablet Take 1 tablet (1,000 mg total) by mouth daily with dinner 30 tablet 5    PARoxetine (PAXIL) 20 mg tablet Take 1 tablet (20 mg total) by mouth daily 30 tablet 5    tenofovir (VIREAD) 300 mg tablet Take 1 tablet (300 mg total) by mouth daily 30 tablet 5     No current facility-administered medications for this visit.         Review of Systems   Constitutional:  Negative for activity change, appetite change, fatigue and unexpected weight change.   HENT:  Negative for trouble swallowing.    Eyes:  Negative for visual disturbance.   Respiratory:  Negative for shortness of breath.    Cardiovascular:  Negative for chest pain and palpitations.   Gastrointestinal:  Negative for constipation and diarrhea.   Endocrine: Negative for cold intolerance and heat intolerance.   Musculoskeletal:  Positive for arthralgias (LE).   Skin: Negative.    Neurological:  Positive for numbness (neuropathy).   Psychiatric/Behavioral: Negative.         Physical Exam:  Body mass index is 25.9 kg/m².  /82   Pulse 81   Ht 6' (1.829 m)   Wt 86.6 kg (191 lb)   SpO2 97%   BMI 25.90 kg/m²     Wt Readings from Last 3 Encounters:   07/17/24 86.6 kg (191 lb)   06/17/24 84.4 kg (186 lb)   06/04/24 84.4 kg (186 lb)       Physical Exam  Vitals and nursing note reviewed.   Constitutional:       Appearance: He is well-developed.   HENT:      Head: Normocephalic.   Eyes:      General: No scleral icterus.     Extraocular Movements: EOM normal.   Neck:      Thyroid: Thyromegaly present.   Cardiovascular:      Rate and Rhythm: Normal rate and regular rhythm.      Pulses:           Radial pulses are 2+ on the right side and 2+ on the left side.      Heart sounds: No murmur heard.  Pulmonary:      Effort: Pulmonary effort is normal. No respiratory distress.      Breath sounds: Normal breath sounds. No wheezing.   Musculoskeletal:      Cervical back: Neck supple.   Skin:     General: Skin is warm and dry.   Neurological:      Mental Status: He is alert.   Psychiatric:         Mood and Affect: Mood and affect normal.       Patient's shoes and socks were not removed.          Labs:   Lab Results   Component Value Date    HGBA1C 6.2 (H) 05/20/2024       Lab Results   Component Value Date    CHOL 149 10/26/2015    HDL 35 (L) 05/20/2024    TRIG 233 (H) 05/20/2024       Lab Results   Component Value Date    GLUCOSE 133 10/26/2015    CALCIUM 10.0 07/10/2024     10/26/2015    K 3.7 07/10/2024    CO2 29 07/10/2024     07/10/2024    BUN 12 07/10/2024    CREATININE 0.91 07/10/2024        eGFR   Date Value Ref Range Status   07/10/2024 90 ml/min/1.73sq m Final       Lab Results   Component Value Date    ALT 12 07/10/2024    AST 14 07/10/2024    ALKPHOS 73 07/10/2024    BILITOT 0.23 10/26/2015       Lab Results   Component Value Date    PRI2YHNPYMKI 2.827 07/10/2024           Plan:    Diagnoses and all orders for this visit:    Thyroid nodule  Ultrasound done July 2024 showed multiple nodules which do not meet criteria for biopsy but a follow-up ultrasound was recommended in 1 year  Ordered ultrasound for July  2025  Call for any obstructive symptoms  -     T4, free; Future  -     TSH, 3rd generation; Future  -     US thyroid; Future    Elevated TSH  History of elevated TSH with normal free T4 and negative antibodies  Most recent TSH normal at 2.827  Monitor labs  -     T4, free; Future  -     TSH, 3rd generation; Future    Type 2 diabetes mellitus with unspecified complications (HCC)  A1c previously controlled at 6.2%  He is on metformin   Managed by PCP        Discussed with the patient and all questions fully answered. He will call me if any problems arise.    Counseled patient on diagnostic results, prognosis, risk and benefit of treatment options, instruction for management, importance of treatment compliance, risk  factor reduction and impressions      Angela Grossman PA-C

## 2024-07-17 ENCOUNTER — OFFICE VISIT (OUTPATIENT)
Dept: ENDOCRINOLOGY | Facility: CLINIC | Age: 64
End: 2024-07-17
Payer: MEDICARE

## 2024-07-17 VITALS
WEIGHT: 191 LBS | OXYGEN SATURATION: 97 % | SYSTOLIC BLOOD PRESSURE: 130 MMHG | HEART RATE: 81 BPM | DIASTOLIC BLOOD PRESSURE: 82 MMHG | BODY MASS INDEX: 25.87 KG/M2 | HEIGHT: 72 IN

## 2024-07-17 DIAGNOSIS — E04.1 THYROID NODULE: Primary | ICD-10-CM

## 2024-07-17 DIAGNOSIS — R79.89 ELEVATED TSH: ICD-10-CM

## 2024-07-17 DIAGNOSIS — E11.8 TYPE 2 DIABETES MELLITUS WITH UNSPECIFIED COMPLICATIONS (HCC): ICD-10-CM

## 2024-07-17 PROCEDURE — 99214 OFFICE O/P EST MOD 30 MIN: CPT | Performed by: PHYSICIAN ASSISTANT

## 2024-07-18 NOTE — PROGRESS NOTES
Pastoral Care Progress Note    2024  Patient: Marko Daniel : 1960  Encounter Date & Time: 2024   MRN: [id number]      The  follows-up with patient weekly for emotional and pastoral support. Mr. Daniel reporting feeling well. He shared his favorite Scripture with the  and discussed various activities and topics that are important in his life.  There are no changes to report with patient at this time.

## 2024-08-19 ENCOUNTER — PATIENT OUTREACH (OUTPATIENT)
Dept: SURGERY | Facility: CLINIC | Age: 64
End: 2024-08-19

## 2024-08-19 ENCOUNTER — OFFICE VISIT (OUTPATIENT)
Dept: UROLOGY | Facility: CLINIC | Age: 64
End: 2024-08-19
Payer: MEDICARE

## 2024-08-19 VITALS
OXYGEN SATURATION: 97 % | WEIGHT: 188 LBS | HEIGHT: 73 IN | HEART RATE: 88 BPM | SYSTOLIC BLOOD PRESSURE: 114 MMHG | BODY MASS INDEX: 24.92 KG/M2 | DIASTOLIC BLOOD PRESSURE: 80 MMHG

## 2024-08-19 DIAGNOSIS — N52.9 ERECTILE DYSFUNCTION, UNSPECIFIED ERECTILE DYSFUNCTION TYPE: ICD-10-CM

## 2024-08-19 PROCEDURE — 99213 OFFICE O/P EST LOW 20 MIN: CPT

## 2024-08-19 RX ORDER — SILDENAFIL 100 MG/1
100 TABLET, FILM COATED ORAL DAILY PRN
Qty: 20 TABLET | Refills: 4 | Status: SHIPPED | OUTPATIENT
Start: 2024-08-19

## 2024-08-19 NOTE — PROGRESS NOTES
8/19/2024      Chief Complaint   Patient presents with    Erectile Dysfunction         Assessment and Plan    64 y.o. male       Erectile Dysfunction  -Patient has been taking 100 mg sildenafil PRN and happy with results. He states he was recently getting this from an online pharmacy but wishes to get prescription from us. He states he has been using this over the past year.  -He states he has been tolerating the medication well. No adverse effects.  -We did also discuss other interventions for ED such as intracavernosal injections, vacuum erection device, and penile prosthesis which patient defers.   -Sildenafil 100 mg PRN refills sent to pharmacy.  -Patient would like to return in 1 year for follow-up.     2. Prostate cancer screening  -patient's last PSA 0.4 (2016)  -Patient does not wish to have prostate cancer screening at this time. Defers REECE and repeat PSA lab.            History of Present Illness  Marko Daniel is a 64 y.o. male presenting today for erectile dysfunction. He has a past medical history of HIV, HTN, PE, Type II DM, Neuropathy, Anxiety, Osteoarthritis, and Hypertriglyceridemia.     Patient was previously seen by us in 2021 for a small left varicocele confirmed on US in 2016. Patient denies scrotal pain. He denies dysuria, gross hematuria, suprapubic pressure, flank pain, fevers, or chills.     Patient has a history of previous left inguinal hernia repeair.     Most recent A1c 6.2      Review of Systems   Constitutional:  Negative for activity change, fatigue and fever.   HENT:  Negative for congestion, rhinorrhea and sore throat.    Eyes:  Negative for photophobia, redness and visual disturbance.   Respiratory:  Negative for cough, shortness of breath and wheezing.    Cardiovascular:  Negative for chest pain, palpitations and leg swelling.   Gastrointestinal:  Negative for abdominal pain, diarrhea, nausea and vomiting.   Genitourinary:  Negative for dysuria, flank pain, frequency, hematuria  "and urgency.   Neurological:  Negative for weakness, light-headedness and headaches.                Vitals  Vitals:    08/19/24 0747   BP: 114/80   BP Location: Left arm   Patient Position: Sitting   Cuff Size: Adult   Pulse: 88   SpO2: 97%   Weight: 85.3 kg (188 lb)   Height: 6' 1\" (1.854 m)       Physical Exam  Constitutional:       Appearance: Normal appearance. He is not toxic-appearing.   HENT:      Head: Normocephalic.      Mouth/Throat:      Pharynx: Oropharynx is clear.   Eyes:      Extraocular Movements: Extraocular movements intact.      Pupils: Pupils are equal, round, and reactive to light.   Pulmonary:      Effort: Pulmonary effort is normal. No respiratory distress.   Musculoskeletal:         General: Normal range of motion.      Cervical back: Normal range of motion.   Neurological:      Mental Status: He is alert and oriented to person, place, and time. Mental status is at baseline.      Gait: Gait normal.   Psychiatric:         Behavior: Behavior normal.           Past History  Past Medical History:   Diagnosis Date    DDD (degenerative disc disease), lumbar     Facet joint disease     Human immunodeficiency virus (HIV) infection (HCC)     resolved 11/5/15    Hypertension     resolved 5/29/15    Other specified disorders of white blood cells     resolved 5/12/16     Social History     Socioeconomic History    Marital status: Single     Spouse name: None    Number of children: None    Years of education: None    Highest education level: None   Occupational History    None   Tobacco Use    Smoking status: Every Day     Current packs/day: 0.25     Average packs/day: 0.3 packs/day for 43.0 years (10.8 ttl pk-yrs)     Types: Cigarettes    Smokeless tobacco: Never    Tobacco comments:     6-7 cigs/day   Vaping Use    Vaping status: Never Used   Substance and Sexual Activity    Alcohol use: Yes     Comment: Occassionally, one beer a month    Drug use: Not Currently     Types: Marijuana     Comment: " "Occassionally \"as needed for depression\"    Sexual activity: Not Currently     Partners: Female   Other Topics Concern    None   Social History Narrative    None     Social Determinants of Health     Financial Resource Strain: Not on file   Food Insecurity: No Food Insecurity (6/29/2022)    Hunger Vital Sign     Worried About Running Out of Food in the Last Year: Never true     Ran Out of Food in the Last Year: Never true   Transportation Needs: No Transportation Needs (6/24/2022)    PRAPARE - Transportation     Lack of Transportation (Medical): No     Lack of Transportation (Non-Medical): No   Physical Activity: Not on file   Stress: Not on file   Social Connections: Not on file   Intimate Partner Violence: Not on file   Housing Stability: Low Risk  (6/24/2022)    Housing Stability Vital Sign     Unable to Pay for Housing in the Last Year: No     Number of Places Lived in the Last Year: 1     Unstable Housing in the Last Year: No     Social History     Tobacco Use   Smoking Status Every Day    Current packs/day: 0.25    Average packs/day: 0.3 packs/day for 43.0 years (10.8 ttl pk-yrs)    Types: Cigarettes   Smokeless Tobacco Never   Tobacco Comments    6-7 cigs/day     History reviewed. No pertinent family history.    The following portions of the patient's history were reviewed and updated as appropriate: allergies, current medications, past medical history, past social history, past surgical history and problem list.    Results  No results found for this or any previous visit (from the past 1 hour(s)).]  Lab Results   Component Value Date    PSA 0.4 02/16/2016     Lab Results   Component Value Date    GLUCOSE 133 10/26/2015    CALCIUM 10.0 07/10/2024     10/26/2015    K 3.7 07/10/2024    CO2 29 07/10/2024     07/10/2024    BUN 12 07/10/2024    CREATININE 0.91 07/10/2024     Lab Results   Component Value Date    WBC 5.7 07/11/2024    HGB 14.1 07/11/2024    HCT 42.9 07/11/2024    MCV 90 07/11/2024    "  07/11/2024       CATHIE Berry

## 2024-08-20 ENCOUNTER — PATIENT OUTREACH (OUTPATIENT)
Dept: SURGERY | Facility: CLINIC | Age: 64
End: 2024-08-20

## 2024-08-21 DIAGNOSIS — Z79.899 OTHER LONG TERM (CURRENT) DRUG THERAPY: ICD-10-CM

## 2024-08-21 DIAGNOSIS — B20 HUMAN IMMUNODEFICIENCY VIRUS (HIV) DISEASE (HCC): Primary | ICD-10-CM

## 2024-08-21 DIAGNOSIS — Z13.1 SCREENING FOR DIABETES MELLITUS: ICD-10-CM

## 2024-08-28 NOTE — PROGRESS NOTES
Cm called ct for their scheduled phone call. Ct reported that he was finally able to get his car fixed and he drove to see his son and his grandchild who he had yet to meet. Ct reported that he was feeling stressed with the car problems and many medical appointments. Cm inquired about seeing the optometrist. Ct stated that he would like to wait until next month to make the appointment with the optometrist after his two medical appointments so that he has less to worry about. Cm explained that she understood and they agreed to meet during his following medical appointment with the clinic so that cm could help ct make the appointment with the optometrist.

## 2024-09-03 ENCOUNTER — PATIENT OUTREACH (OUTPATIENT)
Dept: SURGERY | Facility: CLINIC | Age: 64
End: 2024-09-03

## 2024-09-03 ENCOUNTER — OFFICE VISIT (OUTPATIENT)
Dept: SURGERY | Facility: CLINIC | Age: 64
End: 2024-09-03
Payer: MEDICARE

## 2024-09-03 VITALS
DIASTOLIC BLOOD PRESSURE: 76 MMHG | WEIGHT: 188.4 LBS | BODY MASS INDEX: 24.97 KG/M2 | SYSTOLIC BLOOD PRESSURE: 118 MMHG | TEMPERATURE: 98.3 F | HEIGHT: 73 IN | HEART RATE: 85 BPM

## 2024-09-03 DIAGNOSIS — R63.4 WEIGHT LOSS, UNINTENTIONAL: ICD-10-CM

## 2024-09-03 DIAGNOSIS — N52.9 ERECTILE DYSFUNCTION, UNSPECIFIED ERECTILE DYSFUNCTION TYPE: ICD-10-CM

## 2024-09-03 DIAGNOSIS — B20 CURRENTLY ASYMPTOMATIC HIV INFECTION, WITH HISTORY OF HIV-RELATED ILLNESS (HCC): Primary | ICD-10-CM

## 2024-09-03 DIAGNOSIS — R26.2 AMBULATORY DYSFUNCTION: ICD-10-CM

## 2024-09-03 DIAGNOSIS — I10 ESSENTIAL HYPERTENSION: ICD-10-CM

## 2024-09-03 DIAGNOSIS — G62.9 NEUROPATHY: ICD-10-CM

## 2024-09-03 DIAGNOSIS — B20 HIV INFECTION, UNSPECIFIED SYMPTOM STATUS (HCC): ICD-10-CM

## 2024-09-03 DIAGNOSIS — E11.8 TYPE 2 DIABETES MELLITUS WITH UNSPECIFIED COMPLICATIONS (HCC): ICD-10-CM

## 2024-09-03 DIAGNOSIS — B20 HIV (HUMAN IMMUNODEFICIENCY VIRUS INFECTION) (HCC): ICD-10-CM

## 2024-09-03 DIAGNOSIS — T65.294D TOXIC EFFECT OF TOBACCO, UNDETERMINED INTENT, SUBSEQUENT ENCOUNTER: ICD-10-CM

## 2024-09-03 DIAGNOSIS — F43.10 PTSD (POST-TRAUMATIC STRESS DISORDER): ICD-10-CM

## 2024-09-03 DIAGNOSIS — I26.99 PULMONARY EMBOLISM, OTHER, UNSPECIFIED CHRONICITY, UNSPECIFIED WHETHER ACUTE COR PULMONALE PRESENT (HCC): ICD-10-CM

## 2024-09-03 PROCEDURE — 99214 OFFICE O/P EST MOD 30 MIN: CPT | Performed by: NURSE PRACTITIONER

## 2024-09-03 RX ORDER — PAROXETINE 20 MG/1
20 TABLET, FILM COATED ORAL DAILY
Qty: 30 TABLET | Refills: 5 | Status: SHIPPED | OUTPATIENT
Start: 2024-09-03

## 2024-09-03 RX ORDER — AMLODIPINE BESYLATE 2.5 MG/1
2.5 TABLET ORAL DAILY
Qty: 30 TABLET | Refills: 5 | Status: SHIPPED | OUTPATIENT
Start: 2024-09-03

## 2024-09-03 RX ORDER — DOLUTEGRAVIR SODIUM 50 MG/1
50 TABLET, FILM COATED ORAL DAILY
Qty: 30 TABLET | Refills: 5 | Status: SHIPPED | OUTPATIENT
Start: 2024-09-03

## 2024-09-03 RX ORDER — SILDENAFIL 100 MG/1
100 TABLET, FILM COATED ORAL DAILY PRN
Qty: 20 TABLET | Refills: 4 | Status: SHIPPED | OUTPATIENT
Start: 2024-09-03

## 2024-09-03 RX ORDER — TENOFOVIR DISOPROXIL FUMARATE 300 MG/1
300 TABLET, FILM COATED ORAL DAILY
Qty: 30 TABLET | Refills: 5 | Status: SHIPPED | OUTPATIENT
Start: 2024-09-03

## 2024-09-03 RX ORDER — METFORMIN HCL 500 MG
1000 TABLET, EXTENDED RELEASE 24 HR ORAL
Qty: 60 TABLET | Refills: 5 | Status: SHIPPED | OUTPATIENT
Start: 2024-09-03

## 2024-09-03 RX ORDER — DARUNAVIR ETHANOLATE AND COBICISTAT 800; 150 MG/1; MG/1
1 TABLET, FILM COATED ORAL DAILY
Qty: 30 TABLET | Refills: 3 | Status: SHIPPED | OUTPATIENT
Start: 2024-09-03

## 2024-09-03 NOTE — ASSESSMENT & PLAN NOTE
CD4 T CELL ABSOLUTE   Date/Time Value Ref Range Status   10/26/2015 07:58  359 - 1,519 /uL Final     CD4 ABS   Date/Time Value Ref Range Status   2024 01:05  359 - 1519 /uL Final     HIV-1 RNA by PCR, Qn   Date/Time Value Ref Range Status   2024 10:07 AM <20 copies/mL Final     Comment:     HIV-1 RNA detected  The reportable range for this assay is 20 to 10,000,000  copies HIV-1 RNA/mL.     HIV-1 RNA Viral Load Log   Date/Time Value Ref Range Status   2024 10:07 AM COMMENT [id number]/mL Final     Comment:     Unable to calculate result since non-numeric result obtained for  component test.         ART: Prezcobix, Tivicay        Denies side effects. Stressed the importance of adherence.  Continue follow up with ID clinic.       Reviewed most recent labs, including Cd4 and viral load. Discussed the risks and benefits of treatment options, instructions for management, importance of treatment adherence, and reduction of risk factor.Educated on possible  medication side effects.  Reviewed last ID visit.  Collaborated with ID to optimize medical treatment.    Counseled on routes of HIV transmission, including the risk of  infection. Emphasized that viral suppression is the best method to prevent HIV transmission.  At this time pt.denies the need for HIV testing of anyone in their life.     Total encounter time was 45 minutes. Greater then 20 minutes were spent on counseling and patient education. Pt voices understanding and agreement with treatment plan.

## 2024-09-03 NOTE — PROGRESS NOTES
Ambulatory Visit  Name: Marko Daniel      : 1960      MRN: [id number]  Encounter Provider: CATHIE Roblero  Encounter Date: 9/3/2024   Encounter department: [facility] AT [facility]    Assessment & Plan   1. Currently asymptomatic HIV infection, with history of HIV-related illness (HCC)  Assessment & Plan:  CD4 T CELL ABSOLUTE   Date/Time Value Ref Range Status   10/26/2015 07:58  359 - 1,519 /uL Final     CD4 ABS   Date/Time Value Ref Range Status   2024 01:05  359 - 1519 /uL Final     HIV-1 RNA by PCR, Qn   Date/Time Value Ref Range Status   2024 10:07 AM <20 copies/mL Final     Comment:     HIV-1 RNA detected  The reportable range for this assay is 20 to 10,000,000  copies HIV-1 RNA/mL.     HIV-1 RNA Viral Load Log   Date/Time Value Ref Range Status   2024 10:07 AM COMMENT qap99pbiz/mL Final     Comment:     Unable to calculate result since non-numeric result obtained for  component test.         ART: Prezcobix, Tivicay        Denies side effects. Stressed the importance of adherence.  Continue follow up with ID clinic.       Reviewed most recent labs, including Cd4 and viral load. Discussed the risks and benefits of treatment options, instructions for management, importance of treatment adherence, and reduction of risk factor.Educated on possible  medication side effects.  Reviewed last ID visit.  Collaborated with ID to optimize medical treatment.    Counseled on routes of HIV transmission, including the risk of  infection. Emphasized that viral suppression is the best method to prevent HIV transmission.  At this time pt.denies the need for HIV testing of anyone in their life.     Total encounter time was 45 minutes. Greater then 20 minutes were spent on counseling and patient education. Pt voices understanding and agreement with treatment plan.      2. Erectile dysfunction, unspecified erectile dysfunction type  -     sildenafil (VIAGRA) 100 mg tablet;  Take 1 tablet (100 mg total) by mouth daily as needed for erectile dysfunction  3. HIV (human immunodeficiency virus infection) ([facility])  -     tenofovir (VIREAD) 300 mg tablet; Take 1 tablet (300 mg total) by mouth daily  4. PTSD (post-traumatic stress disorder)  -     PARoxetine (PAXIL) 20 mg tablet; Take 1 tablet (20 mg total) by mouth daily  5. Type 2 diabetes mellitus with unspecified complications ([facility])  Assessment & Plan:    Lab Results   Component Value Date    HGBA1C 6.2 (H) 05/20/2024     Lab Results   Component Value Date/Time    HGBA1C 6.2 (H) 05/20/2024 10:07 AM    HGBA1C 5.9 08/12/2015 07:06 AM        Continue current medication.    Educated to follow diabetic diet, maintain a healthy weight, and exercise regularly. Referred to dietician for additional education.    Continue to follow with podiatry for regular foot care.    Refer to opthalmology for yearly retinal exam.               Orders:  -     metFORMIN (GLUCOPHAGE-XR) 500 mg 24 hr tablet; Take 2 tablets (1,000 mg total) by mouth daily with breakfast  6. HIV infection, unspecified symptom status ([facility])  -     dolutegravir (Tivicay) 50 MG TABS; Take 1 tablet (50 mg total) by mouth daily  -     Darunavir-Cobicistat (Prezcobix) 800-150 MG TABS; Take 1 tablet by mouth daily  7. Pulmonary embolism, other, unspecified chronicity, unspecified whether acute cor pulmonale present ([facility])  -     apixaban (Eliquis) 2.5 mg; Take 1 tablet (2.5 mg total) by mouth 2 (two) times a day  8. Essential hypertension  Assessment & Plan:  Blood pressure: Well controlled on current therapy  BP Readings from Last 3 Encounters:   09/03/24 118/76   08/19/24 114/80   07/17/24 130/82       Continue amlodipine.    Educated on the following lifestyle modifications to lower BP and decrease cardiovascular risk factors.  limit alcohol intake, reduce salt in diet, maintain a healthy weight, engage in 30 minutes of cardiovascular exercise daily, and not smoke.     Orders:  -      "amLODIPine (NORVASC) 2.5 mg tablet; Take 1 tablet (2.5 mg total) by mouth daily  9. Toxic effect of tobacco, undetermined intent, subsequent encounter  10. Neuropathy  Assessment & Plan:  Trialed gabapentin but could not tolerate side effects.  Discussed possible addition of duloxetine, but patient is hesitant to try medical therapy.  Provided with education and will discuss further at follow-up primary care visit.  11. Weight loss, unintentional  Assessment & Plan:  Body mass index is 24.86 kg/m².  Wt Readings from Last 3 Encounters:   09/03/24 85.5 kg (188 lb 6.4 oz)   08/19/24 85.3 kg (188 lb)   07/17/24 86.6 kg (191 lb)     Height: 6' 1\" (185.4 cm)           Lab Results   Component Value Date    HGBA1C 6.2 (H) 05/20/2024     Lab Results   Component Value Date    GLUCOSE 133 10/26/2015   ;    Weight is stable. Continue f/u with dietician.       12. Ambulatory dysfunction  Assessment & Plan:  Continues to decline physical therapy.  Educated that physical therapy would be beneficial in preventing potential future falls.  Is not using prescribed 4 point quad cane at visit today, but states he uses cane while in his house.  Uses single-point cane while ambulating outside.  Advised to use 4 point quad cane consistently to promote stability with ambulation.      History of Present Illness     Marko Daniel is a 64 y.o. male who presents for primary care follow up. He is doing well and has no acute complaints.     Review of Systems   Constitutional:  Negative for chills and fever.   HENT:  Negative for ear pain and sore throat.    Eyes:  Negative for pain and visual disturbance.   Respiratory:  Negative for cough and shortness of breath.    Cardiovascular:  Negative for chest pain and palpitations.   Gastrointestinal:  Negative for abdominal pain and vomiting.   Genitourinary:  Negative for dysuria and hematuria.   Musculoskeletal:  Negative for arthralgias and back pain.   Skin:  Negative for color change and rash. "   Neurological:  Negative for seizures and syncope.   All other systems reviewed and are negative.    Medical History Reviewed by provider this encounter:  Tobacco  Allergies  Meds  Problems  Med Hx  Surg Hx  Fam Hx       Past Medical History   Past Medical History:   Diagnosis Date    DDD (degenerative disc disease), lumbar     Facet joint disease     Human immunodeficiency virus (HIV) infection (HCC)     resolved 11/5/15    Hypertension     resolved 5/29/15    Other specified disorders of white blood cells     resolved 5/12/16     Past Surgical History:   Procedure Laterality Date    HERNIA REPAIR      SHOULDER SURGERY Left     US GUIDED THYROID BIOPSY  1/5/2023     History reviewed. No pertinent family history.  Current Outpatient Medications on File Prior to Visit   Medication Sig Dispense Refill    [DISCONTINUED] amLODIPine (NORVASC) 2.5 mg tablet Take 1 tablet (2.5 mg total) by mouth daily 30 tablet 5    [DISCONTINUED] apixaban (Eliquis) 2.5 mg Take 1 tablet (2.5 mg total) by mouth 2 (two) times a day 60 tablet 2    [DISCONTINUED] Darunavir-Cobicistat (Prezcobix) 800-150 MG TABS Take 1 tablet by mouth daily 30 tablet 3    [DISCONTINUED] dolutegravir (Tivicay) 50 MG TABS Take 1 tablet (50 mg total) by mouth daily 30 tablet 5    [DISCONTINUED] metFORMIN (FORTAMET) 1000 MG (OSM) 24 hr tablet Take 1 tablet (1,000 mg total) by mouth daily with dinner 30 tablet 5    [DISCONTINUED] PARoxetine (PAXIL) 20 mg tablet Take 1 tablet (20 mg total) by mouth daily 30 tablet 5    [DISCONTINUED] sildenafil (VIAGRA) 100 mg tablet Take 1 tablet (100 mg total) by mouth daily as needed for erectile dysfunction 20 tablet 4    [DISCONTINUED] tenofovir (VIREAD) 300 mg tablet Take 1 tablet (300 mg total) by mouth daily 30 tablet 5     No current facility-administered medications on file prior to visit.   No Known Allergies   Current Outpatient Medications on File Prior to Visit   Medication Sig Dispense Refill    [DISCONTINUED]  "amLODIPine (NORVASC) 2.5 mg tablet Take 1 tablet (2.5 mg total) by mouth daily 30 tablet 5    [DISCONTINUED] apixaban (Eliquis) 2.5 mg Take 1 tablet (2.5 mg total) by mouth 2 (two) times a day 60 tablet 2    [DISCONTINUED] Darunavir-Cobicistat (Prezcobix) 800-150 MG TABS Take 1 tablet by mouth daily 30 tablet 3    [DISCONTINUED] dolutegravir (Tivicay) 50 MG TABS Take 1 tablet (50 mg total) by mouth daily 30 tablet 5    [DISCONTINUED] metFORMIN (FORTAMET) 1000 MG (OSM) 24 hr tablet Take 1 tablet (1,000 mg total) by mouth daily with dinner 30 tablet 5    [DISCONTINUED] PARoxetine (PAXIL) 20 mg tablet Take 1 tablet (20 mg total) by mouth daily 30 tablet 5    [DISCONTINUED] sildenafil (VIAGRA) 100 mg tablet Take 1 tablet (100 mg total) by mouth daily as needed for erectile dysfunction 20 tablet 4    [DISCONTINUED] tenofovir (VIREAD) 300 mg tablet Take 1 tablet (300 mg total) by mouth daily 30 tablet 5     No current facility-administered medications on file prior to visit.      Social History     Tobacco Use    Smoking status: Every Day     Current packs/day: 0.25     Average packs/day: 0.3 packs/day for 43.0 years (10.8 ttl pk-yrs)     Types: Cigarettes    Smokeless tobacco: Never    Tobacco comments:     6-7 cigs/day   Vaping Use    Vaping status: Never Used   Substance and Sexual Activity    Alcohol use: Yes     Comment: Occassionally, one beer a month    Drug use: Not Currently     Types: Marijuana     Comment: Occassionally \"as needed for depression\"    Sexual activity: Yes     Partners: Female     Birth control/protection: Condom Male     Objective     /76   Pulse 85   Temp 98.3 °F (36.8 °C)   Ht 6' 1\" (1.854 m)   Wt 85.5 kg (188 lb 6.4 oz)   BMI 24.86 kg/m²     Physical Exam  Constitutional:       General: He is not in acute distress.     Appearance: He is well-developed.   HENT:      Head: Normocephalic.      Right Ear: External ear normal.      Left Ear: External ear normal.      Nose: Nose normal. "      Mouth/Throat:      Pharynx: No oropharyngeal exudate.   Eyes:      General:         Right eye: No discharge.         Left eye: No discharge.      Conjunctiva/sclera: Conjunctivae normal.      Pupils: Pupils are equal, round, and reactive to light.   Neck:      Thyroid: No thyromegaly.   Cardiovascular:      Rate and Rhythm: Normal rate and regular rhythm.      Heart sounds: Normal heart sounds. No murmur heard.  Pulmonary:      Effort: Pulmonary effort is normal.      Breath sounds: Normal breath sounds. No wheezing.   Abdominal:      General: Bowel sounds are normal.      Palpations: Abdomen is soft. There is no mass.      Tenderness: There is no abdominal tenderness.   Musculoskeletal:         General: No tenderness. Normal range of motion.      Cervical back: Normal range of motion.   Lymphadenopathy:      Cervical: No cervical adenopathy.   Skin:     General: Skin is warm and dry.      Findings: No rash.   Neurological:      Mental Status: He is alert and oriented to person, place, and time.   Psychiatric:         Behavior: Behavior normal.       Administrative Statements   I have spent a total time of 15 minutes in caring for this patient on the day of the visit/encounter including Diagnostic results, Prognosis, Risks and benefits of tx options, Instructions for management, Patient and family education, Risk factor reductions, and Impressions.

## 2024-09-03 NOTE — ASSESSMENT & PLAN NOTE
Lab Results   Component Value Date    HGBA1C 6.2 (H) 05/20/2024     Lab Results   Component Value Date/Time    HGBA1C 6.2 (H) 05/20/2024 10:07 AM    HGBA1C 5.9 08/12/2015 07:06 AM        Continue current medication.    Educated to follow diabetic diet, maintain a healthy weight, and exercise regularly. Referred to dietician for additional education.    Continue to follow with podiatry for regular foot care.    Refer to opthalmology for yearly retinal exam.

## 2024-09-03 NOTE — ASSESSMENT & PLAN NOTE
Blood pressure: Well controlled on current therapy  BP Readings from Last 3 Encounters:   09/03/24 118/76   08/19/24 114/80   07/17/24 130/82       Continue amlodipine.    Educated on the following lifestyle modifications to lower BP and decrease cardiovascular risk factors.  limit alcohol intake, reduce salt in diet, maintain a healthy weight, engage in 30 minutes of cardiovascular exercise daily, and not smoke.

## 2024-09-03 NOTE — ASSESSMENT & PLAN NOTE
Trialed gabapentin but could not tolerate side effects.  Discussed possible addition of duloxetine, but patient is hesitant to try medical therapy.  Provided with education and will discuss further at follow-up primary care visit.

## 2024-09-03 NOTE — ASSESSMENT & PLAN NOTE
"Body mass index is 24.86 kg/m².  Wt Readings from Last 3 Encounters:   09/03/24 85.5 kg (188 lb 6.4 oz)   08/19/24 85.3 kg (188 lb)   07/17/24 86.6 kg (191 lb)     Height: 6' 1\" (185.4 cm)           Lab Results   Component Value Date    HGBA1C 6.2 (H) 05/20/2024     Lab Results   Component Value Date    GLUCOSE 133 10/26/2015   ;    Weight is stable. Continue f/u with dietician.       "

## 2024-09-03 NOTE — ASSESSMENT & PLAN NOTE
Continues to decline physical therapy.  Educated that physical therapy would be beneficial in preventing potential future falls.  Is not using prescribed 4 point quad cane at visit today, but states he uses cane while in his house.  Uses single-point cane while ambulating outside.  Advised to use 4 point quad cane consistently to promote stability with ambulation.

## 2024-09-10 NOTE — PROGRESS NOTES
Cm met with ct to check in with him, ct stated he was doing well but was still busy with appointments. Cm and ct discussed scheduling an eye exam, ct reported that he is still feeling overwhelmed with all of his appointments and would still prefer if we waiting the next month to schedule the appointment. Cm stated that she would continue to check in on him and would still help him schedule the appointment when he was ready. Ct agreed.

## 2024-09-12 ENCOUNTER — OFFICE VISIT (OUTPATIENT)
Dept: DENTISTRY | Facility: CLINIC | Age: 64
End: 2024-09-12

## 2024-09-12 VITALS — DIASTOLIC BLOOD PRESSURE: 83 MMHG | SYSTOLIC BLOOD PRESSURE: 137 MMHG | HEART RATE: 80 BPM

## 2024-09-12 DIAGNOSIS — K02.9 TOOTH DECAYED: ICD-10-CM

## 2024-09-12 DIAGNOSIS — Z01.20 ENCOUNTER FOR DENTAL EXAM AND CLEANING W/O ABNORMAL FINDINGS: Primary | ICD-10-CM

## 2024-09-12 PROCEDURE — D0210 INTRAORAL - COMPLETE SERIES OF RADIOGRAPHIC IMAGES: HCPCS

## 2024-09-12 PROCEDURE — D0150 COMPREHENSIVE ORAL EVALUATION - NEW OR ESTABLISHED PATIENT: HCPCS

## 2024-09-12 NOTE — PROGRESS NOTES
COMP EXAM, FMX, PROBE EXAM   REVIEWED MED HX: meds, allergies, health changes reviewed in EPIC   - 6/ 2022 blood clots lungs reports. Hosp 4-5 days. Pt reports no changes in medications  CHIEF CONCERN:     -Last dental visit was 4/ 2021. Pt reports no current dental pain.    - pt reports shoulder replacement years ago. 2012. Pt  previously took  Premedication.   PAIN SCALE:  0  ASA CLASS:  ASA 2 - Patient with mild systemic disease with no functional limitations    PERIO: 3/A    Visual and Tactile Intraoral/ Extraoral evaluation: Oral and Oropharyngeal cancer evaluation. No findings     Dr. Juarez -  Reviewed with patient clinical and radiographic findings and patient verbalized understanding. All questions and concerns addressed.   --> discussed options of #19 - RCT/crown or EXT. Pt would like to wait and think about.   ---> disc #20 deep decay , will try to restore but may require rct     REFERRALS: none    CARIES FINDINGS: 12-do, 15- OB, 20-MOD, 21- DO       NEXT VISIT:   1) sc/rp 4 quads    Last FMX : 9/12/24

## 2024-09-12 NOTE — DENTAL PROCEDURE DETAILS
COMP EXAM, FMX, PROBE EXAM   REVIEWED MED HX: meds, allergies, health changes reviewed in EPIC   - 6/ 2022 blood clots lungs reports. Hosp 4-5 days. Pt reports no changes in medications  CHIEF CONCERN:     -Last dental visit was 4/ 2021. Pt reports no current dental pain.    - pt reports shoulder replacement years ago. 2012. Pt  previously took  Premedication.   PAIN SCALE:  0  ASA CLASS:  ASA 2 - Patient with mild systemic disease with no functional limitations    PERIO: 3/A    Visual and Tactile Intraoral/ Extraoral evaluation: Oral and Oropharyngeal cancer evaluation. No findings     Dr. Juarez -  Reviewed with patient clinical and radiographic findings and patient verbalized understanding. All questions and concerns addressed.   --> discussed options of #19 - RCT/crown or EXT. Pt would like to wait and think about.   ---> disc #20 deep decay , will try to restore but may require rct   --> pt did not seem interested in scheduling any appts at this time.    REFERRALS: none    CARIES FINDINGS: 12-do, 15- OB, 20-MOD, 21- DO       NEXT VISIT:   1) sc/rp 4 quads recommended. Informed of perio disease    Last FMX : 9/12/24

## 2024-09-17 ENCOUNTER — OFFICE VISIT (OUTPATIENT)
Dept: PODIATRY | Facility: CLINIC | Age: 64
End: 2024-09-17
Payer: MEDICARE

## 2024-09-17 VITALS
OXYGEN SATURATION: 100 % | HEART RATE: 77 BPM | SYSTOLIC BLOOD PRESSURE: 142 MMHG | BODY MASS INDEX: 24.86 KG/M2 | DIASTOLIC BLOOD PRESSURE: 90 MMHG | HEIGHT: 73 IN

## 2024-09-17 DIAGNOSIS — B35.1 ONYCHOMYCOSIS: Primary | ICD-10-CM

## 2024-09-17 DIAGNOSIS — E11.8 TYPE 2 DIABETES MELLITUS WITH UNSPECIFIED COMPLICATIONS (HCC): ICD-10-CM

## 2024-09-17 DIAGNOSIS — G62.9 NEUROPATHY: ICD-10-CM

## 2024-09-17 PROCEDURE — 11721 DEBRIDE NAIL 6 OR MORE: CPT | Performed by: PODIATRIST

## 2024-09-17 PROCEDURE — RECHECK: Performed by: PODIATRIST

## 2024-09-17 NOTE — PROGRESS NOTES
Assessment/Plan:     The patient's clinical examination today significant for thickened,brittle and dystrophic pedal nail plates with discoloration and subungual debris consistent with onychomycosis x10.  The interdigital spaces are clear without maceration.  Pedal pulses are diminished bilaterally.  Skin is thin with decreased turgor bilaterally.  There is diminished hair growth bilaterally.  Vibratory and epicritic sensations are diminished secondary to peripheral neuropathy.     The pedal nail plates were sharply debrided with a sterile nail clipper without complication x10.  The nails of them became reduced in thickness and girth utilizing a rotary bur.  The callus lesions beneath the great toe joints were debrided bilaterally with a sterile #15 blade without complication.  There are no other acute pedal issues.  Recommend daily use of a skin cream or lotion to hydrate the dry areas of skin.     Recommend follow-up in 3 months.     Diagnoses and all orders for this visit:    Onychomycosis    Type 2 diabetes mellitus with unspecified complications (HCC)    Neuropathy          Subjective:     Patient ID: Marko Daniel is a 64 y.o. male.    The patient presents today for follow-up at risk diabetic foot care with class findings.  He notes thickening of his nail plates that is causing discomfort in his close toed shoe gear.  Marko has a history of diabetic peripheral neuropathy for which he is on gabapentin.  He denies any other acute pedal issues today.           PAST MEDICAL HISTORY:  Past Medical History:   Diagnosis Date    DDD (degenerative disc disease), lumbar     Facet joint disease     Human immunodeficiency virus (HIV) infection (HCC)     resolved 11/5/15    Hypertension     resolved 5/29/15    Other specified disorders of white blood cells     resolved 5/12/16       PAST SURGICAL HISTORY:  Past Surgical History:   Procedure Laterality Date    HERNIA REPAIR      SHOULDER SURGERY Left     US GUIDED THYROID  "BIOPSY  1/5/2023        ALLERGIES:  Patient has no known allergies.    MEDICATIONS:  Current Outpatient Medications   Medication Sig Dispense Refill    amLODIPine (NORVASC) 2.5 mg tablet Take 1 tablet (2.5 mg total) by mouth daily 30 tablet 5    apixaban (Eliquis) 2.5 mg Take 1 tablet (2.5 mg total) by mouth 2 (two) times a day 60 tablet 2    Darunavir-Cobicistat (Prezcobix) 800-150 MG TABS Take 1 tablet by mouth daily 30 tablet 3    dolutegravir (Tivicay) 50 MG TABS Take 1 tablet (50 mg total) by mouth daily 30 tablet 5    metFORMIN (GLUCOPHAGE-XR) 500 mg 24 hr tablet Take 2 tablets (1,000 mg total) by mouth daily with breakfast 60 tablet 5    PARoxetine (PAXIL) 20 mg tablet Take 1 tablet (20 mg total) by mouth daily 30 tablet 5    sildenafil (VIAGRA) 100 mg tablet Take 1 tablet (100 mg total) by mouth daily as needed for erectile dysfunction 20 tablet 4    tenofovir (VIREAD) 300 mg tablet Take 1 tablet (300 mg total) by mouth daily 30 tablet 5     No current facility-administered medications for this visit.       SOCIAL HISTORY:  Social History     Socioeconomic History    Marital status: Single     Spouse name: None    Number of children: None    Years of education: None    Highest education level: None   Occupational History    None   Tobacco Use    Smoking status: Every Day     Current packs/day: 0.25     Average packs/day: 0.3 packs/day for 43.0 years (10.8 ttl pk-yrs)     Types: Cigarettes    Smokeless tobacco: Never    Tobacco comments:     6-7 cigs/day   Vaping Use    Vaping status: Never Used   Substance and Sexual Activity    Alcohol use: Yes     Comment: Occassionally, one beer a month    Drug use: Not Currently     Types: Marijuana     Comment: Occassionally \"as needed for depression\"    Sexual activity: Yes     Partners: Female     Birth control/protection: Condom Male   Other Topics Concern    None   Social History Narrative    None     Social Determinants of Health     Financial Resource Strain: Not " on file   Food Insecurity: No Food Insecurity (6/29/2022)    Hunger Vital Sign     Worried About Running Out of Food in the Last Year: Never true     Ran Out of Food in the Last Year: Never true   Transportation Needs: No Transportation Needs (6/24/2022)    PRAPARE - Transportation     Lack of Transportation (Medical): No     Lack of Transportation (Non-Medical): No   Physical Activity: Not on file   Stress: Not on file   Social Connections: Not on file   Intimate Partner Violence: Not on file   Housing Stability: Low Risk  (6/24/2022)    Housing Stability Vital Sign     Unable to Pay for Housing in the Last Year: No     Number of Places Lived in the Last Year: 1     Unstable Housing in the Last Year: No        Review of Systems   Constitutional: Negative.    HENT: Negative.     Eyes: Negative.    Respiratory: Negative.     Cardiovascular: Negative.    Endocrine: Negative.    Musculoskeletal: Negative.    Neurological: Negative.    Hematological: Negative.    Psychiatric/Behavioral: Negative.           Objective:     Physical Exam  Vitals reviewed.   Constitutional:       Appearance: Normal appearance.   HENT:      Head: Normocephalic and atraumatic.      Nose: Nose normal.   Eyes:      Conjunctiva/sclera: Conjunctivae normal.      Pupils: Pupils are equal, round, and reactive to light.   Cardiovascular:      Pulses:           Dorsalis pedis pulses are 1+ on the right side and 1+ on the left side.        Posterior tibial pulses are 1+ on the right side and 1+ on the left side.   Pulmonary:      Effort: Pulmonary effort is normal.   Feet:      Right foot:      Skin integrity: Skin integrity normal.      Toenail Condition: Right toenails are abnormally thick and long. Fungal disease present.     Left foot:      Skin integrity: Skin integrity normal.      Toenail Condition: Left toenails are abnormally thick and long. Fungal disease present.     Comments: The patient's clinical examination today significant for  thickened,brittle and dystrophic pedal nail plates with discoloration and subungual debris consistent with onychomycosis x10.  The interdigital spaces are clear without maceration.  Pedal pulses are diminished bilaterally.  Skin is thin with decreased turgor bilaterally.  There is diminished hair growth bilaterally.  Vibratory and epicritic sensations are diminished secondary to peripheral neuropathy.  Skin:     General: Skin is warm.      Capillary Refill: Capillary refill takes less than 2 seconds.   Neurological:      General: No focal deficit present.      Mental Status: He is alert and oriented to person, place, and time.   Psychiatric:         Mood and Affect: Mood normal.         Behavior: Behavior normal.         Thought Content: Thought content normal.

## 2024-09-25 ENCOUNTER — DOCUMENTATION (OUTPATIENT)
Dept: SURGERY | Facility: CLINIC | Age: 64
End: 2024-09-25

## 2024-09-25 NOTE — PROGRESS NOTES
returned patient call. Mr. Daniel called  for continued pastoral support.  Mr. Daniel shared he was not speaking to his SO. The  spoke to Mr. Daniel about the importance of communication in relationships and offered ongoing conversation.  Mr. Daniel appeared to be in a good mood and shared stories about his life.  Further support as needed.

## 2024-10-08 ENCOUNTER — TELEPHONE (OUTPATIENT)
Dept: SURGERY | Facility: CLINIC | Age: 64
End: 2024-10-08

## 2024-10-08 NOTE — TELEPHONE ENCOUNTER
Pt called to question the fact that his de[facility] scan [facility]. I called the department, just to confirm that they do see men at that location, I called patient to make him aware.

## 2024-11-05 ENCOUNTER — TELEPHONE (OUTPATIENT)
Age: 64
End: 2024-11-05

## 2024-11-05 NOTE — TELEPHONE ENCOUNTER
Patient stated they have a family emergency and cannot come into the office this week, they were rescheduled to 1/2/25 however they stated their original appt with Dr Perez was supposed to be in September, they would appreciate a call back to offer a sooner appt if there is anything with either provider. Raheem # [phone]

## 2024-11-19 ENCOUNTER — APPOINTMENT (OUTPATIENT)
Dept: LAB | Facility: HOSPITAL | Age: 64
End: 2024-11-19
Attending: INTERNAL MEDICINE
Payer: MEDICARE

## 2024-11-19 DIAGNOSIS — I26.09 OTHER ACUTE PULMONARY EMBOLISM WITH ACUTE COR PULMONALE (HCC): ICD-10-CM

## 2024-11-19 LAB
ALBUMIN SERPL BCG-MCNC: 4.4 G/DL (ref 3.5–5)
ALP SERPL-CCNC: 75 U/L (ref 34–104)
ALT SERPL W P-5'-P-CCNC: 11 U/L (ref 7–52)
ANION GAP SERPL CALCULATED.3IONS-SCNC: 8 MMOL/L (ref 4–13)
AST SERPL W P-5'-P-CCNC: 14 U/L (ref 13–39)
BASOPHILS # BLD AUTO: 0.05 THOUSANDS/ÂΜL (ref 0–0.1)
BASOPHILS NFR BLD AUTO: 1 % (ref 0–1)
BILIRUB SERPL-MCNC: 0.43 MG/DL (ref 0.2–1)
BUN SERPL-MCNC: 15 MG/DL (ref 5–25)
CALCIUM SERPL-MCNC: 9.8 MG/DL (ref 8.4–10.2)
CHLORIDE SERPL-SCNC: 98 MMOL/L (ref 96–108)
CO2 SERPL-SCNC: 28 MMOL/L (ref 21–32)
CREAT SERPL-MCNC: 0.99 MG/DL (ref 0.6–1.3)
EOSINOPHIL # BLD AUTO: 0.15 THOUSAND/ÂΜL (ref 0–0.61)
EOSINOPHIL NFR BLD AUTO: 3 % (ref 0–6)
ERYTHROCYTE [DISTWIDTH] IN BLOOD BY AUTOMATED COUNT: 12.8 % (ref 11.6–15.1)
EST. AVERAGE GLUCOSE BLD GHB EST-MCNC: 143 MG/DL
GFR SERPL CREATININE-BSD FRML MDRD: 81 ML/MIN/1.73SQ M
GLUCOSE P FAST SERPL-MCNC: 121 MG/DL (ref 65–99)
HBA1C MFR BLD: 6.6 %
HCT VFR BLD AUTO: 45.1 % (ref 36.5–49.3)
HGB BLD-MCNC: 15.3 G/DL (ref 12–17)
IMM GRANULOCYTES # BLD AUTO: 0.01 THOUSAND/UL (ref 0–0.2)
IMM GRANULOCYTES NFR BLD AUTO: 0 % (ref 0–2)
LYMPHOCYTES # BLD AUTO: 1.93 THOUSANDS/ÂΜL (ref 0.6–4.47)
LYMPHOCYTES NFR BLD AUTO: 32 % (ref 14–44)
MCH RBC QN AUTO: 29.5 PG (ref 26.8–34.3)
MCHC RBC AUTO-ENTMCNC: 33.9 G/DL (ref 31.4–37.4)
MCV RBC AUTO: 87 FL (ref 82–98)
MONOCYTES # BLD AUTO: 0.63 THOUSAND/ÂΜL (ref 0.17–1.22)
MONOCYTES NFR BLD AUTO: 10 % (ref 4–12)
NEUTROPHILS # BLD AUTO: 3.29 THOUSANDS/ÂΜL (ref 1.85–7.62)
NEUTS SEG NFR BLD AUTO: 54 % (ref 43–75)
NRBC BLD AUTO-RTO: 0 /100 WBCS
PLATELET # BLD AUTO: 227 THOUSANDS/UL (ref 149–390)
PMV BLD AUTO: 10.6 FL (ref 8.9–12.7)
POTASSIUM SERPL-SCNC: 3.8 MMOL/L (ref 3.5–5.3)
PROT SERPL-MCNC: 8.3 G/DL (ref 6.4–8.4)
RBC # BLD AUTO: 5.18 MILLION/UL (ref 3.88–5.62)
SODIUM SERPL-SCNC: 134 MMOL/L (ref 135–147)
WBC # BLD AUTO: 6.06 THOUSAND/UL (ref 4.31–10.16)

## 2024-11-19 PROCEDURE — 80053 COMPREHEN METABOLIC PANEL: CPT

## 2024-11-19 PROCEDURE — 85025 COMPLETE CBC W/AUTO DIFF WBC: CPT

## 2024-11-20 LAB
BASOPHILS # BLD AUTO: 0.1 X10E3/UL (ref 0–0.2)
BASOPHILS NFR BLD AUTO: 1 %
CD3+CD4+ CELLS # BLD: 779 /UL (ref 359–1519)
CD3+CD4+ CELLS NFR BLD: 41 % (ref 30.8–58.5)
CD3+CD4+ CELLS/CD3+CD8+ CLL BLD: 1.14 % (ref 0.92–3.72)
CD3+CD8+ CELLS # BLD: 686 /UL (ref 109–897)
CD3+CD8+ CELLS NFR BLD: 36.1 % (ref 12–35.5)
EOSINOPHIL # BLD AUTO: 0.2 X10E3/UL (ref 0–0.4)
EOSINOPHIL NFR BLD AUTO: 3 %
ERYTHROCYTE [DISTWIDTH] IN BLOOD BY AUTOMATED COUNT: 13.2 % (ref 11.6–15.4)
HCT VFR BLD AUTO: 42.9 % (ref 37.5–51)
HGB BLD-MCNC: 14.6 G/DL (ref 13–17.7)
HIV1 RNA # PLAS NAA DL=20: NOT DETECTED {COPIES}/ML
IMM GRANULOCYTES # BLD: 0 X10E3/UL (ref 0–0.1)
IMM GRANULOCYTES NFR BLD: 0 %
LYMPHOCYTES # BLD AUTO: 1.9 X10E3/UL (ref 0.7–3.1)
LYMPHOCYTES NFR BLD AUTO: 31 %
MCH RBC QN AUTO: 29.7 PG (ref 26.6–33)
MCHC RBC AUTO-ENTMCNC: 34 G/DL (ref 31.5–35.7)
MCV RBC AUTO: 87 FL (ref 79–97)
MONOCYTES # BLD AUTO: 0.6 X10E3/UL (ref 0.1–0.9)
MONOCYTES NFR BLD AUTO: 11 %
NEUTROPHILS # BLD AUTO: 3.3 X10E3/UL (ref 1.4–7)
NEUTROPHILS NFR BLD AUTO: 54 %
PLATELET # BLD AUTO: 245 X10E3/UL (ref 150–450)
RBC # BLD AUTO: 4.92 X10E6/UL (ref 4.14–5.8)
WBC # BLD AUTO: 6 X10E3/UL (ref 3.4–10.8)

## 2024-11-25 ENCOUNTER — TELEPHONE (OUTPATIENT)
Dept: HEMATOLOGY ONCOLOGY | Facility: CLINIC | Age: 64
End: 2024-11-25

## 2024-11-25 DIAGNOSIS — D72.89 OTHER SPECIFIED DISORDERS OF WHITE BLOOD CELLS: ICD-10-CM

## 2024-11-25 DIAGNOSIS — Z72.89 OTHER PROBLEMS RELATED TO LIFESTYLE: ICD-10-CM

## 2024-11-25 DIAGNOSIS — Z11.1 SCREENING-PULMONARY TB: ICD-10-CM

## 2024-11-25 DIAGNOSIS — Z13.220 ENCOUNTER FOR LIPID SCREENING FOR CARDIOVASCULAR DISEASE: ICD-10-CM

## 2024-11-25 DIAGNOSIS — Z13.6 ENCOUNTER FOR LIPID SCREENING FOR CARDIOVASCULAR DISEASE: ICD-10-CM

## 2024-11-25 DIAGNOSIS — Z20.2 CONTACT WITH AND (SUSPECTED) EXPOSURE TO INFECTIONS WITH A PREDOMINANTLY SEXUAL MODE OF TRANSMISSION: ICD-10-CM

## 2024-11-25 DIAGNOSIS — Z11.3 ENCOUNTER FOR SCREENING FOR BACTERIAL SEXUALLY TRANSMITTED DISEASE: ICD-10-CM

## 2024-11-25 DIAGNOSIS — B20 HUMAN IMMUNODEFICIENCY VIRUS (HIV) DISEASE (HCC): ICD-10-CM

## 2024-11-25 DIAGNOSIS — Z13.1 SCREENING FOR DIABETES MELLITUS: ICD-10-CM

## 2024-11-25 DIAGNOSIS — Z79.899 OTHER LONG TERM (CURRENT) DRUG THERAPY: Primary | ICD-10-CM

## 2024-12-03 ENCOUNTER — PATIENT OUTREACH (OUTPATIENT)
Dept: SURGERY | Facility: CLINIC | Age: 64
End: 2024-12-03

## 2024-12-03 ENCOUNTER — OFFICE VISIT (OUTPATIENT)
Dept: SURGERY | Facility: CLINIC | Age: 64
End: 2024-12-03
Payer: MEDICARE

## 2024-12-03 VITALS
OXYGEN SATURATION: 80 % | HEART RATE: 80 BPM | SYSTOLIC BLOOD PRESSURE: 148 MMHG | TEMPERATURE: 97.8 F | HEIGHT: 74 IN | WEIGHT: 192.8 LBS | DIASTOLIC BLOOD PRESSURE: 84 MMHG | BODY MASS INDEX: 24.74 KG/M2

## 2024-12-03 VITALS
HEART RATE: 84 BPM | DIASTOLIC BLOOD PRESSURE: 84 MMHG | WEIGHT: 192.2 LBS | SYSTOLIC BLOOD PRESSURE: 148 MMHG | OXYGEN SATURATION: 84 % | TEMPERATURE: 97.8 F | HEIGHT: 74 IN | BODY MASS INDEX: 24.67 KG/M2

## 2024-12-03 DIAGNOSIS — G62.9 NEUROPATHY: ICD-10-CM

## 2024-12-03 DIAGNOSIS — Z23 NEED FOR COVID-19 VACCINE: ICD-10-CM

## 2024-12-03 DIAGNOSIS — E11.8 TYPE 2 DIABETES MELLITUS WITH UNSPECIFIED COMPLICATIONS (HCC): ICD-10-CM

## 2024-12-03 DIAGNOSIS — E04.2 GOITER, NONTOXIC, MULTINODULAR: ICD-10-CM

## 2024-12-03 DIAGNOSIS — B20 CURRENTLY ASYMPTOMATIC HIV INFECTION, WITH HISTORY OF HIV-RELATED ILLNESS (HCC): Primary | ICD-10-CM

## 2024-12-03 DIAGNOSIS — Z29.11 NEED FOR RSV IMMUNIZATION: ICD-10-CM

## 2024-12-03 DIAGNOSIS — R35.1 NOCTURIA: ICD-10-CM

## 2024-12-03 DIAGNOSIS — Z12.5 PROSTATE CANCER SCREENING: ICD-10-CM

## 2024-12-03 DIAGNOSIS — Z12.11 COLON CANCER SCREENING: ICD-10-CM

## 2024-12-03 DIAGNOSIS — I10 ESSENTIAL HYPERTENSION: ICD-10-CM

## 2024-12-03 DIAGNOSIS — I26.09 OTHER PULMONARY EMBOLISM WITH ACUTE COR PULMONALE, UNSPECIFIED CHRONICITY (HCC): ICD-10-CM

## 2024-12-03 DIAGNOSIS — T65.294D TOXIC EFFECT OF TOBACCO, UNDETERMINED INTENT, SUBSEQUENT ENCOUNTER: ICD-10-CM

## 2024-12-03 DIAGNOSIS — B20 CURRENTLY ASYMPTOMATIC HIV INFECTION, WITH HISTORY OF HIV-RELATED ILLNESS (HCC): ICD-10-CM

## 2024-12-03 DIAGNOSIS — Z00.00 MEDICARE ANNUAL WELLNESS VISIT, SUBSEQUENT: Primary | ICD-10-CM

## 2024-12-03 DIAGNOSIS — Z23 NEED FOR INFLUENZA VACCINATION: ICD-10-CM

## 2024-12-03 PROCEDURE — G0439 PPPS, SUBSEQ VISIT: HCPCS | Performed by: NURSE PRACTITIONER

## 2024-12-03 PROCEDURE — 90471 IMMUNIZATION ADMIN: CPT

## 2024-12-03 PROCEDURE — G2211 COMPLEX E/M VISIT ADD ON: HCPCS | Performed by: INTERNAL MEDICINE

## 2024-12-03 PROCEDURE — 90673 RIV3 VACCINE NO PRESERV IM: CPT

## 2024-12-03 PROCEDURE — 99215 OFFICE O/P EST HI 40 MIN: CPT | Performed by: INTERNAL MEDICINE

## 2024-12-03 NOTE — ASSESSMENT & PLAN NOTE
Doing well on Tivicay, Prezcobix, Viread with undetectable viral load and a CD4 count of 799.Continue ART, recheck CBC with differential, CMP, HIV RNA, and CD4 in 11 months to make sure no developing toxicity or treatment failure and follow-up in 12 months.  Stressed adherence.

## 2024-12-03 NOTE — ASSESSMENT & PLAN NOTE
CD4 T CELL ABSOLUTE   Date/Time Value Ref Range Status   10/26/2015 07:58  359 - 1,519 /uL Final     CD4 ABS   Date/Time Value Ref Range Status   2024 12:03  359 - 1519 /uL Final     HIV-1 RNA by PCR, Qn   Date/Time Value Ref Range Status   2024 10:07 AM <20 copies/mL Final     Comment:     HIV-1 RNA detected  The reportable range for this assay is 20 to 10,000,000  copies HIV-1 RNA/mL.     HIV-1 RNA Viral Load Log   Date/Time Value Ref Range Status   2024 10:07 AM COMMENT [id number]/mL Final     Comment:     Unable to calculate result since non-numeric result obtained for  component test.         ART: Tivicay Viread and Prezcobix  Scheduled to see Dr. Tobin at ID clinic later tonight.      Denies side effects. Stressed the importance of adherence.  Continue annual  follow up with ID clinic.       Reviewed most recent labs, including Cd4 and viral load. Discussed the risks and benefits of treatment options, instructions for management, importance of treatment adherence, and reduction of risk factor.Educated on possible  medication side effects.  Reviewed last ID visit.  Collaborated with ID to optimize medical treatment.    Counseled on routes of HIV transmission, including the risk of  infection. Emphasized that viral suppression is the best method to prevent HIV transmission.  At this time pt.denies the need for HIV testing of anyone in their life.     Total encounter time was 45 minutes. Greater then 20 minutes were spent on counseling and patient education. Pt voices understanding and agreement with treatment plan.

## 2024-12-03 NOTE — PATIENT INSTRUCTIONS
Medicare Preventive Visit Patient Instructions  Thank you for completing your Welcome to Medicare Visit or Medicare Annual Wellness Visit today. Your next wellness visit will be due in one year (12/4/2025).  The screening/preventive services that you may require over the next 5-10 years are detailed below. Some tests may not apply to you based off risk factors and/or age. Screening tests ordered at today's visit but not completed yet may show as past due. Also, please note that scanned in results may not display below.  Preventive Screenings:  Service Recommendations Previous Testing/Comments   Colorectal Cancer Screening  Colonoscopy    Fecal Occult Blood Test (FOBT)/Fecal Immunochemical Test (FIT)  Fecal DNA/Cologuard Test  Flexible Sigmoidoscopy Age: 45-75 years old   Colonoscopy: every 10 years (May be performed more frequently if at higher risk)  OR  FOBT/FIT: every 1 year  OR  Cologuard: every 3 years  OR  Sigmoidoscopy: every 5 years  Screening may be recommended earlier than age 45 if at higher risk for colorectal cancer. Also, an individualized decision between you and your healthcare provider will decide whether screening between the ages of 76-85 would be appropriate. Colonoscopy: 01/27/2017  FOBT/FIT: Not on file  Cologuard: Not on file  Sigmoidoscopy: Not on file    Screening Current     Prostate Cancer Screening Individualized decision between patient and health care provider in men between ages of 55-69   Medicare will cover every 12 months beginning on the day after your 50th birthday PSA: No results in last 5 years           Hepatitis C Screening Once for adults born between 1945 and 1965  More frequently in patients at high risk for Hepatitis C Hep C Antibody: 05/20/2024    Screening Current   Diabetes Screening 1-2 times per year if you're at risk for diabetes or have pre-diabetes Fasting glucose: 121 mg/dL (11/19/2024)  A1C: 6.6 % (11/19/2024)  Screening Not Indicated  History Diabetes    Cholesterol Screening Once every 5 years if you don't have a lipid disorder. May order more often based on risk factors. Lipid panel: 05/20/2024  Screening Not Indicated  History Lipid Disorder      Other Preventive Screenings Covered by Medicare:  Abdominal Aortic Aneurysm (AAA) Screening: covered once if your at risk. You're considered to be at risk if you have a family history of AAA or a male between the age of 65-75 who smoking at least 100 cigarettes in your lifetime.  Lung Cancer Screening: covers low dose CT scan once per year if you meet all of the following conditions: (1) Age 55-77; (2) No signs or symptoms of lung cancer; (3) Current smoker or have quit smoking within the last 15 years; (4) You have a tobacco smoking history of at least 20 pack years (packs per day x number of years you smoked); (5) You get a written order from a healthcare provider.  Glaucoma Screening: covered annually if you're considered high risk: (1) You have diabetes OR (2) Family history of glaucoma OR (3)  aged 50 and older OR (4)  American aged 65 and older  Osteoporosis Screening: covered every 2 years if you meet one of the following conditions: (1) Have a vertebral abnormality; (2) On glucocorticoid therapy for more than 3 months; (3) Have primary hyperparathyroidism; (4) On osteoporosis medications and need to assess response to drug therapy.  HIV Screening: covered annually if you're between the age of 15-65. Also covered annually if you are younger than 15 and older than 65 with risk factors for HIV infection. For pregnant patients, it is covered up to 3 times per pregnancy.    Immunizations:  Immunization Recommendations   Influenza Vaccine Annual influenza vaccination during flu season is recommended for all persons aged >= 6 months who do not have contraindications   Pneumococcal Vaccine   * Pneumococcal conjugate vaccine = PCV13 (Prevnar 13), PCV15 (Vaxneuvance), PCV20 (Prevnar 20)  *  Pneumococcal polysaccharide vaccine = PPSV23 (Pneumovax) Adults 19-63 yo with certain risk factors or if 65+ yo  If never received any pneumonia vaccine: recommend Prevnar 20 (PCV20)  Give PCV20 if previously received 1 dose of PCV13 or PPSV23   Hepatitis B Vaccine 3 dose series if at intermediate or high risk (ex: diabetes, end stage renal disease, liver disease)   Respiratory syncytial virus (RSV) Vaccine - COVERED BY MEDICARE PART D  * RSVPreF3 (Arexvy) CDC recommends that adults 60 years of age and older may receive a single dose of RSV vaccine using shared clinical decision-making (SCDM)   Tetanus (Td) Vaccine - COST NOT COVERED BY MEDICARE PART B Following completion of primary series, a booster dose should be given every 10 years to maintain immunity against tetanus. Td may also be given as tetanus wound prophylaxis.   Tdap Vaccine - COST NOT COVERED BY MEDICARE PART B Recommended at least once for all adults. For pregnant patients, recommended with each pregnancy.   Shingles Vaccine (Shingrix) - COST NOT COVERED BY MEDICARE PART B  2 shot series recommended in those 19 years and older who have or will have weakened immune systems or those 50 years and older     Health Maintenance Due:      Topic Date Due   • Colorectal Cancer Screening  01/27/2027   • Hepatitis C Screening  Completed   • Lung Cancer Screening  Discontinued     Immunizations Due:      Topic Date Due   • Influenza Vaccine (1) 09/01/2024   • COVID-19 Vaccine (1 - 2024-25 season) Never done     Advance Directives   What are advance directives?  Advance directives are legal documents that state your wishes and plans for medical care. These plans are made ahead of time in case you lose your ability to make decisions for yourself. Advance directives can apply to any medical decision, such as the treatments you want, and if you want to donate organs.   What are the types of advance directives?  There are many types of advance directives, and each  state has rules about how to use them. You may choose a combination of any of the following:  Living will:  This is a written record of the treatment you want. You can also choose which treatments you do not want, which to limit, and which to stop at a certain time. This includes surgery, medicine, IV fluid, and tube feedings.   Durable power of  for healthcare (DPAHC):  This is a written record that states who you want to make healthcare choices for you when you are unable to make them for yourself. This person, called a proxy, is usually a family member or a friend. You may choose more than 1 proxy.  Do not resuscitate (DNR) order:  A DNR order is used in case your heart stops beating or you stop breathing. It is a request not to have certain forms of treatment, such as CPR. A DNR order may be included in other types of advance directives.  Medical directive:  This covers the care that you want if you are in a coma, near death, or unable to make decisions for yourself. You can list the treatments you want for each condition. Treatment may include pain medicine, surgery, blood transfusions, dialysis, IV or tube feedings, and a ventilator (breathing machine).  Values history:  This document has questions about your views, beliefs, and how you feel and think about life. This information can help others choose the care that you would choose.  Why are advance directives important?  An advance directive helps you control your care. Although spoken wishes may be used, it is better to have your wishes written down. Spoken wishes can be misunderstood, or not followed. Treatments may be given even if you do not want them. An advance directive may make it easier for your family to make difficult choices about your care.   Cigarette Smoking and Your Health   Risks to your health if you smoke:  Nicotine and other chemicals found in tobacco damage every cell in your body. Even if you are a light smoker, you have an  increased risk for cancer, heart disease, and lung disease. If you are pregnant or have diabetes, smoking increases your risk for complications.   Benefits to your health if you stop smoking:   You decrease respiratory symptoms such as coughing, wheezing, and shortness of breath.   You reduce your risk for cancers of the lung, mouth, throat, kidney, bladder, pancreas, stomach, and cervix. If you already have cancer, you increase the benefits of chemotherapy. You also reduce your risk for cancer returning or a second cancer from developing.   You reduce your risk for heart disease, blood clots, heart attack, and stroke.   You reduce your risk for lung infections, and diseases such as pneumonia, asthma, chronic bronchitis, and emphysema.  Your circulation improves. More oxygen can be delivered to your body. If you have diabetes, you lower your risk for complications, such as kidney, artery, and eye diseases. You also lower your risk for nerve damage. Nerve damage can lead to amputations, poor vision, and blindness.  You improve your body's ability to heal and to fight infections.  For more information and support to stop smoking:   Smokefree.gov  Phone: 6- 268 - 364-5008  Web Address: www.smokefree.gov     © Copyright [employer] 2018 Information is for End User's use only and may not be sold, redistributed or otherwise used for commercial purposes. All illustrations and images included in CareNotes® are the copyrighted property of [employer] Inc. or [employer]

## 2024-12-03 NOTE — PROGRESS NOTES
Name: Marko Daniel      : 1960      MRN: [id number]  Encounter Provider: Jony Tobin MD  Encounter Date: 12/3/2024   Encounter department: [facility]  :  Assessment & Plan  Currently asymptomatic HIV infection, with history of HIV-related illness (HCC)  Doing well on Tivicay, Prezcobix, Viread with undetectable viral load and a CD4 count of 799.Continue ART, recheck CBC with differential, CMP, HIV RNA, and CD4 in 11 months to make sure no developing toxicity or treatment failure and follow-up in 12 months.  Stressed adherence.        Toxic effect of tobacco, undetermined intent, subsequent encounter  Continue stressed the importance of complete tobacco cessation       Essential hypertension  Remains on amlodipine but no toxicity noted despite being on Prezcobix.  Monitor for now.       Type 2 diabetes mellitus with unspecified complications (HCC)  Reasonable control on metformin.  Continue.  Follow-up with primary.    Lab Results   Component Value Date    HGBA1C 6.6 (H) 2024            Neuropathy  Suspected related to the patient's diabetes.  Refusing any treatment.  Follow-up with functional fitness.         Goiter, nontoxic, multinodular  Status post thyroid nodule resection and on Synthroid.         Other pulmonary embolism with acute cor pulmonale, unspecified chronicity (HCC)  Remains on anticoagulation.           Patient was provided medication, adherence and prevention education.    History of Present Illness   Routine follow-up for HIV.  Patient claims 100% adherence with Prezcobix, Tivicay, Viread. Patient denies any notable side effects.  Overall the feeling well.  The patient denies any fever chills or sweats, denies any nausea vomiting or diarrhea, denies any cough or shortness of breath.    ROS: A complete review of systems are negative other than that noted in the HPI        Objective   /84 (BP Location: Right arm, Patient Position: Sitting)   Pulse 84   Temp  "97.8 °F (36.6 °C)   Ht 6' 2\" (1.88 m)   Wt 87.2 kg (192 lb 3.2 oz)   SpO2 (!) 84%   BMI 24.68 kg/m²     General: Alert, interactive, appearing well, nontoxic, no acute distress.  Neck: Supple without lymphadenopathy, no thyromegaly or masses  Throat: Oropharynx moist without lesions.   Lungs: Clear to auscultation bilaterally; no wheezes, rhonchi or rales; respirations unlabored  Heart: RRR; no murmur, rub or gallop  Abdomen: Soft, non-tender, non-distended, positive bowel sounds.    Extremities: No clubbing, cyanosis or edema  Skin: No new rashes or lesions. No draining wounds noted.    Lab Results: I have personally reviewed pertinent labs.  Lab Results   Component Value Date     10/26/2015    K 3.8 11/19/2024    CL 98 11/19/2024    CO2 28 11/19/2024    ANIONGAP 9 10/26/2015    BUN 15 11/19/2024    CREATININE 0.99 11/19/2024    GLUCOSE 133 10/26/2015    GLUF 121 (H) 11/19/2024    CALCIUM 9.8 11/19/2024    CORRECTEDCA 9.7 06/23/2022    AST 14 11/19/2024    ALT 11 11/19/2024    ALKPHOS 75 11/19/2024    PROT 8.2 10/26/2015    BILITOT 0.23 10/26/2015    EGFR 81 11/19/2024     Lab Results   Component Value Date    WBC 6.0 11/19/2024    WBC 6.06 11/19/2024    HGB 14.6 11/19/2024    HGB 15.3 11/19/2024    HCT 42.9 11/19/2024    HCT 45.1 11/19/2024    MCV 87 11/19/2024    MCV 87 11/19/2024     11/19/2024     11/19/2024     Lab Results   Component Value Date    HEPCAB Non-reactive 05/20/2024     Lab Results   Component Value Date    HEPCAB Non-reactive 05/20/2024     Lab Results   Component Value Date    RPR Non-Reactive 11/21/2022     CD4 ABS   Date/Time Value Ref Range Status   11/19/2024 12:03  359 - 1519 /uL Final     HIV-1 RNA by PCR, Qn   Date/Time Value Ref Range Status   05/20/2024 10:07 AM <20 copies/mL Final     Comment:     HIV-1 RNA detected  The reportable range for this assay is 20 to 10,000,000  copies HIV-1 RNA/mL.     HIV-1 TARGET   Date/Time Value Ref Range Status "   11/19/2024 12:03 PM Not Detected Not Detected Final

## 2024-12-03 NOTE — ASSESSMENT & PLAN NOTE
Declines NRT.  Patient is slowly decreasing amount he smokes daily.  Counseled for greater than 15 minutes on the importance of smoking cessation.  Advised to quit.  Educated on the increased risk of heart and lung disease associated with smoking.

## 2024-12-03 NOTE — ASSESSMENT & PLAN NOTE
Hypertension Assessment  See encounter diagnosis  Discussion: stage 2  Cardiovascular risk factors: advanced age (older than 55 for men, 65 for women), diabetes mellitus, dyslipidemia, hypertension, male gender, sedentary lifestyle, and smoking/ tobacco exposure    Hypertension Plan  Continue current treatment regimen.  Continue current medications.  Dietary sodium restriction.  Stop smoking.  Patient Education: Reviewed risks of hypertension and principles of   treatment.  Blood pressure:   BP Readings from Last 3 Encounters:   12/03/24 148/84   12/03/24 148/84   09/17/24 142/90   Systolic above goal but pt reluctant to adjust medication. Monitor at home and plan to readdress at next PCP f/u appointment.   Continue current antihypertensive.    Educated on the following lifestyle modifications to lower BP and decrease cardiovascular risk factors.  limit alcohol intake, reduce salt in diet, maintain a healthy weight, engage in 30 minutes of cardiovascular exercise daily, and not smoke.

## 2024-12-03 NOTE — ASSESSMENT & PLAN NOTE
Suspected related to the patient's diabetes.  Refusing any treatment.  Follow-up with functional fitness.

## 2024-12-03 NOTE — ASSESSMENT & PLAN NOTE
Reasonable control on metformin.  Continue.  Follow-up with primary.    Lab Results   Component Value Date    HGBA1C 6.6 (H) 11/19/2024

## 2024-12-03 NOTE — PROGRESS NOTES
Cm met with ct and provided him with an [employer] gift card (see media for scanned copy). Cm confirmed whether or not ct was going to attend the holiday lunch. Ct stated that he will join. Cm added the ct to the list of attendees.

## 2024-12-03 NOTE — PROGRESS NOTES
Name: Marko Daniel      : 1960      MRN: [id number]  Encounter Provider: CATHIE Roblero  Encounter Date: 12/3/2024   Encounter department: ASC AT [facility] Placed This Encounter   Procedures    COVID-19 Pfizer mRNA vaccine 12 yr and older (Comirnaty pre-filled syringe)    Flublok (recombinant) 0.5 mL IM    Respiratory Syncytial Virus (RSV) vaccine (recombinant) (Abrysvo)    Albumin / creatinine urine ratio    PSA, total and free    Ambulatory Referral to Gastroenterology       Depression Screening and Follow-up Plan: Patient was screened for depression during today's encounter. They screened negative with a PHQ-2 score of 1.    Tobacco Cessation Counseling: Tobacco cessation counseling was provided. The patient is sincerely urged to quit consumption of tobacco. He is not ready to quit tobacco. Patient refused medication. Pt is working towards reducing the amount he smokes    Lung Cancer Screening Shared Decision Making: I discussed with him that he is a candidate for lung cancer CT screening.     The following Shared Decision-Making points were covered:  Benefits of screening were discussed, including the rates of reduction in death from lung cancer and other causes.  Harms of screening were reviewed, including false positive tests, radiation exposure levels, risks of invasive procedures, risks of complications of screening, and risk of overdiagnosis.  I counseled on the importance of adherence to annual lung cancer LDCT screening, impact of co-morbidities, and ability or willingness to undergo diagnosis and treatment.  I counseled on the importance of maintaining abstinence as a former smoker or was counseled on the importance of smoking cessation if a current smoker    Review of Eligibility Criteria: He meets all of the criteria for Lung Cancer Screening.   - He is 64 y.o.   - He has 20 pack year tobacco history and is a current smoker or has quit within the past 15 years  -  He presents no signs or symptoms of lung cancer    After discussion, the patient decided to elect lung cancer screening.      Preventive health issues were discussed with patient, and age appropriate screening tests were ordered as noted in patient's After Visit Summary. Personalized health advice and appropriate referrals for health education or preventive services given if needed, as noted in patient's After Visit Summary.    History of Present Illness         Patient Care Team:  CATHIE Roblero as PCP - General  CATHIE Roblero MD Aminata Ndiaye as  (Care Coordination)  Vickie Huerta MD (Hematology and Oncology)    Review of Systems   Constitutional:  Negative for chills and fever.   HENT:  Negative for ear pain and sore throat.    Eyes:  Negative for pain and visual disturbance.   Respiratory:  Negative for cough and shortness of breath.    Cardiovascular:  Negative for chest pain and palpitations.   Gastrointestinal:  Negative for abdominal pain and vomiting.   Genitourinary:  Negative for dysuria and hematuria.   Musculoskeletal:  Negative for arthralgias and back pain.   Skin:  Negative for color change and rash.   Neurological:  Negative for seizures and syncope.        Continues to complain of bilateral neuropathy in both legs.  Refusing medication or physical therapy at this time.  Using a single-point cane to ambulate.  Was giving a 3 point cane but declines using it due to him stating that it feels bulky.   All other systems reviewed and are negative.    Annual Wellness Visit Questionnaire       Health Risk Assessment:   Patient rates overall health as good. Patient feels that their physical health rating is same. Patient is dissatisfied with their life. Eyesight was rated as same. Hearing was rated as same. Patient feels that their emotional and mental health rating is slightly better. Patients states they are sometimes angry. Patient states they are  sometimes unusually tired/fatigued. Pain experienced in the last 7 days has been some. Patient's pain rating has been 5/10. Patient states that he has experienced no weight loss or gain in last 6 months.     Depression Screening:   PHQ-2 Score: 1      Fall Risk Screening:   In the past year, patient has experienced: history of falling in past year    Number of falls: 2 or more  Injured during fall?: No    Feels unsteady when standing or walking?: Yes    Worried about falling?: Yes      Home Safety:  Patient does not have trouble with stairs inside or outside of their home. Patient has working smoke alarms and has working carbon monoxide detector. Home safety hazards include: none.     Nutrition:   Current diet is Limited junk food.     Medications:   Patient is not currently taking any over-the-counter supplements. Patient is able to manage medications.     Activities of Daily Living (ADLs)/Instrumental Activities of Daily Living (IADLs):   Walk and transfer into and out of bed and chair?: Yes  Dress and groom yourself?: Yes    Bathe or shower yourself?: Yes    Feed yourself? Yes  Do your laundry/housekeeping?: Yes  Manage your money, pay your bills and track your expenses?: Yes  Make your own meals?: Yes    Do your own shopping?: Yes    Previous Hospitalizations:   Any hospitalizations or ED visits within the last 12 months?: No      Advance Care Planning:   Living will: Yes    Advanced directive: Yes    Advanced directive counseling given: No    End of Life Decisions reviewed with patient: Yes    Provider agrees with end of life decisions: Yes      Cognitive Screening:   Provider or family/friend/caregiver concerned regarding cognition?: No    PREVENTIVE SCREENINGS      Cardiovascular Screening:    General: Screening Not Indicated and History Lipid Disorder      Diabetes Screening:     General: Screening Not Indicated and History Diabetes      Colorectal Cancer Screening:     General: Screening Current    Due for:  "Colonoscopy - High Risk      Abdominal Aortic Aneurysm (AAA) Screening:    Risk factors include: tobacco use        Lung Cancer Screening:     General: Screening Not Indicated      Hepatitis C Screening:    General: Screening Current    Screening, Brief Intervention, and Referral to Treatment (SBIRT)    Screening  Typical number of drinks in a day: 0    Single Item Drug Screening:  How often have you used an illegal drug (including marijuana) or a prescription medication for non-medical reasons in the past year? patient refused    Other Counseling Topics:   Car/seat belt/driving safety and regular weightbearing exercise and calcium and vitamin D intake.     Social Drivers of Health     Food Insecurity: No Food Insecurity (6/29/2022)    Hunger Vital Sign     Worried About Running Out of Food in the Last Year: Never true     Ran Out of Food in the Last Year: Never true   Transportation Needs: No Transportation Needs (6/24/2022)    PRAPARE - Transportation     Lack of Transportation (Medical): No     Lack of Transportation (Non-Medical): No   Housing Stability: Low Risk  (6/24/2022)    Housing Stability Vital Sign     Unable to Pay for Housing in the Last Year: No     Number of Places Lived in the Last Year: 1     Unstable Housing in the Last Year: No     No results found.    Objective   /84 (BP Location: Left arm, Patient Position: Sitting, Cuff Size: Large)   Pulse 80   Temp 97.8 °F (36.6 °C)   Ht 6' 2\" (1.88 m)   Wt 87.5 kg (192 lb 12.8 oz)   SpO2 (!) 80%   BMI 24.75 kg/m²       Physical Exam  Vitals and nursing note reviewed.   Constitutional:       General: He is not in acute distress.     Appearance: He is well-developed.   HENT:      Head: Normocephalic and atraumatic.   Eyes:      Conjunctiva/sclera: Conjunctivae normal.   Cardiovascular:      Rate and Rhythm: Normal rate and regular rhythm.      Heart sounds: No murmur heard.  Pulmonary:      Effort: Pulmonary effort is normal. No respiratory " distress.      Breath sounds: Normal breath sounds.   Abdominal:      Palpations: Abdomen is soft.      Tenderness: There is no abdominal tenderness.   Musculoskeletal:         General: No swelling.      Cervical back: Neck supple.   Skin:     General: Skin is warm and dry.      Capillary Refill: Capillary refill takes less than 2 seconds.   Neurological:      Mental Status: He is alert.   Psychiatric:         Mood and Affect: Mood normal.

## 2024-12-04 DIAGNOSIS — I26.99 PULMONARY EMBOLISM, OTHER, UNSPECIFIED CHRONICITY, UNSPECIFIED WHETHER ACUTE COR PULMONALE PRESENT (HCC): ICD-10-CM

## 2024-12-04 RX ORDER — APIXABAN 2.5 MG/1
2.5 TABLET, FILM COATED ORAL 2 TIMES DAILY
Qty: 60 TABLET | Refills: 2 | Status: SHIPPED | OUTPATIENT
Start: 2024-12-04

## 2024-12-30 ENCOUNTER — OFFICE VISIT (OUTPATIENT)
Dept: PODIATRY | Facility: CLINIC | Age: 64
End: 2024-12-30
Payer: MEDICARE

## 2024-12-30 VITALS
DIASTOLIC BLOOD PRESSURE: 91 MMHG | HEART RATE: 98 BPM | HEIGHT: 74 IN | OXYGEN SATURATION: 98 % | BODY MASS INDEX: 24.68 KG/M2 | SYSTOLIC BLOOD PRESSURE: 140 MMHG

## 2024-12-30 DIAGNOSIS — G62.9 NEUROPATHY: ICD-10-CM

## 2024-12-30 DIAGNOSIS — B35.1 ONYCHOMYCOSIS: Primary | ICD-10-CM

## 2024-12-30 DIAGNOSIS — L84 CORNS: ICD-10-CM

## 2024-12-30 DIAGNOSIS — E11.8 TYPE 2 DIABETES MELLITUS WITH UNSPECIFIED COMPLICATIONS (HCC): ICD-10-CM

## 2024-12-30 PROCEDURE — RECHECK: Performed by: PODIATRIST

## 2024-12-30 PROCEDURE — 11721 DEBRIDE NAIL 6 OR MORE: CPT | Performed by: PODIATRIST

## 2024-12-30 PROCEDURE — 11056 PARNG/CUTG B9 HYPRKR LES 2-4: CPT | Performed by: PODIATRIST

## 2024-12-30 NOTE — PROGRESS NOTES
Assessment/Plan:     The patient's clinical examination today significant for thickened,brittle and dystrophic pedal nail plates with discoloration and subungual debris consistent with onychomycosis x10.  The interdigital spaces are clear without maceration.  Pedal pulses are diminished bilaterally.  Skin is thin with decreased turgor bilaterally.  Hair growth is diminished bilaterally.  Vibratory and epicritic sensations are diminished secondary to peripheral neuropathy.     The pedal nail plates were sharply debrided with a sterile nail clipper without complication x10.  The nails of them became reduced in thickness and girth utilizing a rotary bur.  The callus lesions beneath the great toe joints were debrided bilaterally with a sterile #15 blade without complication.  There are no other acute pedal issues.  Recommend daily use of a skin cream or lotion to hydrate the dry areas of skin.     Recommend follow-up in 3 months.       Diagnoses and all orders for this visit:    Onychomycosis    Corns  -     Lesion Destruction    Type 2 diabetes mellitus with unspecified complications (HCC)  -     Lesion Destruction    Neuropathy  -     Lesion Destruction          Subjective:     Patient ID: Marko Daniel is a 64 y.o. male.    The patient presents today for follow-up at risk diabetic foot care with class findings.  Marko has a history of diabetic peripheral neuropathy for which he is on gabapentin.  He denies any other acute pedal issues today.      PAST MEDICAL HISTORY:  Past Medical History:   Diagnosis Date    DDD (degenerative disc disease), lumbar     Facet joint disease     Human immunodeficiency virus (HIV) infection (HCC)     resolved 11/5/15    Hypertension     resolved 5/29/15    Other specified disorders of white blood cells     resolved 5/12/16       PAST SURGICAL HISTORY:  Past Surgical History:   Procedure Laterality Date    HERNIA REPAIR      SHOULDER SURGERY Left     US GUIDED THYROID BIOPSY  1/5/2023  "       ALLERGIES:  Patient has no known allergies.    MEDICATIONS:  Current Outpatient Medications   Medication Sig Dispense Refill    amLODIPine (NORVASC) 2.5 mg tablet Take 1 tablet (2.5 mg total) by mouth daily 30 tablet 5    Darunavir-Cobicistat (Prezcobix) 800-150 MG TABS Take 1 tablet by mouth daily 30 tablet 3    dolutegravir (Tivicay) 50 MG TABS Take 1 tablet (50 mg total) by mouth daily 30 tablet 5    Eliquis 2.5 MG TAKE 1 TABLET BY MOUTH TWICE A DAY 60 tablet 2    metFORMIN (GLUCOPHAGE-XR) 500 mg 24 hr tablet Take 2 tablets (1,000 mg total) by mouth daily with breakfast 60 tablet 5    PARoxetine (PAXIL) 20 mg tablet Take 1 tablet (20 mg total) by mouth daily 30 tablet 5    sildenafil (VIAGRA) 100 mg tablet Take 1 tablet (100 mg total) by mouth daily as needed for erectile dysfunction 20 tablet 4    tenofovir (VIREAD) 300 mg tablet Take 1 tablet (300 mg total) by mouth daily 30 tablet 5     No current facility-administered medications for this visit.       SOCIAL HISTORY:  Social History     Socioeconomic History    Marital status: Single     Spouse name: None    Number of children: None    Years of education: None    Highest education level: None   Occupational History    None   Tobacco Use    Smoking status: Every Day     Current packs/day: 0.25     Average packs/day: 0.3 packs/day for 43.0 years (10.8 ttl pk-yrs)     Types: Cigarettes    Smokeless tobacco: Never    Tobacco comments:     6-7 cigs/day   Vaping Use    Vaping status: Never Used   Substance and Sexual Activity    Alcohol use: Yes     Comment: Occassionally, one beer a month    Drug use: Not Currently     Types: Marijuana     Comment: Occassionally \"as needed for depression\"    Sexual activity: Not Currently     Partners: Female     Birth control/protection: Condom Male   Other Topics Concern    None   Social History Narrative    None     Social Drivers of Health     Financial Resource Strain: Not on file   Food Insecurity: No Food Insecurity " (6/29/2022)    Hunger Vital Sign     Worried About Running Out of Food in the Last Year: Never true     Ran Out of Food in the Last Year: Never true   Transportation Needs: No Transportation Needs (6/24/2022)    PRAPARE - Transportation     Lack of Transportation (Medical): No     Lack of Transportation (Non-Medical): No   Physical Activity: Not on file   Stress: Not on file   Social Connections: Not on file   Intimate Partner Violence: Not on file   Housing Stability: Low Risk  (6/24/2022)    Housing Stability Vital Sign     Unable to Pay for Housing in the Last Year: No     Number of Places Lived in the Last Year: 1     Unstable Housing in the Last Year: No        Review of Systems   Constitutional: Negative.    HENT: Negative.     Eyes: Negative.    Respiratory: Negative.     Cardiovascular: Negative.    Endocrine: Negative.    Musculoskeletal: Negative.    Neurological: Negative.    Hematological: Negative.    Psychiatric/Behavioral: Negative.           Objective:     Physical Exam  Vitals reviewed.   Constitutional:       Appearance: Normal appearance.   HENT:      Head: Normocephalic and atraumatic.      Nose: Nose normal.   Eyes:      Conjunctiva/sclera: Conjunctivae normal.      Pupils: Pupils are equal, round, and reactive to light.   Cardiovascular:      Pulses:           Dorsalis pedis pulses are 1+ on the right side and 1+ on the left side.        Posterior tibial pulses are 1+ on the right side and 1+ on the left side.   Pulmonary:      Effort: Pulmonary effort is normal.   Musculoskeletal:        Feet:    Feet:      Right foot:      Skin integrity: Callus present.      Toenail Condition: Right toenails are abnormally thick and long. Fungal disease present.     Left foot:      Skin integrity: Callus present.      Toenail Condition: Left toenails are abnormally thick and long. Fungal disease present.     Comments: The patient's clinical examination today significant for thickened,brittle and dystrophic  "pedal nail plates with discoloration and subungual debris consistent with onychomycosis x10.  The interdigital spaces are clear without maceration.  Pedal pulses are diminished bilaterally.  Skin is thin with decreased turgor bilaterally.  Hair growth is diminished bilaterally.  Vibratory and epicritic sensations are diminished secondary to peripheral neuropathy.  Skin:     General: Skin is warm.      Capillary Refill: Capillary refill takes 2 to 3 seconds.   Neurological:      General: No focal deficit present.      Mental Status: He is alert and oriented to person, place, and time.   Psychiatric:         Mood and Affect: Mood normal.         Behavior: Behavior normal.         Thought Content: Thought content normal.         Lesion Destruction    Date/Time: 12/30/2024 9:45 AM    Performed by: Brant Whatley DPM  Authorized by: Brant Whatley DPM  [place] Protocol:  procedure performed by consultantConsent: Verbal consent obtained.  Risks and benefits: risks, benefits and alternatives were discussed  Consent given by: patient  Time out: Immediately prior to procedure a \"time out\" was called to verify the correct patient, procedure, equipment, support staff and site/side marked as required.  Timeout called at: 12/30/2024 9:45 AM.  Patient understanding: patient states understanding of the procedure being performed  Patient identity confirmed: verbally with patient and provided demographic data    Procedure Details - Lesion Destruction:     Number of Lesions:  2  Lesion 1:     Body area:  Lower extremity    Lower extremity location:  R foot    Malignancy: benign hyperkeratotic lesion      Destruction method: scissors used for extraction    Lesion 2:     Body area:  Lower extremity    Lower extremity location:  L foot    Malignancy: benign hyperkeratotic lesion      Destruction method: scissors used for extraction        "

## 2025-01-17 DIAGNOSIS — Z21 HIV INFECTION, UNSPECIFIED SYMPTOM STATUS (HCC): ICD-10-CM

## 2025-01-21 RX ORDER — DARUNAVIR ETHANOLATE AND COBICISTAT 800; 150 MG/1; MG/1
1 TABLET, FILM COATED ORAL DAILY
Qty: 30 TABLET | Refills: 3 | Status: SHIPPED | OUTPATIENT
Start: 2025-01-21

## 2025-01-28 ENCOUNTER — DOCUMENTATION (OUTPATIENT)
Dept: SURGERY | Facility: CLINIC | Age: 65
End: 2025-01-28

## 2025-01-28 NOTE — PROGRESS NOTES
" Pastoral Care Progress Note    2025  Patient: Marko Daniel : 1960  Encounter Date & Time: 2025   MRN: [id number]      The  returned patient call for on going pastoral support.  Mr. Daniel advised he was doing alright, but was struggling with grief.  He recently lost a close \"Wingman\" that he served with in [facility] [employer].  He spent a lot of time with him and shared many memories.  Mr. Daniel is planning to travel to his [facility] to assist with the  honors.  In addition, Mr. Daniel SO has been missing for 2 months and he is very worried about her.  He asked about ways he could find her.  The  suggested he file a missing person's report.  The  affirmed and normalized Mr. Daniel's grief and concerns.  The  fostered storytelling about his Wingman.  Mr. Daniel asked the  to reach out the his .  The  left the CM a Epic message to call the patient.  The  offered prayers for his travels and for his SO.  Mr. Daniel expressed his gratitude.                 Chaplaincy Interventions Utilized:   Empowerment: Encouraged assertiveness, Encouraged focus on present, Provided grief counseling, and Reframed experience of patient/family    Exploration: Explored hope, Explored emotional needs & resources, Explored relational needs & resources, and Identified, evaluated & reinforced appropriate coping strategies    Collaboration: Consulted with interdisciplinary team    Relationship Building: Cultivated a relationship of care and support and Listened empathically    Ritual: Provided prayer      Chaplaincy Outcomes Achieved:  Expressed gratitude and Identified meaningful connections      Spiritual Coping Strategies Utilized:   Connectedness and Spiritual meaning            "

## 2025-01-31 ENCOUNTER — PATIENT OUTREACH (OUTPATIENT)
Dept: SURGERY | Facility: CLINIC | Age: 65
End: 2025-01-31

## 2025-02-10 ENCOUNTER — DOCUMENTATION (OUTPATIENT)
Dept: SURGERY | Facility: CLINIC | Age: 65
End: 2025-02-10

## 2025-02-10 ENCOUNTER — PATIENT OUTREACH (OUTPATIENT)
Dept: SURGERY | Facility: CLINIC | Age: 65
End: 2025-02-10

## 2025-02-10 NOTE — PROGRESS NOTES
Cm met with ct and completed the RW sliding fee scale application. Submitted with supporting documents, scanned into media. Ct provided cm with the original copy of his SS award letter needed for his SPBP application. Cm scanned all documents necessary and submitted to SPBP. Cm will follow up with spbp.

## 2025-02-10 NOTE — PROGRESS NOTES
Pastoral Care Progress Note    2/10/2025  Patient: Marko Daniel : 1960  Encounter Date & Time: 2/10/2025   MRN: [id number]        The  followed up with Mr. Daniel for continued care and support. Mr. Daniel shared his comrade's memorial was postponed. In addition, he expressed struggling to deal with his estranged girlfriend. He advised he still had not seen or heard from her.  Initially he advised he hoped she was safe but later admitted she probably moved on. Mr. Daniel expressed a sense of abandonment and how it triggers his PTSD. Mr. Daniel has not filed a missing person report because he does not like the police and fears this girlfriend is on drugs and she had other relationships that were more of a priority then their relationship.  The  encouraged Mr. Daniel to reach out to law enforcement to file a report to assist clearing his mind. However, if he chose not to file a report the  encouraged him to find ways to move on.  Mr. 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 Mr. Daniel thanked the  for taking the time to listen.  The  encouraged Mr. Daniel to reach and she would follow-up with him.

## 2025-02-10 NOTE — PROGRESS NOTES
Cm spoke with ct about the spbp application. Application is currently pending due to illegible documents. All documents previously submitted were legible. Cm sent the documents again this time via email and informed ct.

## 2025-02-19 ENCOUNTER — DOCUMENTATION (OUTPATIENT)
Dept: SURGERY | Facility: CLINIC | Age: 65
End: 2025-02-19

## 2025-02-20 ENCOUNTER — PATIENT OUTREACH (OUTPATIENT)
Dept: SURGERY | Facility: CLINIC | Age: 65
End: 2025-02-20

## 2025-02-21 ENCOUNTER — PATIENT OUTREACH (OUTPATIENT)
Dept: SURGERY | Facility: CLINIC | Age: 65
End: 2025-02-21

## 2025-02-21 ENCOUNTER — TELEPHONE (OUTPATIENT)
Dept: HEMATOLOGY ONCOLOGY | Facility: CLINIC | Age: 65
End: 2025-02-21

## 2025-02-21 NOTE — TELEPHONE ENCOUNTER
Due to a change in the provider's schedule the appt. On 2/28/25 with Dr. Huerta has been rescheduled to 3/6/25. Patient confirmed the change.

## 2025-02-25 ENCOUNTER — DOCUMENTATION (OUTPATIENT)
Dept: SURGERY | Facility: CLINIC | Age: 65
End: 2025-02-25

## 2025-02-25 NOTE — PROGRESS NOTES
" Pastoral Care Progress Note    2025  Patient: Marko Daniel : 1960  Encounter Date & Time: 2025   MRN: [id number]        Followed up with patient for continued care and support.  Mr. Daniel reported he was doing okay. He advised he is having racing thoughts at night that keeps him up at night and causes him to sleep during the day. He is worried about his SO who has not been in contact with him since December. Because he is not sure if she is missing, using drugs or intentionally estranged, he is having difficulty resting at night. He advised he was not sure how he feels and that he does not do a good job communicating his feelings. Mr. Daniel sounded depressed and sad while describing his painful circumstances. Mr. Daniel expressed relief that his neighbor and friend offer support to him by walking his dog and visiting. He reported he tries to cope by \"keeping it positive.\"  He advised it helps that his son lives with him and he enjoys the conversation with him.     Mr. Daniel shared some people will not understand him because they did not serve in the . He thanked the  for listening and expressed how much it meant to him. He reported that he believes \"somethings need to be talked about.\" He advised he needed to acknowledge things that he did while serving in the . Mr. Daniel changed the topic several times during the conversation and acknowledged that it was done on purpose.     The  fostered storytelling and encouraged Mr. Daniel to address his feelings of shame and loneliness regarding his estranged SO.  Mr. Daniel expressed depression and sadness about his life and relationship. He was very grateful the  is in the  and the space to talk.  His sad feelings appeared to improve momentarily and he could benefit from ongoing support from Spiritual Care.  The  will follow up with a weekly call but encouraged Mr. Daniel to reach out " if additional support is needed.

## 2025-03-06 ENCOUNTER — TELEPHONE (OUTPATIENT)
Dept: HEMATOLOGY ONCOLOGY | Facility: CLINIC | Age: 65
End: 2025-03-06

## 2025-03-06 ENCOUNTER — DOCUMENTATION (OUTPATIENT)
Dept: SURGERY | Facility: CLINIC | Age: 65
End: 2025-03-06

## 2025-03-06 NOTE — TELEPHONE ENCOUNTER
Irina. Pt was a NS for his MICHELLE appt w/ Dr. Huerta on 3/6 @ 200 pm. Left [facility] [phone] for pt to call back to rs his appt.

## 2025-03-10 ENCOUNTER — OFFICE VISIT (OUTPATIENT)
Dept: SURGERY | Facility: CLINIC | Age: 65
End: 2025-03-10
Payer: MEDICARE

## 2025-03-10 ENCOUNTER — TELEPHONE (OUTPATIENT)
Dept: SURGERY | Facility: CLINIC | Age: 65
End: 2025-03-10

## 2025-03-10 ENCOUNTER — DOCUMENTATION (OUTPATIENT)
Dept: SURGERY | Facility: CLINIC | Age: 65
End: 2025-03-10

## 2025-03-10 VITALS
BODY MASS INDEX: 25.15 KG/M2 | HEIGHT: 74 IN | HEART RATE: 104 BPM | SYSTOLIC BLOOD PRESSURE: 122 MMHG | TEMPERATURE: 96.8 F | DIASTOLIC BLOOD PRESSURE: 74 MMHG | OXYGEN SATURATION: 98 % | WEIGHT: 196 LBS

## 2025-03-10 DIAGNOSIS — I10 ESSENTIAL HYPERTENSION: ICD-10-CM

## 2025-03-10 DIAGNOSIS — R60.0 BILATERAL LOWER EXTREMITY EDEMA: ICD-10-CM

## 2025-03-10 DIAGNOSIS — R01.1 HEART MURMUR: ICD-10-CM

## 2025-03-10 DIAGNOSIS — R60.0 PEDAL EDEMA: ICD-10-CM

## 2025-03-10 DIAGNOSIS — Z29.11 NEED FOR RSV IMMUNIZATION: ICD-10-CM

## 2025-03-10 DIAGNOSIS — R63.4 WEIGHT LOSS, UNINTENTIONAL: ICD-10-CM

## 2025-03-10 DIAGNOSIS — B20 CURRENTLY ASYMPTOMATIC HIV INFECTION, WITH HISTORY OF HIV-RELATED ILLNESS (HCC): ICD-10-CM

## 2025-03-10 DIAGNOSIS — G62.9 NEUROPATHY: ICD-10-CM

## 2025-03-10 DIAGNOSIS — T65.294D TOXIC EFFECT OF TOBACCO, UNDETERMINED INTENT, SUBSEQUENT ENCOUNTER: ICD-10-CM

## 2025-03-10 DIAGNOSIS — E11.8 TYPE 2 DIABETES MELLITUS WITH UNSPECIFIED COMPLICATIONS (HCC): ICD-10-CM

## 2025-03-10 DIAGNOSIS — Z23 NEED FOR SHINGLES VACCINE: Primary | ICD-10-CM

## 2025-03-10 DIAGNOSIS — R26.2 AMBULATORY DYSFUNCTION: ICD-10-CM

## 2025-03-10 PROCEDURE — 99214 OFFICE O/P EST MOD 30 MIN: CPT | Performed by: NURSE PRACTITIONER

## 2025-03-10 PROCEDURE — G2211 COMPLEX E/M VISIT ADD ON: HCPCS | Performed by: NURSE PRACTITIONER

## 2025-03-10 NOTE — ASSESSMENT & PLAN NOTE
Suggested follow up with PT, patient declines. Instructed on using 3 point cane to increase stability, however pt insistent he use single point cane in public. Educated on increased fall risk. Reviewed fall precautions.

## 2025-03-10 NOTE — ASSESSMENT & PLAN NOTE
Does not want to stop smoking.. Declines NRT. Counseled for greater than 15 minutes on the importance of smoking cessation.  Advised to quit.  Educated on the increased risk of heart and lung disease associated with smoking.

## 2025-03-10 NOTE — ASSESSMENT & PLAN NOTE
Hypertension Assessment  See encounter diagnosis  Discussion: stage 2  Cardiovascular risk factors: advanced age (older than 55 for men, 65 for women), diabetes mellitus, dyslipidemia, hypertension, male gender, sedentary lifestyle, and smoking/ tobacco exposure    Hypertension Plan  Continue current treatment regimen.  Continue current medications.  Dietary sodium restriction.  Patient Education: Reviewed risks of hypertension and principles of   treatment.  Counseling time: not applicable.Blood pressure:   BP Readings from Last 3 Encounters:   03/10/25 122/74   12/30/24 140/91   12/03/24 148/84       Continue current antihypertensive.    Educated on the following lifestyle modifications to lower BP and decrease cardiovascular risk factors.  limit alcohol intake, reduce salt in diet, maintain a healthy weight, engage in 30 minutes of cardiovascular exercise daily, and not smoke.

## 2025-03-10 NOTE — PROGRESS NOTES
Diabetic Foot Exam    Patient's shoes and socks removed.    Right Foot/Ankle   Right Foot Inspection  Skin Exam: dry skin, callus and callus.     Toe Exam: swelling. No tenderness, erythema and  no right toe deformity    Sensory   Vibration: diminished  Proprioception: diminished  Monofilament testing: diminished    Vascular  Capillary refills: < 3 seconds  The right DP pulse is 1+. The right PT pulse is 1+.     Left Foot/Ankle  Left Foot Inspection  Skin Exam: dry skin, ulcer and callus.     Toe Exam: swelling. No tenderness, no erythema and no left toe deformity.     Sensory   Vibration: diminished  Proprioception: diminished  Monofilament testing: diminished    Vascular  Capillary refills: < 3 seconds  The left DP pulse is 1+. The left PT pulse is 1+.     Assign Risk Category  No deformity present  Loss of protective sensation  No weak pulses  Risk: 1  Name: Marko Daniel      : 1960      MRN: [id number]  Encounter Provider: CATHIE Roblero  Encounter Date: 3/10/2025   Encounter department: [facility] AT [facility]  :  Assessment & Plan  Type 2 diabetes mellitus with unspecified complications (HCC)  Well controlled with metformin and diet. Continue medication and lifestyle changes.   Lab Results   Component Value Date    HGBA1C 6.6 (H) 2024       Orders:    Albumin / creatinine urine ratio; Future    Hemoglobin A1C ([facility]); Future    Need for shingles vaccine    Orders:    Zoster Vaccine Recombinant IM    Need for RSV immunization    Orders:    Respiratory Syncytial Virus (RSV) vaccine (recombinant) ([facility])    Essential hypertension  Hypertension Assessment  See encounter diagnosis  Discussion: stage 2  Cardiovascular risk factors: advanced age (older than 55 for men, 65 for women), diabetes mellitus, dyslipidemia, hypertension, male gender, sedentary lifestyle, and smoking/ tobacco exposure    Hypertension Plan  Continue current treatment regimen.  Continue current  medications.  Dietary sodium restriction.  Patient Education: Reviewed risks of hypertension and principles of   treatment.  Counseling time: not applicable.Blood pressure:   BP Readings from Last 3 Encounters:   03/10/25 122/74   24 140/91   24 148/84       Continue current antihypertensive.    Educated on the following lifestyle modifications to lower BP and decrease cardiovascular risk factors.  limit alcohol intake, reduce salt in diet, maintain a healthy weight, engage in 30 minutes of cardiovascular exercise daily, and not smoke.          Currently asymptomatic HIV infection, with history of HIV-related illness (HCC)  CD4 T CELL ABSOLUTE   Date/Time Value Ref Range Status   10/26/2015 07:58  359 - 1,519 /uL Final     CD4 ABS   Date/Time Value Ref Range Status   2024 12:03  359 - 1519 /uL Final     HIV-1 RNA by PCR, Qn   Date/Time Value Ref Range Status   2024 10:07 AM <20 copies/mL Final     Comment:     HIV-1 RNA detected  The reportable range for this assay is 20 to 10,000,000  copies HIV-1 RNA/mL.     HIV-1 RNA Viral Load Log   Date/Time Value Ref Range Status   2024 10:07 AM COMMENT [id number]/mL Final     Comment:     Unable to calculate result since non-numeric result obtained for  component test.                 Denies side effects. Stressed the importance of adherence.  Continue follow up with ID clinic.       Reviewed most recent labs, including Cd4 and viral load. Discussed the risks and benefits of treatment options, instructions for management, importance of treatment adherence, and reduction of risk factor.Educated on possible  medication side effects.  Reviewed last ID visit.  Collaborated with ID to optimize medical treatment.    Counseled on routes of HIV transmission, including the risk of  infection. Emphasized that viral suppression is the best method to prevent HIV transmission.  At this time pt.denies the need for HIV testing of anyone in  "their life.     Total encounter time was 45 minutes. Greater then 20 minutes were spent on counseling and patient education. Pt voices understanding and agreement with treatment plan.           Toxic effect of tobacco, undetermined intent, subsequent encounter  Does not want to stop smoking.. Declines NRT. Counseled for greater than 15 minutes on the importance of smoking cessation.  Advised to quit.  Educated on the increased risk of heart and lung disease associated with smoking.       Ambulatory dysfunction  Suggested follow up with PT, patient declines. Instructed on using 3 point cane to increase stability, however pt insistent he use single point cane in public. Educated on increased fall risk. Reviewed fall precautions.        Neuropathy  Stable. Declines intervention at this time.        Weight loss, unintentional  Assessment: Pt has gained weight.      Body mass index is 25.16 kg/m².  Wt Readings from Last 3 Encounters:   03/10/25 88.9 kg (196 lb)   12/03/24 87.2 kg (192 lb 3.2 oz)   12/03/24 87.5 kg (192 lb 12.8 oz)     Height: 6' 2\" (188 cm)     Plan: Educated patient to weigh himself monthly and report weight loss to clinic. Continue to eat a healthy diet. Suggested frequent snacks.                      Bilateral lower extremity edema    Orders:    Echo complete w/ contrast if indicated; Future    Heart murmur  Due to new onset heart murmur and pedal edema, ordered an ECHO.   Orders:    Echo complete w/ contrast if indicated; Future        History of Present Illness   Marko Daniel (Jim) is contacted today for primary care follow-up.. PMHx significant for HIV, HTN, hyperlipidemia, polycythemia, and anxiety. He has no acute complaints today.       Marko Daniel is a 64 y.o. male who presents for PCP follow up.  History obtained from: patient    Review of Systems  Medical History Reviewed by provider this encounter:  Tobacco  Allergies  Meds  Problems  Med Hx  Surg Hx  Fam Hx     .  Past Medical " History   Past Medical History:   Diagnosis Date    DDD (degenerative disc disease), lumbar     Facet joint disease     Human immunodeficiency virus (HIV) infection (HCC)     resolved 11/5/15    Hypertension     resolved 5/29/15    Other specified disorders of white blood cells     resolved 5/12/16     Past Surgical History:   Procedure Laterality Date    HERNIA REPAIR      SHOULDER SURGERY Left     US GUIDED THYROID BIOPSY  1/5/2023     History reviewed. No pertinent family history.   reports that he has been smoking cigarettes. He has a 10.8 pack-year smoking history. He has never used smokeless tobacco. He reports current alcohol use. He reports that he does not currently use drugs after having used the following drugs: Marijuana.  Current Outpatient Medications   Medication Instructions    amLODIPine (NORVASC) 2.5 mg, Oral, Daily    Eliquis 2.5 mg, Oral, 2 times daily    metFORMIN (GLUCOPHAGE-XR) 1,000 mg, Oral, Daily with breakfast    PARoxetine (PAXIL) 20 mg, Oral, Daily    Prezcobix 800-150 MG TABS 1 tablet, Oral, Daily    sildenafil (VIAGRA) 100 mg, Oral, Daily PRN    tenofovir disoproxil fumarate (VIREAD) 300 mg, Oral, Daily    Tivicay 50 mg, Oral, Daily   No Known Allergies   Current Outpatient Medications on File Prior to Visit   Medication Sig Dispense Refill    amLODIPine (NORVASC) 2.5 mg tablet Take 1 tablet (2.5 mg total) by mouth daily 30 tablet 5    dolutegravir (Tivicay) 50 MG TABS Take 1 tablet (50 mg total) by mouth daily 30 tablet 5    Eliquis 2.5 MG TAKE 1 TABLET BY MOUTH TWICE A DAY 60 tablet 2    metFORMIN (GLUCOPHAGE-XR) 500 mg 24 hr tablet Take 2 tablets (1,000 mg total) by mouth daily with breakfast 60 tablet 5    PARoxetine (PAXIL) 20 mg tablet Take 1 tablet (20 mg total) by mouth daily 30 tablet 5    Prezcobix 800-150 MG TABS TAKE 1 TABLET BY MOUTH DAILY 30 tablet 3    sildenafil (VIAGRA) 100 mg tablet Take 1 tablet (100 mg total) by mouth daily as needed for erectile dysfunction 20  "tablet 4    tenofovir (VIREAD) 300 mg tablet Take 1 tablet (300 mg total) by mouth daily 30 tablet 5     No current facility-administered medications on file prior to visit.      Social History     Tobacco Use    Smoking status: Every Day     Current packs/day: 0.25     Average packs/day: 0.3 packs/day for 43.0 years (10.8 ttl pk-yrs)     Types: Cigarettes    Smokeless tobacco: Never    Tobacco comments:     6-7 cigs/day   Vaping Use    Vaping status: Never Used   Substance and Sexual Activity    Alcohol use: Yes     Comment: Occassionally, one beer a month    Drug use: Not Currently     Types: Marijuana     Comment: Occassionally \"as needed for depression\"    Sexual activity: Not Currently     Partners: Female     Birth control/protection: Condom Male        Objective   /74 (BP Location: Right arm, Patient Position: Sitting, Cuff Size: Large)   Pulse 104   Temp (!) 96.8 °F (36 °C) (Tympanic)   Ht 6' 2\" (1.88 m)   Wt 88.9 kg (196 lb)   SpO2 98%   BMI 25.16 kg/m²      Physical Exam  Constitutional:       General: He is not in acute distress.     Appearance: He is well-developed.   HENT:      Head: Normocephalic.      Right Ear: External ear normal.      Left Ear: External ear normal.      Nose: Nose normal.      Mouth/Throat:      Pharynx: No oropharyngeal exudate.   Eyes:      General:         Right eye: No discharge.         Left eye: No discharge.      Conjunctiva/sclera: Conjunctivae normal.      Pupils: Pupils are equal, round, and reactive to light.   Neck:      Thyroid: No thyromegaly.   Cardiovascular:      Rate and Rhythm: Normal rate and regular rhythm.      Pulses: no weak pulses.           Dorsalis pedis pulses are 1+ on the right side and 1+ on the left side.        Posterior tibial pulses are 1+ on the right side and 1+ on the left side.      Heart sounds: Murmur heard.      Comments: Edema present on dorsum pedis. No ankle edema present.   Pulmonary:      Effort: Pulmonary effort is normal. "      Breath sounds: Normal breath sounds. No wheezing.   Abdominal:      General: Bowel sounds are normal.      Palpations: Abdomen is soft. There is no mass.      Tenderness: There is no abdominal tenderness.   Musculoskeletal:         General: No tenderness. Normal range of motion.      Cervical back: Normal range of motion.      Right lower le+ Pitting Edema present.      Left lower le+ Pitting Edema present.   Feet:      Right foot:      Skin integrity: Callus and dry skin present.      Left foot:      Skin integrity: Ulcer, callus and dry skin present.   Lymphadenopathy:      Cervical: No cervical adenopathy.   Skin:     General: Skin is warm and dry.      Findings: No rash.   Neurological:      Mental Status: He is alert and oriented to person, place, and time.      Gait: Gait abnormal.      Comments: Uses cane to ambulate.   Psychiatric:         Behavior: Behavior normal.         Administrative Statements   I have spent a total time of 45 minutes in caring for this patient on the day of the visit/encounter including Diagnostic results, Prognosis, Risks and benefits of tx options, Instructions for management, Patient and family education, Importance of tx compliance, Risk factor reductions, Impressions, and Counseling / Coordination of care.

## 2025-03-10 NOTE — ASSESSMENT & PLAN NOTE
Well controlled with metformin and diet. Continue medication and lifestyle changes.   Lab Results   Component Value Date    HGBA1C 6.6 (H) 11/19/2024       Orders:    Albumin / creatinine urine ratio; Future    Hemoglobin A1C ([facility]); Future

## 2025-03-10 NOTE — TELEPHONE ENCOUNTER
Pt left a message at 10:40 on Sunday saying that he was here for his appointment but could not get in the building for his 11 o'clock appointment. His appointment is actually today.

## 2025-03-10 NOTE — ASSESSMENT & PLAN NOTE
CD4 T CELL ABSOLUTE   Date/Time Value Ref Range Status   10/26/2015 07:58  359 - 1,519 /uL Final     CD4 ABS   Date/Time Value Ref Range Status   2024 12:03  359 - 1519 /uL Final     HIV-1 RNA by PCR, Qn   Date/Time Value Ref Range Status   2024 10:07 AM <20 copies/mL Final     Comment:     HIV-1 RNA detected  The reportable range for this assay is 20 to 10,000,000  copies HIV-1 RNA/mL.     HIV-1 RNA Viral Load Log   Date/Time Value Ref Range Status   2024 10:07 AM COMMENT [id number]/mL Final     Comment:     Unable to calculate result since non-numeric result obtained for  component test.                 Denies side effects. Stressed the importance of adherence.  Continue follow up with ID clinic.       Reviewed most recent labs, including Cd4 and viral load. Discussed the risks and benefits of treatment options, instructions for management, importance of treatment adherence, and reduction of risk factor.Educated on possible  medication side effects.  Reviewed last ID visit.  Collaborated with ID to optimize medical treatment.    Counseled on routes of HIV transmission, including the risk of  infection. Emphasized that viral suppression is the best method to prevent HIV transmission.  At this time pt.denies the need for HIV testing of anyone in their life.     Total encounter time was 45 minutes. Greater then 20 minutes were spent on counseling and patient education. Pt voices understanding and agreement with treatment plan.

## 2025-03-10 NOTE — ASSESSMENT & PLAN NOTE
"Assessment: Pt has gained weight.      Body mass index is 25.16 kg/m².  Wt Readings from Last 3 Encounters:   03/10/25 88.9 kg (196 lb)   12/03/24 87.2 kg (192 lb 3.2 oz)   12/03/24 87.5 kg (192 lb 12.8 oz)     Height: 6' 2\" (188 cm)     Plan: Educated patient to weigh himself monthly and report weight loss to clinic. Continue to eat a healthy diet. Suggested frequent snacks.                      "

## 2025-03-10 NOTE — ASSESSMENT & PLAN NOTE
Due to new onset heart murmur and pedal edema, ordered an ECHO.   Orders:    Echo complete w/ contrast if indicated; Future

## 2025-03-10 NOTE — PATIENT INSTRUCTIONS
Patient Education     Heart Healthy Diet   General   With a heart healthy food plan, you will learn to make better food choices. This diet may help you lower your blood cholesterol level, manage your blood pressure, and lower your risk for heart problems. Smaller portions may also be helpful.  Sodium is a type of mineral found in many foods. It helps keep the balance of fluids in your body. Too much sodium can raise your blood pressure. It can also make you take on extra water. This is called edema. Pay careful attention to how much salt or sodium is in your food. You may need to avoid salt or eat foods with less sodium.  Cholesterol is a fat-like, waxy substance in your blood. It is normal to have some cholesterol in your blood because your body makes it. You also get extra cholesterol from all animal products. These are foods like meats, eggs, and dairy products. Too much cholesterol in your blood can block or damage your blood vessels. This can lead to a heart attack or stroke.  Fats in your food have calories which give energy. Not all fats are bad. Some fats are healthy, like the fat found in fish, nuts, and olive oil. These are called unsaturated fats. They help manage body functions and lower cholesterol levels. Learn about the best fats to use in your diet and where to use them. Eating too much fat may make you more likely to weigh more than is healthy. This raises your risk of many heart problems.  Fiber is found in plants. Meat and dairy products do not have fiber in them. Fiber can help you lower your unhealthy cholesterol level. You may need more water as you eat more fiber so you do not get hard stools.  What lifestyle changes are needed?   Eat a healthy diet and workout often. Try to use as many calories as you take in each day.  What changes to diet are needed?   Eat oily fish at least 2 times a week. These are fish like tuna, salmon, and mackerel.  Limit sodium to no more than 2,300 mg of sodium per  day. This is about 1 teaspoon (5 grams) of table salt. Use little or no salt when making food. Try other spices or seasoning instead.  Limit how much cholesterol you eat to less than 300 mg per day. You can do this by having lean meats. Also eat lots of fruits, vegetables, and fat-free and low-fat dairy products.  Limit how much trans fats you eat. Trans fats are found in many processed foods like stick margarine, shortening, and some fried foods. Also, lower how much hydrogenated fats you eat. They are used to make pastries, biscuits, cookies, crackers, chips, and many snack foods.  Have no more than 1 drink per day of beer, wine, and mixed drinks (alcohol).  Who should use this diet?   A heart healthy diet is good for everyone.  What foods are good to eat?   Grains: Try to eat 6 to 8 servings of whole grain, high fiber foods each day. These are whole grain bread, cereals, brown rice, or pasta.  Fruits and vegetables: Eat 4 to 5 servings each day. Try to pick many kinds and colors. Try to eat more that are fresh or frozen. Look for low sodium or salt-free if you choose canned. Rinse canned items before cooking or eating. Dried peas, beans, and lentils are also good.  Dairy: Choose low fat (1%) or fat-free milk. Eat nonfat or low-fat products.  Protein: Try to eat more low fat or lean meats like chicken and turkey. Eat less red meat and eat more fish, eggs, egg whites, and beans instead.  Fats: Use good fats found in fish, nuts, and avocados. Try using olive oil, canola oil, and low-sodium and low-fat salad dressing and mayonnaise. Use corn, safflower, sunflower, and soybean oils.  Condiments: Use low-sodium or salt-free broths, soups, soy sauce, and condiments. Pepper, herbs, spices, vinegar, lemon or lime juices are great for seasoning. Sugar, cocoa powder, honey, syrup, and jams may be eaten in small amounts.  Sweets: Low-fat, trans fat-free cookies, cakes, and pies; sonu crackers; animal crackers; low-fat fig  bars; and esmer snaps.     What foods should be limited or avoided?   Grains: Salted breads, rolls, crackers, quick breads, self-rising flours, biscuit mixes, regular bread crumbs, instant hot cereals, commercially-prepared rice, pasta, stuffing mixes  Fruits and vegetables: Commercially-prepared potatoes and vegetable mixes, regular canned vegetables and juices, vegetables frozen with sauce or pickled vegetables, processed fruits with added sugar or salt  Dairy: Whole milk, malted milk, chocolate milk, buttermilk  Protein: Smoked, cured, salted, or canned meat, fish, or poultry such as henley and sausages  Fats: Cut back on solid fats like butter, lard, and margarine.  Condiments and snacks: Salted and canned peas, beans, and olives; salted snack foods; fried foods; soda, juices, or other sweetened drinks; commercially-softened water. Miso, salsa, ketchup, barbecue sauce, Worcestershire sauce, soy sauce, and teriyaki sauce are also high in salt.  Sweets: High-fat baked goods such as muffins, donuts, pastries, commercial baked goods  Helpful tips   When you go to a grocery store, have a list or a meal plan. Do not shop when you are hungry to avoid cravings for foods.  You need to know about the sodium and fat content of the food you eat. Read food labels with care. They will show you how much of each is in a serving. This amount is given as a percentage of the total amount you need each day. Reading the labels will help you make healthy food choices.     Avoid fast foods.  Watch your portions when eating out. Split an order or bring home half for another meal.     Talk to a dietitian for help.  Last Reviewed Date   2020-03-27  Consumer Information Use and Disclaimer   This generalized information is a limited summary of diagnosis, treatment, and/or medication information. It is not meant to be comprehensive and should be used as a tool to help the user understand and/or assess potential diagnostic and treatment  options. It does NOT include all information about conditions, treatments, medications, side effects, or risks that may apply to a specific patient. It is not intended to be medical advice or a substitute for the medical advice, diagnosis, or treatment of a health care provider based on the health care provider's examination and assessment of a patient’s specific and unique circumstances. Patients must speak with a health care provider for complete information about their health, medical questions, and treatment options, including any risks or benefits regarding use of medications. This information does not endorse any treatments or medications as safe, effective, or approved for treating a specific patient. [employer] and its affiliates disclaim any warranty or liability relating to this information or the use thereof. The use of this information is governed by the Terms of Use, available at https://www.woltersSmart Paneluwer.com/en/know/clinical-effectiveness-terms   Copyright   Copyright © 2024 [employer] and its affiliates and/or licensors. All rights reserved.

## 2025-03-11 NOTE — PROGRESS NOTES
[facility] Annual Sexual Health Assessment     Marko was seen by Prevention Nurse in conjunction with his PCP visit.      Patient is an established patient.  CD4 count: 779    Viral load:   undetectable  ART: Prezcobix    Marko is not sexually active at this time. Partners in past have been female. He is not looking for a partner at this time. 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 He says he finds this therapeutic and really enjoys the activity.

## 2025-03-18 ENCOUNTER — TELEPHONE (OUTPATIENT)
Dept: GASTROENTEROLOGY | Facility: CLINIC | Age: 65
End: 2025-03-18

## 2025-03-18 NOTE — TELEPHONE ENCOUNTER
Referral in chart for colon. Lmm for pt to call back and schedule. Pt may need ov if he is on eliquis. Sb    Improved right

## 2025-03-21 DIAGNOSIS — Z21 HIV INFECTION, UNSPECIFIED SYMPTOM STATUS (HCC): ICD-10-CM

## 2025-03-21 DIAGNOSIS — I26.99 PULMONARY EMBOLISM, OTHER, UNSPECIFIED CHRONICITY, UNSPECIFIED WHETHER ACUTE COR PULMONALE PRESENT (HCC): ICD-10-CM

## 2025-03-21 DIAGNOSIS — F43.10 PTSD (POST-TRAUMATIC STRESS DISORDER): ICD-10-CM

## 2025-03-21 DIAGNOSIS — Z21 HIV (HUMAN IMMUNODEFICIENCY VIRUS INFECTION) (HCC): ICD-10-CM

## 2025-03-21 DIAGNOSIS — I10 ESSENTIAL HYPERTENSION: ICD-10-CM

## 2025-03-21 DIAGNOSIS — E11.8 TYPE 2 DIABETES MELLITUS WITH UNSPECIFIED COMPLICATIONS (HCC): ICD-10-CM

## 2025-03-24 RX ORDER — APIXABAN 2.5 MG/1
2.5 TABLET, FILM COATED ORAL 2 TIMES DAILY
Qty: 60 TABLET | Refills: 2 | Status: SHIPPED | OUTPATIENT
Start: 2025-03-24

## 2025-03-24 RX ORDER — TENOFOVIR DISOPROXIL FUMARATE 300 MG/1
300 TABLET, FILM COATED ORAL DAILY
Qty: 30 TABLET | Refills: 5 | Status: SHIPPED | OUTPATIENT
Start: 2025-03-24

## 2025-03-24 RX ORDER — PAROXETINE 20 MG/1
20 TABLET, FILM COATED ORAL DAILY
Qty: 30 TABLET | Refills: 5 | Status: SHIPPED | OUTPATIENT
Start: 2025-03-24

## 2025-03-24 RX ORDER — METFORMIN HYDROCHLORIDE 500 MG/1
TABLET, EXTENDED RELEASE ORAL
Qty: 60 TABLET | Refills: 5 | Status: SHIPPED | OUTPATIENT
Start: 2025-03-24

## 2025-03-24 RX ORDER — DOLUTEGRAVIR SODIUM 50 MG/1
50 TABLET, FILM COATED ORAL DAILY
Qty: 30 TABLET | Refills: 5 | Status: SHIPPED | OUTPATIENT
Start: 2025-03-24

## 2025-03-24 RX ORDER — AMLODIPINE BESYLATE 2.5 MG/1
2.5 TABLET ORAL DAILY
Qty: 30 TABLET | Refills: 5 | Status: SHIPPED | OUTPATIENT
Start: 2025-03-24

## 2025-03-25 ENCOUNTER — DOCUMENTATION (OUTPATIENT)
Dept: SURGERY | Facility: CLINIC | Age: 65
End: 2025-03-25

## 2025-03-25 NOTE — PROGRESS NOTES
Pastoral Care Progress Note    3/25/2025  Patient: Marko Daniel : 1960  Encounter Date & Time: 3/25/2025   MRN: [id number]        Patient returned 's call. Mr. Daniel reported feeling frustrated due to identity and bank thief. He reported having a difficult time getting the bank and credit card company to cooperate and restore his account. He expressed not feeling valued.  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  In addition, he advised he heard news about his ex-girl friend and now knows she is alive and he is relieved.  Furthermore, he advised he has moved on and is now in a new relationship.  The  offered empathic listening and encouraged Mr. Daniel not to give up due to his frustration.  He agreed but he was not clear about to do next.     Mr. Daniel asked the  to reach out to his  for support on some other issues. He thanked the  and expressed his appreciation and advised he needed to hear from her.  The  will continued to follow-up with Mr. Daniel for morale support.              Chaplaincy Interventions Utilized:   Empowerment: Encouraged focus on present    Exploration: Explored hope, Explored emotional needs & resources, Explored relational needs & resources, and Identified, evaluated & reinforced appropriate coping strategies    Collaboration: Consulted with interdisciplinary team    Relationship Building: Cultivated a relationship of care and support, Listened empathically, and Provided silent and supportive presence    Ritual: Provided prayer    Chaplaincy Outcomes Achieved:  Developed chaplaincy care plan, Distress reduced, and Expressed gratitude      Spiritual Coping Strategies Utilized:   Connectedness, Spiritual empowerment, and Spiritual gratitude

## 2025-04-01 ENCOUNTER — OFFICE VISIT (OUTPATIENT)
Dept: PODIATRY | Facility: CLINIC | Age: 65
End: 2025-04-01
Payer: MEDICARE

## 2025-04-01 VITALS — HEART RATE: 93 BPM | HEIGHT: 74 IN | BODY MASS INDEX: 25.16 KG/M2 | OXYGEN SATURATION: 97 %

## 2025-04-01 DIAGNOSIS — G62.9 NEUROPATHY: ICD-10-CM

## 2025-04-01 DIAGNOSIS — E11.8 TYPE 2 DIABETES MELLITUS WITH UNSPECIFIED COMPLICATIONS (HCC): ICD-10-CM

## 2025-04-01 DIAGNOSIS — B35.1 ONYCHOMYCOSIS: ICD-10-CM

## 2025-04-01 DIAGNOSIS — L84 CORNS: Primary | ICD-10-CM

## 2025-04-01 PROCEDURE — 11721 DEBRIDE NAIL 6 OR MORE: CPT | Performed by: PODIATRIST

## 2025-04-01 PROCEDURE — 11056 PARNG/CUTG B9 HYPRKR LES 2-4: CPT | Performed by: PODIATRIST

## 2025-04-01 PROCEDURE — RECHECK: Performed by: PODIATRIST

## 2025-04-01 NOTE — PROGRESS NOTES
:  Assessment & Plan  Onychomycosis         Type 2 diabetes mellitus with unspecified complications (HCC)    Lab Results   Component Value Date    HGBA1C 6.6 (H) 11/19/2024       Orders:    Lesion Destruction    Neuropathy    Orders:    Lesion Destruction    Corns    Orders:    Lesion Destruction      The patient's clinical examination today significant for brittle, thickened and dystrophic pedal nail plates with discoloration and subungual debris consistent with onychomycosis x10.  Dense callus lesions noted to the medial first MTPJ bilaterally.  The interdigital spaces are clear and without maceration.  Pedal pulses were non-palpable bilaterally.  Skin is dry and thin with decreased turgor bilaterally.  Hair growth is absent bilaterally.  Vibratory and epicritic sensations are diminished secondary to peripheral neuropathy.     The pedal nail plates were sharply debrided with a sterile nail clipper without complication x10.  The nails were then reduced in thickness and girth utilizing a rotary bur.  The callus lesions to the great toe joints were debrided bilaterally utilizing a rotary bur without complication.  There are no other acute pedal issues.  Recommend daily use of a skin cream or lotion to hydrate the dry areas of skin.     Recommend follow-up in 3 months.    History of Present Illness     Marko Daniel is a 65 y.o. male   The patient presents today for follow-up at risk diabetic foot care with class findings.  Marko has a history of diabetic peripheral neuropathy for which he is on gabapentin.  He denies any other acute pedal issues today.           PAST MEDICAL HISTORY:  Past Medical History:   Diagnosis Date    DDD (degenerative disc disease), lumbar     Facet joint disease     Human immunodeficiency virus (HIV) infection (HCC)     resolved 11/5/15    Hypertension     resolved 5/29/15    Other specified disorders of white blood cells     resolved 5/12/16       PAST SURGICAL HISTORY:  Past Surgical  "History:   Procedure Laterality Date    HERNIA REPAIR      SHOULDER SURGERY Left     US GUIDED THYROID BIOPSY  1/5/2023        ALLERGIES:  Patient has no known allergies.    MEDICATIONS:  Current Outpatient Medications   Medication Sig Dispense Refill    amLODIPine (NORVASC) 2.5 mg tablet TAKE 1 TABLET BY MOUTH DAILY 30 tablet 5    Eliquis 2.5 MG TAKE 1 TABLET BY MOUTH TWICE A DAY 60 tablet 2    metFORMIN (GLUCOPHAGE-XR) 500 mg 24 hr tablet TAKE TWO TABLETS BY MOUTH DAILY WITH BREAKFAST 60 tablet 5    PARoxetine (PAXIL) 20 mg tablet TAKE 1 TABLET BY MOUTH DAILY 30 tablet 5    Prezcobix 800-150 MG TABS TAKE 1 TABLET BY MOUTH DAILY 30 tablet 3    sildenafil (VIAGRA) 100 mg tablet Take 1 tablet (100 mg total) by mouth daily as needed for erectile dysfunction 20 tablet 4    tenofovir disoproxil fumarate (VIREAD) 300 mg tablet TAKE 1 TABLET BY MOUTH DAILY 30 tablet 5    Tivicay 50 MG TABS TAKE 1 TABLET BY MOUTH DAILY 30 tablet 5     No current facility-administered medications for this visit.       SOCIAL HISTORY:  Social History     Socioeconomic History    Marital status: Single     Spouse name: None    Number of children: None    Years of education: None    Highest education level: None   Occupational History    None   Tobacco Use    Smoking status: Every Day     Current packs/day: 0.25     Average packs/day: 0.3 packs/day for 43.0 years (10.8 ttl pk-yrs)     Types: Cigarettes    Smokeless tobacco: Never    Tobacco comments:     6-7 cigs/day   Vaping Use    Vaping status: Never Used   Substance and Sexual Activity    Alcohol use: Yes     Comment: Occassionally, one beer a month    Drug use: Not Currently     Types: Marijuana     Comment: Occassionally \"as needed for depression\"    Sexual activity: Not Currently     Partners: Female     Birth control/protection: Condom Male   Other Topics Concern    None   Social History Narrative    None     Social Drivers of Health     Financial Resource Strain: Not on file   Food " "Insecurity: No Food Insecurity (6/29/2022)    Hunger Vital Sign     Worried About Running Out of Food in the Last Year: Never true     Ran Out of Food in the Last Year: Never true   Transportation Needs: No Transportation Needs (6/24/2022)    PRAPARE - Transportation     Lack of Transportation (Medical): No     Lack of Transportation (Non-Medical): No   Physical Activity: Not on file   Stress: Not on file   Social Connections: Not on file   Intimate Partner Violence: Not on file   Housing Stability: Low Risk  (6/24/2022)    Housing Stability Vital Sign     Unable to Pay for Housing in the Last Year: No     Number of Places Lived in the Last Year: 1     Unstable Housing in the Last Year: No      Review of Systems   Constitutional: Negative.    HENT: Negative.     Eyes: Negative.    Respiratory: Negative.     Cardiovascular: Negative.    Endocrine: Negative.    Musculoskeletal: Negative.    Neurological: Negative.    Hematological: Negative.    Psychiatric/Behavioral: Negative.       Objective   Pulse 93   Ht 6' 2\" (1.88 m)   SpO2 97%   BMI 25.16 kg/m²      Physical Exam  Constitutional:       Appearance: Normal appearance.   HENT:      Head: Normocephalic and atraumatic.   Cardiovascular:      Pulses:           Dorsalis pedis pulses are 0 on the right side.        Posterior tibial pulses are 0 on the right side and 0 on the left side.   Pulmonary:      Effort: Pulmonary effort is normal.   Feet:      Comments: The patient's clinical examination today significant for brittle, thickened and dystrophic pedal nail plates with discoloration and subungual debris consistent with onychomycosis x10.  The interdigital spaces are clear and without maceration.  Pedal pulses were non-palpable bilaterally.  Skin is dry and thin with decreased turgor bilaterally.  Hair growth is absent bilaterally.  Vibratory and epicritic sensations are diminished secondary to peripheral neuropathy.  Skin:     General: Skin is warm and dry.     " " Capillary Refill: Capillary refill takes 2 to 3 seconds.   Neurological:      General: No focal deficit present.      Mental Status: He is alert and oriented to person, place, and time.   Psychiatric:         Mood and Affect: Mood normal.     Lesion Destruction    Date/Time: 4/1/2025 9:15 AM    Performed by: Brant Whatley DPM  Authorized by: Brant Whatley DPM  [place] Protocol:  procedure performed by consultantConsent: Verbal consent obtained.  Risks and benefits: risks, benefits and alternatives were discussed  Consent given by: patient  Time out: Immediately prior to procedure a \"time out\" was called to verify the correct patient, procedure, equipment, support staff and site/side marked as required.  Timeout called at: 4/1/2025 9:15 AM.  Patient understanding: patient states understanding of the procedure being performed  Patient identity confirmed: verbally with patient and provided demographic data    Procedure Details - Lesion Destruction:     Number of Lesions:  2  Lesion 1:     Body area:  Lower extremity    Lower extremity location:  R foot    Malignancy: benign hyperkeratotic lesion      Destruction method: scissors used for extraction    Lesion 2:     Body area:  Lower extremity    Lower extremity location:  L foot    Malignancy: benign hyperkeratotic lesion      Destruction method: scissors used for extraction            "

## 2025-04-01 NOTE — ASSESSMENT & PLAN NOTE
Lab Results   Component Value Date    HGBA1C 6.6 (H) 11/19/2024       Orders:    Lesion Destruction

## 2025-04-18 ENCOUNTER — DOCUMENTATION (OUTPATIENT)
Dept: SURGERY | Facility: CLINIC | Age: 65
End: 2025-04-18

## 2025-04-18 NOTE — PROGRESS NOTES
[facility]    [facility]  Patient: Marko Daniel : 1960  Encounter Date & Time: 2025   MRN: [id number]        The  called patient for continued care and support. Mr. Daniel advised he is doing okay. He shared he has his ups and downs. His ups are dependent on his attitude.  He puts effort into trying to foster positive vibes in his life.  Mr. Daniel expressed concern about taking his Metformin. He shared he is thinking about taking himself off the drug because of the side effects.  He advised it causes issues with his muscles.     Mr. Daniel mentioned he is in a new relationship. His voice appeared to lighten up when sharing he is dating someone name Becky. He advised he met her when she called the wrong number and has seen her once in person.  She described her as beautiful and he expressed some fear about their relationship.  Mr. Daniel fears center around him not having a good track record in relationships and he believes he is out of his league.     Mr. Daniel had to abruptly get off the call but during the call the  encouraged Mr. Daniel to seek medical advice before taking himself off the Metformin.  She encouraged Mr. Daniel to call his PCP before his  visit. In addition, the  discussed with Mr. Daniel the seriousness of diabetes and the various impact it could have on his quality of life.  The  explored positive practices that help bring wood and positive perspective to his life. Mr. Daniel agreed he needs to practice looking at life differently. Mr. Daniel's feelings regarding his new relationship but the call ended early due to his other commitment.     The  will follow-up with his mentioned concerns for case management and with his PCP regarding him met             Chaplaincy Interventions Utilized:   Empowerment: Clarified, confirmed, or reviewed information from treatment team , Encouraged self-care, and Provided  anxiety containment    Exploration: Explored emotional needs & resources, Explored relational needs & resources, Explored spiritual needs & resources, and Identified, evaluated & reinforced appropriate coping strategies    Collaboration: Consulted with interdisciplinary team  Inboxed PCP and     Relationship Building: Cultivated a relationship of care and support and Listened empathically    Ritual: Provided ritual  Encouraged daily ritually for spiritual wellbeing           Chaplaincy Outcomes Achieved:  Developed chaplaincy care plan    Will follow up with patient on Monday      Spiritual Coping Strategies Utilized:   Spiritual empowerment and Positive spiritual reframing

## 2025-04-21 ENCOUNTER — PATIENT OUTREACH (OUTPATIENT)
Dept: SURGERY | Facility: CLINIC | Age: 65
End: 2025-04-21

## 2025-04-22 NOTE — PROGRESS NOTES
Cm spoke with the ct, ct previously informed the  that he needed to speak to cm. Cm asked ct if everything was okay. Ct stated that everything was fine and updated cm on his life. Ct reported to having a new partner but continues to live alone. Ct explained that it's been going well. Ct stated that she can make a copy of his new spbp card during his next appointment in June.

## 2025-05-21 DIAGNOSIS — Z21 HIV INFECTION, UNSPECIFIED SYMPTOM STATUS (HCC): ICD-10-CM

## 2025-05-21 RX ORDER — DARUNAVIR ETHANOLATE AND COBICISTAT 800; 150 MG/1; MG/1
1 TABLET, FILM COATED ORAL DAILY
Qty: 30 TABLET | Refills: 3 | Status: SHIPPED | OUTPATIENT
Start: 2025-05-21

## 2025-06-17 DIAGNOSIS — I26.99 PULMONARY EMBOLISM, OTHER, UNSPECIFIED CHRONICITY, UNSPECIFIED WHETHER ACUTE COR PULMONALE PRESENT (HCC): ICD-10-CM

## 2025-06-20 ENCOUNTER — APPOINTMENT (OUTPATIENT)
Dept: LAB | Facility: HOSPITAL | Age: 65
End: 2025-06-20
Attending: INTERNAL MEDICINE
Payer: MEDICARE

## 2025-06-20 DIAGNOSIS — E11.8 TYPE 2 DIABETES MELLITUS WITH UNSPECIFIED COMPLICATIONS (HCC): ICD-10-CM

## 2025-06-20 DIAGNOSIS — R35.1 NOCTURIA: ICD-10-CM

## 2025-06-20 DIAGNOSIS — Z12.5 PROSTATE CANCER SCREENING: ICD-10-CM

## 2025-06-20 DIAGNOSIS — E04.1 THYROID NODULE: ICD-10-CM

## 2025-06-20 DIAGNOSIS — R79.89 ELEVATED TSH: ICD-10-CM

## 2025-06-20 LAB
ALBUMIN SERPL BCG-MCNC: 4.6 G/DL (ref 3.5–5)
ALP SERPL-CCNC: 78 U/L (ref 34–104)
ALT SERPL W P-5'-P-CCNC: 17 U/L (ref 7–52)
ANION GAP SERPL CALCULATED.3IONS-SCNC: 8 MMOL/L (ref 4–13)
AST SERPL W P-5'-P-CCNC: 15 U/L (ref 13–39)
BASOPHILS # BLD AUTO: 0.06 THOUSANDS/ÂΜL (ref 0–0.1)
BASOPHILS NFR BLD AUTO: 1 % (ref 0–1)
BILIRUB SERPL-MCNC: 0.5 MG/DL (ref 0.2–1)
BUN SERPL-MCNC: 13 MG/DL (ref 5–25)
CALCIUM SERPL-MCNC: 10.7 MG/DL (ref 8.4–10.2)
CHLORIDE SERPL-SCNC: 98 MMOL/L (ref 96–108)
CHOLEST SERPL-MCNC: 168 MG/DL (ref ?–200)
CO2 SERPL-SCNC: 28 MMOL/L (ref 21–32)
CREAT SERPL-MCNC: 0.92 MG/DL (ref 0.6–1.3)
EOSINOPHIL # BLD AUTO: 0.2 THOUSAND/ÂΜL (ref 0–0.61)
EOSINOPHIL NFR BLD AUTO: 4 % (ref 0–6)
ERYTHROCYTE [DISTWIDTH] IN BLOOD BY AUTOMATED COUNT: 12.7 % (ref 11.6–15.1)
EST. AVERAGE GLUCOSE BLD GHB EST-MCNC: 171 MG/DL
GFR SERPL CREATININE-BSD FRML MDRD: 88 ML/MIN/1.73SQ M
GLUCOSE P FAST SERPL-MCNC: 155 MG/DL (ref 65–99)
HBA1C MFR BLD: 7.6 %
HCT VFR BLD AUTO: 48.7 % (ref 36.5–49.3)
HCV AB SER QL: NORMAL
HDLC SERPL-MCNC: 37 MG/DL
HGB BLD-MCNC: 16.5 G/DL (ref 12–17)
IMM GRANULOCYTES # BLD AUTO: 0.02 THOUSAND/UL (ref 0–0.2)
IMM GRANULOCYTES NFR BLD AUTO: 0 % (ref 0–2)
LDLC SERPL CALC-MCNC: 102 MG/DL (ref 0–100)
LYMPHOCYTES # BLD AUTO: 1.42 THOUSANDS/ÂΜL (ref 0.6–4.47)
LYMPHOCYTES NFR BLD AUTO: 25 % (ref 14–44)
MCH RBC QN AUTO: 29.3 PG (ref 26.8–34.3)
MCHC RBC AUTO-ENTMCNC: 33.9 G/DL (ref 31.4–37.4)
MCV RBC AUTO: 86 FL (ref 82–98)
MONOCYTES # BLD AUTO: 0.56 THOUSAND/ÂΜL (ref 0.17–1.22)
MONOCYTES NFR BLD AUTO: 10 % (ref 4–12)
NEUTROPHILS # BLD AUTO: 3.33 THOUSANDS/ÂΜL (ref 1.85–7.62)
NEUTS SEG NFR BLD AUTO: 60 % (ref 43–75)
NRBC BLD AUTO-RTO: 0 /100 WBCS
PLATELET # BLD AUTO: 221 THOUSANDS/UL (ref 149–390)
PMV BLD AUTO: 11 FL (ref 8.9–12.7)
POTASSIUM SERPL-SCNC: 4.1 MMOL/L (ref 3.5–5.3)
PROT SERPL-MCNC: 8.6 G/DL (ref 6.4–8.4)
PSA FREE MFR SERPL: 39.12 %
PSA FREE SERPL-MCNC: 0.14 NG/ML
PSA SERPL-MCNC: 0.36 NG/ML (ref 0–4)
RBC # BLD AUTO: 5.64 MILLION/UL (ref 3.88–5.62)
SODIUM SERPL-SCNC: 134 MMOL/L (ref 135–147)
T4 FREE SERPL-MCNC: 0.76 NG/DL (ref 0.61–1.12)
TREPONEMA PALLIDUM IGG+IGM AB [PRESENCE] IN SERUM OR PLASMA BY IMMUNOASSAY: NORMAL
TRIGL SERPL-MCNC: 146 MG/DL (ref ?–150)
TSH SERPL DL<=0.05 MIU/L-ACNC: 2.45 UIU/ML (ref 0.45–4.5)
WBC # BLD AUTO: 5.59 THOUSAND/UL (ref 4.31–10.16)

## 2025-06-20 PROCEDURE — 84439 ASSAY OF FREE THYROXINE: CPT

## 2025-06-20 PROCEDURE — 36415 COLL VENOUS BLD VENIPUNCTURE: CPT

## 2025-06-20 PROCEDURE — 84154 ASSAY OF PSA FREE: CPT

## 2025-06-20 PROCEDURE — 84153 ASSAY OF PSA TOTAL: CPT

## 2025-06-20 PROCEDURE — 84443 ASSAY THYROID STIM HORMONE: CPT

## 2025-06-21 LAB
GAMMA INTERFERON BACKGROUND BLD IA-ACNC: 0.04 IU/ML
M TB IFN-G BLD-IMP: NEGATIVE
M TB IFN-G CD4+ BCKGRND COR BLD-ACNC: 0.03 IU/ML
M TB IFN-G CD4+ BCKGRND COR BLD-ACNC: 0.07 IU/ML
MITOGEN IGNF BCKGRD COR BLD-ACNC: 9.96 IU/ML

## 2025-06-23 ENCOUNTER — RESULTS FOLLOW-UP (OUTPATIENT)
Dept: ENDOCRINOLOGY | Facility: CLINIC | Age: 65
End: 2025-06-23

## 2025-06-23 ENCOUNTER — OFFICE VISIT (OUTPATIENT)
Dept: SURGERY | Facility: CLINIC | Age: 65
End: 2025-06-23
Payer: MEDICARE

## 2025-06-23 VITALS
WEIGHT: 192 LBS | SYSTOLIC BLOOD PRESSURE: 144 MMHG | BODY MASS INDEX: 24.64 KG/M2 | TEMPERATURE: 99 F | HEART RATE: 101 BPM | OXYGEN SATURATION: 96 % | DIASTOLIC BLOOD PRESSURE: 87 MMHG | HEIGHT: 74 IN

## 2025-06-23 DIAGNOSIS — R26.2 AMBULATORY DYSFUNCTION: ICD-10-CM

## 2025-06-23 DIAGNOSIS — R63.4 WEIGHT LOSS, UNINTENTIONAL: ICD-10-CM

## 2025-06-23 DIAGNOSIS — E78.1 HYPERTRIGLYCERIDEMIA: ICD-10-CM

## 2025-06-23 DIAGNOSIS — G62.9 NEUROPATHY: ICD-10-CM

## 2025-06-23 DIAGNOSIS — T65.294D TOXIC EFFECT OF TOBACCO, UNDETERMINED INTENT, SUBSEQUENT ENCOUNTER: ICD-10-CM

## 2025-06-23 DIAGNOSIS — E11.8 TYPE 2 DIABETES MELLITUS WITH UNSPECIFIED COMPLICATIONS (HCC): ICD-10-CM

## 2025-06-23 DIAGNOSIS — I10 ESSENTIAL HYPERTENSION: ICD-10-CM

## 2025-06-23 DIAGNOSIS — B20 CURRENTLY ASYMPTOMATIC HIV INFECTION, WITH HISTORY OF HIV-RELATED ILLNESS (HCC): Primary | ICD-10-CM

## 2025-06-23 LAB — HIV1 RNA # PLAS NAA DL=20: NOT DETECTED {COPIES}/ML

## 2025-06-23 PROCEDURE — 99214 OFFICE O/P EST MOD 30 MIN: CPT | Performed by: NURSE PRACTITIONER

## 2025-06-23 RX ORDER — BLOOD PRESSURE TEST KIT
KIT MISCELLANEOUS DAILY
Qty: 1 KIT | Refills: 0 | Status: SHIPPED | OUTPATIENT
Start: 2025-06-23

## 2025-06-23 RX ORDER — APIXABAN 2.5 MG/1
2.5 TABLET, FILM COATED ORAL 2 TIMES DAILY
Qty: 60 TABLET | Refills: 2 | Status: SHIPPED | OUTPATIENT
Start: 2025-06-23

## 2025-06-23 NOTE — ASSESSMENT & PLAN NOTE
Lab Results   Component Value Date    HGBA1C 7.6 (H) 06/20/2025       Laboratory:  Lab Results   Component Value Date    HGBA1C 7.6 (H) 06/20/2025    HGBA1C 5.9 08/12/2015       Assessment & Plan     Diabetes mellitus Type II, under fair control..    Discussed general issues about diabetes pathophysiology and management.  Addressed ADA diet.  Suggested low cholesterol diet.  Discussed foot care.  Educated on the importance of keeping HgbA1C below 8 Reviewed that medication helps keep his glucose levels in an acceptable range.Marko agrees to repeat HgbA1C in 3 months. If it remains elevated he will restart medication. .

## 2025-06-23 NOTE — ASSESSMENT & PLAN NOTE
" Body mass index is 24.65 kg/m².  Wt Readings from Last 3 Encounters:   06/23/25 87.1 kg (192 lb)   03/10/25 88.9 kg (196 lb)   12/03/24 87.2 kg (192 lb 3.2 oz)     Height: 6' 2\" (188 cm)     Weight is now stable. Thyroid function has normalized. Continue to monitor weight at routine PCP f/u appointments.                    "

## 2025-06-23 NOTE — PROGRESS NOTES
Name: Marko Daniel      : 1960      MRN: [id number]  Encounter Provider: CATHIE Roblero  Encounter Date: 2025   Encounter department: [facility]  :  Assessment & Plan  Currently asymptomatic HIV infection, with history of HIV-related illness (HCC)  CD4 T CELL ABSOLUTE   Date/Time Value Ref Range Status   10/26/2015 07:58  359 - 1,519 /uL Final     CD4 ABS   Date/Time Value Ref Range Status   2024 12:03  359 - 1519 /uL Final     HIV-1 RNA by PCR, Qn   Date/Time Value Ref Range Status   2024 10:07 AM <20 copies/mL Final     Comment:     HIV-1 RNA detected  The reportable range for this assay is 20 to 10,000,000  copies HIV-1 RNA/mL.     HIV-1 RNA Viral Load Log   Date/Time Value Ref Range Status   2024 10:07 AM COMMENT [id number]/mL Final     Comment:     Unable to calculate result since non-numeric result obtained for  component test.         ART: Tivicay, Prezcobix, and Viread        Denies side effects. Stressed the importance of adherence.  Continue follow up with ID clinic.       Reviewed most recent labs, including Cd4 and viral load. Discussed the risks and benefits of treatment options, instructions for management, importance of treatment adherence, and reduction of risk factor.Educated on possible  medication side effects.  Reviewed last ID visit.  Collaborated with ID to optimize medical treatment.    Counseled on routes of HIV transmission, including the risk of  infection. Emphasized that viral suppression is the best method to prevent HIV transmission.  At this time pt.denies the need for HIV testing of anyone in their life.     Total encounter time was 45 minutes. Greater then 20 minutes were spent on counseling and patient education. Pt voices understanding and agreement with treatment plan.             Ambulatory dysfunction  Suggested follow up with PT, patient declines. Instructed on using 3 point cane to increase stability,  however pt insistent he use single point cane in public. Educated on increased fall risk. Reviewed fall precautions.              Essential hypertension  Hypertension Assessment  See encounter diagnosis  Discussion: stage 2  Cardiovascular risk factors: advanced age (older than 55 for men, 65 for women), diabetes mellitus, dyslipidemia, hypertension, male gender, and smoking/ tobacco exposure    Hypertension Plan  Continue current treatment regimen.  Continue current medications.  Dietary sodium restriction.  Stop smoking.  Patient Education: Reviewed risks of hypertension and principles of   treatment.  Counseling time: counseling time more than 50% of visit: 30 minutes.Blood pressure:   BP Readings from Last 3 Encounters:   06/23/25 144/87   03/10/25 122/74   12/30/24 140/91       Continue current antihypertensive. Reviewed taking BP at home and bringing a log to next appointment.     Educated on the following lifestyle modifications to lower BP and decrease cardiovascular risk factors.  limit alcohol intake, reduce salt in diet, maintain a healthy weight, engage in 30 minutes of cardiovascular exercise daily, and not smoke.       Orders:    Blood Pressure KIT; Use in the morning    Toxic effect of tobacco, undetermined intent, subsequent encounter  Counseled for greater than 15 minutes on the importance of smoking cessation.  Advised to quit.  Educated on the increased risk of heart and lung disease associated with smoking.          Type 2 diabetes mellitus with unspecified complications (HCC)    Lab Results   Component Value Date    HGBA1C 7.6 (H) 06/20/2025       Laboratory:  Lab Results   Component Value Date    HGBA1C 7.6 (H) 06/20/2025    HGBA1C 5.9 08/12/2015       Assessment & Plan     Diabetes mellitus Type II, under fair control..    Discussed general issues about diabetes pathophysiology and management.  Addressed ADA diet.  Suggested low cholesterol diet.  Discussed foot care.  Educated on the  "importance of keeping HgbA1C below 8 Reviewed that medication helps keep his glucose levels in an acceptable range.Marko agrees to repeat HgbA1C in 3 months. If it remains elevated he will restart medication. .                 Hypertriglyceridemia         Neuropathy         Weight loss, unintentional   Body mass index is 24.65 kg/m².  Wt Readings from Last 3 Encounters:   06/23/25 87.1 kg (192 lb)   03/10/25 88.9 kg (196 lb)   12/03/24 87.2 kg (192 lb 3.2 oz)     Height: 6' 2\" (188 cm)     Weight is now stable. Thyroid function has normalized. Continue to monitor weight at routine PCP f/u appointments.                        History of Present Illness   Marko (Milli Daniel is contacted today for primary care follow-up.. PMHx significant for HIV, HTN, hyperlipidemia, polycythemia, and anxiety. He reports feeling \"grumpy\". He has stopped his metformin because he believes it is causing him muscle weakness. I have offered additional work up for his ambulatory dysfunction, which he has declined. I have also suggested PT and gait assist devices, which he has declined. He continues to decline any additional intervention.        Marko Daniel is a 65 y.o. male who presents for PCP follow up  History obtained from: patient    Review of Systems   Constitutional:  Negative for chills and fever.   HENT:  Negative for ear pain and sore throat.    Eyes:  Negative for pain and visual disturbance.   Respiratory:  Negative for cough and shortness of breath.    Cardiovascular:  Negative for chest pain and palpitations.   Gastrointestinal:  Negative for abdominal pain and vomiting.   Genitourinary:  Negative for dysuria and hematuria.   Musculoskeletal:  Positive for arthralgias and gait problem. Negative for back pain.   Skin:  Negative for color change and rash.   Neurological:  Negative for seizures and syncope.   All other systems reviewed and are negative.    Medical History Reviewed by provider this encounter:  Tobacco  " "Allergies  Meds  Problems  Med Hx  Surg Hx  Fam Hx     .  Past Medical History   Past Medical History[1]  Past Surgical History[2]  Family History[3]   reports that he has been smoking cigarettes. He has a 10.8 pack-year smoking history. He has never used smokeless tobacco. He reports current alcohol use. He reports that he does not currently use drugs after having used the following drugs: Marijuana.  Current Outpatient Medications   Medication Instructions    amLODIPine (NORVASC) 2.5 mg, Oral, Daily    Blood Pressure KIT Does not apply, Daily    Eliquis 2.5 mg, Oral, 2 times daily    metFORMIN (GLUCOPHAGE-XR) 500 mg 24 hr tablet TAKE TWO TABLETS BY MOUTH DAILY WITH BREAKFAST    PARoxetine (PAXIL) 20 mg, Oral, Daily    Prezcobix 800-150 MG TABS 1 tablet, Oral, Daily    sildenafil (VIAGRA) 100 mg, Oral, Daily PRN    tenofovir disoproxil fumarate (VIREAD) 300 mg, Oral, Daily    Tivicay 50 mg, Oral, Daily   Allergies[4]   Medications Ordered Prior to Encounter[5]   Social History[6]     Objective   /87   Pulse 101   Temp 99 °F (37.2 °C)   Ht 6' 2\" (1.88 m)   Wt 87.1 kg (192 lb)   SpO2 96%   BMI 24.65 kg/m²      Physical Exam  Vitals and nursing note reviewed.   Constitutional:       General: He is not in acute distress.     Appearance: He is well-developed.   HENT:      Head: Normocephalic and atraumatic.     Eyes:      Conjunctiva/sclera: Conjunctivae normal.       Cardiovascular:      Rate and Rhythm: Normal rate and regular rhythm.      Heart sounds: No murmur heard.  Pulmonary:      Effort: Pulmonary effort is normal. No respiratory distress.      Breath sounds: Normal breath sounds.   Abdominal:      Palpations: Abdomen is soft.      Tenderness: There is no abdominal tenderness.     Musculoskeletal:         General: No swelling.      Cervical back: Neck supple.     Skin:     General: Skin is warm and dry.      Capillary Refill: Capillary refill takes less than 2 seconds.     Neurological:      " Mental Status: He is alert.      Gait: Gait abnormal.     Psychiatric:         Mood and Affect: Mood normal.         Administrative Statements   I have spent a total time of 45 minutes in caring for this patient on the day of the visit/encounter including Diagnostic results, Prognosis, Risks and benefits of tx options, Instructions for management, Patient and family education, Importance of tx compliance, Risk factor reductions, Impressions, Counseling / Coordination of care, and Documenting in the medical record.       [1]   Past Medical History:  Diagnosis Date    DDD (degenerative disc disease), lumbar     Facet joint disease     Human immunodeficiency virus (HIV) infection (HCC)     resolved 11/5/15    Hypertension     resolved 5/29/15    Other specified disorders of white blood cells     resolved 5/12/16   [2]   Past Surgical History:  Procedure Laterality Date    HERNIA REPAIR      SHOULDER SURGERY Left     US GUIDED THYROID BIOPSY  1/5/2023   [3] No family history on file.  [4] No Known Allergies  [5]   Current Outpatient Medications on File Prior to Visit   Medication Sig Dispense Refill    amLODIPine (NORVASC) 2.5 mg tablet TAKE 1 TABLET BY MOUTH DAILY 30 tablet 5    Eliquis 2.5 MG TAKE 1 TABLET BY MOUTH TWICE A DAY 60 tablet 2    metFORMIN (GLUCOPHAGE-XR) 500 mg 24 hr tablet TAKE TWO TABLETS BY MOUTH DAILY WITH BREAKFAST (Patient taking differently: once as needed) 60 tablet 5    PARoxetine (PAXIL) 20 mg tablet TAKE 1 TABLET BY MOUTH DAILY 30 tablet 5    Prezcobix 800-150 MG TABS TAKE 1 TABLET BY MOUTH DAILY 30 tablet 3    sildenafil (VIAGRA) 100 mg tablet Take 1 tablet (100 mg total) by mouth daily as needed for erectile dysfunction 20 tablet 4    tenofovir disoproxil fumarate (VIREAD) 300 mg tablet TAKE 1 TABLET BY MOUTH DAILY 30 tablet 5    Tivicay 50 MG TABS TAKE 1 TABLET BY MOUTH DAILY 30 tablet 5     No current facility-administered medications on file prior to visit.   [6]   Social History  Tobacco  "Use    Smoking status: Every Day     Current packs/day: 0.25     Average packs/day: 0.3 packs/day for 43.0 years (10.8 ttl pk-yrs)     Types: Cigarettes    Smokeless tobacco: Never    Tobacco comments:     6-7 cigs/day   Vaping Use    Vaping status: Never Used   Substance and Sexual Activity    Alcohol use: Yes     Comment: Occassionally, one beer a month    Drug use: Not Currently     Types: Marijuana     Comment: Occassionally \"as needed for depression\"    Sexual activity: Not Currently     Partners: Female     Birth control/protection: Condom Male     "

## 2025-06-23 NOTE — ASSESSMENT & PLAN NOTE
CD4 T CELL ABSOLUTE   Date/Time Value Ref Range Status   10/26/2015 07:58  359 - 1,519 /uL Final     CD4 ABS   Date/Time Value Ref Range Status   2024 12:03  359 - 1519 /uL Final     HIV-1 RNA by PCR, Qn   Date/Time Value Ref Range Status   2024 10:07 AM <20 copies/mL Final     Comment:     HIV-1 RNA detected  The reportable range for this assay is 20 to 10,000,000  copies HIV-1 RNA/mL.     HIV-1 RNA Viral Load Log   Date/Time Value Ref Range Status   2024 10:07 AM COMMENT [id number]/mL Final     Comment:     Unable to calculate result since non-numeric result obtained for  component test.         ART: Tivicay, Prezcobix, and Viread        Denies side effects. Stressed the importance of adherence.  Continue follow up with ID clinic.       Reviewed most recent labs, including Cd4 and viral load. Discussed the risks and benefits of treatment options, instructions for management, importance of treatment adherence, and reduction of risk factor.Educated on possible  medication side effects.  Reviewed last ID visit.  Collaborated with ID to optimize medical treatment.    Counseled on routes of HIV transmission, including the risk of  infection. Emphasized that viral suppression is the best method to prevent HIV transmission.  At this time pt.denies the need for HIV testing of anyone in their life.     Total encounter time was 45 minutes. Greater then 20 minutes were spent on counseling and patient education. Pt voices understanding and agreement with treatment plan.

## 2025-06-23 NOTE — ASSESSMENT & PLAN NOTE
Hypertension Assessment  See encounter diagnosis  Discussion: stage 2  Cardiovascular risk factors: advanced age (older than 55 for men, 65 for women), diabetes mellitus, dyslipidemia, hypertension, male gender, and smoking/ tobacco exposure    Hypertension Plan  Continue current treatment regimen.  Continue current medications.  Dietary sodium restriction.  Stop smoking.  Patient Education: Reviewed risks of hypertension and principles of   treatment.  Counseling time: counseling time more than 50% of visit: 30 minutes.Blood pressure:   BP Readings from Last 3 Encounters:   06/23/25 144/87   03/10/25 122/74   12/30/24 140/91       Continue current antihypertensive. Reviewed taking BP at home and bringing a log to next appointment.     Educated on the following lifestyle modifications to lower BP and decrease cardiovascular risk factors.  limit alcohol intake, reduce salt in diet, maintain a healthy weight, engage in 30 minutes of cardiovascular exercise daily, and not smoke.       Orders:    Blood Pressure KIT; Use in the morning

## 2025-06-25 LAB — CD3+CD4+ CELLS # BLD: NORMAL /UL

## 2025-06-30 ENCOUNTER — HOSPITAL ENCOUNTER (OUTPATIENT)
Dept: NON INVASIVE DIAGNOSTICS | Facility: HOSPITAL | Age: 65
Discharge: HOME | End: 2025-06-30
Attending: NURSE PRACTITIONER
Payer: MEDICARE

## 2025-06-30 VITALS
WEIGHT: 192 LBS | SYSTOLIC BLOOD PRESSURE: 144 MMHG | HEIGHT: 74 IN | HEART RATE: 85 BPM | DIASTOLIC BLOOD PRESSURE: 87 MMHG | BODY MASS INDEX: 24.64 KG/M2

## 2025-06-30 DIAGNOSIS — R60.0 BILATERAL LOWER EXTREMITY EDEMA: ICD-10-CM

## 2025-06-30 DIAGNOSIS — R01.1 HEART MURMUR: ICD-10-CM

## 2025-06-30 LAB
AORTIC ROOT: 3.3 CM
ASCENDING AORTA: 3.4 CM
BSA FOR ECHO PROCEDURE: 2.14 M2
E WAVE DECELERATION TIME: 101 MS
E/A RATIO: 0.6
FRACTIONAL SHORTENING: 28 (ref 28–44)
INTERVENTRICULAR SEPTUM IN DIASTOLE (PARASTERNAL SHORT AXIS VIEW): 1.7 CM
INTERVENTRICULAR SEPTUM: 1.7 CM (ref 0.6–1.1)
LAAS-AP2: 18.3 CM2
LAAS-AP4: 13.3 CM2
LEFT ATRIUM SIZE: 3.2 CM
LEFT ATRIUM VOLUME (MOD BIPLANE): 41 ML
LEFT ATRIUM VOLUME INDEX (MOD BIPLANE): 19.2 ML/M2
LEFT INTERNAL DIMENSION IN SYSTOLE: 2.3 CM (ref 2.1–4)
LEFT VENTRICULAR INTERNAL DIMENSION IN DIASTOLE: 3.2 CM (ref 3.5–6)
LEFT VENTRICULAR POSTERIOR WALL IN END DIASTOLE: 0.8 CM
LEFT VENTRICULAR STROKE VOLUME: 23 ML
LV EF US.2D.A4C+ESTIMATED: 54 %
LVSV (TEICH): 23 ML
MV E'TISSUE VEL-SEP: 4 CM/S
MV PEAK A VEL: 0.82 M/S
MV PEAK E VEL: 49 CM/S
MV STENOSIS PRESSURE HALF TIME: 29 MS
MV VALVE AREA P 1/2 METHOD: 7.59
RIGHT ATRIUM AREA SYSTOLE A4C: 15.2 CM2
RIGHT VENTRICLE ID DIMENSION: 3.3 CM
SL CV LEFT ATRIUM LENGTH A2C: 5.7 CM
SL CV LV EF: 65
SL CV PED ECHO LEFT VENTRICLE DIASTOLIC VOLUME (MOD BIPLANE) 2D: 42 ML
SL CV PED ECHO LEFT VENTRICLE SYSTOLIC VOLUME (MOD BIPLANE) 2D: 19 ML
TRICUSPID ANNULAR PLANE SYSTOLIC EXCURSION: 1.7 CM

## 2025-06-30 PROCEDURE — 93306 TTE W/DOPPLER COMPLETE: CPT

## 2025-06-30 PROCEDURE — 93306 TTE W/DOPPLER COMPLETE: CPT | Performed by: INTERNAL MEDICINE

## 2025-07-01 ENCOUNTER — PROCEDURE VISIT (OUTPATIENT)
Dept: PODIATRY | Facility: CLINIC | Age: 65
End: 2025-07-01
Payer: MEDICARE

## 2025-07-01 VITALS — HEART RATE: 91 BPM | BODY MASS INDEX: 24.65 KG/M2 | HEIGHT: 74 IN | OXYGEN SATURATION: 97 %

## 2025-07-01 DIAGNOSIS — B35.1 ONYCHOMYCOSIS: Primary | ICD-10-CM

## 2025-07-01 DIAGNOSIS — E11.8 TYPE 2 DIABETES MELLITUS WITH UNSPECIFIED COMPLICATIONS (HCC): ICD-10-CM

## 2025-07-01 DIAGNOSIS — G62.9 NEUROPATHY: ICD-10-CM

## 2025-07-01 PROCEDURE — 11721 DEBRIDE NAIL 6 OR MORE: CPT | Performed by: PODIATRIST

## 2025-07-15 NOTE — PROGRESS NOTES
"Name: Marko Daniel      : 1960      MRN: [id number]  Encounter Provider: Brant Whatley DPM  Encounter Date: 2025   Encounter department: [facility] PODIATRY [facility]  :  Assessment & Plan  Onychomycosis    The patient's clinical examination today significant for brittle, thickened and dystrophic pedal nail plates with discoloration and subungual debris consistent with onychomycosis x10.  The interdigital spaces are clear and without maceration.  Pedal pulses were non-palpable bilaterally.  Skin is dry and thin with decreased turgor bilaterally.  Hair growth is absent bilaterally.  Vibratory and epicritic sensations are diminished secondary to peripheral neuropathy.     The pedal nail plates were sharply debrided with a sterile nail clipper without complication x10.  The nails were then reduced in thickness and girth utilizing a rotary bur.  There are no other acute pedal issues.  Recommend daily use of a skin cream or lotion to hydrate the dry areas of skin.     Recommend follow-up in 3 months.    Type 2 diabetes mellitus with unspecified complications (HCC)    Lab Results   Component Value Date    HGBA1C 7.6 (H) 2025            Neuropathy             History of Present Illness   HPI  Marko Daniel is a 65 y.o. male who presents today for follow-up at risk diabetic foot care with class findings. Marko has a history of diabetic peripheral neuropathy for which he is on gabapentin. He notes no other acute pedal issues today.       Review of Systems   Constitutional: Negative.    Respiratory: Negative.     Cardiovascular: Negative.    Musculoskeletal: Negative.    Skin: Negative.    Psychiatric/Behavioral: Negative.       Pertinent Medical History     PAST MEDICAL HISTORY:  Past Medical History[1]    PAST SURGICAL HISTORY:  Past Surgical History[2]     ALLERGIES:  Patient has no known allergies.    MEDICATIONS:  Current Medications[3]             Objective   Pulse 91   Ht 6' 2\" " (1.88 m)   SpO2 97%   BMI 24.65 kg/m²      Physical Exam  Constitutional:       Appearance: Normal appearance.   HENT:      Head: Normocephalic and atraumatic.     Eyes:      Conjunctiva/sclera: Conjunctivae normal.       Cardiovascular:      Pulses:           Dorsalis pedis pulses are 0 on the right side and 0 on the left side.        Posterior tibial pulses are 0 on the right side and 0 on the left side.   Pulmonary:      Effort: Pulmonary effort is normal.   Feet:      Right foot:      Skin integrity: Dry skin present.      Toenail Condition: Right toenails are abnormally thick and long. Fungal disease present.     Left foot:      Skin integrity: Dry skin present.      Toenail Condition: Left toenails are abnormally thick and long. Fungal disease present.     Comments: The patient's clinical examination today significant for brittle, thickened and dystrophic pedal nail plates with discoloration and subungual debris consistent with onychomycosis x10.  The interdigital spaces are clear and without maceration.  Pedal pulses were non-palpable bilaterally.  Skin is dry and thin with decreased turgor bilaterally.  Hair growth is absent bilaterally.  Vibratory and epicritic sensations are diminished secondary to peripheral neuropathy.    Skin:     General: Skin is warm and dry.      Capillary Refill: Capillary refill takes 2 to 3 seconds.     Neurological:      General: No focal deficit present.      Mental Status: He is alert and oriented to person, place, and time.     Psychiatric:         Mood and Affect: Mood normal.                [1]   Past Medical History:  Diagnosis Date    DDD (degenerative disc disease), lumbar     Facet joint disease     Human immunodeficiency virus (HIV) infection (HCC)     resolved 11/5/15    Hypertension     resolved 5/29/15    Other specified disorders of white blood cells     resolved 5/12/16   [2]   Past Surgical History:  Procedure Laterality Date    HERNIA REPAIR      SHOULDER  SURGERY Left     US GUIDED THYROID BIOPSY  1/5/2023   [3]   Current Outpatient Medications   Medication Sig Dispense Refill    amLODIPine (NORVASC) 2.5 mg tablet TAKE 1 TABLET BY MOUTH DAILY 30 tablet 5    Blood Pressure KIT Use in the morning 1 kit 0    Eliquis 2.5 MG TAKE 1 TABLET BY MOUTH TWICE A DAY 60 tablet 2    PARoxetine (PAXIL) 20 mg tablet TAKE 1 TABLET BY MOUTH DAILY 30 tablet 5    Prezcobix 800-150 MG TABS TAKE 1 TABLET BY MOUTH DAILY 30 tablet 3    sildenafil (VIAGRA) 100 mg tablet Take 1 tablet (100 mg total) by mouth daily as needed for erectile dysfunction 20 tablet 4    tenofovir disoproxil fumarate (VIREAD) 300 mg tablet TAKE 1 TABLET BY MOUTH DAILY 30 tablet 5    Tivicay 50 MG TABS TAKE 1 TABLET BY MOUTH DAILY 30 tablet 5    metFORMIN (GLUCOPHAGE-XR) 500 mg 24 hr tablet TAKE TWO TABLETS BY MOUTH DAILY WITH BREAKFAST (Patient not taking: Reported on 7/1/2025) 60 tablet 5     No current facility-administered medications for this visit.

## 2025-07-16 ENCOUNTER — HOSPITAL ENCOUNTER (OUTPATIENT)
Dept: ULTRASOUND IMAGING | Facility: HOSPITAL | Age: 65
Discharge: HOME | End: 2025-07-16
Payer: MEDICARE

## 2025-07-16 DIAGNOSIS — E04.1 THYROID NODULE: ICD-10-CM

## 2025-07-16 PROCEDURE — 76536 US EXAM OF HEAD AND NECK: CPT

## 2025-07-23 ENCOUNTER — PATIENT OUTREACH (OUTPATIENT)
Dept: SURGERY | Facility: CLINIC | Age: 65
End: 2025-07-23

## 2025-07-23 NOTE — PROGRESS NOTES
Jojo from [facility] called cm and reported that the ct's health insurance was inactive. Jojo explained that they need to ship out his medication but because it is inactive, she cannot. Jojo reported that she has been trying call him but he does not answer for them anymore. Cm informed Jojo that she would reach out to the ct right away to inform him of this. Cm called the ct and discussed this. Ct agreed to meet with cm on Friday the 25th. Cm explained she will assist him with his insurance issue and complete his recert. Ct agreed and stated that he will call her if he cannot make it due to not feeling well.

## 2025-07-25 ENCOUNTER — PATIENT OUTREACH (OUTPATIENT)
Dept: SURGERY | Facility: CLINIC | Age: 65
End: 2025-07-25

## 2025-07-25 ENCOUNTER — TELEPHONE (OUTPATIENT)
Dept: SURGERY | Facility: CLINIC | Age: 65
End: 2025-07-25

## 2025-07-25 NOTE — PROGRESS NOTES
Cm met with the ct and completed the recert. See media for recert and supporting documents. Ct's insurance is currently inactive. Cm called Jojo from [facility] and provided the [employer] information. Jojo explained that there is a problem in the system and has to reach out to their support team to get this processed. Jojo informed cm that she would have to call her back. Cm and ct called the [facility] assistance office, the  stated that all operators were busy and hung up the line. Cm and ct agreed they would call back again later on together. Ct and cm called the [facility] again in the afternoon but the  gave the same reply and hung up. Cm encouraged the ct to call the [facility] assistance office on Monday to speak to someone in order to know what steps they needed to take in order to activate his insurance again. Ct stated that he would try but would not make any promises.

## 2025-07-25 NOTE — TELEPHONE ENCOUNTER
SP[employer] information given to [facility].     [id number] start date: 02/24/25 and end date:03/31/2026.

## 2025-08-04 ENCOUNTER — PATIENT OUTREACH (OUTPATIENT)
Dept: SURGERY | Facility: CLINIC | Age: 65
End: 2025-08-04

## 2025-08-12 ENCOUNTER — TELEPHONE (OUTPATIENT)
Age: 65
End: 2025-08-12

## 2025-08-14 DIAGNOSIS — N52.9 ERECTILE DYSFUNCTION, UNSPECIFIED ERECTILE DYSFUNCTION TYPE: ICD-10-CM

## 2025-08-19 ENCOUNTER — OFFICE VISIT (OUTPATIENT)
Dept: ENDOCRINOLOGY | Facility: CLINIC | Age: 65
End: 2025-08-19
Payer: MEDICARE

## 2025-08-19 VITALS
HEART RATE: 94 BPM | SYSTOLIC BLOOD PRESSURE: 130 MMHG | OXYGEN SATURATION: 98 % | DIASTOLIC BLOOD PRESSURE: 90 MMHG | BODY MASS INDEX: 24.65 KG/M2 | HEIGHT: 74 IN

## 2025-08-19 DIAGNOSIS — E04.1 THYROID NODULE: Primary | ICD-10-CM

## 2025-08-19 DIAGNOSIS — E11.8 TYPE 2 DIABETES MELLITUS WITH UNSPECIFIED COMPLICATIONS (HCC): ICD-10-CM

## 2025-08-19 DIAGNOSIS — R79.89 ELEVATED TSH: ICD-10-CM

## 2025-08-19 PROCEDURE — 99214 OFFICE O/P EST MOD 30 MIN: CPT | Performed by: INTERNAL MEDICINE

## 2025-08-19 RX ORDER — SILDENAFIL 100 MG/1
TABLET, FILM COATED ORAL
Qty: 10 TABLET | OUTPATIENT
Start: 2025-08-19